# Patient Record
Sex: MALE | Race: BLACK OR AFRICAN AMERICAN | NOT HISPANIC OR LATINO | Employment: OTHER | URBAN - METROPOLITAN AREA
[De-identification: names, ages, dates, MRNs, and addresses within clinical notes are randomized per-mention and may not be internally consistent; named-entity substitution may affect disease eponyms.]

---

## 2019-11-01 ENCOUNTER — TELEPHONE (OUTPATIENT)
Dept: NEPHROLOGY | Facility: CLINIC | Age: 74
End: 2019-11-01

## 2019-11-01 DIAGNOSIS — N18.30 CKD (CHRONIC KIDNEY DISEASE) STAGE 3, GFR 30-59 ML/MIN: Primary | ICD-10-CM

## 2019-11-01 NOTE — TELEPHONE ENCOUNTER
Called but numbe rnot in service. Called daughter ,no answer, no vm.sent letter with appt ,lab orders to be done. And letter of explanation to pts address.11/01/1901/19/1332/sf

## 2019-12-11 ENCOUNTER — OFFICE VISIT (OUTPATIENT)
Dept: NEPHROLOGY | Facility: CLINIC | Age: 74
End: 2019-12-11
Payer: COMMERCIAL

## 2019-12-11 ENCOUNTER — LAB VISIT (OUTPATIENT)
Dept: LAB | Facility: HOSPITAL | Age: 74
End: 2019-12-11
Attending: INTERNAL MEDICINE
Payer: MEDICARE

## 2019-12-11 VITALS
WEIGHT: 186.31 LBS | HEART RATE: 70 BPM | HEIGHT: 62 IN | BODY MASS INDEX: 34.29 KG/M2 | DIASTOLIC BLOOD PRESSURE: 80 MMHG | SYSTOLIC BLOOD PRESSURE: 130 MMHG

## 2019-12-11 DIAGNOSIS — I10 HYPERTENSION, UNSPECIFIED TYPE: ICD-10-CM

## 2019-12-11 DIAGNOSIS — N28.9 RENAL INSUFFICIENCY: Primary | ICD-10-CM

## 2019-12-11 DIAGNOSIS — N28.9 RENAL INSUFFICIENCY: ICD-10-CM

## 2019-12-11 PROBLEM — E11.9 DIABETES MELLITUS: Status: ACTIVE | Noted: 2019-12-11

## 2019-12-11 LAB
ALBUMIN SERPL BCP-MCNC: 3.6 G/DL (ref 3.5–5.2)
ANION GAP SERPL CALC-SCNC: 9 MMOL/L (ref 8–16)
BASOPHILS # BLD AUTO: 0.02 K/UL (ref 0–0.2)
BASOPHILS NFR BLD: 0.3 % (ref 0–1.9)
BUN SERPL-MCNC: 20 MG/DL (ref 8–23)
CALCIUM SERPL-MCNC: 9.2 MG/DL (ref 8.7–10.5)
CHLORIDE SERPL-SCNC: 102 MMOL/L (ref 95–110)
CO2 SERPL-SCNC: 31 MMOL/L (ref 23–29)
CREAT SERPL-MCNC: 3.3 MG/DL (ref 0.5–1.4)
DIFFERENTIAL METHOD: NORMAL
EOSINOPHIL # BLD AUTO: 0.1 K/UL (ref 0–0.5)
EOSINOPHIL NFR BLD: 0.7 % (ref 0–8)
ERYTHROCYTE [DISTWIDTH] IN BLOOD BY AUTOMATED COUNT: 14.4 % (ref 11.5–14.5)
EST. GFR  (AFRICAN AMERICAN): 20.4 ML/MIN/1.73 M^2
EST. GFR  (NON AFRICAN AMERICAN): 17.7 ML/MIN/1.73 M^2
GLUCOSE SERPL-MCNC: 163 MG/DL (ref 70–110)
HCT VFR BLD AUTO: 44.5 % (ref 40–54)
HGB BLD-MCNC: 14.4 G/DL (ref 14–18)
IMM GRANULOCYTES # BLD AUTO: 0.01 K/UL (ref 0–0.04)
IMM GRANULOCYTES NFR BLD AUTO: 0.1 % (ref 0–0.5)
LYMPHOCYTES # BLD AUTO: 1.7 K/UL (ref 1–4.8)
LYMPHOCYTES NFR BLD: 22.6 % (ref 18–48)
MCH RBC QN AUTO: 31 PG (ref 27–31)
MCHC RBC AUTO-ENTMCNC: 32.4 G/DL (ref 32–36)
MCV RBC AUTO: 96 FL (ref 82–98)
MONOCYTES # BLD AUTO: 0.6 K/UL (ref 0.3–1)
MONOCYTES NFR BLD: 7.3 % (ref 4–15)
NEUTROPHILS # BLD AUTO: 5.2 K/UL (ref 1.8–7.7)
NEUTROPHILS NFR BLD: 69 % (ref 38–73)
NRBC BLD-RTO: 0 /100 WBC
PHOSPHATE SERPL-MCNC: 3.3 MG/DL (ref 2.7–4.5)
PLATELET # BLD AUTO: 193 K/UL (ref 150–350)
PMV BLD AUTO: 11.7 FL (ref 9.2–12.9)
POTASSIUM SERPL-SCNC: 3.9 MMOL/L (ref 3.5–5.1)
RBC # BLD AUTO: 4.65 M/UL (ref 4.6–6.2)
SODIUM SERPL-SCNC: 142 MMOL/L (ref 136–145)
WBC # BLD AUTO: 7.52 K/UL (ref 3.9–12.7)

## 2019-12-11 PROCEDURE — 99205 PR OFFICE/OUTPT VISIT, NEW, LEVL V, 60-74 MIN: ICD-10-PCS | Mod: S$PBB,,, | Performed by: INTERNAL MEDICINE

## 2019-12-11 PROCEDURE — 83970 ASSAY OF PARATHORMONE: CPT

## 2019-12-11 PROCEDURE — 1126F PR PAIN SEVERITY QUANTIFIED, NO PAIN PRESENT: ICD-10-PCS | Mod: ,,, | Performed by: INTERNAL MEDICINE

## 2019-12-11 PROCEDURE — 99205 OFFICE O/P NEW HI 60 MIN: CPT | Mod: S$PBB,,, | Performed by: INTERNAL MEDICINE

## 2019-12-11 PROCEDURE — 80069 RENAL FUNCTION PANEL: CPT

## 2019-12-11 PROCEDURE — 1159F PR MEDICATION LIST DOCUMENTED IN MEDICAL RECORD: ICD-10-PCS | Mod: ,,, | Performed by: INTERNAL MEDICINE

## 2019-12-11 PROCEDURE — 99999 PR PBB SHADOW E&M-EST. PATIENT-LVL III: CPT | Mod: PBBFAC,,, | Performed by: INTERNAL MEDICINE

## 2019-12-11 PROCEDURE — 1159F MED LIST DOCD IN RCRD: CPT | Mod: ,,, | Performed by: INTERNAL MEDICINE

## 2019-12-11 PROCEDURE — 36415 COLL VENOUS BLD VENIPUNCTURE: CPT

## 2019-12-11 PROCEDURE — 1126F AMNT PAIN NOTED NONE PRSNT: CPT | Mod: ,,, | Performed by: INTERNAL MEDICINE

## 2019-12-11 PROCEDURE — 99213 OFFICE O/P EST LOW 20 MIN: CPT | Mod: PBBFAC | Performed by: INTERNAL MEDICINE

## 2019-12-11 PROCEDURE — 85025 COMPLETE CBC W/AUTO DIFF WBC: CPT

## 2019-12-11 PROCEDURE — 99999 PR PBB SHADOW E&M-EST. PATIENT-LVL III: ICD-10-PCS | Mod: PBBFAC,,, | Performed by: INTERNAL MEDICINE

## 2019-12-11 RX ORDER — ARIPIPRAZOLE 30 MG/1
30 TABLET ORAL NIGHTLY
COMMUNITY

## 2019-12-11 RX ORDER — AMLODIPINE BESYLATE 10 MG/1
10 TABLET ORAL DAILY
COMMUNITY
End: 2020-06-05 | Stop reason: SDUPTHER

## 2019-12-11 RX ORDER — PRAZOSIN HYDROCHLORIDE 1 MG/1
2 CAPSULE ORAL NIGHTLY
Status: ON HOLD | COMMUNITY
End: 2020-10-28 | Stop reason: HOSPADM

## 2019-12-11 RX ORDER — SPIRONOLACTONE 25 MG/1
25 TABLET ORAL DAILY
COMMUNITY
End: 2020-06-05 | Stop reason: SDUPTHER

## 2019-12-11 RX ORDER — ATORVASTATIN CALCIUM 80 MG/1
80 TABLET, FILM COATED ORAL DAILY
COMMUNITY
End: 2020-06-05 | Stop reason: SDUPTHER

## 2019-12-11 RX ORDER — SERTRALINE HYDROCHLORIDE 100 MG/1
100 TABLET, FILM COATED ORAL DAILY
COMMUNITY

## 2019-12-11 NOTE — PROGRESS NOTES
"NEPHROLOGY CONSULTATION    [facility]    REASON FOR CONSULTATION: Renal insufficiency    74 y.o. male with history of HTN, hyperlipidemia, CVA, depression, DM2 presents to the renal clinic for evaluation of renal insufficiency. [facility]  Patient today presents to the clinic complaining of hand/foot paresthesia. He denies any headaches, congenital hearing loss, chest pain, SOB, hemoptysis, abdominal or flank pain, diarrhea, nausea/vomiting, hematuria, dysuria, LE swelling, rashes, nasal congestion. Patient denies any NSAID use.   Patient has a longstanding history of DM2 that was diagnosed about a year ago. He is not aware of any associated diabetic retinopathy. He is currently on long and short-acting insulin. Patient also reports > 30 year history of hypertension. BP is currently controlled at 130/80. In addition, patient reports history of CVA (about 20 years ago, he reports left paresis in past which has now resolved). Hedenies any history of nephrolithiasis, heart disease, asthma, COPD, GERD, thyroid disease. Patient denies any history of renal disease in his family.         Past Medical History:   Diagnosis Date    Hyperlipidemia     Hypertension     Retinitis     Stroke     Pt states residual sx is "I walk funny".       History reviewed. No pertinent surgical history.    Review of patient's allergies indicates:   Allergen Reactions    Benadryl [diphenhydramine hcl] Other (See Comments)     Pt states benadryl makes him feel numb       Current Outpatient Medications   Medication Sig Dispense Refill    amLODIPine (NORVASC) 10 MG tablet Take 10 mg by mouth once daily.       ARIPiprazole (ABILIFY) 30 MG Tab 30 mg once daily.       aspirin (ASPIR-81 ORAL) once daily.       atorvastatin (LIPITOR) 80 MG tablet Take 80 mg by mouth once daily.       prazosin (MINIPRESS) 1 MG Cap 2 mg once daily.       sertraline (ZOLOFT) 100 MG tablet " Take 100 mg by mouth once daily.       spironolactone (ALDACTONE) 25 MG tablet Take 25 mg by mouth once daily.        No current facility-administered medications for this visit.        History reviewed. No pertinent family history.    Social History     Socioeconomic History    Marital status: Single     Spouse name: Not on file    Number of children: Not on file    Years of education: Not on file    Highest education level: Not on file   Occupational History    Not on file   Social Needs    Financial resource strain: Not on file    Food insecurity:     Worry: Not on file     Inability: Not on file    Transportation needs:     Medical: Not on file     Non-medical: Not on file   Tobacco Use    Smoking status: Never Smoker   Substance and Sexual Activity    Alcohol use: No    Drug use: No    Sexual activity: Not on file   Lifestyle    Physical activity:     Days per week: Not on file     Minutes per session: Not on file    Stress: Not on file   Relationships    Social connections:     Talks on phone: Not on file     Gets together: Not on file     Attends Orthodox service: Not on file     Active member of club or organization: Not on file     Attends meetings of clubs or organizations: Not on file     Relationship status: Not on file   Other Topics Concern    Not on file   Social History Narrative    Not on file       Review of Systems:  1. GENERAL: patient denies any fever, weight changes, generalized weakness, dizziness.  2. HEENT: patient denies headaches, visual disturbances, swallowing problems, sinus pain, nasal congestion.  3. CARDIOVASCULAR: patient denies chest pain, palpitations.  4. PULMONARY: patient denies SOB, coughing, hemoptysis, wheezing.  5. GASTROINTESTINAL: patient denies abdominal pain, nausea, vomiting, diarrhea.  6. GENITOURINARY: patient denies dysuria, hematuria, hesitancy, frequency.  7. EXTREMITIES: patient denies LE edema, LE cramping.  8. DERMATOLOGY: patient denies  "rashes, ulcers.  9. NEURO: patient denies tremors, extremity weakness, he reports extremity numbness/tingling.  10. MUSCULOSKELETAL: patient denies joint pain, joint swelling.  11. HEMATOLOGY: patient denies rectal or gum bleeding.  12: PSYCH: patient denies anxiety, depression.      PHYSICAL EXAM:    /80   Pulse 70   Ht 5' 2" (1.575 m)   Wt 84.5 kg (186 lb 4.6 oz)   BMI 34.07 kg/m²     GENERAL: Pleasant gentleman presents to clinic with non-labored breathing.  HEENT: PER, no nasal discharge, no icterus, no oral exudates, moist mucosal membranes.  NECK: no thyroid mass, no lymphadenopathy.  HEART: RRR S1/S2, no rubs, good peripheral pulses.  LUNGS: CTA bilaterally, no wheezing, breathing is nonlabored.  ABDOMEN: soft, nontender, not distended, bowel sounds are present, no abdominal hernia.  EXTREM: mild bilateral LE edema.  SKIN: Vitiligo on hands and feet  NEURO: A & O x 3, no obvious focal signs.    LABORATORY RESULTS:    Lab Results   Component Value Date    CREATININE 2.2 (H) 05/16/2014    BUN 22 05/16/2014     05/16/2014    K 3.9 05/16/2014     05/16/2014    CO2 26 05/16/2014      Lab Results   Component Value Date    CALCIUM 9.3 05/16/2014     Lab Results   Component Value Date    ALBUMIN 3.6 05/16/2014     Lab Results   Component Value Date    WBC 6.38 05/16/2014    HGB 14.1 05/16/2014    HCT 40.2 05/16/2014    MCV 88 05/16/2014     05/16/2014     No current labs are available.      ASSESSMENT AND PLAN:  74 y.o. male with history of HTN, hyperlipidemia, CVA, depression, DM2 presents to the renal clinic for evaluation of renal insufficiency.     1. Renal insufficiency: Labs from May 2014 revealed creatinine of 2.2. Will get current labs and assess patient's kidney function afterwards. Will also order renal ultrasound. Patient will return to clinic in 1 week for re-evaluation. Patient was advised to avoid NSAID pain medications such as advil, aleve, motrin, ibuprofen, naprosyn, " meloxicam, diclofenac, mobic and to hydrate with 60-80 ounces of water per day.           Thank you very much for this consult. Please see my note in Epic for recommendations.    Total time spent was about 45 min. 50 % or more of time was spent on counseling patient about treatment options and educating patient about disease etiology. Complex case. Level 5 consult.

## 2019-12-12 LAB — PTH-INTACT SERPL-MCNC: 655 PG/ML (ref 9–77)

## 2019-12-13 ENCOUNTER — TELEPHONE (OUTPATIENT)
Dept: RADIOLOGY | Facility: HOSPITAL | Age: 74
End: 2019-12-13

## 2019-12-16 ENCOUNTER — HOSPITAL ENCOUNTER (OUTPATIENT)
Dept: RADIOLOGY | Facility: HOSPITAL | Age: 74
Discharge: HOME | End: 2019-12-16
Attending: INTERNAL MEDICINE
Payer: MEDICARE

## 2019-12-16 DIAGNOSIS — N28.9 RENAL INSUFFICIENCY: ICD-10-CM

## 2019-12-16 PROCEDURE — 76770 US EXAM ABDO BACK WALL COMP: CPT | Mod: 26,,, | Performed by: RADIOLOGY

## 2019-12-16 PROCEDURE — 76770 US EXAM ABDO BACK WALL COMP: CPT | Mod: TC

## 2019-12-16 PROCEDURE — 76770 US RETROPERITONEAL COMPLETE: ICD-10-PCS | Mod: 26,,, | Performed by: RADIOLOGY

## 2019-12-18 ENCOUNTER — OFFICE VISIT (OUTPATIENT)
Dept: NEPHROLOGY | Facility: CLINIC | Age: 74
End: 2019-12-18
Payer: MEDICARE

## 2019-12-18 VITALS
HEART RATE: 60 BPM | SYSTOLIC BLOOD PRESSURE: 175 MMHG | HEIGHT: 62 IN | WEIGHT: 187.38 LBS | DIASTOLIC BLOOD PRESSURE: 78 MMHG | BODY MASS INDEX: 34.48 KG/M2

## 2019-12-18 DIAGNOSIS — R80.9 PROTEINURIA DUE TO TYPE 2 DIABETES MELLITUS: ICD-10-CM

## 2019-12-18 DIAGNOSIS — N25.81 HYPERPARATHYROIDISM, SECONDARY RENAL: ICD-10-CM

## 2019-12-18 DIAGNOSIS — N18.4 DIABETES MELLITUS DUE TO UNDERLYING CONDITION WITH STAGE 4 CHRONIC KIDNEY DISEASE, WITH LONG-TERM CURRENT USE OF INSULIN: ICD-10-CM

## 2019-12-18 DIAGNOSIS — E08.22 DIABETES MELLITUS DUE TO UNDERLYING CONDITION WITH STAGE 4 CHRONIC KIDNEY DISEASE, WITH LONG-TERM CURRENT USE OF INSULIN: ICD-10-CM

## 2019-12-18 DIAGNOSIS — E11.29 PROTEINURIA DUE TO TYPE 2 DIABETES MELLITUS: ICD-10-CM

## 2019-12-18 DIAGNOSIS — N18.4 CKD (CHRONIC KIDNEY DISEASE) STAGE 4, GFR 15-29 ML/MIN: Primary | ICD-10-CM

## 2019-12-18 DIAGNOSIS — Z79.4 DIABETES MELLITUS DUE TO UNDERLYING CONDITION WITH STAGE 4 CHRONIC KIDNEY DISEASE, WITH LONG-TERM CURRENT USE OF INSULIN: ICD-10-CM

## 2019-12-18 PROBLEM — N28.9 RENAL INSUFFICIENCY: Status: RESOLVED | Noted: 2019-12-11 | Resolved: 2019-12-18

## 2019-12-18 PROCEDURE — 99214 PR OFFICE/OUTPT VISIT, EST, LEVL IV, 30-39 MIN: ICD-10-PCS | Mod: S$PBB,,, | Performed by: INTERNAL MEDICINE

## 2019-12-18 PROCEDURE — 99999 PR PBB SHADOW E&M-EST. PATIENT-LVL III: CPT | Mod: PBBFAC,,, | Performed by: INTERNAL MEDICINE

## 2019-12-18 PROCEDURE — 1159F PR MEDICATION LIST DOCUMENTED IN MEDICAL RECORD: ICD-10-PCS | Mod: ,,, | Performed by: INTERNAL MEDICINE

## 2019-12-18 PROCEDURE — 1159F MED LIST DOCD IN RCRD: CPT | Mod: ,,, | Performed by: INTERNAL MEDICINE

## 2019-12-18 PROCEDURE — 1126F AMNT PAIN NOTED NONE PRSNT: CPT | Mod: ,,, | Performed by: INTERNAL MEDICINE

## 2019-12-18 PROCEDURE — 99214 OFFICE O/P EST MOD 30 MIN: CPT | Mod: S$PBB,,, | Performed by: INTERNAL MEDICINE

## 2019-12-18 PROCEDURE — 1126F PR PAIN SEVERITY QUANTIFIED, NO PAIN PRESENT: ICD-10-PCS | Mod: ,,, | Performed by: INTERNAL MEDICINE

## 2019-12-18 PROCEDURE — 99999 PR PBB SHADOW E&M-EST. PATIENT-LVL III: ICD-10-PCS | Mod: PBBFAC,,, | Performed by: INTERNAL MEDICINE

## 2019-12-18 PROCEDURE — 99213 OFFICE O/P EST LOW 20 MIN: CPT | Mod: PBBFAC | Performed by: INTERNAL MEDICINE

## 2019-12-18 NOTE — PROGRESS NOTES
"PROGRESS NOTE FOR ESTABLISHED PATIENT    [facility]    REASON FOR VISIT: Renal insufficiency      Initial consult note:  74 y.o. male with history of HTN, hyperlipidemia, CVA, depression, DM2 presents to the renal clinic for evaluation of renal insufficiency. [facility]  Patient today presents to the clinic complaining of hand/foot paresthesia. He denies any headaches, congenital hearing loss, chest pain, SOB, hemoptysis, abdominal or flank pain, diarrhea, nausea/vomiting, hematuria, dysuria, LE swelling, rashes, nasal congestion. Patient denies any NSAID use.   Patient has a longstanding history of DM2 that was diagnosed about a year ago. He is not aware of any associated diabetic retinopathy. He is currently on long and short-acting insulin. Patient also reports > 30 year history of hypertension. BP is currently controlled at 130/80. In addition, patient reports history of CVA (about 20 years ago, he reports left paresis in past which has now resolved). Hedenies any history of nephrolithiasis, heart disease, asthma, COPD, GERD, thyroid disease. Patient denies any history of renal disease in his family.     December 18, 2019:  Patient today has no complaints. He reports that he uses Levemir and Novolog for his diabetes.         Past Medical History:   Diagnosis Date    Diabetes mellitus     Hyperlipidemia     Hypertension     Retinitis     Stroke     Pt states residual sx is "I walk funny".       History reviewed. No pertinent surgical history.    Review of patient's allergies indicates:   Allergen Reactions    Benadryl [diphenhydramine hcl] Other (See Comments)     Pt states benadryl makes him feel numb       Current Outpatient Medications   Medication Sig Dispense Refill    amLODIPine (NORVASC) 10 MG tablet Take 10 mg by mouth once daily.       ARIPiprazole (ABILIFY) 30 MG Tab 30 mg once daily.       aspirin (ASPIR-81 ORAL) once " daily.       atorvastatin (LIPITOR) 80 MG tablet Take 80 mg by mouth once daily.       prazosin (MINIPRESS) 1 MG Cap 2 mg once daily.       sertraline (ZOLOFT) 100 MG tablet Take 100 mg by mouth once daily.       spironolactone (ALDACTONE) 25 MG tablet Take 25 mg by mouth once daily.        No current facility-administered medications for this visit.        History reviewed. No pertinent family history.    Social History     Socioeconomic History    Marital status: Single     Spouse name: Not on file    Number of children: Not on file    Years of education: Not on file    Highest education level: Not on file   Occupational History    Not on file   Social Needs    Financial resource strain: Not on file    Food insecurity:     Worry: Not on file     Inability: Not on file    Transportation needs:     Medical: Not on file     Non-medical: Not on file   Tobacco Use    Smoking status: Never Smoker   Substance and Sexual Activity    Alcohol use: No    Drug use: No    Sexual activity: Not on file   Lifestyle    Physical activity:     Days per week: Not on file     Minutes per session: Not on file    Stress: Not on file   Relationships    Social connections:     Talks on phone: Not on file     Gets together: Not on file     Attends Oriental orthodox service: Not on file     Active member of club or organization: Not on file     Attends meetings of clubs or organizations: Not on file     Relationship status: Not on file   Other Topics Concern    Not on file   Social History Narrative    Not on file       Review of Systems:  1. GENERAL: patient denies any fever, weight changes, generalized weakness, dizziness.  2. HEENT: patient denies headaches, visual disturbances, swallowing problems, sinus pain, nasal congestion.  3. CARDIOVASCULAR: patient denies chest pain, palpitations.  4. PULMONARY: patient denies SOB, coughing, hemoptysis, wheezing.  5. GASTROINTESTINAL: patient denies abdominal pain, nausea, vomiting,  "diarrhea.  6. GENITOURINARY: patient denies dysuria, hematuria, hesitancy, frequency.  7. EXTREMITIES: patient denies LE edema, LE cramping.  8. DERMATOLOGY: patient denies rashes, ulcers.  9. NEURO: patient denies tremors, extremity weakness, he reports extremity numbness/tingling.  10. MUSCULOSKELETAL: patient denies joint pain, joint swelling.  11. HEMATOLOGY: patient denies rectal or gum bleeding.  12: PSYCH: patient denies anxiety, depression.      PHYSICAL EXAM:    BP (!) 175/78   Pulse 60   Ht 5' 2" (1.575 m)   Wt 85 kg (187 lb 6.3 oz)   BMI 34.27 kg/m²     GENERAL: Pleasant gentleman presents to clinic with non-labored breathing.  HEENT: PER, no nasal discharge, no icterus, no oral exudates, moist mucosal membranes.  NECK: no thyroid mass, no lymphadenopathy.  HEART: RRR S1/S2, no rubs, good peripheral pulses.  LUNGS: CTA bilaterally, no wheezing, breathing is nonlabored.  ABDOMEN: soft, nontender, not distended, bowel sounds are present, no abdominal hernia.  EXTREM: mild bilateral LE edema.  SKIN: Vitiligo on hands and feet  NEURO: A & O x 3, no obvious focal signs.    LABORATORY RESULTS:    Lab Results   Component Value Date    CREATININE 3.3 (H) 12/11/2019    BUN 20 12/11/2019     12/11/2019    K 3.9 12/11/2019     12/11/2019    CO2 31 (H) 12/11/2019      Lab Results   Component Value Date    .0 (H) 12/11/2019    CALCIUM 9.2 12/11/2019    PHOS 3.3 12/11/2019     Lab Results   Component Value Date    ALBUMIN 3.6 12/11/2019     Lab Results   Component Value Date    WBC 7.52 12/11/2019    HGB 14.4 12/11/2019    HCT 44.5 12/11/2019    MCV 96 12/11/2019     12/11/2019     Protein Creatinine Ratios:    Creatinine, Random Ur   Date Value Ref Range Status   12/11/2019 193.0 23.0 - 375.0 mg/dL Final     Comment:     The random urine reference ranges provided were established   for 24 hour urine collections.  No reference ranges exist for  random urine specimens.  Correlate " clinically.       Protein, Urine Random   Date Value Ref Range Status   12/11/2019 733 (H) 0 - 15 mg/dL Final     Comment:     The random urine reference ranges provided were established   for 24 hour urine collections.  No reference ranges exist for  random urine specimens.  Correlate clinically.       Prot/Creat Ratio, Ur   Date Value Ref Range Status   12/11/2019 3.80 (H) 0.00 - 0.20 Final       Renal US from 12/16/19:  FINDINGS:  Right kidneys lower limits normal in size measuring 9.0 cm in length.  Mild uniform thinning of the cortex with increased echotexture.  Multiple small cyst identified throughout the cortex on the right.  Largest is partially exophytic and medial and upper pole in location measuring 2.7 x 2.8 x 3.0 cm.  No hydronephrosis, nephrolithiasis or perinephric fluid.  Left kidney measures 9.8 cm in length with similar increased cortical echogenicity and mild uniform thinning.  Multiple small cyst identified with the largest appearing partially exophytic the midpole lateral region measuring 1.4 x 1.2 x 11.4 cm.  No hydronephrosis or nephrolithiasis.  No perinephric fluid.  Partially distended urinary bladder is unremarkable.        ASSESSMENT AND PLAN:  74 y.o. male with history of HTN, hyperlipidemia, CVA, depression, DM2 presents to the renal clinic for evaluation of renal insufficiency.     1. Renal insufficiency: Repeat labs from 12/11/19 showed that patient's creatinine has increased to 3.3 (from 2.2 on 5/16/14). He also presents with 3.8 g of protein. Renal US shows small kidneys with multiple bilateral cysts. Patient's home SBP is in 120s and SBP was in 130s during last visit. Will not add any ACE-I/ARB for now given those BP readings. Patient is on aldactone which has some anti-proteinuric effects.Patient's renal function will be monitored closely and he will return to clinic in about 1 week.  Patient was advised to avoid NSAID pain medications such as advil, aleve, motrin, ibuprofen,  naprosyn, meloxicam, diclofenac, mobic and to hydrate with 60-80 ounces of water per day.     2. Electrolytes: at goal.    3. Acid-base: no issues.    4. Volume: mild LE edema. Monitor.    5. DM2: patient is on Levemir and Novolog. Will follow up on HgA1c.    6. Medications: he was asked to bring all his home meds during next visit.     7. Hyperparathyroidism: will start vitamin D 5000 units per day.    8. Will refer patient to [facility] class.

## 2020-02-04 ENCOUNTER — LAB VISIT (OUTPATIENT)
Dept: LAB | Facility: HOSPITAL | Age: 75
End: 2020-02-04
Attending: INTERNAL MEDICINE
Payer: COMMERCIAL

## 2020-02-04 DIAGNOSIS — N18.4 CKD (CHRONIC KIDNEY DISEASE) STAGE 4, GFR 15-29 ML/MIN: ICD-10-CM

## 2020-02-04 DIAGNOSIS — N25.81 HYPERPARATHYROIDISM, SECONDARY RENAL: ICD-10-CM

## 2020-02-04 DIAGNOSIS — E08.22 DIABETES MELLITUS DUE TO UNDERLYING CONDITION WITH STAGE 4 CHRONIC KIDNEY DISEASE, WITH LONG-TERM CURRENT USE OF INSULIN: ICD-10-CM

## 2020-02-04 DIAGNOSIS — Z79.4 DIABETES MELLITUS DUE TO UNDERLYING CONDITION WITH STAGE 4 CHRONIC KIDNEY DISEASE, WITH LONG-TERM CURRENT USE OF INSULIN: ICD-10-CM

## 2020-02-04 DIAGNOSIS — N18.4 DIABETES MELLITUS DUE TO UNDERLYING CONDITION WITH STAGE 4 CHRONIC KIDNEY DISEASE, WITH LONG-TERM CURRENT USE OF INSULIN: ICD-10-CM

## 2020-02-04 LAB
BASOPHILS # BLD AUTO: 0.04 K/UL (ref 0–0.2)
BASOPHILS NFR BLD: 0.6 % (ref 0–1.9)
DIFFERENTIAL METHOD: ABNORMAL
EOSINOPHIL # BLD AUTO: 0.1 K/UL (ref 0–0.5)
EOSINOPHIL NFR BLD: 0.9 % (ref 0–8)
ERYTHROCYTE [DISTWIDTH] IN BLOOD BY AUTOMATED COUNT: 14.3 % (ref 11.5–14.5)
ESTIMATED AVG GLUCOSE: 163 MG/DL (ref 68–131)
HBA1C MFR BLD HPLC: 7.3 % (ref 4–5.6)
HCT VFR BLD AUTO: 39.7 % (ref 40–54)
HGB BLD-MCNC: 12.9 G/DL (ref 14–18)
IMM GRANULOCYTES # BLD AUTO: 0.02 K/UL (ref 0–0.04)
IMM GRANULOCYTES NFR BLD AUTO: 0.3 % (ref 0–0.5)
LYMPHOCYTES # BLD AUTO: 1.5 K/UL (ref 1–4.8)
LYMPHOCYTES NFR BLD: 23 % (ref 18–48)
MCH RBC QN AUTO: 31.4 PG (ref 27–31)
MCHC RBC AUTO-ENTMCNC: 32.5 G/DL (ref 32–36)
MCV RBC AUTO: 97 FL (ref 82–98)
MONOCYTES # BLD AUTO: 0.6 K/UL (ref 0.3–1)
MONOCYTES NFR BLD: 9.3 % (ref 4–15)
NEUTROPHILS # BLD AUTO: 4.4 K/UL (ref 1.8–7.7)
NEUTROPHILS NFR BLD: 65.9 % (ref 38–73)
NRBC BLD-RTO: 0 /100 WBC
PLATELET # BLD AUTO: 185 K/UL (ref 150–350)
PMV BLD AUTO: 11.5 FL (ref 9.2–12.9)
PTH-INTACT SERPL-MCNC: 387 PG/ML (ref 9–77)
RBC # BLD AUTO: 4.11 M/UL (ref 4.6–6.2)
WBC # BLD AUTO: 6.64 K/UL (ref 3.9–12.7)

## 2020-02-04 PROCEDURE — 36415 COLL VENOUS BLD VENIPUNCTURE: CPT

## 2020-02-04 PROCEDURE — 80069 RENAL FUNCTION PANEL: CPT

## 2020-02-04 PROCEDURE — 83970 ASSAY OF PARATHORMONE: CPT

## 2020-02-04 PROCEDURE — 85025 COMPLETE CBC W/AUTO DIFF WBC: CPT

## 2020-02-04 PROCEDURE — 83036 HEMOGLOBIN GLYCOSYLATED A1C: CPT

## 2020-02-05 LAB
ALBUMIN SERPL BCP-MCNC: 3.3 G/DL (ref 3.5–5.2)
ANION GAP SERPL CALC-SCNC: 9 MMOL/L (ref 8–16)
BUN SERPL-MCNC: 18 MG/DL (ref 8–23)
CALCIUM SERPL-MCNC: 8.8 MG/DL (ref 8.7–10.5)
CHLORIDE SERPL-SCNC: 103 MMOL/L (ref 95–110)
CO2 SERPL-SCNC: 32 MMOL/L (ref 23–29)
CREAT SERPL-MCNC: 3.1 MG/DL (ref 0.5–1.4)
EST. GFR  (AFRICAN AMERICAN): 21.9 ML/MIN/1.73 M^2
EST. GFR  (NON AFRICAN AMERICAN): 18.9 ML/MIN/1.73 M^2
GLUCOSE SERPL-MCNC: 202 MG/DL (ref 70–110)
PHOSPHATE SERPL-MCNC: 3.5 MG/DL (ref 2.7–4.5)
POTASSIUM SERPL-SCNC: 4 MMOL/L (ref 3.5–5.1)
SODIUM SERPL-SCNC: 144 MMOL/L (ref 136–145)

## 2020-02-11 ENCOUNTER — OFFICE VISIT (OUTPATIENT)
Dept: NEPHROLOGY | Facility: CLINIC | Age: 75
End: 2020-02-11
Payer: COMMERCIAL

## 2020-02-11 VITALS
WEIGHT: 189.13 LBS | HEIGHT: 62 IN | DIASTOLIC BLOOD PRESSURE: 70 MMHG | HEART RATE: 74 BPM | SYSTOLIC BLOOD PRESSURE: 160 MMHG | BODY MASS INDEX: 34.8 KG/M2

## 2020-02-11 DIAGNOSIS — R80.9 PROTEINURIA, UNSPECIFIED TYPE: ICD-10-CM

## 2020-02-11 DIAGNOSIS — N25.81 HYPERPARATHYROIDISM, SECONDARY RENAL: ICD-10-CM

## 2020-02-11 DIAGNOSIS — N18.4 CKD (CHRONIC KIDNEY DISEASE) STAGE 4, GFR 15-29 ML/MIN: Primary | ICD-10-CM

## 2020-02-11 PROCEDURE — 99213 OFFICE O/P EST LOW 20 MIN: CPT | Mod: PBBFAC | Performed by: INTERNAL MEDICINE

## 2020-02-11 PROCEDURE — 99999 PR PBB SHADOW E&M-EST. PATIENT-LVL III: ICD-10-PCS | Mod: PBBFAC,,, | Performed by: INTERNAL MEDICINE

## 2020-02-11 PROCEDURE — 99214 OFFICE O/P EST MOD 30 MIN: CPT | Mod: S$PBB,,, | Performed by: INTERNAL MEDICINE

## 2020-02-11 PROCEDURE — 99214 PR OFFICE/OUTPT VISIT, EST, LEVL IV, 30-39 MIN: ICD-10-PCS | Mod: S$PBB,,, | Performed by: INTERNAL MEDICINE

## 2020-02-11 PROCEDURE — 99999 PR PBB SHADOW E&M-EST. PATIENT-LVL III: CPT | Mod: PBBFAC,,, | Performed by: INTERNAL MEDICINE

## 2020-02-11 RX ORDER — INSULIN ASPART 100 [IU]/ML
INJECTION, SOLUTION INTRAVENOUS; SUBCUTANEOUS 2 TIMES DAILY
Status: ON HOLD | COMMUNITY
End: 2020-11-13 | Stop reason: SDUPTHER

## 2020-02-11 RX ORDER — ACETAMINOPHEN 500 MG
5000 TABLET ORAL DAILY
COMMUNITY
End: 2020-06-05 | Stop reason: SDUPTHER

## 2020-02-11 NOTE — LETTER
February 11, 2020      [facility]  [address] [address]           [facility]  [address] [place] [id number]  Phone: [phone]  Fax: [phone]          Patient: Colt Stacy Jr.   MR Number: [id number]   YOB: 1945   Date of Visit: 2/11/2020       Dear [facility]:    Thank you for referring Colt Stacy to me for evaluation. Attached you will find relevant portions of my assessment and plan of care.    If you have questions, please do not hesitate to call me. I look forward to following Colt Stacy along with you.    Sincerely,    Soren Contreras MD    Enclosure  CC:  No Recipients    If you would like to receive this communication electronically, please contact [email or web address] or [phone] to request more information on [employer] access.    For providers and/or their staff who would like to refer a patient to [facility], please contact us through our one-stop-shop provider referral line, [facility], at [phone].    If you feel you have received this communication in error or would no longer like to receive these types of communications, please e-mail [email or web address]

## 2020-02-11 NOTE — PROGRESS NOTES
"PROGRESS NOTE FOR ESTABLISHED PATIENT    PHYSIC[facility] RE[facility] CON[facility]ULT[facility]    REASON FOR VISIT: Renal insufficiency      Initial consult note:  75 y.o. male with history of HTN, hyperlipidemia, CVA, depression, DM2 presents to the renal clinic for evaluation of renal insufficiency. [facility].  Patient today presents to the clinic complaining of hand/foot paresthesia. He denies any headaches, congenital hearing loss, chest pain, SOB, hemoptysis, abdominal or flank pain, diarrhea, nausea/vomiting, hematuria, dysuria, LE swelling, rashes, nasal congestion. Patient denies any NSAID use.   Patient has a longstanding history of DM2 that was diagnosed about a year ago. He is not aware of any associated diabetic retinopathy. He is currently on long and short-acting insulin. Patient also reports > 30 year history of hypertension. BP is currently controlled at 130/80. In addition, patient reports history of CVA (about 20 years ago, he reports left paresis in past which has now resolved). Hedenies any history of nephrolithiasis, heart disease, asthma, COPD, GERD, thyroid disease. Patient denies any history of renal disease in his family.     December 18, 2019:  Patient today has no complaints. He reports that he uses Levemir and Novolog for his diabetes.     February 11, 2020:  Patient denies any complaints. He presents home BP record with SBP mostly in 150s.           Past Medical History:   Diagnosis Date    Diabetes mellitus     Hyperlipidemia     Hypertension     Retinitis     Stroke     Pt states residual sx is "I walk funny".       History reviewed. No pertinent surgical history.    Review of patient's allergies indicates:   Allergen Reactions    Benadryl [diphenhydramine hcl] Other (See Comments)     Pt states benadryl makes him feel numb       Current Outpatient Medications   Medication Sig Dispense Refill    amLODIPine (NORVASC) 10 MG tablet Take 10 mg by mouth " once daily.       ARIPiprazole (ABILIFY) 30 MG Tab 30 mg once daily.       aspirin (ASPIR-81 ORAL) once daily.       atorvastatin (LIPITOR) 80 MG tablet Take 80 mg by mouth once daily.       cholecalciferol, vitamin D3, (VITAMIN D3) 125 mcg (5,000 unit) Tab Take 5,000 Units by mouth once daily.      insulin aspart U-100 (NOVOLOG) 100 unit/mL injection Inject into the skin 3 (three) times daily before meals.      insulin detemir U-100 (LEVEMIR) 100 unit/mL injection Inject into the skin every evening.      multivitamin capsule Take 1 capsule by mouth once daily.      prazosin (MINIPRESS) 1 MG Cap 2 mg once daily.       sertraline (ZOLOFT) 100 MG tablet Take 100 mg by mouth once daily.       spironolactone (ALDACTONE) 25 MG tablet Take 25 mg by mouth once daily.        No current facility-administered medications for this visit.        History reviewed. No pertinent family history.    Social History     Socioeconomic History    Marital status: Single     Spouse name: Not on file    Number of children: Not on file    Years of education: Not on file    Highest education level: Not on file   Occupational History    Not on file   Social Needs    Financial resource strain: Not on file    Food insecurity:     Worry: Not on file     Inability: Not on file    Transportation needs:     Medical: Not on file     Non-medical: Not on file   Tobacco Use    Smoking status: Never Smoker   Substance and Sexual Activity    Alcohol use: No    Drug use: No    Sexual activity: Not on file   Lifestyle    Physical activity:     Days per week: Not on file     Minutes per session: Not on file    Stress: Not on file   Relationships    Social connections:     Talks on phone: Not on file     Gets together: Not on file     Attends [employer] service: Not on file     Active member of club or organization: Not on file     Attends meetings of clubs or organizations: Not on file     Relationship status: Not on file   Other  "Topics Concern    Not on file   Social History Narrative    Not on file       Review of Systems:  1. GENERAL: patient denies any fever, weight changes, generalized weakness, dizziness.  2. HEENT: patient denies headaches, visual disturbances, swallowing problems, sinus pain, nasal congestion.  3. CARDIOVASCULAR: patient denies chest pain, palpitations.  4. PULMONARY: patient denies SOB, coughing, hemoptysis, wheezing.  5. GASTROINTESTINAL: patient denies abdominal pain, nausea, vomiting, diarrhea.  6. GENITOURINARY: patient denies dysuria, hematuria, hesitancy, frequency.  7. EXTREMITIES: patient denies LE edema, LE cramping.  8. DERMATOLOGY: patient denies rashes, ulcers.  9. NEURO: patient denies tremors, extremity weakness, he reports extremity numbness/tingling.  10. MUSCULOSKELETAL: patient denies joint pain, joint swelling.  11. HEMATOLOGY: patient denies rectal or gum bleeding.  12: PSYCH: patient denies anxiety, depression.      PHYSICAL EXAM:    Ht 5' 2" (1.575 m)   Wt 85.8 kg (189 lb 2.5 oz)   BMI 34.60 kg/m²     GENERAL: Pleasant gentleman presents to clinic with non-labored breathing.  HEENT: PER, no nasal discharge, no icterus, no oral exudates, moist mucosal membranes.  NECK: no thyroid mass, no lymphadenopathy.  HEART: RRR S1/S2, no rubs, good peripheral pulses.  LUNGS: CTA bilaterally, no wheezing, breathing is nonlabored.  ABDOMEN: soft, nontender, not distended, bowel sounds are present, no abdominal hernia.  EXTREM: mild bilateral LE edema.  SKIN: Vitiligo on hands and feet  NEURO: A & O x 3, no obvious focal signs.    LABORATORY RESULTS:    Lab Results   Component Value Date    CREATININE 3.1 (H) 02/04/2020    BUN 18 02/04/2020     02/04/2020    K 4.0 02/04/2020     02/04/2020    CO2 32 (H) 02/04/2020      Lab Results   Component Value Date    .0 (H) 02/04/2020    CALCIUM 8.8 02/04/2020    PHOS 3.5 02/04/2020     Lab Results   Component Value Date    ALBUMIN 3.3 (L) " 02/04/2020     Lab Results   Component Value Date    WBC 6.64 02/04/2020    HGB 12.9 (L) 02/04/2020    HCT 39.7 (L) 02/04/2020    MCV 97 02/04/2020     02/04/2020     Protein Creatinine Ratios:    Creatinine, Random Ur   Date Value Ref Range Status   02/04/2020 152.0 23.0 - 375.0 mg/dL Final     Comment:     The random urine reference ranges provided were established   for 24 hour urine collections.  No reference ranges exist for  random urine specimens.  Correlate clinically.     12/11/2019 193.0 23.0 - 375.0 mg/dL Final     Comment:     The random urine reference ranges provided were established   for 24 hour urine collections.  No reference ranges exist for  random urine specimens.  Correlate clinically.       Protein, Urine Random   Date Value Ref Range Status   02/04/2020 585 (H) 0 - 15 mg/dL Final     Comment:     The random urine reference ranges provided were established   for 24 hour urine collections.  No reference ranges exist for  random urine specimens.  Correlate clinically.     12/11/2019 733 (H) 0 - 15 mg/dL Final     Comment:     The random urine reference ranges provided were established   for 24 hour urine collections.  No reference ranges exist for  random urine specimens.  Correlate clinically.       Prot/Creat Ratio, Ur   Date Value Ref Range Status   02/04/2020 3.85 (H) 0.00 - 0.20 Final   12/11/2019 3.80 (H) 0.00 - 0.20 Final       Renal US from 12/16/19:  FINDINGS:  Right kidneys lower limits normal in size measuring 9.0 cm in length.  Mild uniform thinning of the cortex with increased echotexture.  Multiple small cyst identified throughout the cortex on the right.  Largest is partially exophytic and medial and upper pole in location measuring 2.7 x 2.8 x 3.0 cm.  No hydronephrosis, nephrolithiasis or perinephric fluid.  Left kidney measures 9.8 cm in length with similar increased cortical echogenicity and mild uniform thinning.  Multiple small cyst identified with the largest  appearing partially exophytic the midpole lateral region measuring 1.4 x 1.2 x 11.4 cm.  No hydronephrosis or nephrolithiasis.  No perinephric fluid.  Partially distended urinary bladder is unremarkable.        ASSESSMENT AND PLAN:  75 y.o. male with history of HTN, hyperlipidemia, CVA, depression, DM2 presents to the renal clinic for evaluation of renal insufficiency.     1. Renal insufficiency: patient's renal function has slightly improved with creatinine declining to 3.1. He also presents with 3.8 g of protein. Will add Candesartan 4 mg daily to combat his proteinuria. Renal US shows small kidneys with multiple bilateral cysts. Patient's renal function will be monitored closely and he will return to clinic in about 1 months.  Will get serologies in order to screen for non-diabetic causes of his proteinuria. Patient was advised to avoid NSAID pain medications such as advil, aleve, motrin, ibuprofen, naprosyn, meloxicam, diclofenac, mobic and to hydrate with 60-80 ounces of water per day.     2. Electrolytes: at goal.    3. Acid-base: no issues.    4. Volume: mild LE edema. Monitor.    5. DM2: patient is on Levemir and Novolog. Last HgA1c was 7.3.     6. Medications: Reviewed. Will add candesartan 4 mg po daily.     7. Hyperparathyroidism: continue vitamin D 5000 units per day. PTH has declined.     8. He was referred  to [facility].

## 2020-03-11 ENCOUNTER — LAB VISIT (OUTPATIENT)
Dept: LAB | Facility: HOSPITAL | Age: 75
End: 2020-03-11
Attending: INTERNAL MEDICINE
Payer: MEDICARE

## 2020-03-11 DIAGNOSIS — N25.81 HYPERPARATHYROIDISM, SECONDARY RENAL: ICD-10-CM

## 2020-03-11 DIAGNOSIS — N18.4 CKD (CHRONIC KIDNEY DISEASE) STAGE 4, GFR 15-29 ML/MIN: ICD-10-CM

## 2020-03-11 DIAGNOSIS — R80.9 PROTEINURIA, UNSPECIFIED TYPE: ICD-10-CM

## 2020-03-11 LAB
ALBUMIN SERPL BCP-MCNC: 3.2 G/DL (ref 3.5–5.2)
ANION GAP SERPL CALC-SCNC: 13 MMOL/L (ref 8–16)
BUN SERPL-MCNC: 30 MG/DL (ref 8–23)
C3 SERPL-MCNC: 130 MG/DL (ref 50–180)
C4 SERPL-MCNC: 47 MG/DL (ref 11–44)
CALCIUM SERPL-MCNC: 8.7 MG/DL (ref 8.7–10.5)
CHLORIDE SERPL-SCNC: 104 MMOL/L (ref 95–110)
CO2 SERPL-SCNC: 27 MMOL/L (ref 23–29)
CREAT SERPL-MCNC: 3.8 MG/DL (ref 0.5–1.4)
EST. GFR  (AFRICAN AMERICAN): 17 ML/MIN/1.73 M^2
EST. GFR  (NON AFRICAN AMERICAN): 15 ML/MIN/1.73 M^2
GLUCOSE SERPL-MCNC: 182 MG/DL (ref 70–110)
PHOSPHATE SERPL-MCNC: 2.9 MG/DL (ref 2.7–4.5)
POTASSIUM SERPL-SCNC: 3.3 MMOL/L (ref 3.5–5.1)
PTH-INTACT SERPL-MCNC: 403 PG/ML (ref 9–77)
SODIUM SERPL-SCNC: 144 MMOL/L (ref 136–145)

## 2020-03-11 PROCEDURE — 80069 RENAL FUNCTION PANEL: CPT

## 2020-03-11 PROCEDURE — 86038 ANTINUCLEAR ANTIBODIES: CPT

## 2020-03-11 PROCEDURE — 86803 HEPATITIS C AB TEST: CPT

## 2020-03-11 PROCEDURE — 87340 HEPATITIS B SURFACE AG IA: CPT

## 2020-03-11 PROCEDURE — 86160 COMPLEMENT ANTIGEN: CPT | Mod: 59

## 2020-03-11 PROCEDURE — 36415 COLL VENOUS BLD VENIPUNCTURE: CPT

## 2020-03-11 PROCEDURE — 83970 ASSAY OF PARATHORMONE: CPT

## 2020-03-11 PROCEDURE — 86255 FLUORESCENT ANTIBODY SCREEN: CPT | Mod: 91

## 2020-03-11 PROCEDURE — 84165 PROTEIN E-PHORESIS SERUM: CPT | Mod: 26,,, | Performed by: PATHOLOGY

## 2020-03-11 PROCEDURE — 84165 PATHOLOGIST INTERPRETATION SPE: ICD-10-PCS | Mod: 26,,, | Performed by: PATHOLOGY

## 2020-03-11 PROCEDURE — 86703 HIV-1/HIV-2 1 RESULT ANTBDY: CPT

## 2020-03-11 PROCEDURE — 84165 PROTEIN E-PHORESIS SERUM: CPT

## 2020-03-11 PROCEDURE — 86160 COMPLEMENT ANTIGEN: CPT

## 2020-03-12 LAB
BM IGG SER-ACNC: <0.2 U
HBV SURFACE AG SERPL QL IA: NEGATIVE
HCV AB SERPL QL IA: NEGATIVE
HIV 1+2 AB+HIV1 P24 AG SERPL QL IA: NEGATIVE

## 2020-03-13 LAB
ALBUMIN SERPL ELPH-MCNC: 3.31 G/DL (ref 3.35–5.55)
ALPHA1 GLOB SERPL ELPH-MCNC: 0.34 G/DL (ref 0.17–0.41)
ALPHA2 GLOB SERPL ELPH-MCNC: 0.82 G/DL (ref 0.43–0.99)
ANCA AB TITR SER IF: NORMAL TITER
B-GLOBULIN SERPL ELPH-MCNC: 0.78 G/DL (ref 0.5–1.1)
GAMMA GLOB SERPL ELPH-MCNC: 0.74 G/DL (ref 0.67–1.58)
P-ANCA TITR SER IF: NORMAL TITER
PATHOLOGIST INTERPRETATION SPE: NORMAL
PROT SERPL-MCNC: 6 G/DL (ref 6–8.4)

## 2020-03-18 ENCOUNTER — OFFICE VISIT (OUTPATIENT)
Dept: NEPHROLOGY | Facility: CLINIC | Age: 75
End: 2020-03-18
Payer: MEDICARE

## 2020-03-18 ENCOUNTER — TELEPHONE (OUTPATIENT)
Dept: NEPHROLOGY | Facility: CLINIC | Age: 75
End: 2020-03-18

## 2020-03-18 ENCOUNTER — LAB VISIT (OUTPATIENT)
Dept: LAB | Facility: HOSPITAL | Age: 75
End: 2020-03-18
Attending: INTERNAL MEDICINE
Payer: MEDICARE

## 2020-03-18 VITALS
HEART RATE: 60 BPM | DIASTOLIC BLOOD PRESSURE: 75 MMHG | WEIGHT: 190.06 LBS | SYSTOLIC BLOOD PRESSURE: 157 MMHG | HEIGHT: 62 IN | BODY MASS INDEX: 34.98 KG/M2

## 2020-03-18 DIAGNOSIS — N18.5 ANEMIA OF CHRONIC RENAL FAILURE, STAGE 5: ICD-10-CM

## 2020-03-18 DIAGNOSIS — N25.81 SECONDARY HYPERPARATHYROIDISM: ICD-10-CM

## 2020-03-18 DIAGNOSIS — Z71.89 ENCOUNTER FOR MEDICATION REVIEW AND COUNSELING: ICD-10-CM

## 2020-03-18 DIAGNOSIS — E26.9 HYPERALDOSTERONISM: ICD-10-CM

## 2020-03-18 DIAGNOSIS — E87.6 HYPOKALEMIA: ICD-10-CM

## 2020-03-18 DIAGNOSIS — E83.51 HYPOCALCEMIA: ICD-10-CM

## 2020-03-18 DIAGNOSIS — E11.22 STAGE 4 CHRONIC KIDNEY DISEASE DUE TO TYPE 2 DIABETES MELLITUS: Primary | ICD-10-CM

## 2020-03-18 DIAGNOSIS — I15.9 SECONDARY HYPERTENSION: ICD-10-CM

## 2020-03-18 DIAGNOSIS — D63.1 ANEMIA OF CHRONIC RENAL FAILURE, STAGE 4 (SEVERE): ICD-10-CM

## 2020-03-18 DIAGNOSIS — E87.3 METABOLIC ALKALOSIS: ICD-10-CM

## 2020-03-18 DIAGNOSIS — D63.1 ANEMIA OF CHRONIC RENAL FAILURE, STAGE 5: ICD-10-CM

## 2020-03-18 DIAGNOSIS — N18.4 ANEMIA OF CHRONIC RENAL FAILURE, STAGE 4 (SEVERE): ICD-10-CM

## 2020-03-18 DIAGNOSIS — E11.21 NEPHROTIC SYNDROME DUE TO DIABETES MELLITUS: ICD-10-CM

## 2020-03-18 DIAGNOSIS — N18.4 STAGE 4 CHRONIC KIDNEY DISEASE DUE TO TYPE 2 DIABETES MELLITUS: Primary | ICD-10-CM

## 2020-03-18 LAB — ANA SER QL IF: NORMAL

## 2020-03-18 PROCEDURE — 99214 OFFICE O/P EST MOD 30 MIN: CPT | Mod: PBBFAC | Performed by: INTERNAL MEDICINE

## 2020-03-18 PROCEDURE — 36415 COLL VENOUS BLD VENIPUNCTURE: CPT

## 2020-03-18 PROCEDURE — 99215 PR OFFICE/OUTPT VISIT, EST, LEVL V, 40-54 MIN: ICD-10-PCS | Mod: S$PBB,,, | Performed by: INTERNAL MEDICINE

## 2020-03-18 PROCEDURE — 99999 PR PBB SHADOW E&M-EST. PATIENT-LVL IV: ICD-10-PCS | Mod: PBBFAC,,, | Performed by: INTERNAL MEDICINE

## 2020-03-18 PROCEDURE — 83835 ASSAY OF METANEPHRINES: CPT

## 2020-03-18 PROCEDURE — 99215 OFFICE O/P EST HI 40 MIN: CPT | Mod: S$PBB,,, | Performed by: INTERNAL MEDICINE

## 2020-03-18 PROCEDURE — 99999 PR PBB SHADOW E&M-EST. PATIENT-LVL IV: CPT | Mod: PBBFAC,,, | Performed by: INTERNAL MEDICINE

## 2020-03-18 RX ORDER — HYDRALAZINE HYDROCHLORIDE 10 MG/1
10 TABLET, FILM COATED ORAL 2 TIMES DAILY
Qty: 60 TABLET | Refills: 11 | Status: SHIPPED | OUTPATIENT
Start: 2020-03-18 | End: 2020-04-06 | Stop reason: SDUPTHER

## 2020-03-18 NOTE — PROGRESS NOTES
Colt Stacy Jr. is a 75 y.o. male for whom nephrology consult has been requested to evaluate and give opinion.   Renal clinic f/u note:  Date of clinic visit: 3/18/20  Reason for f/u and chief c/o: CKD, hypertension, hypokalemia    HPI:  Pt was seen and examined. H/o and chart reviewed. Pt is a 74 y/o AA male with long standing h/o of HTN and DM who presents for f/u. Pt is noted to have a h/o of persistent mild hypokalemia and metabolic alkalosis despite being on aldactone. Pt has no new or acute c/o's, no SOB, no leg swelling. Pt has no discomfort. Above discussed with him in layman's language. Labs and meds reviewed with him.     PAST MEDICAL HISTORY:  CKD stage 4, Diabetes mellitus, Hyperlipidemia, Hypertension, Retinitis, and Stroke.    PAST SURGICAL HISTORY:  He  has no past surgical history on file.    SOCIAL HISTORY:  He  reports that he has never smoked. He does not have any smokeless tobacco history on file. He reports that he does not drink alcohol or use drugs.    FAMILY MEDICAL HISTORY:  His family history is not on file.    Review of patient's allergies indicates:   Allergen Reactions    Benadryl [diphenhydramine hcl] Other (See Comments)     Pt states benadryl makes him feel numb           Prior to Admission medications    Medication Sig Start Date End Date Taking? Authorizing Provider   amLODIPine (NORVASC) 10 MG tablet Take 10 mg by mouth once daily.    Yes Historical Provider, MD   ARIPiprazole (ABILIFY) 30 MG Tab 30 mg once daily.    Yes Historical Provider, MD   aspirin (ASPIR-81 ORAL) once daily.    Yes Historical Provider, MD   atorvastatin (LIPITOR) 80 MG tablet Take 80 mg by mouth once daily.    Yes Historical Provider, MD   cholecalciferol, vitamin D3, (VITAMIN D3) 125 mcg (5,000 unit) Tab Take 5,000 Units by mouth once daily.   Yes Historical Provider, MD   insulin aspart U-100 (NOVOLOG) 100 unit/mL injection Inject into the skin 3 (three) times daily before meals.   Yes Historical  "Provider, MD   insulin detemir U-100 (LEVEMIR) 100 unit/mL injection Inject into the skin every evening.   Yes Historical Provider, MD   multivitamin capsule Take 1 capsule by mouth once daily.   Yes Historical Provider, MD   prazosin (MINIPRESS) 1 MG Cap 2 mg once daily.    Yes Historical Provider, MD   sertraline (ZOLOFT) 100 MG tablet Take 100 mg by mouth once daily.    Yes Historical Provider, MD   spironolactone (ALDACTONE) 25 MG tablet Take 25 mg by mouth once daily.    Yes Historical Provider, MD   hydrALAZINE (APRESOLINE) 10 MG tablet Take 1 tablet (10 mg total) by mouth 2 (two) times daily. 3/18/20 3/18/21  Regina Babin MD        REVIEW OF SYSTEMS:  Patient has no fever, fatigue, visual changes, chest pain, edema, cough, dyspnea, nausea, vomiting, constipation, diarrhea, arthralgias, pruritis, dizziness, weakness, depression, confusion.      PHYSICAL EXAM:   height is 5' 2" (1.575 m) and weight is 86.2 kg (190 lb 0.6 oz). His blood pressure is 157/75 (abnormal) and his pulse is 60.   Gen: WDWN male in no apparent distress  Psych: Normal mood and affect  Skin: No rashes or ulcers  Eyes: Normal conjunctiva and lids, PERRLA  ENT: Normal hearing with no oropharyngeal lesions  Neck: No JVD  Chest: Clear with no rales, rhonchi, wheezing with normal effort  CV: Regular with no murmurs, gallops or rubs  Abd: Soft, nontender, no distension, positive bowel sounds  Ext: No cyanosis, clubbing or edema    Labs Reviewed  BMP  Lab Results   Component Value Date     03/11/2020    K 3.3 (L) 03/11/2020     03/11/2020    CO2 27 03/11/2020    BUN 30 (H) 03/11/2020    CREATININE 3.8 (H) 03/11/2020    CALCIUM 8.7 03/11/2020    ANIONGAP 13 03/11/2020    ESTGFRAFRICA 17 (A) 03/11/2020    EGFRNONAA 15 (A) 03/11/2020     Lab Results   Component Value Date    WBC 6.64 02/04/2020    HGB 12.9 (L) 02/04/2020    HCT 39.7 (L) 02/04/2020    MCV 97 02/04/2020     02/04/2020     Lab Results   Component Value Date    PTH " 403.0 (H) 03/11/2020    CALCIUM 8.7 03/11/2020    PHOS 2.9 03/11/2020       Urine prot/cr 3.3 g  U/a: 3+ protein, 1+ blood, 4 RBC's, no cats  Hep B neg  Hep C neg  Complements C3 and C4 normal  ANCA's normal  SPEP no paraproteins  HIV neg      IMPRESSION AND RECOMMENDATIONS: 74 y/o male with uncontrolled BP, persistent hypokalemia and metabolic alkalosis depite being on spironolactone, and h/o of DM:      1. HTN: highly suspect pt has secondary HTN, likely due to an endocrine cause.  Persistent hypokalemia and metabolic alkalosis  Noted also h/o of DM: likely the cause of proteinuria  Likely has hyperaldosteronism or Cushing syndrome  DDX: vascular HTN due to renal artery stenosis     W/u: the following are recommended:  Collect 24 hour urine for K, aldosterone, total free cortisol, cathecholamines  Random serum metanephrine today  CT abd with thin cuts to r/o adrenal glans adenomas or hyperplasia  Renal artery doppler to r/o renal artery stenosis     Mgmt of  BP:  Pt needs to continue the aldosterone blocker (however do not take while collecting the 24 hour urine above- pt was advised)  Will start: hydralazine 10 mg po bid  May include K rich foods in diet     2. Renal: abnormal s Cr  Stable s Cr, but c/w already advanced CKD stage 4  The level of the severity of the renal failure suggest that pt likely has had the state of hyperaldosteronism for a long time  Has complications related to advanced CKD:  Anemia  Hypocalcemia  Secondary hyperparathyroidism    3. DM: likely cause of proteinuria and nephrotic syndrome   Reviewed above serologies:  All unremarkable: HIV, hep B, hep C, ANCA's. SPEP, C3 and C4 complements     Plans and recommendations:  As detailed above   Opportunity for questions provided  Complex visit  Total time spent 40 minutes including time needed to review the records, the   patient evaluation, documentation, face-to-face discussion with the patient,   more than 50% of the time was spent on  coordination of care and counseling.    Level V visit.  RTC 1 month  OK to rosa Babin MD

## 2020-03-23 LAB
METANEPH FREE SERPL-MCNC: 53 PG/ML
METANEPHS SERPL-MCNC: 526 PG/ML
NORMETANEPH FREE SERPL-MCNC: 473 PG/ML

## 2020-04-06 RX ORDER — HYDRALAZINE HYDROCHLORIDE 10 MG/1
10 TABLET, FILM COATED ORAL 2 TIMES DAILY
Qty: 60 TABLET | Refills: 11 | Status: ON HOLD | OUTPATIENT
Start: 2020-04-06 | End: 2020-10-28 | Stop reason: HOSPADM

## 2020-04-06 NOTE — TELEPHONE ENCOUNTER
----- Message from Devon Arevalo sent at 4/6/2020  1:26 PM CDT -----  Contact: Self- [phone]  .Type:  RX Refill Request    Who Called: Colt Stacy Jr.    Refill or New Rx:Refill   RX Name and Strength:hydrALAZINE (APRESOLINE) 10 MG tablet  How is the patient currently taking it? (ex. 1XDay):2xday   Is this a 30 day or 90 day RX:30day   Preferred Pharmacy with phone number: [facility] //Phone number:[phone]  Local or Mail Order:Local  Ordering Provider:   Would the patient rather a call back or a response via [facility]? Call back   Best Call Back Number:.[phone] (home)   Additional Information:

## 2020-05-07 ENCOUNTER — HOSPITAL ENCOUNTER (OUTPATIENT)
Dept: RADIOLOGY | Facility: HOSPITAL | Age: 75
Discharge: HOME | End: 2020-05-07
Attending: INTERNAL MEDICINE
Payer: COMMERCIAL

## 2020-05-07 DIAGNOSIS — I15.9 SECONDARY HYPERTENSION: ICD-10-CM

## 2020-05-07 PROCEDURE — 74176 CT ABD & PELVIS W/O CONTRAST: CPT | Mod: TC

## 2020-05-07 PROCEDURE — 93975 VASCULAR STUDY: CPT | Mod: TC

## 2020-05-29 ENCOUNTER — TELEPHONE (OUTPATIENT)
Dept: NEPHROLOGY | Facility: CLINIC | Age: 75
End: 2020-05-29

## 2020-05-29 DIAGNOSIS — N18.4 CKD (CHRONIC KIDNEY DISEASE) STAGE 4, GFR 15-29 ML/MIN: Primary | ICD-10-CM

## 2020-06-01 ENCOUNTER — LAB VISIT (OUTPATIENT)
Dept: LAB | Facility: HOSPITAL | Age: 75
End: 2020-06-01
Attending: INTERNAL MEDICINE
Payer: MEDICARE

## 2020-06-01 DIAGNOSIS — N18.4 CKD (CHRONIC KIDNEY DISEASE) STAGE 4, GFR 15-29 ML/MIN: ICD-10-CM

## 2020-06-01 LAB
ANION GAP SERPL CALC-SCNC: 9 MMOL/L (ref 8–16)
BUN SERPL-MCNC: 44 MG/DL (ref 8–23)
CALCIUM SERPL-MCNC: 9 MG/DL (ref 8.7–10.5)
CHLORIDE SERPL-SCNC: 106 MMOL/L (ref 95–110)
CO2 SERPL-SCNC: 28 MMOL/L (ref 23–29)
CREAT SERPL-MCNC: 4.6 MG/DL (ref 0.5–1.4)
EST. GFR  (AFRICAN AMERICAN): 13.6 ML/MIN/1.73 M^2
EST. GFR  (NON AFRICAN AMERICAN): 11.7 ML/MIN/1.73 M^2
GLUCOSE SERPL-MCNC: 171 MG/DL (ref 70–110)
POTASSIUM SERPL-SCNC: 3.4 MMOL/L (ref 3.5–5.1)
SODIUM SERPL-SCNC: 143 MMOL/L (ref 136–145)

## 2020-06-01 PROCEDURE — 80048 BASIC METABOLIC PNL TOTAL CA: CPT | Mod: PO

## 2020-06-01 PROCEDURE — 36415 COLL VENOUS BLD VENIPUNCTURE: CPT | Mod: PO

## 2020-06-05 ENCOUNTER — OFFICE VISIT (OUTPATIENT)
Dept: NEPHROLOGY | Facility: CLINIC | Age: 75
End: 2020-06-05
Payer: MEDICARE

## 2020-06-05 VITALS
HEIGHT: 62 IN | SYSTOLIC BLOOD PRESSURE: 142 MMHG | WEIGHT: 184.31 LBS | BODY MASS INDEX: 33.92 KG/M2 | RESPIRATION RATE: 20 BRPM | HEART RATE: 80 BPM | DIASTOLIC BLOOD PRESSURE: 80 MMHG

## 2020-06-05 DIAGNOSIS — I10 ESSENTIAL HYPERTENSION: ICD-10-CM

## 2020-06-05 DIAGNOSIS — N18.4 STAGE 4 CHRONIC KIDNEY DISEASE: ICD-10-CM

## 2020-06-05 DIAGNOSIS — Z71.89 ENCOUNTER FOR MEDICATION REVIEW AND COUNSELING: ICD-10-CM

## 2020-06-05 DIAGNOSIS — E87.6 HYPOKALEMIA: ICD-10-CM

## 2020-06-05 DIAGNOSIS — E26.9 HYPERALDOSTERONISM: Primary | ICD-10-CM

## 2020-06-05 PROCEDURE — 99215 PR OFFICE/OUTPT VISIT, EST, LEVL V, 40-54 MIN: ICD-10-PCS | Mod: S$PBB,,, | Performed by: INTERNAL MEDICINE

## 2020-06-05 PROCEDURE — 99999 PR PBB SHADOW E&M-EST. PATIENT-LVL IV: CPT | Mod: PBBFAC,,, | Performed by: INTERNAL MEDICINE

## 2020-06-05 PROCEDURE — 99214 OFFICE O/P EST MOD 30 MIN: CPT | Mod: PBBFAC | Performed by: INTERNAL MEDICINE

## 2020-06-05 PROCEDURE — 99215 OFFICE O/P EST HI 40 MIN: CPT | Mod: S$PBB,,, | Performed by: INTERNAL MEDICINE

## 2020-06-05 PROCEDURE — 99999 PR PBB SHADOW E&M-EST. PATIENT-LVL IV: ICD-10-PCS | Mod: PBBFAC,,, | Performed by: INTERNAL MEDICINE

## 2020-06-05 RX ORDER — LISINOPRIL 10 MG/1
10 TABLET ORAL DAILY
Qty: 30 TABLET | Refills: 11 | Status: ON HOLD | OUTPATIENT
Start: 2020-06-05 | End: 2020-10-22

## 2020-06-05 RX ORDER — ACETAMINOPHEN 500 MG
5000 TABLET ORAL DAILY
Qty: 90 TABLET | Refills: 3 | Status: SHIPPED | OUTPATIENT
Start: 2020-06-05 | End: 2020-09-03

## 2020-06-05 RX ORDER — ATORVASTATIN CALCIUM 80 MG/1
80 TABLET, FILM COATED ORAL DAILY
Qty: 90 TABLET | Refills: 3 | Status: SHIPPED | OUTPATIENT
Start: 2020-06-05 | End: 2020-11-16

## 2020-06-05 RX ORDER — AMLODIPINE BESYLATE 10 MG/1
10 TABLET ORAL DAILY
Qty: 90 TABLET | Refills: 3 | Status: ON HOLD | OUTPATIENT
Start: 2020-06-05 | End: 2020-10-28 | Stop reason: HOSPADM

## 2020-06-05 RX ORDER — SPIRONOLACTONE 25 MG/1
25 TABLET ORAL 2 TIMES DAILY
Qty: 60 TABLET | Refills: 11 | Status: SHIPPED | OUTPATIENT
Start: 2020-06-05 | End: 2020-06-05 | Stop reason: SDUPTHER

## 2020-06-05 RX ORDER — SPIRONOLACTONE 25 MG/1
25 TABLET ORAL 2 TIMES DAILY
Qty: 180 TABLET | Refills: 3 | Status: ON HOLD | OUTPATIENT
Start: 2020-06-05 | End: 2020-10-28 | Stop reason: HOSPADM

## 2020-06-05 NOTE — PROGRESS NOTES
Renal clinic f/u note:  Date of clinic visit: 6/5/20  Reason for f/u and chief c/o: CKD, hypertension, persistent hypokalemia     HPI:  Pt is a 74 y/o AA male with long standing h/o of CKD stage 4, HTN and DM who presents for f/u. Pt was initially referred to us for persistent mild hypokalemia and metabolic alkalosis despite being on aldactone. W/u was initiated, both lab work and CT abd. Pt presents for f/u. Pt has no new or acute c/o's, no SOB, no leg swelling. The w/u done, labs, and meds discussed with him in layman's language. He is he is being compliant with his meds.     PAST MEDICAL HISTORY:  CKD stage 4, Diabetes mellitus, Hyperlipidemia, Hypertension, Retinitis, and Stroke.     PAST SURGICAL HISTORY:  He  has no past surgical history on file.     SOCIAL HISTORY:  He  reports that he has never smoked. He does not have any smokeless tobacco history on file. He reports that he does not drink alcohol or use drugs.     FAMILY MEDICAL HISTORY:  His family history is not on file.           Review of patient's allergies indicates:   Allergen Reactions    Benadryl [diphenhydramine hcl] Other (See Comments)       Pt states benadryl makes him feel numb      Meds reviewed    Current Outpatient Medications:     amLODIPine (NORVASC) 10 MG tablet, Take 10 mg by mouth once daily. , Disp: , Rfl:     ARIPiprazole (ABILIFY) 30 MG Tab, 30 mg once daily. , Disp: , Rfl:     aspirin (ASPIR-81 ORAL), once daily. , Disp: , Rfl:     atorvastatin (LIPITOR) 80 MG tablet, Take 80 mg by mouth once daily. , Disp: , Rfl:     cholecalciferol, vitamin D3, (VITAMIN D3) 125 mcg (5,000 unit) Tab, Take 5,000 Units by mouth once daily., Disp: , Rfl:     hydrALAZINE (APRESOLINE) 10 MG tablet, Take 1 tablet (10 mg total) by mouth 2 (two) times daily., Disp: 60 tablet, Rfl: 11    insulin aspart U-100 (NOVOLOG) 100 unit/mL injection, Inject into the skin 3 (three) times daily before meals., Disp: , Rfl:     insulin detemir U-100 (LEVEMIR)  "100 unit/mL injection, Inject into the skin every evening., Disp: , Rfl:     multivitamin capsule, Take 1 capsule by mouth once daily., Disp: , Rfl:     prazosin (MINIPRESS) 1 MG Cap, 2 mg once daily. , Disp: , Rfl:     sertraline (ZOLOFT) 100 MG tablet, Take 100 mg by mouth once daily. , Disp: , Rfl:     spironolactone (ALDACTONE) 25 MG tablet, Take 1 tablet (25 mg total) by mouth 2 (two) times daily., Disp: 60 tablet, Rfl: 11      REVIEW OF SYSTEMS:  Patient has no fever, fatigue, visual changes, chest pain, edema, cough, dyspnea, nausea, vomiting, constipation, diarrhea, arthralgias, pruritis, dizziness, weakness, depression, confusion.        PHYSICAL EXAM:  Blood pressure (!) 142/80, pulse 80, resp. rate 20, height 5' 2" (1.575 m), weight 83.6 kg (184 lb 4.9 oz).  Gen:    WDWN male in no apparent distress  Psych: Normal mood and affect  Skin:    No rashes or ulcers  Neck:   No JVD  Chest:  Clear with no rales, rhonchi, wheezing with normal effort  CV:      Regular with no murmurs, gallops or rubs  Abd:     Soft, nontender, no distension, positive bowel sounds  Ext:      trace edema     Labs Reviewed  BMP  Lab Results   Component Value Date     06/01/2020    K 3.4 (L) 06/01/2020     06/01/2020    CO2 28 06/01/2020    BUN 44 (H) 06/01/2020    CREATININE 4.6 (H) 06/01/2020    CALCIUM 9.0 06/01/2020    ANIONGAP 9 06/01/2020    ESTGFRAFRICA 13.6 (A) 06/01/2020    EGFRNONAA 11.7 (A) 06/01/2020     Lab Results   Component Value Date    WBC 7.11 05/07/2020    HGB 13.0 (L) 05/07/2020    HCT 39.7 (L) 05/07/2020    MCV 95 05/07/2020     05/07/2020     Lab Results   Component Value Date    .0 (H) 03/11/2020    CALCIUM 9.0 06/01/2020    PHOS 2.9 05/07/2020       24 hour urine for cortisol, normal, aldosterone 37 (high), catecholamines normal  Serum free metanephrine level normal, total (non-specific) elevated    Urine prot/cr 3.7 g, from 3.3 g  U/a: 3+ protein, 1+ blood, 4 RBC's, no cats  Hep B " neg  Hep C neg  Complements C3 and C4 normal  ANCA's normal  SPEP no paraproteins  HIV neg    March 2020:  CT abd: L adrenal gland 1.3 cm lesion  Renal doppler: no signs of CASSIA    Dec 2019:  Renal u/s:  Right kidneys lower limits normal in size measuring 9.0 cm in length.  Mild uniform thinning of the cortex with increased echotexture.  Multiple small cyst identified throughout the cortex on the right.  Largest is partially exophytic and medial and upper pole in location measuring 2.7 x 2.8 x 3.0 cm.  No hydronephrosis, nephrolithiasis or perinephric fluid.  Left kidney measures 9.8 cm in length with similar increased cortical echogenicity and mild uniform thinning.  Multiple small cyst identified with the largest appearing partially exophytic the midpole lateral region measuring 1.4 x 1.2 x 11.4 cm.  No hydronephrosis or nephrolithiasis.  No perinephric fluid.          IMPRESSION AND RECOMMENDATIONS: 74 y/o male with uncontrolled BP, persistent hypokalemia and metabolic alkalosis depite being on spironolactone, and h/o of DM:      1. 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 Quiñones, for possible left adrenelectomy    2. CKD: progressive CKD, stage 4. Due to HTN.  The level of the severity of the renal failure suggest that pt likely has had the state of hyperaldosteronism for a long  time  All unremarkable: HIV, hep B, hep C, ANCA's. SPEP, C3 and C4 complements    3. Metabolic syndrome: h/o of DM:   Likely the cause of proteinuria  Nephrotic range proteinuria  Will add lisinopril 10 mg po qd    4. Hypokalemia: due to unopposed hyperaldosteronism  Will increase spironolactone to bid    5. Has complications of CKD:  Anemia  Hypocalcemia  Secondary hyperparathyroidism: on pre-vit D; will continue      Plans and recommendations:  As detailed above   Opportunity for questions provided  Complex visit  Will refer to surgery  Total time spent 40 minutes including time needed to review the records, the   patient evaluation, documentation, face-to-face discussion with the patient,   more than 50% of the time was spent on coordination of care and counseling.    Level V visit.  RTC 1 month  OK to rosa Babin MD

## 2020-06-15 ENCOUNTER — OFFICE VISIT (OUTPATIENT)
Dept: SURGERY | Facility: CLINIC | Age: 75
End: 2020-06-15
Payer: MEDICARE

## 2020-06-15 VITALS
WEIGHT: 184.06 LBS | TEMPERATURE: 97 F | HEART RATE: 57 BPM | BODY MASS INDEX: 33.87 KG/M2 | HEIGHT: 62 IN | SYSTOLIC BLOOD PRESSURE: 173 MMHG | DIASTOLIC BLOOD PRESSURE: 76 MMHG

## 2020-06-15 DIAGNOSIS — E26.9 HYPERALDOSTERONISM: Primary | ICD-10-CM

## 2020-06-15 PROCEDURE — 99204 OFFICE O/P NEW MOD 45 MIN: CPT | Mod: S$PBB,,, | Performed by: SURGERY

## 2020-06-15 PROCEDURE — 99999 PR PBB SHADOW E&M-EST. PATIENT-LVL IV: CPT | Mod: PBBFAC,,, | Performed by: SURGERY

## 2020-06-15 PROCEDURE — 99204 PR OFFICE/OUTPT VISIT, NEW, LEVL IV, 45-59 MIN: ICD-10-PCS | Mod: S$PBB,,, | Performed by: SURGERY

## 2020-06-15 PROCEDURE — 99999 PR PBB SHADOW E&M-EST. PATIENT-LVL IV: ICD-10-PCS | Mod: PBBFAC,,, | Performed by: SURGERY

## 2020-06-15 PROCEDURE — 99214 OFFICE O/P EST MOD 30 MIN: CPT | Mod: PBBFAC | Performed by: SURGERY

## 2020-06-16 ENCOUNTER — TELEPHONE (OUTPATIENT)
Dept: NEPHROLOGY | Facility: CLINIC | Age: 75
End: 2020-06-16

## 2020-06-16 NOTE — TELEPHONE ENCOUNTER
----- Message from Amaya Colon sent at 6/16/2020 12:39 PM CDT -----  Regarding: medication  Good afternoon,      Pt [facility] and [facility] be reached at [phone]    [facility] - [facility] [address]  [address] [facility] [address]  Phone: [phone] Fax: [phone]    Thanks,  Amaya Colon

## 2020-06-22 ENCOUNTER — TELEPHONE (OUTPATIENT)
Dept: SURGERY | Facility: CLINIC | Age: 75
End: 2020-06-22

## 2020-06-22 NOTE — TELEPHONE ENCOUNTER
----- Message from Livan Quiñones MD sent at 6/22/2020 11:30 AM CDT -----  Regarding: Labs and IR test  Patient needs to be scheduled for plasma renin/aldosterone ratio with lab.  He will also need to be scheduled with interventional Radiology for adrenal vein sampling.  I want him to get his lab prior to the adrenal vein sampling.  Thanks

## 2020-06-22 NOTE — PROGRESS NOTES
History & Physical    SUBJECTIVE:     History of Present Illness:  Patient is a 75 y.o. male with CKD stage 4 referred for suspected aldosteronoma.  Patient is noted to have hypertension and hypokalemia and subsequent CT showing left adrenal nodule.  He was noted to have elevated urinary aldosterone levels on 24 hr collection as well as elevated metanephrines.    Chief Complaint   Patient presents with    Follow-up       Review of patient's allergies indicates:   Allergen Reactions    Benadryl [diphenhydramine hcl] Other (See Comments)     Pt states benadryl makes him feel numb       Current Outpatient Medications   Medication Sig Dispense Refill    amLODIPine (NORVASC) 10 MG tablet Take 1 tablet (10 mg total) by mouth once daily. 90 tablet 3    ARIPiprazole (ABILIFY) 30 MG Tab 30 mg once daily.       aspirin (ASPIR-81 ORAL) once daily.       atorvastatin (LIPITOR) 80 MG tablet Take 1 tablet (80 mg total) by mouth once daily. 90 tablet 3    cholecalciferol, vitamin D3, (VITAMIN D3) 125 mcg (5,000 unit) Tab Take 1 tablet (5,000 Units total) by mouth once daily. 90 tablet 3    hydrALAZINE (APRESOLINE) 10 MG tablet Take 1 tablet (10 mg total) by mouth 2 (two) times daily. 60 tablet 11    insulin aspart U-100 (NOVOLOG) 100 unit/mL injection Inject into the skin 3 (three) times daily before meals.      insulin detemir U-100 (LEVEMIR) 100 unit/mL injection Inject into the skin every evening.      lisinopriL 10 MG tablet Take 1 tablet (10 mg total) by mouth once daily. 30 tablet 11    multivitamin capsule Take 1 capsule by mouth once daily.      prazosin (MINIPRESS) 1 MG Cap 2 mg once daily.       sertraline (ZOLOFT) 100 MG tablet Take 100 mg by mouth once daily.       spironolactone (ALDACTONE) 25 MG tablet Take 1 tablet (25 mg total) by mouth 2 (two) times daily. 180 tablet 3     No current facility-administered medications for this visit.        Past Medical History:   Diagnosis Date    Diabetes mellitus  "    Hyperlipidemia     Hypertension     Retinitis     Stroke     Pt states residual sx is "I walk funny".     History reviewed. No pertinent surgical history.  History reviewed. No pertinent family history.  Social History     Tobacco Use    Smoking status: Never Smoker   Substance Use Topics    Alcohol use: No    Drug use: No        Review of Systems:  Review of Systems   Constitutional: Negative for activity change, appetite change, chills, diaphoresis, fatigue, fever and unexpected weight change.   HENT: Negative for congestion, hearing loss, sore throat and trouble swallowing.    Eyes: Negative for visual disturbance.   Respiratory: Negative for apnea, cough, choking, chest tightness, shortness of breath and stridor.    Cardiovascular: Negative for chest pain, palpitations and leg swelling.   Gastrointestinal: Negative for abdominal distention, abdominal pain, anal bleeding, blood in stool, constipation, diarrhea, nausea, rectal pain and vomiting.   Endocrine: Negative for cold intolerance, heat intolerance, polydipsia, polyphagia and polyuria.   Genitourinary: Negative for difficulty urinating, dysuria, frequency, hematuria and urgency.   Musculoskeletal: Negative for arthralgias, back pain, myalgias and neck pain.   Skin: Negative for color change, pallor, rash and wound.   Neurological: Negative for dizziness, syncope, weakness, light-headedness, numbness and headaches.   Hematological: Negative for adenopathy. Does not bruise/bleed easily.   Psychiatric/Behavioral: Negative for agitation, confusion, decreased concentration and sleep disturbance. The patient is not nervous/anxious.        OBJECTIVE:     Vital Signs (Most Recent)  Temp: 97 °F (36.1 °C) (06/15/20 1508)  Pulse: (!) 57 (06/15/20 1508)  BP: (!) 173/76 (06/15/20 1508)  5' 2" (1.575 m)  83.5 kg (184 lb 1.4 oz)     Physical Exam:  Physical Exam  Vitals signs reviewed.   Constitutional:       General: He is not in acute distress.     " Appearance: He is well-developed. He is not diaphoretic.   HENT:      Head: Normocephalic and atraumatic.      Right Ear: External ear normal.      Left Ear: External ear normal.   Eyes:      General: No scleral icterus.     Conjunctiva/sclera: Conjunctivae normal.      Pupils: Pupils are equal, round, and reactive to light.   Neck:      Musculoskeletal: Normal range of motion and neck supple.      Thyroid: No thyromegaly.      Trachea: No tracheal deviation.   Cardiovascular:      Rate and Rhythm: Normal rate and regular rhythm.      Heart sounds: Normal heart sounds. No murmur. No friction rub. No gallop.    Pulmonary:      Effort: Pulmonary effort is normal. No respiratory distress.      Breath sounds: Normal breath sounds. No wheezing or rales.   Chest:      Chest wall: No tenderness.   Abdominal:      General: Bowel sounds are normal. There is no distension.      Palpations: Abdomen is soft.      Tenderness: There is no abdominal tenderness.      Hernia: No hernia is present.   Musculoskeletal: Normal range of motion.         General: No tenderness or deformity.   Lymphadenopathy:      Cervical: No cervical adenopathy.   Skin:     General: Skin is warm and dry.      Coloration: Skin is not pale.      Findings: No erythema or rash.   Neurological:      Mental Status: He is alert and oriented to person, place, and time.   Psychiatric:         Behavior: Behavior normal.         Thought Content: Thought content normal.         Judgment: Judgment normal.         Laboratory  24 hour urine for cortisol, normal, aldosterone 37 (high), catecholamines normal  Serum free metanephrine level normal, total (non-specific) elevated    Diagnostic Results:  CT:  Impression:  No nephrolithiasis or hydronephrosis.  Bilateral renal cysts.  Left hepatic lobe cysts and additional subcentimeter hypodensities in the right hepatic lobe, which may reflect the same.  1.3 cm low-density, nonspecific left adrenal lesion.  Questionable fat  density lesion involving the right adrenal gland versus averaging of the adjacent mesenteric fat.  Small hiatal hernia.    ASSESSMENT/PLAN:     75-year-old male with left adrenal mass and elevated 24 hr urine aldosterone with hypertension and hypokalemia    PLAN:Plan     Plasma renin aldosterone ratio  Adrenal vein sampling  Follow-up with patient after testing complete for further discussion and recommendations

## 2020-06-24 ENCOUNTER — LAB VISIT (OUTPATIENT)
Dept: LAB | Facility: HOSPITAL | Age: 75
End: 2020-06-24
Attending: SURGERY
Payer: MEDICARE

## 2020-06-24 DIAGNOSIS — E26.9 HYPERALDOSTERONISM: ICD-10-CM

## 2020-06-24 PROCEDURE — 82088 ASSAY OF ALDOSTERONE: CPT

## 2020-06-24 PROCEDURE — 36415 COLL VENOUS BLD VENIPUNCTURE: CPT

## 2020-06-27 LAB
ALDOST SERPL-MCNC: 103 NG/DL
ALDOST/RENIN PLAS-RTO: 343.3 RATIO
RENIN PLAS-CCNC: 0.3 NG/ML/HR

## 2020-06-30 RX ORDER — CANDESARTAN 4 MG/1
4 TABLET ORAL DAILY
COMMUNITY
End: 2020-07-16 | Stop reason: SDUPTHER

## 2020-06-30 RX ORDER — CANDESARTAN 4 MG/1
4 TABLET ORAL DAILY
Qty: 90 TABLET | Refills: 3 | OUTPATIENT
Start: 2020-06-30

## 2020-06-30 NOTE — TELEPHONE ENCOUNTER
----- Message from Geno Bonner sent at 6/30/2020 10:27 AM CDT -----  Regarding: refill on med not in chart  Please call back Pt in regards to a medication refill. Pt states the medication is a 4 mg tablet. Please call back [phone]  Thanks

## 2020-06-30 NOTE — TELEPHONE ENCOUNTER
----- Message from Geno Bonner sent at 6/30/2020 10:27 AM CDT -----  Regarding: refill on med not in chart  Please call back Pt in regards to a medication refill. Pt states the medication is a 4 mg tablet. Please call back [phone]  Thanks

## 2020-06-30 NOTE — TELEPHONE ENCOUNTER
----- Message from Geno Bonner sent at 6/30/2020 10:27 AM CDT -----  Regarding: refill on med not in chart  Please call back Pt in regards to a medication refill. Pt states the medication is a 4 mg tablet. Please call back [phone]  Thanks

## 2020-07-16 RX ORDER — CANDESARTAN 4 MG/1
4 TABLET ORAL DAILY
Qty: 90 TABLET | Refills: 3 | Status: SHIPPED | OUTPATIENT
Start: 2020-07-16 | End: 2020-07-17 | Stop reason: SDUPTHER

## 2020-07-16 NOTE — TELEPHONE ENCOUNTER
----- Message from Sandie Barlow sent at 7/16/2020  9:52 AM CDT -----  Regarding: Medication  Contact: Pt  Pt called in regards to getting candesartan (ATACAND) 4 MG tablet (1x) (30 day supply) [facility]. PT can be reached at [phone] (shgx)

## 2020-07-17 RX ORDER — CANDESARTAN 4 MG/1
4 TABLET ORAL DAILY
Qty: 90 TABLET | Refills: 3 | Status: SHIPPED | OUTPATIENT
Start: 2020-07-17 | End: 2020-07-22 | Stop reason: SDUPTHER

## 2020-07-17 NOTE — TELEPHONE ENCOUNTER
----- Message from Ally Washburn sent at 7/17/2020  9:46 AM CDT -----  Contact: SELF/[phone]  Type:  RX Refill Request    Who Called: Colt Stacy Jr.  Refill or New Rx:REFILL  RX Name and Strength:candesartan (ATACAND) 4 MG tablet  How is the patient currently taking it? (ex. 1XDay):ONCE A DAY  Is this a 30 day or 90 day RX:30  Preferred Pharmacy with phone number:  [facility] - [facility] [address]  [address] [address]  Phone: [phone] Fax: [phone]  Local or Mail Order:local  Ordering Provider:Dr Contreras  Would the patient rather a call back or a response via [facility]? Call back   Best Call Back Number:[phone]  Additional Information:

## 2020-07-20 ENCOUNTER — HOSPITAL ENCOUNTER (OUTPATIENT)
Facility: HOSPITAL | Age: 75
Discharge: HOME | End: 2020-07-20
Attending: RADIOLOGY | Admitting: RADIOLOGY
Payer: MEDICARE

## 2020-07-20 VITALS
WEIGHT: 184 LBS | SYSTOLIC BLOOD PRESSURE: 176 MMHG | BODY MASS INDEX: 33.86 KG/M2 | HEART RATE: 50 BPM | DIASTOLIC BLOOD PRESSURE: 59 MMHG | OXYGEN SATURATION: 97 % | HEIGHT: 62 IN | TEMPERATURE: 98 F | RESPIRATION RATE: 17 BRPM

## 2020-07-20 DIAGNOSIS — E26.9 HYPERALDOSTERONISM: ICD-10-CM

## 2020-07-20 LAB
CORTIS SERPL-MCNC: 15.3 UG/DL
CORTIS SERPL-MCNC: 16.5 UG/DL
CORTIS SERPL-MCNC: 18.6 UG/DL
SARS-COV-2 RDRP RESP QL NAA+PROBE: NEGATIVE

## 2020-07-20 PROCEDURE — C1887 CATHETER, GUIDING: HCPCS | Performed by: RADIOLOGY

## 2020-07-20 PROCEDURE — 27201423 OPTIME MED/SURG SUP & DEVICES STERILE SUPPLY: Performed by: RADIOLOGY

## 2020-07-20 PROCEDURE — 82088 ASSAY OF ALDOSTERONE: CPT | Mod: 91

## 2020-07-20 PROCEDURE — C1769 GUIDE WIRE: HCPCS | Performed by: RADIOLOGY

## 2020-07-20 PROCEDURE — 82533 TOTAL CORTISOL: CPT | Mod: 91

## 2020-07-20 PROCEDURE — 25500020 PHARM REV CODE 255: Performed by: RADIOLOGY

## 2020-07-20 PROCEDURE — U0002 COVID-19 LAB TEST NON-CDC: HCPCS

## 2020-07-20 PROCEDURE — C1894 INTRO/SHEATH, NON-LASER: HCPCS | Performed by: RADIOLOGY

## 2020-07-20 PROCEDURE — 63600175 PHARM REV CODE 636 W HCPCS: Performed by: RADIOLOGY

## 2020-07-20 PROCEDURE — 25000003 PHARM REV CODE 250: Performed by: RADIOLOGY

## 2020-07-20 PROCEDURE — 84244 ASSAY OF RENIN: CPT

## 2020-07-20 RX ORDER — LIDOCAINE HYDROCHLORIDE 20 MG/ML
INJECTION, SOLUTION INFILTRATION; PERINEURAL
Status: DISCONTINUED | OUTPATIENT
Start: 2020-07-20 | End: 2020-07-20 | Stop reason: HOSPADM

## 2020-07-20 RX ORDER — MIDAZOLAM HYDROCHLORIDE 1 MG/ML
INJECTION INTRAMUSCULAR; INTRAVENOUS
Status: DISCONTINUED | OUTPATIENT
Start: 2020-07-20 | End: 2020-07-20 | Stop reason: HOSPADM

## 2020-07-20 RX ORDER — FENTANYL CITRATE 50 UG/ML
INJECTION, SOLUTION INTRAMUSCULAR; INTRAVENOUS
Status: DISCONTINUED | OUTPATIENT
Start: 2020-07-20 | End: 2020-07-20 | Stop reason: HOSPADM

## 2020-07-20 RX ADMIN — MIDAZOLAM HYDROCHLORIDE 1 MG: 1 INJECTION, SOLUTION INTRAMUSCULAR; INTRAVENOUS at 02:07

## 2020-07-20 RX ADMIN — FENTANYL CITRATE 50 MCG: 50 INJECTION, SOLUTION INTRAMUSCULAR; INTRAVENOUS at 02:07

## 2020-07-20 RX ADMIN — COSYNTROPIN 250 MCG: 0.25 INJECTION, POWDER, LYOPHILIZED, FOR SOLUTION INTRAVENOUS at 02:07

## 2020-07-20 RX ADMIN — IOHEXOL 40 ML: 240 INJECTION, SOLUTION INTRATHECAL; INTRAVASCULAR; INTRAVENOUS; ORAL at 03:07

## 2020-07-20 RX ADMIN — LIDOCAINE HYDROCHLORIDE 10 ML: 20 INJECTION, SOLUTION INFILTRATION; PERINEURAL at 02:07

## 2020-07-20 NOTE — DISCHARGE INSTRUCTIONS
"Post-op    1. DIET: It is advisable for you to follow a diet that limits the intake of salt, sugar, saturated fats and cholesterol.     2. FOR THE NEXT 24 HOURS:   · For the next 8 hours, you should be watched by a responsible adult. This person should make sure your condition is not getting worse.   · Don't drink any alcohol for the next 24 hours.  · Don't drive, operate dangerous machinery, or make important business or personal decisions during the next 24 hours.  · Your healthcare provider may tell you not to take any medicine by mouth for pain or sleep in the next 4 hours. These medicines may react with the medicines you were given in the hospital. This could cause a much stronger response than usual.       3. ACTIVITY:                                Day of discharge:             NO vigorous activity, lifting or straining                                                   Day after discharge:         Avoid heavy lifting (up to 10 lbs)                                                                        The day after discharge you may shower, but avoid tub baths for 5 days                                                                         Wash site gently with soap and water        NO powder or lotion to your procedure site.                 Before you shower, remove dressing, apply bandaid if desired                                                                                                                                                                            2nd day after discharge:  Resume normal activities                                                                         Exercise program as instructed      4. WOUND CARE: It is not unusual to have a small amount of bruising appear in the groin area. It is also common to have a tender "knot" develop beneath the skin at the puncture site in the groin. This is scar tissue only and is not a cause for concern or alarm. This tender knot may take " "several weeks to fully resolve. The bruise will usually spread over several days. If the lump gets bigger, call you doctor immediately.    5. DISCOMFORT: For general discomfort at the puncture site, you may take 1 or 2 Acetaminophen (Tylenol) tablets every 4 hours as needed. (Do not take more than 4000 mg a day)                 6. CALL YOUR HEALTHCARE PROVIDER IF YOU START TO HAVE THE FOLLOWING SYMPTOMS:        1. Problems with the affected leg: Pain, discomfort, loss of warmth, numbness or tingling                                                                                                                            2. Problems at the groin site: Bleeding, pain that is sudden/sharp/persistent,                   swelling at site or a change in "lump" size, increased redness or drainage at                     puncture site                                                               3. High fever (101 degrees or higher)       4.  Drowsiness that doesn't get better       5. Weakness or dizziness that doesn't get better            6. Repeated vomiting        7. GO TO  THE EMERGENCY ROOM OR CALL 911 IF YOU HAVE: Chest pains or discomforts not relieved with 3 nitroglycerin doses (sublingual tablets or spray), numbness or severe pain or if your foot or leg becomes cold or discolored or uncontrolled bleeding from site (apply direct pressure above site).  "

## 2020-07-20 NOTE — DISCHARGE SUMMARY
Radiology Discharge Summary      Hospital Course: No complications    Admit Date: 7/20/2020  Discharge Date: 07/20/2020     Instructions Given to Patient: Yes  Diet: regular diet and Resume prior diet  Activity: activity as tolerated    Description of Condition on Discharge: Stable  Vital Signs (Most Recent): Temp: 98.4 °F (36.9 °C) (07/20/20 1155)  Pulse: (!) 54 (07/20/20 1155)  Resp: 16 (07/20/20 1155)  BP: (!) 177/66 (07/20/20 1155)  SpO2: 100 % (07/20/20 1155)    Discharge Disposition: Home    Discharge Diagnosis: hyperaldost     Follow-up: with dr laura Laws MD  Staff Radiologist  Department of Radiology  Pager: [phone]

## 2020-07-20 NOTE — NURSING
Patient discharged home with daughter in private vehicle. Reviewed discharged instructions, verbalized understanding. IV removed with catheter intact. Right groin dressing clean dry and intact, no hematoma to site.

## 2020-07-20 NOTE — H&P
"Radiology History & Physical      SUBJECTIVE:     Chief Complaint: hyper aldost    History of Present Illness:  Colt Stacy Jr. is a Patient is a 75 y.o. male with CKD stage 4 referred for suspected aldosteronoma.  Patient is noted to have hypertension and hypokalemia and subsequent CT showing left adrenal nodule.  He was noted to have elevated urinary aldosterone levels on 24 hr collection as well as elevated metanephrines.  Past Medical History:   Diagnosis Date    Diabetes mellitus     Hyperlipidemia     Hypertension     Retinitis     Stroke     Pt states residual sx is "I walk funny".     No past surgical history on file.    Home Meds:   Prior to Admission medications    Medication Sig Start Date End Date Taking? Authorizing Provider   amLODIPine (NORVASC) 10 MG tablet Take 1 tablet (10 mg total) by mouth once daily. 6/5/20  Yes Regina Babin MD   ARIPiprazole (ABILIFY) 30 MG Tab 30 mg once daily.    Yes Historical Provider, MD   aspirin (ASPIR-81 ORAL) once daily.    Yes Historical Provider, MD   atorvastatin (LIPITOR) 80 MG tablet Take 1 tablet (80 mg total) by mouth once daily. 6/5/20 9/3/20 Yes Regina Babin MD   candesartan (ATACAND) 4 MG tablet Take 1 tablet (4 mg total) by mouth once daily. 7/17/20  Yes Soren Contreras MD   cholecalciferol, vitamin D3, (VITAMIN D3) 125 mcg (5,000 unit) Tab Take 1 tablet (5,000 Units total) by mouth once daily. 6/5/20 9/3/20 Yes Regina Babin MD   hydrALAZINE (APRESOLINE) 10 MG tablet Take 1 tablet (10 mg total) by mouth 2 (two) times daily. 4/6/20 4/6/21 Yes Regina Babin MD   insulin aspart U-100 (NOVOLOG) 100 unit/mL injection Inject into the skin 3 (three) times daily before meals.   Yes Historical Provider, MD   insulin detemir U-100 (LEVEMIR) 100 unit/mL injection Inject into the skin every evening.   Yes Historical Provider, MD   lisinopriL 10 MG tablet Take 1 tablet (10 mg total) by mouth once daily. 6/5/20 6/5/21 Yes Regina Babin MD   multivitamin capsule " Take 1 capsule by mouth once daily.   Yes Historical Provider, MD   prazosin (MINIPRESS) 1 MG Cap 2 mg once daily.    Yes Historical Provider, MD   sertraline (ZOLOFT) 100 MG tablet Take 100 mg by mouth once daily.    Yes Historical Provider, MD   spironolactone (ALDACTONE) 25 MG tablet Take 1 tablet (25 mg total) by mouth 2 (two) times daily. 6/5/20 9/3/20 Yes Regina Babin MD     Anticoagulants/Antiplatelets: no anticoagulation    Allergies:   Review of patient's allergies indicates:   Allergen Reactions    Benadryl [diphenhydramine hcl] Other (See Comments)     Pt states benadryl makes him feel numb     Sedation History:  have not been any systemic reactions      OBJECTIVE:     Vital Signs (Most Recent)  Temp: 98.4 °F (36.9 °C) (07/20/20 1155)  Pulse: (!) 54 (07/20/20 1155)  Resp: 16 (07/20/20 1155)  BP: (!) 177/66 (07/20/20 1155)  SpO2: 100 % (07/20/20 1155)    Physical Exam:      General: no acute distress  Mental Status: alert and oriented to person, place and time  HEENT: normocephalic, atraumatic  Chest: unlabored breathing  Heart: regular heart rate  Abdomen: nondistended  Extremity: moves all extremities    Laboratory  No results found for: INR    Lab Results   Component Value Date    WBC 7.11 05/07/2020    HGB 13.0 (L) 05/07/2020    HCT 39.7 (L) 05/07/2020    MCV 95 05/07/2020     05/07/2020      Lab Results   Component Value Date     (H) 06/01/2020     06/01/2020    K 3.4 (L) 06/01/2020     06/01/2020    CO2 28 06/01/2020    BUN 44 (H) 06/01/2020    CREATININE 4.6 (H) 06/01/2020    CALCIUM 9.0 06/01/2020    ALT 11 05/16/2014    AST 17 05/16/2014    ALBUMIN 3.3 (L) 05/07/2020    BILITOT 0.6 05/16/2014       ASSESSMENT/PLAN:     Sedation Plan: moderate  Patient will undergo adrenal v sampling.    Jose R Laws MD  Staff Radiologist  Department of Radiology  Pager: [phone]

## 2020-07-20 NOTE — PROCEDURES
Radiology Post-Procedure Note    Pre Op Diagnosis: adrenal adenoma  Post Op Diagnosis: Same    Procedure: adrenal v sampling    Procedure performed by: Dr Jose R Laws    Written Informed Consent Obtained: Yes  Specimen Removed: NO  Estimated Blood Loss: Minimal    Findings:   No complications.     Patient tolerated procedure well.    Jose R Laws MD  Staff Radiologist  Department of Radiology  Pager: [phone]

## 2020-07-22 LAB — CORTIS SERPL-MCNC: >110 UG/DL

## 2020-07-22 RX ORDER — CANDESARTAN 4 MG/1
4 TABLET ORAL DAILY
Qty: 90 TABLET | Refills: 3 | Status: ON HOLD | OUTPATIENT
Start: 2020-07-22 | End: 2020-10-28 | Stop reason: HOSPADM

## 2020-07-23 LAB
ALDOST SERPL-MCNC: 129 NG/DL
ALDOST SERPL-MCNC: 166 NG/DL
ALDOST SERPL-MCNC: 42 NG/DL
ALDOST SERPL-MCNC: 9900 NG/DL

## 2020-07-26 LAB
RENIN PLAS-CCNC: 0.6 NG/ML/H
RENIN PLAS-CCNC: 0.8 NG/ML/H
RENIN PLAS-CCNC: 0.9 NG/ML/H
RENIN PLAS-CCNC: 1 NG/ML/H

## 2020-07-29 LAB — POCT GLUCOSE: 99 MG/DL (ref 70–110)

## 2020-09-04 ENCOUNTER — LAB VISIT (OUTPATIENT)
Dept: LAB | Facility: HOSPITAL | Age: 75
End: 2020-09-04
Attending: INTERNAL MEDICINE
Payer: MEDICARE

## 2020-09-04 DIAGNOSIS — E26.9 HYPERALDOSTERONISM: ICD-10-CM

## 2020-09-04 LAB
ALBUMIN SERPL BCP-MCNC: 3.4 G/DL (ref 3.5–5.2)
ANION GAP SERPL CALC-SCNC: 10 MMOL/L (ref 8–16)
BASOPHILS # BLD AUTO: 0.04 K/UL (ref 0–0.2)
BASOPHILS NFR BLD: 0.5 % (ref 0–1.9)
BUN SERPL-MCNC: 37 MG/DL (ref 8–23)
CALCIUM SERPL-MCNC: 8.9 MG/DL (ref 8.7–10.5)
CHLORIDE SERPL-SCNC: 108 MMOL/L (ref 95–110)
CO2 SERPL-SCNC: 27 MMOL/L (ref 23–29)
CREAT SERPL-MCNC: 4.2 MG/DL (ref 0.5–1.4)
DIFFERENTIAL METHOD: ABNORMAL
EOSINOPHIL # BLD AUTO: 0.1 K/UL (ref 0–0.5)
EOSINOPHIL NFR BLD: 0.8 % (ref 0–8)
ERYTHROCYTE [DISTWIDTH] IN BLOOD BY AUTOMATED COUNT: 13.3 % (ref 11.5–14.5)
EST. GFR  (AFRICAN AMERICAN): 15.2 ML/MIN/1.73 M^2
EST. GFR  (NON AFRICAN AMERICAN): 13.1 ML/MIN/1.73 M^2
GLUCOSE SERPL-MCNC: 55 MG/DL (ref 70–110)
HCT VFR BLD AUTO: 36.1 % (ref 40–54)
HGB BLD-MCNC: 12.3 G/DL (ref 14–18)
IMM GRANULOCYTES # BLD AUTO: 0.02 K/UL (ref 0–0.04)
IMM GRANULOCYTES NFR BLD AUTO: 0.3 % (ref 0–0.5)
LYMPHOCYTES # BLD AUTO: 2 K/UL (ref 1–4.8)
LYMPHOCYTES NFR BLD: 26.7 % (ref 18–48)
MCH RBC QN AUTO: 31.5 PG (ref 27–31)
MCHC RBC AUTO-ENTMCNC: 34.1 G/DL (ref 32–36)
MCV RBC AUTO: 92 FL (ref 82–98)
MONOCYTES # BLD AUTO: 0.6 K/UL (ref 0.3–1)
MONOCYTES NFR BLD: 8.3 % (ref 4–15)
NEUTROPHILS # BLD AUTO: 4.8 K/UL (ref 1.8–7.7)
NEUTROPHILS NFR BLD: 63.4 % (ref 38–73)
NRBC BLD-RTO: 0 /100 WBC
PHOSPHATE SERPL-MCNC: 2.5 MG/DL (ref 2.7–4.5)
PLATELET # BLD AUTO: 173 K/UL (ref 150–350)
PMV BLD AUTO: 10 FL (ref 9.2–12.9)
POTASSIUM SERPL-SCNC: 3.3 MMOL/L (ref 3.5–5.1)
RBC # BLD AUTO: 3.91 M/UL (ref 4.6–6.2)
SODIUM SERPL-SCNC: 145 MMOL/L (ref 136–145)
WBC # BLD AUTO: 7.49 K/UL (ref 3.9–12.7)

## 2020-09-04 PROCEDURE — 80069 RENAL FUNCTION PANEL: CPT | Mod: PO

## 2020-09-04 PROCEDURE — 85025 COMPLETE CBC W/AUTO DIFF WBC: CPT | Mod: PO

## 2020-09-04 PROCEDURE — 83970 ASSAY OF PARATHORMONE: CPT

## 2020-09-04 PROCEDURE — 36415 COLL VENOUS BLD VENIPUNCTURE: CPT | Mod: PO

## 2020-09-05 LAB — PTH-INTACT SERPL-MCNC: 401 PG/ML (ref 9–77)

## 2020-09-08 ENCOUNTER — OFFICE VISIT (OUTPATIENT)
Dept: NEPHROLOGY | Facility: CLINIC | Age: 75
End: 2020-09-08
Payer: MEDICARE

## 2020-09-08 VITALS
DIASTOLIC BLOOD PRESSURE: 78 MMHG | WEIGHT: 183.88 LBS | SYSTOLIC BLOOD PRESSURE: 142 MMHG | BODY MASS INDEX: 33.84 KG/M2 | RESPIRATION RATE: 20 BRPM | HEIGHT: 62 IN | HEART RATE: 58 BPM

## 2020-09-08 DIAGNOSIS — N18.5 CHRONIC KIDNEY DISEASE (CKD), STAGE V: Primary | ICD-10-CM

## 2020-09-08 DIAGNOSIS — I10 HTN (HYPERTENSION), BENIGN: ICD-10-CM

## 2020-09-08 PROCEDURE — 99214 OFFICE O/P EST MOD 30 MIN: CPT | Mod: S$PBB,,, | Performed by: INTERNAL MEDICINE

## 2020-09-08 PROCEDURE — 99214 OFFICE O/P EST MOD 30 MIN: CPT | Mod: PBBFAC | Performed by: INTERNAL MEDICINE

## 2020-09-08 PROCEDURE — 99999 PR PBB SHADOW E&M-EST. PATIENT-LVL IV: ICD-10-PCS | Mod: PBBFAC,,, | Performed by: INTERNAL MEDICINE

## 2020-09-08 PROCEDURE — 99214 PR OFFICE/OUTPT VISIT, EST, LEVL IV, 30-39 MIN: ICD-10-PCS | Mod: S$PBB,,, | Performed by: INTERNAL MEDICINE

## 2020-09-08 PROCEDURE — 99999 PR PBB SHADOW E&M-EST. PATIENT-LVL IV: CPT | Mod: PBBFAC,,, | Performed by: INTERNAL MEDICINE

## 2020-09-08 NOTE — PROGRESS NOTES
"Subjective:       Patient ID: Colt Stacy Jr. is a 75 y.o.    male who presents for follow-up evaluation of CKD stage 5, HTN,     Primary Doctor No      HPI : Colt Stacy Jr. is a pleasant 75-year-old  man with longstanding history of hypertension, CKD stage 4 bordering on stage 5, was diagnosed with primary hyperaldosteronism, further workup revealed left adrenal adenoma, adrenal vein sampling positive for increased activity on the left side, he likely will need left adrenalectomy, he was seen in our clinic about 3 months ago, recent labs reviewed and discussed with the patient and his daughter, progressive decline in renal function explained, recent serum creatinine is 4 mg/dL GFR of about 15 mL/minute,          Past Medical History:   Diagnosis Date    Diabetes mellitus     Hyperlipidemia     Hypertension     Retinitis     Stroke     Pt states residual sx is "I walk funny".       Current Outpatient Medications on File Prior to Visit   Medication Sig Dispense Refill    amLODIPine (NORVASC) 10 MG tablet Take 1 tablet (10 mg total) by mouth once daily. 90 tablet 3    ARIPiprazole (ABILIFY) 30 MG Tab 30 mg once daily.       aspirin (ASPIR-81 ORAL) once daily.       atorvastatin (LIPITOR) 80 MG tablet Take 1 tablet (80 mg total) by mouth once daily. 90 tablet 3    candesartan (ATACAND) 4 MG tablet Take 1 tablet (4 mg total) by mouth once daily. 90 tablet 3    hydrALAZINE (APRESOLINE) 10 MG tablet Take 1 tablet (10 mg total) by mouth 2 (two) times daily. 60 tablet 11    insulin aspart U-100 (NOVOLOG) 100 unit/mL injection Inject into the skin 3 (three) times daily before meals.      insulin detemir U-100 (LEVEMIR) 100 unit/mL injection Inject into the skin every evening.      lisinopriL 10 MG tablet Take 1 tablet (10 mg total) by mouth once daily. 30 tablet 11    multivitamin capsule Take 1 capsule by mouth once daily.      prazosin (MINIPRESS) 1 MG Cap 2 mg once " daily.       sertraline (ZOLOFT) 100 MG tablet Take 100 mg by mouth once daily.       spironolactone (ALDACTONE) 25 MG tablet Take 1 tablet (25 mg total) by mouth 2 (two) times daily. 180 tablet 3     No current facility-administered medications on file prior to visit.        Review of Systems   Constitutional: Positive for activity change. Negative for appetite change.   HENT: Negative for congestion and facial swelling.    Eyes: Negative for pain, discharge and redness.   Respiratory: Negative for apnea, cough and chest tightness.    Cardiovascular: Negative for chest pain, palpitations and leg swelling.   Gastrointestinal: Negative for abdominal distention.   Genitourinary: Negative for difficulty urinating, dysuria and frequency.   Musculoskeletal: Positive for arthralgias. Negative for neck pain and neck stiffness.   Skin: Negative for color change, rash and wound.   Neurological: Negative for dizziness, weakness and numbness.   Psychiatric/Behavioral: Negative for sleep disturbance.   All other systems reviewed and are negative.    :              Objective:         Vitals:    09/08/20 1616   BP: (!) 142/78   Pulse: (!) 58   Resp: 20       Weight 183 lbs     Physical Exam  Vitals signs and nursing note reviewed.   Constitutional:       General: He is not in acute distress.     Appearance: He is well-developed. He is not diaphoretic.   HENT:      Head: Normocephalic and atraumatic.   Eyes:      Pupils: Pupils are equal, round, and reactive to light.   Neck:      Musculoskeletal: Normal range of motion and neck supple.      Thyroid: No thyromegaly.      Trachea: No tracheal deviation.   Cardiovascular:      Rate and Rhythm: Normal rate and regular rhythm.      Heart sounds: Murmur present. No friction rub. No gallop.       Comments: SM in apex   Pulmonary:      Effort: Pulmonary effort is normal.      Breath sounds: Normal breath sounds. No wheezing or rales.   Abdominal:      Palpations: Abdomen is soft. There  is no mass.      Tenderness: There is no abdominal tenderness. There is no guarding or rebound.      Comments: Obese abdomen    Musculoskeletal: Normal range of motion.   Lymphadenopathy:      Cervical: No cervical adenopathy.   Skin:     General: Skin is warm.      Findings: No erythema or rash.      Comments: Leukoderma noted    Neurological:      Mental Status: He is alert and oriented to person, place, and time.       :            Labs:    Lab Results   Component Value Date    CREATININE 4.2 (H) 09/04/2020    BUN 37 (H) 09/04/2020     09/04/2020    K 3.3 (L) 09/04/2020     09/04/2020    CO2 27 09/04/2020       Lab Results   Component Value Date    WBC 7.49 09/04/2020    HGB 12.3 (L) 09/04/2020    HCT 36.1 (L) 09/04/2020    MCV 92 09/04/2020     09/04/2020       Lab Results   Component Value Date    .0 (H) 09/04/2020    CALCIUM 8.9 09/04/2020    PHOS 2.5 (L) 09/04/2020       Lab Results   Component Value Date    ALBUMIN 3.4 (L) 09/04/2020       Lab Results   Component Value Date    HGBA1C 7.3 (H) 02/04/2020     Impression and Plan :  75-year-old  man with history of hypertension, type 2 diabetes, CKD stage 5, seen in office today in follow-up for following medical problems    1.  Hypertension, due to primary hyperaldosteronism, status post bilateral renal vein sampling, significantly positive on the left side, he has an appointment with General surgery to discussed left adrenalectomy, meanwhile discussed low-salt diet, continue current medications,    2.  Chronic kidney disease stage 5, progressive decline in renal function noted, recent creatinine is 4.2 mg/dL, advised patient to avoid NSAID, will schedule him to attend chronic kidney disease education and options class,    3.  Anemia, recent hemoglobin is stable at 12.3 g, monitor    4.  Lytes, mild hypokalemia noted, continue Aldactone,    5.  Euvolemic on exam,    6.  Proteinuria, multifactorial, continue  lisinopril,    Return to clinic in about 4 weeks, more than 30 min of face-to-face time was spent with the patient and his daughter discussing labs and plan of care,        Thad Eastman MD

## 2020-09-09 ENCOUNTER — TELEPHONE (OUTPATIENT)
Dept: SURGERY | Facility: CLINIC | Age: 75
End: 2020-09-09

## 2020-09-09 NOTE — TELEPHONE ENCOUNTER
Spoke to pt and scheduled appt on 9/16 @  8:40.  Pt stated he will be here.    ----- Message from Livan Quiñones MD sent at 9/8/2020  9:23 PM CDT -----  Needs appt next week to discuss adrenal gland workup. Thanks  ----- Message -----  From: Thad Eastman MD  Sent: 9/8/2020   4:30 PM CDT  To: Livan Quiñones MD    Hi, I think he needs a f/u with you

## 2020-09-16 ENCOUNTER — OFFICE VISIT (OUTPATIENT)
Dept: SURGERY | Facility: CLINIC | Age: 75
End: 2020-09-16
Payer: MEDICARE

## 2020-09-16 ENCOUNTER — HOSPITAL ENCOUNTER (OUTPATIENT)
Dept: CARDIOLOGY | Facility: HOSPITAL | Age: 75
Discharge: HOME | End: 2020-09-16
Attending: SURGERY
Payer: MEDICARE

## 2020-09-16 VITALS
WEIGHT: 182.31 LBS | DIASTOLIC BLOOD PRESSURE: 88 MMHG | HEART RATE: 59 BPM | TEMPERATURE: 99 F | SYSTOLIC BLOOD PRESSURE: 187 MMHG | BODY MASS INDEX: 33.55 KG/M2 | HEIGHT: 62 IN

## 2020-09-16 DIAGNOSIS — E26.9 HYPERALDOSTERONISM: ICD-10-CM

## 2020-09-16 DIAGNOSIS — E26.9 HYPERALDOSTERONISM: Primary | ICD-10-CM

## 2020-09-16 DIAGNOSIS — E27.8 ADRENAL MASS: ICD-10-CM

## 2020-09-16 DIAGNOSIS — Z01.818 PREOP EXAMINATION: ICD-10-CM

## 2020-09-16 PROCEDURE — 99215 OFFICE O/P EST HI 40 MIN: CPT | Mod: PBBFAC,25 | Performed by: SURGERY

## 2020-09-16 PROCEDURE — 93010 EKG 12-LEAD: ICD-10-PCS | Mod: ,,, | Performed by: INTERNAL MEDICINE

## 2020-09-16 PROCEDURE — 99214 PR OFFICE/OUTPT VISIT, EST, LEVL IV, 30-39 MIN: ICD-10-PCS | Mod: S$PBB,,, | Performed by: SURGERY

## 2020-09-16 PROCEDURE — 99999 PR PBB SHADOW E&M-EST. PATIENT-LVL V: ICD-10-PCS | Mod: PBBFAC,,, | Performed by: SURGERY

## 2020-09-16 PROCEDURE — 93010 ELECTROCARDIOGRAM REPORT: CPT | Mod: ,,, | Performed by: INTERNAL MEDICINE

## 2020-09-16 PROCEDURE — 99999 PR PBB SHADOW E&M-EST. PATIENT-LVL V: CPT | Mod: PBBFAC,,, | Performed by: SURGERY

## 2020-09-16 PROCEDURE — 93005 ELECTROCARDIOGRAM TRACING: CPT

## 2020-09-16 PROCEDURE — 99214 OFFICE O/P EST MOD 30 MIN: CPT | Mod: S$PBB,,, | Performed by: SURGERY

## 2020-09-16 RX ORDER — SODIUM CHLORIDE 9 MG/ML
INJECTION, SOLUTION INTRAVENOUS CONTINUOUS
Status: CANCELLED | OUTPATIENT
Start: 2020-09-16

## 2020-09-16 RX ORDER — LIDOCAINE HYDROCHLORIDE 10 MG/ML
1 INJECTION, SOLUTION EPIDURAL; INFILTRATION; INTRACAUDAL; PERINEURAL ONCE
Status: CANCELLED | OUTPATIENT
Start: 2020-09-16 | End: 2020-09-16

## 2020-09-28 NOTE — PROGRESS NOTES
History & Physical    SUBJECTIVE:     History of Present Illness:  Patient is a 75 y.o. male in the interim the patient underwent venous sampling with Interventional Radiology which was suspicious for left-sided aldosteronoma based on lab results.  He presents today to discuss surgery.    with CKD stage 4 referred for suspected aldosteronoma.  Patient is noted to have hypertension and hypokalemia and subsequent CT showing left adrenal nodule.  He was noted to have elevated urinary aldosterone levels on 24 hr collection as well as elevated metanephrines.    Chief Complaint   Patient presents with    Consult       Review of patient's allergies indicates:   Allergen Reactions    Benadryl [diphenhydramine hcl] Other (See Comments)     Pt states benadryl makes him feel numb       Current Outpatient Medications   Medication Sig Dispense Refill    amLODIPine (NORVASC) 10 MG tablet Take 1 tablet (10 mg total) by mouth once daily. 90 tablet 3    ARIPiprazole (ABILIFY) 30 MG Tab 30 mg once daily.       aspirin (ASPIR-81 ORAL) once daily.       atorvastatin (LIPITOR) 80 MG tablet Take 1 tablet (80 mg total) by mouth once daily. 90 tablet 3    candesartan (ATACAND) 4 MG tablet Take 1 tablet (4 mg total) by mouth once daily. 90 tablet 3    hydrALAZINE (APRESOLINE) 10 MG tablet Take 1 tablet (10 mg total) by mouth 2 (two) times daily. 60 tablet 11    insulin aspart U-100 (NOVOLOG) 100 unit/mL injection Inject into the skin 3 (three) times daily before meals.      insulin detemir U-100 (LEVEMIR) 100 unit/mL injection Inject into the skin every evening.      lisinopriL 10 MG tablet Take 1 tablet (10 mg total) by mouth once daily. 30 tablet 11    multivitamin capsule Take 1 capsule by mouth once daily.      prazosin (MINIPRESS) 1 MG Cap 2 mg once daily.       sertraline (ZOLOFT) 100 MG tablet Take 100 mg by mouth once daily.       spironolactone (ALDACTONE) 25 MG tablet Take 1 tablet (25 mg total) by mouth 2 (two)  "times daily. 180 tablet 3     No current facility-administered medications for this visit.        Past Medical History:   Diagnosis Date    Diabetes mellitus     Hyperlipidemia     Hypertension     Retinitis     Stroke     Pt states residual sx is "I walk funny".     Past Surgical History:   Procedure Laterality Date    FLUOROSCOPY Bilateral 7/20/2020    Procedure: Angiogram- Bilateral Adrenal;  Surgeon: Jose R Laws MD;  [facility]: [facility] CATH LAB;  Service: General;  Laterality: Bilateral;     No family history on file.  Social History     Tobacco Use    Smoking status: Never Smoker   Substance Use Topics    Alcohol use: No    Drug use: No        Review of Systems:  Review of Systems   Constitutional: Negative for activity change, appetite change, chills, diaphoresis, fatigue, fever and unexpected weight change.   HENT: Negative for congestion, hearing loss, sore throat and trouble swallowing.    Eyes: Negative for visual disturbance.   Respiratory: Negative for apnea, cough, choking, chest tightness, shortness of breath and stridor.    Cardiovascular: Negative for chest pain, palpitations and leg swelling.   Gastrointestinal: Negative for abdominal distention, abdominal pain, anal bleeding, blood in stool, constipation, diarrhea, nausea, rectal pain and vomiting.   Endocrine: Negative for cold intolerance, heat intolerance, polydipsia, polyphagia and polyuria.   Genitourinary: Negative for difficulty urinating, dysuria, frequency, hematuria and urgency.   Musculoskeletal: Negative for arthralgias, back pain, myalgias and neck pain.   Skin: Negative for color change, pallor, rash and wound.   Neurological: Negative for dizziness, syncope, weakness, light-headedness, numbness and headaches.   Hematological: Negative for adenopathy. Does not bruise/bleed easily.   Psychiatric/Behavioral: Negative for agitation, confusion, decreased concentration and sleep disturbance. The patient is not nervous/anxious.  " "      OBJECTIVE:     Vital Signs (Most Recent)  Temp: 98.6 °F (37 °C) (09/16/20 0838)  Pulse: (!) 59 (09/16/20 0838)  BP: (!) 187/88 (09/16/20 0838)  5' 2" (1.575 m)  82.7 kg (182 lb 5.1 oz)     Physical Exam:  Physical Exam  Vitals signs reviewed.   Constitutional:       General: He is not in acute distress.     Appearance: He is well-developed. He is not diaphoretic.   HENT:      Head: Normocephalic and atraumatic.      Right Ear: External ear normal.      Left Ear: External ear normal.   Eyes:      General: No scleral icterus.     Conjunctiva/sclera: Conjunctivae normal.      Pupils: Pupils are equal, round, and reactive to light.   Neck:      Musculoskeletal: Normal range of motion and neck supple.      Thyroid: No thyromegaly.      Trachea: No tracheal deviation.   Cardiovascular:      Rate and Rhythm: Normal rate and regular rhythm.      Heart sounds: Normal heart sounds. No murmur. No friction rub. No gallop.    Pulmonary:      Effort: Pulmonary effort is normal. No respiratory distress.      Breath sounds: Normal breath sounds. No wheezing or rales.   Chest:      Chest wall: No tenderness.   Abdominal:      General: Bowel sounds are normal. There is no distension.      Palpations: Abdomen is soft.      Tenderness: There is no abdominal tenderness.      Hernia: No hernia is present.   Musculoskeletal: Normal range of motion.         General: No tenderness or deformity.   Lymphadenopathy:      Cervical: No cervical adenopathy.   Skin:     General: Skin is warm and dry.      Coloration: Skin is not pale.      Findings: No erythema or rash.   Neurological:      Mental Status: He is alert and oriented to person, place, and time.   Psychiatric:         Behavior: Behavior normal.         Thought Content: Thought content normal.         Judgment: Judgment normal.         Laboratory  24 hour urine for cortisol, normal, aldosterone 37 (high), catecholamines normal  Serum free metanephrine level normal, total " (non-specific) elevated    Aldosterone renin ratio:  343  Aldosterone right adrenal vein 42  Aldosterone right adrenal vein 9900        Diagnostic Results:  CT:  Impression:  No nephrolithiasis or hydronephrosis.  Bilateral renal cysts.  Left hepatic lobe cysts and additional subcentimeter hypodensities in the right hepatic lobe, which may reflect the same.  1.3 cm low-density, nonspecific left adrenal lesion.  Questionable fat density lesion involving the right adrenal gland versus averaging of the adjacent mesenteric fat.  Small hiatal hernia.    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

## 2020-10-14 ENCOUNTER — ANESTHESIA EVENT (OUTPATIENT)
Dept: SURGERY | Facility: HOSPITAL | Age: 75
DRG: 614 | End: 2020-10-14
Payer: MEDICARE

## 2020-10-16 NOTE — PRE ADMISSION SCREENING
Pre op instructions reviewed with patient per phone:    To confirm, Your surgeon has instructed you:  Surgery is scheduled 10/20/20 at 1100.      Please report to [facility] 1[facility] floor main lobby by 0930.   Pre admit office to call afternoon prior to surgery with final arrival time    Covid 19 testing is scheduled for 10/17/20 at 0910  @ [facility]  Please self quarantine after Covid testing, prior to surgery      INSTRUCTIONS IMPORTANT!!!  ¨ Do not eat, drink, or smoke after 12 midnight prior to surgery, including water. OK to brush teeth, no gum, candy or mints!    ¨ Take only these medicines with a small swallow of water-morning of surgery.  Amlodipine, Lipitor, Hydralazine, Sertraline          ____   Due to COVID 19 concerns, 1 visitor will be allowed in the pre operative area, and must adhere to social distancing guidelines.  One visitor/family member is currently allowed to visit in-patient rooms from 08:00 a.m - 6:00 p.m    ____   Family/caregivers will be updated re pt status via text/cell phone      ____  Do not wear makeup, including mascara.  ____  No powder, lotions or creams to surgical area.  ____  Please remove all jewelry, including piercings and leave at home.  ____  No money or valuables needed. Please leave at home.  ____  Please bring identification and insurance information to hospital.  ____  If going home the same day, arrange for a ride home. You will not be able to   drive if Anesthesia was used.  ____  Children, under 12 years old, must remain in the waiting room with an adult.  They are not allowed in patient areas.  ____  Wear loose fitting clothing. Allow for dressings, bandages.  ____  Stop Aspirin, Ibuprofen, Motrin and Aleve at least 5-7 days before surgery, unless otherwise instructed by your doctor, or the nurse.   You MAY use Tylenol/acetaminophen until day of surgery.  ____  If you take diabetic medication, do not take am of surgery unless instructed by    Doctor.  ____ Stop taking any Fish Oil supplement or any Vitamins that contain Vitamin E at least 5 days prior to surgery.          Bathing Instructions-- The night before surgery and the morning prior to coming to the hospital:   -Do not shave the surgical area.   -Shower and wash your hair and body as usual with anti-bacterial  soap and shampoo.   -Rinse your hair and body completely.   -Use one packet of hibiclens to wash the surgical site (using your hand) gently for 5 minutes.  Do not scrub you skin too hard.   -Do not use hibiclens on your head, face, or genitals.   -Do not wash with anti-bacterial soap after you use the hibiclens.   -Rinse your body thoroughly.   -Dry with clean, soft towel.  Do not use lotion, cream, deodorant, or powders on   the surgical site.    Use antibacterial soap in place of hibiclens if your surgery is on the head, face or genitals.         Surgical Site Infection    Prevention of surgical site infections:     -Keep incisions clean and dry.   -Do not soak/submerge incisions in water until completely healed.   -Do not apply lotions, powders, creams, or deodorants to site.   -Always make sure hands are cleaned with antibacterial soap/ alcohol-based   prior to touching the surgical site.  (This includes doctors, nurses, staff, and yourself.)    Signs and symptoms:   -Redness and pain around the area where you had surgery   -Drainage of cloudy fluid from your surgical wound   -Fever over 100.4  I have read or had read and explained to me, and understand the above information.

## 2020-10-19 NOTE — ANESTHESIA PREPROCEDURE EVALUATION
10/19/2020  Colt Stacy Jr. is a 75 y.o., male.  Patient Active Problem List   Diagnosis    HTN (hypertension)    Diabetes mellitus    CKD (chronic kidney disease) stage 4, GFR 15-29 ml/min    Hyperaldosteronism     No current facility-administered medications on file prior to encounter.      Current Outpatient Medications on File Prior to Encounter   Medication Sig Dispense Refill    amLODIPine (NORVASC) 10 MG tablet Take 1 tablet (10 mg total) by mouth once daily. 90 tablet 3    ARIPiprazole (ABILIFY) 30 MG Tab Take 30 mg by mouth every evening.       aspirin (ASPIR-81 ORAL) Take 81 mg by mouth every evening.       atorvastatin (LIPITOR) 80 MG tablet Take 1 tablet (80 mg total) by mouth once daily. 90 tablet 3    candesartan (ATACAND) 4 MG tablet Take 1 tablet (4 mg total) by mouth once daily. (Patient taking differently: Take 4 mg by mouth nightly. ) 90 tablet 3    hydrALAZINE (APRESOLINE) 10 MG tablet Take 1 tablet (10 mg total) by mouth 2 (two) times daily. 60 tablet 11    insulin aspart U-100 (NOVOLOG) 100 unit/mL injection Inject into the skin 2 (two) times a day. 4 units q a.m, 7 units at dinner      insulin detemir U-100 (LEVEMIR) 100 unit/mL injection Inject 31 Units into the skin every evening.       lisinopriL 10 MG tablet Take 1 tablet (10 mg total) by mouth once daily. 30 tablet 11    multivitamin capsule Take 1 capsule by mouth once daily.      prazosin (MINIPRESS) 1 MG Cap Take 2 mg by mouth every evening.       sertraline (ZOLOFT) 100 MG tablet Take 100 mg by mouth once daily.       spironolactone (ALDACTONE) 25 MG tablet Take 1 tablet (25 mg total) by mouth 2 (two) times daily. 180 tablet 3     Past Surgical History:   Procedure Laterality Date    FLUOROSCOPY Bilateral 7/20/2020    Procedure: Angiogram- Bilateral Adrenal;  Surgeon: Jose R Laws MD;  Location: [facility]  CATH LAB;  Service: General;  Laterality: Bilateral;       Anesthesia Evaluation    I have reviewed the Patient Summary Reports.    I have reviewed the Nursing Notes.    I have reviewed the Medications.     Review of Systems  Anesthesia Hx:  No problems with previous Anesthesia  History of prior surgery of interest to airway management or planning: Previous anesthesia: General  Denies Personal Hx of Anesthesia complications.   Social:  Non-Smoker    Hematology/Oncology:  Hematology Normal   Oncology Normal     EENT/Dental:EENT/Dental Normal   Cardiovascular:   Hypertension ECG has been reviewed.    Pulmonary:  Pulmonary Normal    Renal/:   Chronic Renal Disease, CRI    Hepatic/GI:  Hepatic/GI Normal    Musculoskeletal:  Musculoskeletal Normal    Neurological:   CVA, residual symptoms  CVA - Cerebrovasular Accident (Trouble walking)    Endocrine:   Diabetes, type 2    Dermatological:  Skin Normal    Psych:  Psychiatric Normal           Chemistry        Component Value Date/Time     09/16/2020 1014    K 3.4 (L) 09/16/2020 1014     09/16/2020 1014    CO2 27 09/16/2020 1014    BUN 38 (H) 09/16/2020 1014    CREATININE 3.9 (H) 09/16/2020 1014     (H) 09/16/2020 1014        Component Value Date/Time    CALCIUM 8.6 (L) 09/16/2020 1014    ALKPHOS 58 09/16/2020 1014    AST 17 09/16/2020 1014    ALT 12 09/16/2020 1014    BILITOT 0.3 09/16/2020 1014    ESTGFRAFRICA 16.6 (A) 09/16/2020 1014    EGFRNONAA 14.3 (A) 09/16/2020 1014        Lab Results   Component Value Date    WBC 5.43 10/19/2020    HGB 12.9 (L) 10/19/2020    HCT 38.9 (L) 10/19/2020    MCV 94 10/19/2020     10/19/2020           Physical Exam  General:  Well nourished    Airway/Jaw/Neck:  Airway Findings: Mouth Opening: Normal Tongue: Normal  General Airway Assessment: Adult  Mallampati: II  TM Distance: 4 - 6 cm  Jaw/Neck Findings:  Neck ROM: Normal ROM      Dental:  Dental Findings: In tact   Chest/Lungs:  Chest/Lungs Findings: Clear to  auscultation, Normal Respiratory Rate     Heart/Vascular:  Heart Findings: Rate: Normal  Rhythm: Regular Rhythm  Sounds: Normal        Mental Status:  Mental Status Findings:  Cooperative, Alert and Oriented         Anesthesia Plan  Type of Anesthesia, risks & benefits discussed:  Anesthesia Type:  general  Patient's Preference:   Intra-op Monitoring Plan: standard ASA monitors and arterial line  Intra-op Monitoring Plan Comments:   Post Op Pain Control Plan: multimodal analgesia  Post Op Pain Control Plan Comments:   Induction:   IV  Beta Blocker:  Patient is not currently on a Beta-Blocker (No further documentation required).       Informed Consent: Patient understands risks and agrees with Anesthesia plan.  Questions answered. Anesthesia consent signed with patient.  ASA Score: 3     Day of Surgery Review of History & Physical: I have interviewed and examined the patient. I have reviewed the patient's H&P dated:    H&P update referred to the surgeon.         Ready For Surgery From Anesthesia Perspective.

## 2020-10-20 ENCOUNTER — HOSPITAL ENCOUNTER (INPATIENT)
Facility: HOSPITAL | Age: 75
LOS: 8 days | Discharge: HOME | DRG: 614 | End: 2020-10-28
Attending: SURGERY | Admitting: SURGERY
Payer: MEDICARE

## 2020-10-20 ENCOUNTER — ANESTHESIA (OUTPATIENT)
Dept: SURGERY | Facility: HOSPITAL | Age: 75
DRG: 614 | End: 2020-10-20
Payer: MEDICARE

## 2020-10-20 DIAGNOSIS — R06.02 SHORTNESS OF BREATH: ICD-10-CM

## 2020-10-20 DIAGNOSIS — E26.9 HYPERALDOSTERONISM: ICD-10-CM

## 2020-10-20 DIAGNOSIS — N18.4 CKD (CHRONIC KIDNEY DISEASE) STAGE 4, GFR 15-29 ML/MIN: ICD-10-CM

## 2020-10-20 DIAGNOSIS — R31.0 GROSS HEMATURIA: Primary | ICD-10-CM

## 2020-10-20 LAB
ABO + RH BLD: NORMAL
ALBUMIN SERPL BCP-MCNC: 2.8 G/DL (ref 3.5–5.2)
ANION GAP SERPL CALC-SCNC: 14 MMOL/L (ref 8–16)
BLD GP AB SCN CELLS X3 SERPL QL: NORMAL
BUN SERPL-MCNC: 36 MG/DL (ref 8–23)
CALCIUM SERPL-MCNC: 7.7 MG/DL (ref 8.7–10.5)
CHLORIDE SERPL-SCNC: 106 MMOL/L (ref 95–110)
CO2 SERPL-SCNC: 23 MMOL/L (ref 23–29)
CREAT SERPL-MCNC: 4.1 MG/DL (ref 0.5–1.4)
EST. GFR  (AFRICAN AMERICAN): 16 ML/MIN/1.73 M^2
EST. GFR  (NON AFRICAN AMERICAN): 14 ML/MIN/1.73 M^2
GLUCOSE SERPL-MCNC: 147 MG/DL (ref 70–110)
PHOSPHATE SERPL-MCNC: 4.1 MG/DL (ref 2.7–4.5)
POCT GLUCOSE: 130 MG/DL (ref 70–110)
POCT GLUCOSE: 150 MG/DL (ref 70–110)
POCT GLUCOSE: 194 MG/DL (ref 70–110)
POCT GLUCOSE: 236 MG/DL (ref 70–110)
POTASSIUM SERPL-SCNC: 3.8 MMOL/L (ref 3.5–5.1)
SODIUM SERPL-SCNC: 143 MMOL/L (ref 136–145)

## 2020-10-20 PROCEDURE — 82962 GLUCOSE BLOOD TEST: CPT | Performed by: SURGERY

## 2020-10-20 PROCEDURE — 27201423 OPTIME MED/SURG SUP & DEVICES STERILE SUPPLY: Performed by: SURGERY

## 2020-10-20 PROCEDURE — 60650 LAPAROSCOPY ADRENALECTOMY: CPT | Mod: LT,,, | Performed by: SURGERY

## 2020-10-20 PROCEDURE — 11000001 HC ACUTE MED/SURG PRIVATE ROOM

## 2020-10-20 PROCEDURE — 63600175 PHARM REV CODE 636 W HCPCS: Performed by: NURSE PRACTITIONER

## 2020-10-20 PROCEDURE — 97110 THERAPEUTIC EXERCISES: CPT

## 2020-10-20 PROCEDURE — 99223 PR INITIAL HOSPITAL CARE,LEVL III: ICD-10-PCS | Mod: ,,, | Performed by: INTERNAL MEDICINE

## 2020-10-20 PROCEDURE — 36000713 HC OR TIME LEV V EA ADD 15 MIN: Performed by: SURGERY

## 2020-10-20 PROCEDURE — 83036 HEMOGLOBIN GLYCOSYLATED A1C: CPT

## 2020-10-20 PROCEDURE — 63600175 PHARM REV CODE 636 W HCPCS: Performed by: NURSE ANESTHETIST, CERTIFIED REGISTERED

## 2020-10-20 PROCEDURE — 25000003 PHARM REV CODE 250: Performed by: INTERNAL MEDICINE

## 2020-10-20 PROCEDURE — 25000003 PHARM REV CODE 250: Performed by: SURGERY

## 2020-10-20 PROCEDURE — PATHNN PATH DEFICIENCY: Mod: ,,, | Performed by: SURGERY

## 2020-10-20 PROCEDURE — A4216 STERILE WATER/SALINE, 10 ML: HCPCS | Performed by: ANESTHESIOLOGY

## 2020-10-20 PROCEDURE — 60650 PR LAP,ADRENALECTOMY: ICD-10-PCS | Mod: 80,,, | Performed by: SURGERY

## 2020-10-20 PROCEDURE — PATHNN PATH DEFICIENCY: ICD-10-PCS | Mod: ,,, | Performed by: SURGERY

## 2020-10-20 PROCEDURE — 25000003 PHARM REV CODE 250: Performed by: NURSE ANESTHETIST, CERTIFIED REGISTERED

## 2020-10-20 PROCEDURE — 63600175 PHARM REV CODE 636 W HCPCS: Performed by: SURGERY

## 2020-10-20 PROCEDURE — 88307 TISSUE EXAM BY PATHOLOGIST: CPT | Mod: 26,,, | Performed by: PATHOLOGY

## 2020-10-20 PROCEDURE — 88307 PR  SURG PATH,LEVEL V: ICD-10-PCS | Mod: 26,,, | Performed by: PATHOLOGY

## 2020-10-20 PROCEDURE — 88307 TISSUE EXAM BY PATHOLOGIST: CPT | Performed by: PATHOLOGY

## 2020-10-20 PROCEDURE — 80069 RENAL FUNCTION PANEL: CPT

## 2020-10-20 PROCEDURE — 60650 LAPAROSCOPY ADRENALECTOMY: CPT | Mod: 80,,, | Performed by: SURGERY

## 2020-10-20 PROCEDURE — 37000009 HC ANESTHESIA EA ADD 15 MINS: Performed by: SURGERY

## 2020-10-20 PROCEDURE — 60650 PR LAP,ADRENALECTOMY: ICD-10-PCS | Mod: LT,,, | Performed by: SURGERY

## 2020-10-20 PROCEDURE — 37000008 HC ANESTHESIA 1ST 15 MINUTES: Performed by: SURGERY

## 2020-10-20 PROCEDURE — 94799 UNLISTED PULMONARY SVC/PX: CPT

## 2020-10-20 PROCEDURE — 36000712 HC OR TIME LEV V 1ST 15 MIN: Performed by: SURGERY

## 2020-10-20 PROCEDURE — 97161 PT EVAL LOW COMPLEX 20 MIN: CPT

## 2020-10-20 PROCEDURE — 71000039 HC RECOVERY, EACH ADD'L HOUR: Performed by: SURGERY

## 2020-10-20 PROCEDURE — 25000003 PHARM REV CODE 250: Performed by: ANESTHESIOLOGY

## 2020-10-20 PROCEDURE — 21400001 HC TELEMETRY ROOM

## 2020-10-20 PROCEDURE — 86850 RBC ANTIBODY SCREEN: CPT

## 2020-10-20 PROCEDURE — 97530 THERAPEUTIC ACTIVITIES: CPT

## 2020-10-20 PROCEDURE — 71000033 HC RECOVERY, INTIAL HOUR: Performed by: SURGERY

## 2020-10-20 PROCEDURE — 99223 1ST HOSP IP/OBS HIGH 75: CPT | Mod: ,,, | Performed by: INTERNAL MEDICINE

## 2020-10-20 RX ORDER — ONDANSETRON 2 MG/ML
4 INJECTION INTRAMUSCULAR; INTRAVENOUS EVERY 8 HOURS PRN
Status: DISCONTINUED | OUTPATIENT
Start: 2020-10-20 | End: 2020-10-28 | Stop reason: HOSPADM

## 2020-10-20 RX ORDER — GLUCAGON 1 MG
1 KIT INJECTION
Status: DISCONTINUED | OUTPATIENT
Start: 2020-10-20 | End: 2020-10-28 | Stop reason: HOSPADM

## 2020-10-20 RX ORDER — SODIUM CHLORIDE, SODIUM LACTATE, POTASSIUM CHLORIDE, CALCIUM CHLORIDE 600; 310; 30; 20 MG/100ML; MG/100ML; MG/100ML; MG/100ML
INJECTION, SOLUTION INTRAVENOUS CONTINUOUS PRN
Status: DISCONTINUED | OUTPATIENT
Start: 2020-10-20 | End: 2020-10-20

## 2020-10-20 RX ORDER — SODIUM CHLORIDE 9 MG/ML
INJECTION, SOLUTION INTRAVENOUS CONTINUOUS
Status: DISCONTINUED | OUTPATIENT
Start: 2020-10-20 | End: 2020-10-20

## 2020-10-20 RX ORDER — LIDOCAINE HYDROCHLORIDE 10 MG/ML
1 INJECTION, SOLUTION EPIDURAL; INFILTRATION; INTRACAUDAL; PERINEURAL ONCE
Status: DISCONTINUED | OUTPATIENT
Start: 2020-10-20 | End: 2020-10-20 | Stop reason: HOSPADM

## 2020-10-20 RX ORDER — HYDROMORPHONE HYDROCHLORIDE 2 MG/ML
0.2 INJECTION, SOLUTION INTRAMUSCULAR; INTRAVENOUS; SUBCUTANEOUS EVERY 5 MIN PRN
Status: DISCONTINUED | OUTPATIENT
Start: 2020-10-20 | End: 2020-10-21

## 2020-10-20 RX ORDER — AMLODIPINE BESYLATE 5 MG/1
5 TABLET ORAL DAILY
Status: DISCONTINUED | OUTPATIENT
Start: 2020-10-21 | End: 2020-10-20

## 2020-10-20 RX ORDER — PHENYLEPHRINE HYDROCHLORIDE 10 MG/ML
INJECTION INTRAVENOUS
Status: DISCONTINUED | OUTPATIENT
Start: 2020-10-20 | End: 2020-10-20

## 2020-10-20 RX ORDER — CEFAZOLIN SODIUM 2 G/50ML
2 SOLUTION INTRAVENOUS
Status: COMPLETED | OUTPATIENT
Start: 2020-10-20 | End: 2020-10-20

## 2020-10-20 RX ORDER — LIDOCAINE HYDROCHLORIDE 10 MG/ML
INJECTION INFILTRATION; PERINEURAL
Status: DISCONTINUED | OUTPATIENT
Start: 2020-10-20 | End: 2020-10-20 | Stop reason: HOSPADM

## 2020-10-20 RX ORDER — AMOXICILLIN 250 MG
1 CAPSULE ORAL 2 TIMES DAILY
Status: DISCONTINUED | OUTPATIENT
Start: 2020-10-20 | End: 2020-10-23

## 2020-10-20 RX ORDER — BUPIVACAINE HYDROCHLORIDE 2.5 MG/ML
INJECTION, SOLUTION EPIDURAL; INFILTRATION; INTRACAUDAL
Status: DISCONTINUED | OUTPATIENT
Start: 2020-10-20 | End: 2020-10-20 | Stop reason: HOSPADM

## 2020-10-20 RX ORDER — DIPHENHYDRAMINE HYDROCHLORIDE 50 MG/ML
25 INJECTION INTRAMUSCULAR; INTRAVENOUS EVERY 6 HOURS PRN
Status: ACTIVE | OUTPATIENT
Start: 2020-10-20

## 2020-10-20 RX ORDER — ARIPIPRAZOLE 5 MG/1
30 TABLET ORAL NIGHTLY
Status: DISCONTINUED | OUTPATIENT
Start: 2020-10-20 | End: 2020-10-28 | Stop reason: HOSPADM

## 2020-10-20 RX ORDER — INSULIN ASPART 100 [IU]/ML
0-5 INJECTION, SOLUTION INTRAVENOUS; SUBCUTANEOUS
Status: DISCONTINUED | OUTPATIENT
Start: 2020-10-20 | End: 2020-10-28 | Stop reason: HOSPADM

## 2020-10-20 RX ORDER — EPHEDRINE SULFATE 50 MG/ML
INJECTION, SOLUTION INTRAVENOUS
Status: DISCONTINUED | OUTPATIENT
Start: 2020-10-20 | End: 2020-10-20

## 2020-10-20 RX ORDER — SODIUM CHLORIDE 0.9 % (FLUSH) 0.9 %
3 SYRINGE (ML) INJECTION EVERY 8 HOURS
Status: DISCONTINUED | OUTPATIENT
Start: 2020-10-20 | End: 2020-10-23

## 2020-10-20 RX ORDER — HYDROCODONE BITARTRATE AND ACETAMINOPHEN 5; 325 MG/1; MG/1
2 TABLET ORAL EVERY 4 HOURS PRN
Status: DISCONTINUED | OUTPATIENT
Start: 2020-10-20 | End: 2020-10-28 | Stop reason: HOSPADM

## 2020-10-20 RX ORDER — NEOSTIGMINE METHYLSULFATE 1 MG/ML
INJECTION, SOLUTION INTRAVENOUS
Status: DISCONTINUED | OUTPATIENT
Start: 2020-10-20 | End: 2020-10-20

## 2020-10-20 RX ORDER — ASPIRIN 81 MG/1
81 TABLET ORAL NIGHTLY
Status: DISCONTINUED | OUTPATIENT
Start: 2020-10-20 | End: 2020-10-28 | Stop reason: HOSPADM

## 2020-10-20 RX ORDER — SODIUM CHLORIDE, SODIUM LACTATE, POTASSIUM CHLORIDE, CALCIUM CHLORIDE 600; 310; 30; 20 MG/100ML; MG/100ML; MG/100ML; MG/100ML
INJECTION, SOLUTION INTRAVENOUS CONTINUOUS
Status: DISCONTINUED | OUTPATIENT
Start: 2020-10-20 | End: 2020-10-21

## 2020-10-20 RX ORDER — MORPHINE SULFATE 2 MG/ML
2 INJECTION, SOLUTION INTRAMUSCULAR; INTRAVENOUS
Status: DISCONTINUED | OUTPATIENT
Start: 2020-10-20 | End: 2020-10-28 | Stop reason: HOSPADM

## 2020-10-20 RX ORDER — CHLORHEXIDINE GLUCONATE ORAL RINSE 1.2 MG/ML
10 SOLUTION DENTAL 2 TIMES DAILY
Status: COMPLETED | OUTPATIENT
Start: 2020-10-20 | End: 2020-10-25

## 2020-10-20 RX ORDER — MEPERIDINE HYDROCHLORIDE 25 MG/ML
12.5 INJECTION INTRAMUSCULAR; INTRAVENOUS; SUBCUTANEOUS ONCE AS NEEDED
Status: DISCONTINUED | OUTPATIENT
Start: 2020-10-20 | End: 2020-10-21

## 2020-10-20 RX ORDER — ATORVASTATIN CALCIUM 40 MG/1
80 TABLET, FILM COATED ORAL DAILY
Status: DISCONTINUED | OUTPATIENT
Start: 2020-10-20 | End: 2020-10-28 | Stop reason: HOSPADM

## 2020-10-20 RX ORDER — ACETAMINOPHEN 325 MG/1
650 TABLET ORAL EVERY 6 HOURS PRN
Status: DISCONTINUED | OUTPATIENT
Start: 2020-10-20 | End: 2020-10-28 | Stop reason: HOSPADM

## 2020-10-20 RX ORDER — PROPOFOL 10 MG/ML
VIAL (ML) INTRAVENOUS
Status: DISCONTINUED | OUTPATIENT
Start: 2020-10-20 | End: 2020-10-20

## 2020-10-20 RX ORDER — FENTANYL CITRATE 50 UG/ML
INJECTION, SOLUTION INTRAMUSCULAR; INTRAVENOUS
Status: DISCONTINUED | OUTPATIENT
Start: 2020-10-20 | End: 2020-10-20

## 2020-10-20 RX ORDER — IBUPROFEN 200 MG
24 TABLET ORAL
Status: DISCONTINUED | OUTPATIENT
Start: 2020-10-20 | End: 2020-10-28 | Stop reason: HOSPADM

## 2020-10-20 RX ORDER — SERTRALINE HYDROCHLORIDE 50 MG/1
100 TABLET, FILM COATED ORAL DAILY
Status: DISCONTINUED | OUTPATIENT
Start: 2020-10-20 | End: 2020-10-28 | Stop reason: HOSPADM

## 2020-10-20 RX ORDER — AMLODIPINE BESYLATE 5 MG/1
5 TABLET ORAL DAILY
Status: DISCONTINUED | OUTPATIENT
Start: 2020-10-20 | End: 2020-10-21

## 2020-10-20 RX ORDER — LABETALOL HYDROCHLORIDE 5 MG/ML
10 INJECTION, SOLUTION INTRAVENOUS EVERY 6 HOURS PRN
Status: DISCONTINUED | OUTPATIENT
Start: 2020-10-20 | End: 2020-10-21

## 2020-10-20 RX ORDER — METOCLOPRAMIDE HYDROCHLORIDE 5 MG/ML
10 INJECTION INTRAMUSCULAR; INTRAVENOUS EVERY 10 MIN PRN
Status: ACTIVE | OUTPATIENT
Start: 2020-10-20

## 2020-10-20 RX ORDER — ONDANSETRON 2 MG/ML
INJECTION INTRAMUSCULAR; INTRAVENOUS
Status: DISCONTINUED | OUTPATIENT
Start: 2020-10-20 | End: 2020-10-20

## 2020-10-20 RX ORDER — ROCURONIUM BROMIDE 10 MG/ML
INJECTION, SOLUTION INTRAVENOUS
Status: DISCONTINUED | OUTPATIENT
Start: 2020-10-20 | End: 2020-10-20

## 2020-10-20 RX ORDER — TALC
9 POWDER (GRAM) TOPICAL NIGHTLY PRN
Status: DISCONTINUED | OUTPATIENT
Start: 2020-10-20 | End: 2020-10-28 | Stop reason: HOSPADM

## 2020-10-20 RX ORDER — IBUPROFEN 200 MG
16 TABLET ORAL
Status: DISCONTINUED | OUTPATIENT
Start: 2020-10-20 | End: 2020-10-28 | Stop reason: HOSPADM

## 2020-10-20 RX ADMIN — INSULIN ASPART 1 UNITS: 100 INJECTION, SOLUTION INTRAVENOUS; SUBCUTANEOUS at 09:10

## 2020-10-20 RX ADMIN — LABETALOL HYDROCHLORIDE 10 MG: 5 INJECTION INTRAVENOUS at 11:10

## 2020-10-20 RX ADMIN — NEOSTIGMINE METHYLSULFATE 5 MG: 1 INJECTION INTRAVENOUS at 12:10

## 2020-10-20 RX ADMIN — SODIUM CHLORIDE, SODIUM LACTATE, POTASSIUM CHLORIDE, AND CALCIUM CHLORIDE: 600; 310; 30; 20 INJECTION, SOLUTION INTRAVENOUS at 09:10

## 2020-10-20 RX ADMIN — SODIUM CHLORIDE, SODIUM LACTATE, POTASSIUM CHLORIDE, AND CALCIUM CHLORIDE: 600; 310; 30; 20 INJECTION, SOLUTION INTRAVENOUS at 08:10

## 2020-10-20 RX ADMIN — FENTANYL CITRATE 50 MCG: 50 INJECTION, SOLUTION INTRAMUSCULAR; INTRAVENOUS at 08:10

## 2020-10-20 RX ADMIN — ROCURONIUM BROMIDE 10 MG: 10 INJECTION, SOLUTION INTRAVENOUS at 09:10

## 2020-10-20 RX ADMIN — ROCURONIUM BROMIDE 50 MG: 10 INJECTION, SOLUTION INTRAVENOUS at 08:10

## 2020-10-20 RX ADMIN — PHENYLEPHRINE HYDROCHLORIDE 100 MCG: 10 INJECTION INTRAVENOUS at 08:10

## 2020-10-20 RX ADMIN — SODIUM CHLORIDE, SODIUM LACTATE, POTASSIUM CHLORIDE, AND CALCIUM CHLORIDE: .6; .31; .03; .02 INJECTION, SOLUTION INTRAVENOUS at 03:10

## 2020-10-20 RX ADMIN — STANDARDIZED SENNA CONCENTRATE AND DOCUSATE SODIUM 1 TABLET: 8.6; 5 TABLET ORAL at 09:10

## 2020-10-20 RX ADMIN — CHLORHEXIDINE GLUCONATE 0.12% ORAL RINSE 10 ML: 1.2 LIQUID ORAL at 09:10

## 2020-10-20 RX ADMIN — SUGAMMADEX 200 MG: 100 INJECTION, SOLUTION INTRAVENOUS at 12:10

## 2020-10-20 RX ADMIN — ROCURONIUM BROMIDE 10 MG: 10 INJECTION, SOLUTION INTRAVENOUS at 10:10

## 2020-10-20 RX ADMIN — ARIPIPRAZOLE 30 MG: 5 TABLET ORAL at 09:10

## 2020-10-20 RX ADMIN — SODIUM CHLORIDE, SODIUM LACTATE, POTASSIUM CHLORIDE, AND CALCIUM CHLORIDE: 600; 310; 30; 20 INJECTION, SOLUTION INTRAVENOUS at 11:10

## 2020-10-20 RX ADMIN — FENTANYL CITRATE 100 MCG: 50 INJECTION, SOLUTION INTRAMUSCULAR; INTRAVENOUS at 10:10

## 2020-10-20 RX ADMIN — FENTANYL CITRATE 100 MCG: 50 INJECTION, SOLUTION INTRAMUSCULAR; INTRAVENOUS at 09:10

## 2020-10-20 RX ADMIN — EPHEDRINE SULFATE 50 MG: 50 INJECTION INTRAVENOUS at 08:10

## 2020-10-20 RX ADMIN — ONDANSETRON 4 MG: 2 INJECTION, SOLUTION INTRAMUSCULAR; INTRAVENOUS at 12:10

## 2020-10-20 RX ADMIN — GLYCOPYRROLATE 0.8 MG: 0.2 INJECTION, SOLUTION INTRAMUSCULAR; INTRAVENOUS at 12:10

## 2020-10-20 RX ADMIN — Medication 3 ML: at 09:10

## 2020-10-20 RX ADMIN — PROPOFOL 120 MG: 10 INJECTION, EMULSION INTRAVENOUS at 08:10

## 2020-10-20 RX ADMIN — ASPIRIN 81 MG: 81 TABLET, COATED ORAL at 09:10

## 2020-10-20 RX ADMIN — ROCURONIUM BROMIDE 20 MG: 10 INJECTION, SOLUTION INTRAVENOUS at 09:10

## 2020-10-20 RX ADMIN — ROCURONIUM BROMIDE 10 MG: 10 INJECTION, SOLUTION INTRAVENOUS at 11:10

## 2020-10-20 RX ADMIN — CEFAZOLIN SODIUM 2 G: 2 SOLUTION INTRAVENOUS at 08:10

## 2020-10-20 NOTE — OP NOTE
[facility]  Surgery Department  Operative Note    SUMMARY     Date of Procedure: 10/20/2020     Procedure: Procedure(s) (LRB):  XI ROBOTIC ADRENALECTOMY (Left)     Surgeon(s) and Role:     * Livan Quiñones MD - Primary     * Beny Cortez MD - Assisting        Pre-Operative Diagnosis: Hyperaldosteronism [E26.9]  Left adrenal mass    Post-Operative Diagnosis: Post-Op Diagnosis Codes:     * Hyperaldosteronism [E26.9]  Left adrenal mass    Anesthesia: General    Technical Procedures Used:     Description of the Findings of the Procedure:  Robotic left adrenalectomy    Significant Surgical Tasks Conducted by the Assistant(s), if Applicable:  Assistance with surgery    Complications: No    Estimated Blood Loss (EBL): 20 mL           Implants: * No implants in log *    Specimens:   Left adrenal gland           Condition: Good    Disposition: PACU - hemodynamically stable.    Procedure in detail:  Patient was brought to the OR and underwent general anesthesia.  He was prepped draped in the usual sterile fashion in the right lateral decubitus position.  Incision was made just below the costal margin.  Veress needle was inserted and pneumoperitoneum achieved to 15 mm Hg.  A 8 mm Optiview port was then placed just to the left of the patient's midline.  Three more 8 mm robotic ports were then placed 1 the epigastric region and then 2 in the left mid and left lateral abdomen under direct visualization.  A 5 mm port was also placed to use for retraction.    The splenic flexure of the colon was mobilized to facilitate exposure.  The lateral attachments of the spleen were divided using the vessel sealer retracting the spleen medially.  Meticulous dissection into the retroperitoneal space allowed for further visualization.  The left renal vein was identified and left adrenal vein was noted coming off superiorly.  This was clipped twice proximally and divided distally using the vessel sealer.  The adrenal gland  was then dissected away from the surrounding tissue using the vessel sealer.  Once completely dissected free it was passed off into an Endo-Catch bag. Pathology confirmed it was adrenal tissue.  The area was irrigated thoroughly.  Hemostasis was noted.  All instruments were removed and robot undocked.  The fascial incision at the left lateral site was closed using 0 Vicryl suture.  Local anesthetic was injected Skin sites were closed using 4 Monocryl.  Dermabond was applied. The patient was transferred to recovery in stable and satisfactory condition.

## 2020-10-20 NOTE — ASSESSMENT & PLAN NOTE
Hemoglobin A1C   Date Value Ref Range Status   02/04/2020 7.3 (H) 4.0 - 5.6 % Final     Comment:     ADA Screening Guidelines:  5.7-6.4%  Consistent with prediabetes  >or=6.5%  Consistent with diabetes  High levels of fetal hemoglobin interfere with the HbA1C  assay. Heterozygous hemoglobin variants (HbS, HgC, etc)do  not significantly interfere with this assay.   However, presence of multiple variants may affect accuracy.     --insulin sliding scale  --Diabetic diet

## 2020-10-20 NOTE — ANESTHESIA POSTPROCEDURE EVALUATION
Anesthesia Post Evaluation    Patient: Colt Stacy Jr.    Procedure(s) Performed: Procedure(s) (LRB):  XI ROBOTIC ADRENALECTOMY (Left)    Final Anesthesia Type: general    Patient location during evaluation: PACU  Patient participation: Yes- Able to Participate  Level of consciousness: awake and alert  Post-procedure vital signs: reviewed and stable  Pain management: adequate  Airway patency: patent  CALVIN mitigation strategies: Extubation while patient is awake  PONV status at discharge: No PONV  Anesthetic complications: no      Cardiovascular status: hemodynamically stable  Respiratory status: spontaneous ventilation  Hydration status: euvolemic  Follow-up not needed.          Vitals Value Taken Time   /66 10/20/20 1435   Temp 36.5 °C (97.7 °F) 10/20/20 1430   Pulse 73 10/20/20 1445   Resp 22 10/20/20 1443   SpO2 94 % 10/20/20 1445   Vitals shown include unvalidated device data.      No case tracking events are documented in the log.      Pain/Marina Score: Marina Score: 9 (10/20/2020  2:00 PM)

## 2020-10-20 NOTE — H&P
History & Physical     SUBJECTIVE:      History of Present Illness: No changes since clinic visit  Patient is a 75 y.o. male in the interim the patient underwent venous sampling with Interventional Radiology which was suspicious for left-sided aldosteronoma based on lab results.  He presents today to discuss surgery.     with CKD stage 4 referred for suspected aldosteronoma.  Patient is noted to have hypertension and hypokalemia and subsequent CT showing left adrenal nodule.  He was noted to have elevated urinary aldosterone levels on 24 hr collection as well as elevated metanephrines.         Chief Complaint   Patient presents with    Consult               Review of patient's allergies indicates:   Allergen Reactions    Benadryl [diphenhydramine hcl] Other (See Comments)       Pt states benadryl makes him feel numb         Current Medications          Current Outpatient Medications   Medication Sig Dispense Refill    amLODIPine (NORVASC) 10 MG tablet Take 1 tablet (10 mg total) by mouth once daily. 90 tablet 3    ARIPiprazole (ABILIFY) 30 MG Tab 30 mg once daily.         aspirin (ASPIR-81 ORAL) once daily.         atorvastatin (LIPITOR) 80 MG tablet Take 1 tablet (80 mg total) by mouth once daily. 90 tablet 3    candesartan (ATACAND) 4 MG tablet Take 1 tablet (4 mg total) by mouth once daily. 90 tablet 3    hydrALAZINE (APRESOLINE) 10 MG tablet Take 1 tablet (10 mg total) by mouth 2 (two) times daily. 60 tablet 11    insulin aspart U-100 (NOVOLOG) 100 unit/mL injection Inject into the skin 3 (three) times daily before meals.        insulin detemir U-100 (LEVEMIR) 100 unit/mL injection Inject into the skin every evening.        lisinopriL 10 MG tablet Take 1 tablet (10 mg total) by mouth once daily. 30 tablet 11    multivitamin capsule Take 1 capsule by mouth once daily.        prazosin (MINIPRESS) 1 MG Cap 2 mg once daily.         sertraline (ZOLOFT) 100 MG tablet Take 100 mg by mouth once daily.       "   spironolactone (ALDACTONE) 25 MG tablet Take 1 tablet (25 mg total) by mouth 2 (two) times daily. 180 tablet 3      No current facility-administered medications for this visit.                 Past Medical History:   Diagnosis Date    Diabetes mellitus      Hyperlipidemia      Hypertension      Retinitis      Stroke       Pt states residual sx is "I walk funny".            Past Surgical History:   Procedure Laterality Date    FLUOROSCOPY Bilateral 7/20/2020     Procedure: Angiogram- Bilateral Adrenal;  Surgeon: Jose R Laws MD;  Location: [facility] CATH LAB;  Service: General;  Laterality: Bilateral;      No family history on file.  Social History           Tobacco Use    Smoking status: Never Smoker   Substance Use Topics    Alcohol use: No    Drug use: No         Review of Systems:  Review of Systems   Constitutional: Negative for activity change, appetite change, chills, diaphoresis, fatigue, fever and unexpected weight change.   HENT: Negative for congestion, hearing loss, sore throat and trouble swallowing.    Eyes: Negative for visual disturbance.   Respiratory: Negative for apnea, cough, choking, chest tightness, shortness of breath and stridor.    Cardiovascular: Negative for chest pain, palpitations and leg swelling.   Gastrointestinal: Negative for abdominal distention, abdominal pain, anal bleeding, blood in stool, constipation, diarrhea, nausea, rectal pain and vomiting.   Endocrine: Negative for cold intolerance, heat intolerance, polydipsia, polyphagia and polyuria.   Genitourinary: Negative for difficulty urinating, dysuria, frequency, hematuria and urgency.   Musculoskeletal: Negative for arthralgias, back pain, myalgias and neck pain.   Skin: Negative for color change, pallor, rash and wound.   Neurological: Negative for dizziness, syncope, weakness, light-headedness, numbness and headaches.   Hematological: Negative for adenopathy. Does not bruise/bleed easily.   Psychiatric/Behavioral: " "Negative for agitation, confusion, decreased concentration and sleep disturbance. The patient is not nervous/anxious.          OBJECTIVE:      Vital Signs (Most Recent)  Temp: 98.6 °F (37 °C) (09/16/20 0838)  Pulse: (!) 59 (09/16/20 0838)  BP: (!) 187/88 (09/16/20 0838)  5' 2" (1.575 m)  82.7 kg (182 lb 5.1 oz)      Physical Exam:  Physical Exam  Vitals signs reviewed.   Constitutional:       General: He is not in acute distress.     Appearance: He is well-developed. He is not diaphoretic.   HENT:      Head: Normocephalic and atraumatic.      Right Ear: External ear normal.      Left Ear: External ear normal.   Eyes:      General: No scleral icterus.     Conjunctiva/sclera: Conjunctivae normal.      Pupils: Pupils are equal, round, and reactive to light.   Neck:      Musculoskeletal: Normal range of motion and neck supple.      Thyroid: No thyromegaly.      Trachea: No tracheal deviation.   Cardiovascular:      Rate and Rhythm: Normal rate and regular rhythm.      Heart sounds: Normal heart sounds. No murmur. No friction rub. No gallop.    Pulmonary:      Effort: Pulmonary effort is normal. No respiratory distress.      Breath sounds: Normal breath sounds. No wheezing or rales.   Chest:      Chest wall: No tenderness.   Abdominal:      General: Bowel sounds are normal. There is no distension.      Palpations: Abdomen is soft.      Tenderness: There is no abdominal tenderness.      Hernia: No hernia is present.   Musculoskeletal: Normal range of motion.         General: No tenderness or deformity.   Lymphadenopathy:      Cervical: No cervical adenopathy.   Skin:     General: Skin is warm and dry.      Coloration: Skin is not pale.      Findings: No erythema or rash.   Neurological:      Mental Status: He is alert and oriented to person, place, and time.   Psychiatric:         Behavior: Behavior normal.         Thought Content: Thought content normal.         Judgment: Judgment normal.            Laboratory  24 hour " urine for cortisol, normal, aldosterone 37 (high), catecholamines normal  Serum free metanephrine level normal, total (non-specific) elevated     Aldosterone renin ratio:  343  Aldosterone right adrenal vein 42  Aldosterone right adrenal vein 9900           Diagnostic Results:  CT:  Impression:  No nephrolithiasis or hydronephrosis.  Bilateral renal cysts.  Left hepatic lobe cysts and additional subcentimeter hypodensities in the right hepatic lobe, which may reflect the same.  1.3 cm low-density, nonspecific left adrenal lesion.  Questionable fat density lesion involving the right adrenal gland versus averaging of the adjacent mesenteric fat.  Small hiatal hernia.     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

## 2020-10-20 NOTE — SUBJECTIVE & OBJECTIVE
"Past Medical History:   Diagnosis Date    Diabetes mellitus     Hyperlipidemia     Hypertension     Retinitis     Stroke     Pt states residual sx is "I walk funny".       Past Surgical History:   Procedure Laterality Date    FLUOROSCOPY Bilateral 7/20/2020    Procedure: Angiogram- Bilateral Adrenal;  Surgeon: Jose R Laws MD;  Location: [facility] CATH LAB;  Service: General;  Laterality: Bilateral;       Review of patient's allergies indicates:   Allergen Reactions    Benadryl [diphenhydramine hcl] Other (See Comments)     Pt states benadryl makes him feel numb     Current Facility-Administered Medications   Medication Frequency    acetaminophen tablet 650 mg Q6H PRN    amLODIPine tablet 5 mg Daily    ARIPiprazole tablet 30 mg QHS    aspirin EC tablet 81 mg QHS    atorvastatin tablet 80 mg Daily    chlorhexidine 0.12 % solution 10 mL BID    diphenhydrAMINE injection 25 mg Q6H PRN    HYDROcodone-acetaminophen 5-325 mg per tablet 2 tablet Q4H PRN    hydromorphone (PF) injection 0.2 mg Q5 Min PRN    hydromorphone (PF) injection 0.2 mg Q5 Min PRN    labetaloL injection 10 mg Q6H PRN    lactated ringers infusion Continuous    melatonin tablet 9 mg Nightly PRN    meperidine (PF) injection 12.5 mg Once PRN    metoclopramide HCl injection 10 mg Q10 Min PRN    morphine injection 2 mg Q3H PRN    nozaseptin (NOZIN) nasal  BID    ondansetron injection 4 mg Q8H PRN    promethazine (PHENERGAN) 6.25 mg in dextrose 5 % 50 mL IVPB Q10 Min PRN    promethazine (PHENERGAN) 6.25 mg in dextrose 5 % 50 mL IVPB Q6H PRN    senna-docusate 8.6-50 mg per tablet 1 tablet BID    sertraline tablet 100 mg Daily    sodium chloride 0.9% flush 3 mL Q8H     Family History     None        Tobacco Use    Smoking status: Never Smoker    Smokeless tobacco: Never Used   Substance and Sexual Activity    Alcohol use: No    Drug use: No    Sexual activity: Not on file     Review of Systems   Constitutional: Positive " for activity change. Negative for appetite change.   HENT: Negative for congestion and facial swelling.    Eyes: Negative for pain, discharge and redness.   Respiratory: Negative for apnea, cough and chest tightness.    Cardiovascular: Negative for chest pain, palpitations and leg swelling.   Gastrointestinal: Negative for abdominal distention.   Genitourinary: Negative for difficulty urinating, dysuria and frequency.   Musculoskeletal: Positive for arthralgias. Negative for neck pain and neck stiffness.   Skin: Negative for color change, rash and wound.   Neurological: Positive for weakness. Negative for dizziness and numbness.   Psychiatric/Behavioral: Negative for sleep disturbance.   All other systems reviewed and are negative.    Objective:     Vital Signs (Most Recent):  Temp: 98.9 °F (37.2 °C) (10/20/20 1642)  Pulse: 66 (10/20/20 1642)  Resp: 18 (10/20/20 1642)  BP: (!) 146/70 (10/20/20 1642)  SpO2: (!) 93 % (10/20/20 1642)  O2 Device (Oxygen Therapy): (S) room air (10/20/20 1607) Vital Signs (24h Range):  Temp:  [97.7 °F (36.5 °C)-98.9 °F (37.2 °C)] 98.9 °F (37.2 °C)  Pulse:  [58-80] 66  Resp:  [11-26] 18  SpO2:  [93 %-100 %] 93 %  BP: (127-179)/(58-79) 146/70     Weight: 83.3 kg (183 lb 10.3 oz) (10/20/20 0735)  Body mass index is 33.59 kg/m².  Body surface area is 1.91 meters squared.    No intake/output data recorded.    Physical Exam  Vitals signs and nursing note reviewed.   Constitutional:       General: He is not in acute distress.     Appearance: He is well-developed. He is not diaphoretic.   HENT:      Head: Normocephalic and atraumatic.      Comments: Facial edema   Eyes:      General:         Right eye: No discharge.      Pupils: Pupils are equal, round, and reactive to light.   Neck:      Musculoskeletal: Normal range of motion and neck supple.      Thyroid: No thyromegaly.      Trachea: No tracheal deviation.   Cardiovascular:      Rate and Rhythm: Normal rate and regular rhythm.      Heart  sounds: Murmur present. No friction rub. No gallop.    Pulmonary:      Effort: Pulmonary effort is normal.      Breath sounds: Normal breath sounds. No wheezing or rales.   Abdominal:      Palpations: Abdomen is soft. There is no mass.      Tenderness: There is no abdominal tenderness. There is no guarding or rebound.      Comments: Obese    Musculoskeletal: Normal range of motion.   Lymphadenopathy:      Cervical: No cervical adenopathy.   Skin:     General: Skin is warm.      Findings: No erythema or rash.      Comments: Areas of Leukoderma    Neurological:      Mental Status: He is alert and oriented to person, place, and time.         Significant Labs:  CBC:   Recent Labs   Lab 10/19/20  0917   WBC 5.43   RBC 4.12*   HGB 12.9*   HCT 38.9*      MCV 94   MCH 31.3*   MCHC 33.2     CMP:   Recent Labs   Lab 10/19/20  0917   *   CALCIUM 8.5*   ALBUMIN 3.4*   *   K 3.4*   CO2 29      BUN 46*   CREATININE 4.7*     Coagulation: No results for input(s): PT, INR, APTT in the last 168 hours.  LFTs:   Recent Labs   Lab 10/19/20  0917   ALBUMIN 3.4*     All labs within the past 24 hours have been reviewed.    Significant Imaging:  Reviewed    Lab Results   Component Value Date    .0 (H) 09/04/2020    CALCIUM 8.5 (L) 10/19/2020    PHOS 3.9 10/19/2020

## 2020-10-20 NOTE — INTERVAL H&P NOTE
The patient has been examined and the H&P has been reviewed:    I concur with the findings and no changes have occurred since H&P was written.    Surgery risks, benefits and alternative options discussed and understood by patient/family.          Active Hospital Problems    Diagnosis  POA    Hyperaldosteronism [E26.9]  Yes      Resolved Hospital Problems   No resolved problems to display.

## 2020-10-20 NOTE — PLAN OF CARE
Bed Mobility sba; Transfers sba no ad; Amb 20ft no AD min/cga for safety & balance; rec home with family - 24hr spv at first

## 2020-10-20 NOTE — H&P
History & Physical     SUBJECTIVE:      History of Present Illness: No changes since clinic visit  Patient is a 75 y.o. male in the interim the patient underwent venous sampling with Interventional Radiology which was suspicious for left-sided aldosteronoma based on lab results.  He presents today to discuss surgery.     with CKD stage 4 referred for suspected aldosteronoma.  Patient is noted to have hypertension and hypokalemia and subsequent CT showing left adrenal nodule.  He was noted to have elevated urinary aldosterone levels on 24 hr collection as well as elevated metanephrines.         Chief Complaint   Patient presents with    Consult               Review of patient's allergies indicates:   Allergen Reactions    Benadryl [diphenhydramine hcl] Other (See Comments)       Pt states benadryl makes him feel numb         Current Medications          Current Outpatient Medications   Medication Sig Dispense Refill    amLODIPine (NORVASC) 10 MG tablet Take 1 tablet (10 mg total) by mouth once daily. 90 tablet 3    ARIPiprazole (ABILIFY) 30 MG Tab 30 mg once daily.         aspirin (ASPIR-81 ORAL) once daily.         atorvastatin (LIPITOR) 80 MG tablet Take 1 tablet (80 mg total) by mouth once daily. 90 tablet 3    candesartan (ATACAND) 4 MG tablet Take 1 tablet (4 mg total) by mouth once daily. 90 tablet 3    hydrALAZINE (APRESOLINE) 10 MG tablet Take 1 tablet (10 mg total) by mouth 2 (two) times daily. 60 tablet 11    insulin aspart U-100 (NOVOLOG) 100 unit/mL injection Inject into the skin 3 (three) times daily before meals.        insulin detemir U-100 (LEVEMIR) 100 unit/mL injection Inject into the skin every evening.        lisinopriL 10 MG tablet Take 1 tablet (10 mg total) by mouth once daily. 30 tablet 11    multivitamin capsule Take 1 capsule by mouth once daily.        prazosin (MINIPRESS) 1 MG Cap 2 mg once daily.         sertraline (ZOLOFT) 100 MG tablet Take 100 mg by mouth once daily.       "   spironolactone (ALDACTONE) 25 MG tablet Take 1 tablet (25 mg total) by mouth 2 (two) times daily. 180 tablet 3      No current facility-administered medications for this visit.                 Past Medical History:   Diagnosis Date    Diabetes mellitus      Hyperlipidemia      Hypertension      Retinitis      Stroke       Pt states residual sx is "I walk funny".            Past Surgical History:   Procedure Laterality Date    FLUOROSCOPY Bilateral 7/20/2020     Procedure: Angiogram- Bilateral Adrenal;  Surgeon: Jose R Laws MD;  Location: [facility] CATH LAB;  Service: General;  Laterality: Bilateral;      No family history on file.  Social History           Tobacco Use    Smoking status: Never Smoker   Substance Use Topics    Alcohol use: No    Drug use: No         Review of Systems:  Review of Systems   Constitutional: Negative for activity change, appetite change, chills, diaphoresis, fatigue, fever and unexpected weight change.   HENT: Negative for congestion, hearing loss, sore throat and trouble swallowing.    Eyes: Negative for visual disturbance.   Respiratory: Negative for apnea, cough, choking, chest tightness, shortness of breath and stridor.    Cardiovascular: Negative for chest pain, palpitations and leg swelling.   Gastrointestinal: Negative for abdominal distention, abdominal pain, anal bleeding, blood in stool, constipation, diarrhea, nausea, rectal pain and vomiting.   Endocrine: Negative for cold intolerance, heat intolerance, polydipsia, polyphagia and polyuria.   Genitourinary: Negative for difficulty urinating, dysuria, frequency, hematuria and urgency.   Musculoskeletal: Negative for arthralgias, back pain, myalgias and neck pain.   Skin: Negative for color change, pallor, rash and wound.   Neurological: Negative for dizziness, syncope, weakness, light-headedness, numbness and headaches.   Hematological: Negative for adenopathy. Does not bruise/bleed easily.   Psychiatric/Behavioral: " "Negative for agitation, confusion, decreased concentration and sleep disturbance. The patient is not nervous/anxious.          OBJECTIVE:      Vital Signs (Most Recent)  Temp: 98.6 °F (37 °C) (09/16/20 0838)  Pulse: (!) 59 (09/16/20 0838)  BP: (!) 187/88 (09/16/20 0838)  5' 2" (1.575 m)  82.7 kg (182 lb 5.1 oz)      Physical Exam:  Physical Exam  Vitals signs reviewed.   Constitutional:       General: He is not in acute distress.     Appearance: He is well-developed. He is not diaphoretic.   HENT:      Head: Normocephalic and atraumatic.      Right Ear: External ear normal.      Left Ear: External ear normal.   Eyes:      General: No scleral icterus.     Conjunctiva/sclera: Conjunctivae normal.      Pupils: Pupils are equal, round, and reactive to light.   Neck:      Musculoskeletal: Normal range of motion and neck supple.      Thyroid: No thyromegaly.      Trachea: No tracheal deviation.   Cardiovascular:      Rate and Rhythm: Normal rate and regular rhythm.      Heart sounds: Normal heart sounds. No murmur. No friction rub. No gallop.    Pulmonary:      Effort: Pulmonary effort is normal. No respiratory distress.      Breath sounds: Normal breath sounds. No wheezing or rales.   Chest:      Chest wall: No tenderness.   Abdominal:      General: Bowel sounds are normal. There is no distension.      Palpations: Abdomen is soft.      Tenderness: There is no abdominal tenderness.      Hernia: No hernia is present.   Musculoskeletal: Normal range of motion.         General: No tenderness or deformity.   Lymphadenopathy:      Cervical: No cervical adenopathy.   Skin:     General: Skin is warm and dry.      Coloration: Skin is not pale.      Findings: No erythema or rash.   Neurological:      Mental Status: He is alert and oriented to person, place, and time.   Psychiatric:         Behavior: Behavior normal.         Thought Content: Thought content normal.         Judgment: Judgment normal.            Laboratory  24 hour " urine for cortisol, normal, aldosterone 37 (high), catecholamines normal  Serum free metanephrine level normal, total (non-specific) elevated     Aldosterone renin ratio:  343  Aldosterone right adrenal vein 42  Aldosterone right adrenal vein 9900           Diagnostic Results:  CT:  Impression:  No nephrolithiasis or hydronephrosis.  Bilateral renal cysts.  Left hepatic lobe cysts and additional subcentimeter hypodensities in the right hepatic lobe, which may reflect the same.  1.3 cm low-density, nonspecific left adrenal lesion.  Questionable fat density lesion involving the right adrenal gland versus averaging of the adjacent mesenteric fat.  Small hiatal hernia.     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

## 2020-10-20 NOTE — ASSESSMENT & PLAN NOTE
1.  Hypertension, due to primary hyperaldosteronism, s/p left adrenalectomy, monitor blood pressures, will resume amlodipine, add p.r.n. labetalol coverage,       2.  Chronic kidney disease stage 5, progressive decline in renal function noted, recent creatinine is 4.7 mg/dL, advised patient to avoid NSAID,      3.  Anemia, recent hemoglobin is stable at 13  g, monitor     4.   hypernatremia/hypokalemia, due to primary hyperaldosteronism, monitor labs, status post surgery,     5.  Euvolemic on exam,     6.  Proteinuria, multifactorial,

## 2020-10-20 NOTE — HPI
Colt Stacy Jr. is a pleasant 75-year-old  man with longstanding history of hypertension, CKD stage 4 bordering on stage 5, was diagnosed with primary hyperaldosteronism, further workup revealed left adrenal adenoma, adrenal vein sampling positive for increased activity on the left side, s/p  left adrenalectomy today , patient being admitted to hospital for observation overnight following his surgery, we were consulted due to his advanced renal failure, patient seen and examined in the room, denies any complaints at this time, comfortably eating his supper

## 2020-10-20 NOTE — ASSESSMENT & PLAN NOTE
--blood pressure currently stable in 150's.   --agree with nephrology with only adding amlodipine for now. Titrate up as needed.   Labetalol prn

## 2020-10-20 NOTE — TRANSFER OF CARE
"Anesthesia Transfer of Care Note    Patient: Colt Stacy Jr.    Procedure(s) Performed: Procedure(s) (LRB):  XI ROBOTIC ADRENALECTOMY (Left)    Patient location: PACU    Anesthesia Type: general    Transport from OR: Transported from OR on room air with adequate spontaneous ventilation    Post pain: adequate analgesia    Post assessment: no apparent anesthetic complications    Post vital signs: stable    Level of consciousness: awake    Nausea/Vomiting: no nausea/vomiting    Complications: none    Transfer of care protocol was followed      Last vitals:   Visit Vitals  BP (!) 179/79 (BP Location: Right arm, Patient Position: Sitting)   Pulse 80   Temp 36.5 °C (97.7 °F) (Temporal)   Resp 18   Ht 5' 2" (1.575 m)   Wt 83.3 kg (183 lb 10.3 oz)   SpO2 98%   BMI 33.59 kg/m²     "

## 2020-10-20 NOTE — HPI
Colt Stacy is a 75 year old male with history of primary hyperaldosteronism. 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 Quiñones. Surgery without acute complications. Hospital medicine has been consulted for medical management. Given his secondary hypertension, nephrology has been consulted to assist with antihypertensives.

## 2020-10-20 NOTE — PT/OT/SLP EVAL
Physical Therapy Evaluation    Patient Name:  Colt Stacy Jr.   MRN:  [id number]    Recommendations:     Discharge Recommendations:  home(with family supervision; patient does not want a RW or HHPT)   Discharge Equipment Recommendations: none   Barriers to discharge: None    Assessment:     Colt Stacy Jr. is a 75 y.o. male admitted with a medical diagnosis of Hyperaldosteronism.  He presents with the following impairments/functional limitations:  weakness, impaired functional mobilty, decreased safety awareness, pain, gait instability, impaired endurance, impaired balance, impaired self care skills, decreased lower extremity function.    Rehab Prognosis: Good; patient would benefit from acute skilled PT services to address these deficits and reach maximum level of function.    Recent Surgery: Procedure(s) (LRB):  XI ROBOTIC ADRENALECTOMY (Left) Day of Surgery    Plan:     During this hospitalization, patient to be seen 5 x/week to address the identified rehab impairments via gait training, therapeutic activities, therapeutic exercises and progress toward the following goals:    · Plan of Care Expires:  10/27/20    Subjective     Chief Complaint: min unsteadiness; soreness  Patient/Family Comments/goals: to go home back to normal  Pain/Comfort:  · Pain Rating 1: 2/10(with mobility)  · Location - Side 1: Left  · Location - Orientation 1: lower  · Location 1: back  · Pain Addressed 1: Reposition, Cessation of Activity, Distraction  · Pain Rating Post-Intervention 1: 0/10(at rest)    Patients cultural, spiritual, Orthodox conflicts given the current situation:      Living Environment:  Lives in home with son and 3 steps to enter - no rails  Prior to admission, patients level of function was indep.  Equipment used at home: none.  DME owned (not currently used): none.  Upon discharge, patient will have assistance from family.    Objective:     Communicated with RN prior to session.  Patient found HOB with  "peripheral IV  upon PT entry to room.    General Precautions: Standard, fall   Orthopedic Precautions:N/A   Braces: N/A     Exams:  · B LE ROM wfl and strength grossly 4/5 at least    Functional Mobility:  · Bed Mobility - sba supine to/from sit and cues for technique to dec risk of pain exacerbation  · Transfers - sit to/from stand sin ad with sba for safety  · Gt - Amb 20ft in room no AD min/cga for safety/balance r/t min unsteadiness    Therapeutic Activities and Exercises:   PT educated patient/dtr on POC, B LE TE to prep mobility & dec stiffness and safety/fall precautions with mobility    AM-PAC 6 CLICK MOBILITY  Total Score:19     Patient left HOB elevated with all lines intact, call button in reach, rN notified and dtr present.    GOALS:   Multidisciplinary Problems     Physical Therapy Goals        Problem: Physical Therapy Goal    Goal Priority Disciplines Outcome Goal Variances Interventions   Physical Therapy Goal     PT, PT/OT      Description: 1. Patient will perform supine to/from sit indep  2. Patient will perform sit to/from stand indep  3. Patient will ambulate >200ft sin AD spv no gross LOB                   History:     Past Medical History:   Diagnosis Date    Diabetes mellitus     Hyperlipidemia     Hypertension     Retinitis     Stroke     Pt states residual sx is "I walk funny".       Past Surgical History:   Procedure Laterality Date    FLUOROSCOPY Bilateral 7/20/2020    Procedure: Angiogram- Bilateral Adrenal;  Surgeon: Jose R Laws MD;  Location: [facility] CATH LAB;  Service: General;  Laterality: Bilateral;       Time Tracking:     PT Received On: 10/20/20  PT Start Time: 1550     PT Stop Time: 1630  PT Total Time (min): 40 min     Billable Minutes: Evaluation 15, Therapeutic Activity 15 and Therapeutic Exercise 10      Davian Montgomery, PRAVEEN  10/20/2020  "

## 2020-10-20 NOTE — CONSULTS
"[facility] -   Nephrology  Consult Note      Patient Name: Colt Stacy Jr.  MRN: [id number]  Admission Date: 10/20/2020  Hospital Length of Stay: 0 days  Attending Provider: Livan Quiñones MD   Primary Care Physician: Primary Doctor No  Principal Problem:Hyperaldosteronism    Consults  Subjective:     HPI: Colt Stacy Jr. is a pleasant 75-year-old  man with longstanding history of hypertension, CKD stage 4 bordering on stage 5, was diagnosed with primary hyperaldosteronism, further workup revealed left adrenal adenoma, adrenal vein sampling positive for increased activity on the left side, s/p  left adrenalectomy today , patient being admitted to hospital for observation overnight following his surgery, we were consulted due to his advanced renal failure, patient seen and examined in the room, denies any complaints at this time, comfortably eating his supper     Past Medical History:   Diagnosis Date    Diabetes mellitus     Hyperlipidemia     Hypertension     Retinitis     Stroke     Pt states residual sx is "I walk funny".       Past Surgical History:   Procedure Laterality Date    FLUOROSCOPY Bilateral 7/20/2020    Procedure: Angiogram- Bilateral Adrenal;  Surgeon: Jose R Laws MD;  Location: [facility] CATH LAB;  Service: General;  Laterality: Bilateral;       Review of patient's allergies indicates:   Allergen Reactions    Benadryl [diphenhydramine hcl] Other (See Comments)     Pt states benadryl makes him feel numb     Current Facility-Administered Medications   Medication Frequency    acetaminophen tablet 650 mg Q6H PRN    amLODIPine tablet 5 mg Daily    ARIPiprazole tablet 30 mg QHS    aspirin EC tablet 81 mg QHS    atorvastatin tablet 80 mg Daily    chlorhexidine 0.12 % solution 10 mL BID    diphenhydrAMINE injection 25 mg Q6H PRN    HYDROcodone-acetaminophen 5-325 mg per tablet 2 tablet Q4H PRN    hydromorphone (PF) injection 0.2 mg Q5 Min PRN    " hydromorphone (PF) injection 0.2 mg Q5 Min PRN    labetaloL injection 10 mg Q6H PRN    lactated ringers infusion Continuous    melatonin tablet 9 mg Nightly PRN    meperidine (PF) injection 12.5 mg Once PRN    metoclopramide HCl injection 10 mg Q10 Min PRN    morphine injection 2 mg Q3H PRN    nozaseptin (NOZIN) nasal  BID    ondansetron injection 4 mg Q8H PRN    promethazine (PHENERGAN) 6.25 mg in dextrose 5 % 50 mL IVPB Q10 Min PRN    promethazine (PHENERGAN) 6.25 mg in dextrose 5 % 50 mL IVPB Q6H PRN    senna-docusate 8.6-50 mg per tablet 1 tablet BID    sertraline tablet 100 mg Daily    sodium chloride 0.9% flush 3 mL Q8H     Family History     None        Tobacco Use    Smoking status: Never Smoker    Smokeless tobacco: Never Used   Substance and Sexual Activity    Alcohol use: No    Drug use: No    Sexual activity: Not on file     Review of Systems   Constitutional: Positive for activity change. Negative for appetite change.   HENT: Negative for congestion and facial swelling.    Eyes: Negative for pain, discharge and redness.   Respiratory: Negative for apnea, cough and chest tightness.    Cardiovascular: Negative for chest pain, palpitations and leg swelling.   Gastrointestinal: Negative for abdominal distention.   Genitourinary: Negative for difficulty urinating, dysuria and frequency.   Musculoskeletal: Positive for arthralgias. Negative for neck pain and neck stiffness.   Skin: Negative for color change, rash and wound.   Neurological: Positive for weakness. Negative for dizziness and numbness.   Psychiatric/Behavioral: Negative for sleep disturbance.   All other systems reviewed and are negative.    Objective:     Vital Signs (Most Recent):  Temp: 98.9 °F (37.2 °C) (10/20/20 1642)  Pulse: 66 (10/20/20 1642)  Resp: 18 (10/20/20 1642)  BP: (!) 146/70 (10/20/20 1642)  SpO2: (!) 93 % (10/20/20 1642)  O2 Device (Oxygen Therapy): (S) room air (10/20/20 1607) Vital Signs (24h  Range):  Temp:  [97.7 °F (36.5 °C)-98.9 °F (37.2 °C)] 98.9 °F (37.2 °C)  Pulse:  [58-80] 66  Resp:  [11-26] 18  SpO2:  [93 %-100 %] 93 %  BP: (127-179)/(58-79) 146/70     Weight: 83.3 kg (183 lb 10.3 oz) (10/20/20 0735)  Body mass index is 33.59 kg/m².  Body surface area is 1.91 meters squared.    No intake/output data recorded.    Physical Exam  Vitals signs and nursing note reviewed.   Constitutional:       General: He is not in acute distress.     Appearance: He is well-developed. He is not diaphoretic.   HENT:      Head: Normocephalic and atraumatic.      Comments: Facial edema   Eyes:      General:         Right eye: No discharge.      Pupils: Pupils are equal, round, and reactive to light.   Neck:      Musculoskeletal: Normal range of motion and neck supple.      Thyroid: No thyromegaly.      Trachea: No tracheal deviation.   Cardiovascular:      Rate and Rhythm: Normal rate and regular rhythm.      Heart sounds: Murmur present. No friction rub. No gallop.    Pulmonary:      Effort: Pulmonary effort is normal.      Breath sounds: Normal breath sounds. No wheezing or rales.   Abdominal:      Palpations: Abdomen is soft. There is no mass.      Tenderness: There is no abdominal tenderness. There is no guarding or rebound.      Comments: Obese    Musculoskeletal: Normal range of motion.   Lymphadenopathy:      Cervical: No cervical adenopathy.   Skin:     General: Skin is warm.      Findings: No erythema or rash.      Comments: Areas of Leukoderma    Neurological:      Mental Status: He is alert and oriented to person, place, and time.         Significant Labs:  CBC:   Recent Labs   Lab 10/19/20  0917   WBC 5.43   RBC 4.12*   HGB 12.9*   HCT 38.9*      MCV 94   MCH 31.3*   MCHC 33.2     CMP:   Recent Labs   Lab 10/19/20  0917   *   CALCIUM 8.5*   ALBUMIN 3.4*   *   K 3.4*   CO2 29      BUN 46*   CREATININE 4.7*     Coagulation: No results for input(s): PT, INR, APTT in the last 168  hours.  LFTs:   Recent Labs   Lab 10/19/20  0917   ALBUMIN 3.4*     All labs within the past 24 hours have been reviewed.    Significant Imaging:  Reviewed    Lab Results   Component Value Date    .0 (H) 09/04/2020    CALCIUM 8.5 (L) 10/19/2020    PHOS 3.9 10/19/2020           Assessment/Plan:     CKD (chronic kidney disease) stage 4, GFR 15-29 ml/min  1.  Hypertension, due to primary hyperaldosteronism, s/p left adrenalectomy, monitor blood pressures, will resume amlodipine, add p.r.n. labetalol coverage,       2.  Chronic kidney disease stage 5, progressive decline in renal function noted, recent creatinine is 4.7 mg/dL, advised patient to avoid NSAID,      3.  Anemia, recent hemoglobin is stable at 13  g, monitor     4.   hypernatremia/hypokalemia, due to primary hyperaldosteronism, monitor labs, status post surgery,     5.  Euvolemic on exam,     6.  Proteinuria, multifactorial,            Thank you for your consult. I will follow-up with patient. Please contact us if you have any additional questions.     Total time spent 70 minutes including time needed to review the records,  patient  evaluation, documentation, face-to-face discussion with the patient, dtr, primary team,    more than 50% of the time was spent on coordination of care and counseling.       Thad Eastman MD   Nephrology  [facility] -

## 2020-10-21 LAB
ALBUMIN SERPL BCP-MCNC: 3 G/DL (ref 3.5–5.2)
ANION GAP SERPL CALC-SCNC: 12 MMOL/L (ref 8–16)
ANION GAP SERPL CALC-SCNC: 14 MMOL/L (ref 8–16)
BASOPHILS # BLD AUTO: 0.03 K/UL (ref 0–0.2)
BASOPHILS NFR BLD: 0.3 % (ref 0–1.9)
BUN SERPL-MCNC: 41 MG/DL (ref 8–23)
BUN SERPL-MCNC: 41 MG/DL (ref 8–23)
CALCIUM SERPL-MCNC: 8.4 MG/DL (ref 8.7–10.5)
CALCIUM SERPL-MCNC: 8.6 MG/DL (ref 8.7–10.5)
CHLORIDE SERPL-SCNC: 106 MMOL/L (ref 95–110)
CHLORIDE SERPL-SCNC: 106 MMOL/L (ref 95–110)
CO2 SERPL-SCNC: 24 MMOL/L (ref 23–29)
CO2 SERPL-SCNC: 25 MMOL/L (ref 23–29)
CREAT SERPL-MCNC: 5.1 MG/DL (ref 0.5–1.4)
CREAT SERPL-MCNC: 5.2 MG/DL (ref 0.5–1.4)
DIFFERENTIAL METHOD: ABNORMAL
EOSINOPHIL # BLD AUTO: 0 K/UL (ref 0–0.5)
EOSINOPHIL NFR BLD: 0.1 % (ref 0–8)
ERYTHROCYTE [DISTWIDTH] IN BLOOD BY AUTOMATED COUNT: 13.5 % (ref 11.5–14.5)
ERYTHROCYTE [DISTWIDTH] IN BLOOD BY AUTOMATED COUNT: 13.5 % (ref 11.5–14.5)
EST. GFR  (AFRICAN AMERICAN): 12 ML/MIN/1.73 M^2
EST. GFR  (AFRICAN AMERICAN): 12 ML/MIN/1.73 M^2
EST. GFR  (NON AFRICAN AMERICAN): 10 ML/MIN/1.73 M^2
EST. GFR  (NON AFRICAN AMERICAN): 10 ML/MIN/1.73 M^2
ESTIMATED AVG GLUCOSE: 126 MG/DL (ref 68–131)
GLUCOSE SERPL-MCNC: 158 MG/DL (ref 70–110)
GLUCOSE SERPL-MCNC: 159 MG/DL (ref 70–110)
HBA1C MFR BLD HPLC: 6 % (ref 4–5.6)
HCT VFR BLD AUTO: 34.6 % (ref 40–54)
HCT VFR BLD AUTO: 34.6 % (ref 40–54)
HGB BLD-MCNC: 11.7 G/DL (ref 14–18)
HGB BLD-MCNC: 11.7 G/DL (ref 14–18)
IMM GRANULOCYTES # BLD AUTO: 0.05 K/UL (ref 0–0.04)
IMM GRANULOCYTES NFR BLD AUTO: 0.5 % (ref 0–0.5)
LYMPHOCYTES # BLD AUTO: 0.8 K/UL (ref 1–4.8)
LYMPHOCYTES NFR BLD: 7.3 % (ref 18–48)
MCH RBC QN AUTO: 31.6 PG (ref 27–31)
MCH RBC QN AUTO: 31.6 PG (ref 27–31)
MCHC RBC AUTO-ENTMCNC: 33.8 G/DL (ref 32–36)
MCHC RBC AUTO-ENTMCNC: 33.8 G/DL (ref 32–36)
MCV RBC AUTO: 94 FL (ref 82–98)
MCV RBC AUTO: 94 FL (ref 82–98)
MONOCYTES # BLD AUTO: 0.8 K/UL (ref 0.3–1)
MONOCYTES NFR BLD: 6.9 % (ref 4–15)
NEUTROPHILS # BLD AUTO: 9.2 K/UL (ref 1.8–7.7)
NEUTROPHILS NFR BLD: 84.9 % (ref 38–73)
NRBC BLD-RTO: 0 /100 WBC
PHOSPHATE SERPL-MCNC: 3.6 MG/DL (ref 2.7–4.5)
PLATELET # BLD AUTO: 194 K/UL (ref 150–350)
PLATELET # BLD AUTO: 194 K/UL (ref 150–350)
PMV BLD AUTO: 10.7 FL (ref 9.2–12.9)
PMV BLD AUTO: 10.7 FL (ref 9.2–12.9)
POCT GLUCOSE: 159 MG/DL (ref 70–110)
POCT GLUCOSE: 161 MG/DL (ref 70–110)
POCT GLUCOSE: 189 MG/DL (ref 70–110)
POTASSIUM SERPL-SCNC: 3.6 MMOL/L (ref 3.5–5.1)
POTASSIUM SERPL-SCNC: 3.6 MMOL/L (ref 3.5–5.1)
RBC # BLD AUTO: 3.7 M/UL (ref 4.6–6.2)
RBC # BLD AUTO: 3.7 M/UL (ref 4.6–6.2)
SODIUM SERPL-SCNC: 143 MMOL/L (ref 136–145)
SODIUM SERPL-SCNC: 144 MMOL/L (ref 136–145)
WBC # BLD AUTO: 10.87 K/UL (ref 3.9–12.7)
WBC # BLD AUTO: 10.87 K/UL (ref 3.9–12.7)

## 2020-10-21 PROCEDURE — 85025 COMPLETE CBC W/AUTO DIFF WBC: CPT

## 2020-10-21 PROCEDURE — 25000003 PHARM REV CODE 250: Performed by: INTERNAL MEDICINE

## 2020-10-21 PROCEDURE — 99232 SBSQ HOSP IP/OBS MODERATE 35: CPT | Mod: ,,, | Performed by: INTERNAL MEDICINE

## 2020-10-21 PROCEDURE — 25000003 PHARM REV CODE 250: Performed by: NURSE PRACTITIONER

## 2020-10-21 PROCEDURE — 97116 GAIT TRAINING THERAPY: CPT

## 2020-10-21 PROCEDURE — 80069 RENAL FUNCTION PANEL: CPT

## 2020-10-21 PROCEDURE — 36415 COLL VENOUS BLD VENIPUNCTURE: CPT

## 2020-10-21 PROCEDURE — 84244 ASSAY OF RENIN: CPT

## 2020-10-21 PROCEDURE — A4216 STERILE WATER/SALINE, 10 ML: HCPCS | Performed by: ANESTHESIOLOGY

## 2020-10-21 PROCEDURE — 82088 ASSAY OF ALDOSTERONE: CPT

## 2020-10-21 PROCEDURE — 21400001 HC TELEMETRY ROOM

## 2020-10-21 PROCEDURE — 11000001 HC ACUTE MED/SURG PRIVATE ROOM

## 2020-10-21 PROCEDURE — 25000003 PHARM REV CODE 250: Performed by: SURGERY

## 2020-10-21 PROCEDURE — 99232 PR SUBSEQUENT HOSPITAL CARE,LEVL II: ICD-10-PCS | Mod: ,,, | Performed by: INTERNAL MEDICINE

## 2020-10-21 PROCEDURE — 97110 THERAPEUTIC EXERCISES: CPT

## 2020-10-21 PROCEDURE — 99900035 HC TECH TIME PER 15 MIN (STAT)

## 2020-10-21 PROCEDURE — 94799 UNLISTED PULMONARY SVC/PX: CPT

## 2020-10-21 PROCEDURE — 25000003 PHARM REV CODE 250: Performed by: ANESTHESIOLOGY

## 2020-10-21 PROCEDURE — 80048 BASIC METABOLIC PNL TOTAL CA: CPT

## 2020-10-21 RX ORDER — SIMETHICONE 80 MG
1 TABLET,CHEWABLE ORAL 3 TIMES DAILY PRN
Status: DISCONTINUED | OUTPATIENT
Start: 2020-10-21 | End: 2020-10-28 | Stop reason: HOSPADM

## 2020-10-21 RX ORDER — LISINOPRIL 10 MG/1
10 TABLET ORAL DAILY
Status: DISCONTINUED | OUTPATIENT
Start: 2020-10-21 | End: 2020-10-22

## 2020-10-21 RX ORDER — HYDRALAZINE HYDROCHLORIDE 10 MG/1
10 TABLET, FILM COATED ORAL EVERY 8 HOURS
Status: DISCONTINUED | OUTPATIENT
Start: 2020-10-21 | End: 2020-10-21

## 2020-10-21 RX ORDER — AMLODIPINE BESYLATE 10 MG/1
10 TABLET ORAL DAILY
Status: DISCONTINUED | OUTPATIENT
Start: 2020-10-21 | End: 2020-10-22

## 2020-10-21 RX ORDER — CLONIDINE HYDROCHLORIDE 0.1 MG/1
0.1 TABLET ORAL ONCE
Status: COMPLETED | OUTPATIENT
Start: 2020-10-21 | End: 2020-10-21

## 2020-10-21 RX ORDER — HYDRALAZINE HYDROCHLORIDE 50 MG/1
50 TABLET, FILM COATED ORAL EVERY 8 HOURS
Status: DISCONTINUED | OUTPATIENT
Start: 2020-10-21 | End: 2020-10-28

## 2020-10-21 RX ORDER — LABETALOL HYDROCHLORIDE 5 MG/ML
10 INJECTION, SOLUTION INTRAVENOUS ONCE
Status: COMPLETED | OUTPATIENT
Start: 2020-10-21 | End: 2020-10-21

## 2020-10-21 RX ORDER — LABETALOL HYDROCHLORIDE 5 MG/ML
10 INJECTION, SOLUTION INTRAVENOUS EVERY 4 HOURS PRN
Status: DISCONTINUED | OUTPATIENT
Start: 2020-10-21 | End: 2020-10-27

## 2020-10-21 RX ORDER — TAMSULOSIN HYDROCHLORIDE 0.4 MG/1
0.4 CAPSULE ORAL DAILY
Status: DISCONTINUED | OUTPATIENT
Start: 2020-10-22 | End: 2020-10-28 | Stop reason: HOSPADM

## 2020-10-21 RX ADMIN — ASPIRIN 81 MG: 81 TABLET, COATED ORAL at 09:10

## 2020-10-21 RX ADMIN — CHLORHEXIDINE GLUCONATE 0.12% ORAL RINSE 10 ML: 1.2 LIQUID ORAL at 09:10

## 2020-10-21 RX ADMIN — LABETALOL HYDROCHLORIDE 10 MG: 5 INJECTION INTRAVENOUS at 04:10

## 2020-10-21 RX ADMIN — STANDARDIZED SENNA CONCENTRATE AND DOCUSATE SODIUM 1 TABLET: 8.6; 5 TABLET ORAL at 11:10

## 2020-10-21 RX ADMIN — STANDARDIZED SENNA CONCENTRATE AND DOCUSATE SODIUM 1 TABLET: 8.6; 5 TABLET ORAL at 09:10

## 2020-10-21 RX ADMIN — CLONIDINE HYDROCHLORIDE 0.1 MG: 0.1 TABLET ORAL at 07:10

## 2020-10-21 RX ADMIN — Medication 3 ML: at 05:10

## 2020-10-21 RX ADMIN — LISINOPRIL 10 MG: 10 TABLET ORAL at 09:10

## 2020-10-21 RX ADMIN — ARIPIPRAZOLE 30 MG: 5 TABLET ORAL at 09:10

## 2020-10-21 RX ADMIN — HYDRALAZINE HYDROCHLORIDE 10 MG: 10 TABLET, FILM COATED ORAL at 09:10

## 2020-10-21 RX ADMIN — ATORVASTATIN CALCIUM 80 MG: 40 TABLET, FILM COATED ORAL at 09:10

## 2020-10-21 RX ADMIN — SERTRALINE HYDROCHLORIDE 100 MG: 50 TABLET ORAL at 09:10

## 2020-10-21 RX ADMIN — HYDRALAZINE HYDROCHLORIDE 50 MG: 50 TABLET, FILM COATED ORAL at 09:10

## 2020-10-21 RX ADMIN — LABETALOL HYDROCHLORIDE 10 MG: 5 INJECTION, SOLUTION INTRAVENOUS at 05:10

## 2020-10-21 RX ADMIN — Medication 3 ML: at 11:10

## 2020-10-21 RX ADMIN — AMLODIPINE BESYLATE 10 MG: 10 TABLET ORAL at 04:10

## 2020-10-21 RX ADMIN — HYDRALAZINE HYDROCHLORIDE 50 MG: 50 TABLET, FILM COATED ORAL at 03:10

## 2020-10-21 NOTE — SUBJECTIVE & OBJECTIVE
Interval History: no acute events, feels better     Review of patient's allergies indicates:   Allergen Reactions    Benadryl [diphenhydramine hcl] Other (See Comments)     Pt states benadryl makes him feel numb     Current Facility-Administered Medications   Medication Frequency    acetaminophen tablet 650 mg Q6H PRN    amLODIPine tablet 10 mg Daily    ARIPiprazole tablet 30 mg QHS    aspirin EC tablet 81 mg QHS    atorvastatin tablet 80 mg Daily    chlorhexidine 0.12 % solution 10 mL BID    dextrose 50% injection 12.5 g PRN    dextrose 50% injection 25 g PRN    diphenhydrAMINE injection 25 mg Q6H PRN    glucagon (human recombinant) injection 1 mg PRN    glucose chewable tablet 16 g PRN    glucose chewable tablet 24 g PRN    hydrALAZINE tablet 50 mg Q8H    HYDROcodone-acetaminophen 5-325 mg per tablet 2 tablet Q4H PRN    insulin aspart U-100 pen 0-5 Units QID (AC + HS) PRN    labetaloL injection 10 mg Q4H PRN    lisinopriL tablet 10 mg Daily    melatonin tablet 9 mg Nightly PRN    metoclopramide HCl injection 10 mg Q10 Min PRN    morphine injection 2 mg Q3H PRN    nozaseptin (NOZIN) nasal  BID    ondansetron injection 4 mg Q8H PRN    promethazine (PHENERGAN) 6.25 mg in dextrose 5 % 50 mL IVPB Q6H PRN    senna-docusate 8.6-50 mg per tablet 1 tablet BID    sertraline tablet 100 mg Daily    simethicone chewable tablet 80 mg TID PRN    sodium chloride 0.9% flush 3 mL Q8H       Objective:     Vital Signs (Most Recent):  Temp: 98.5 °F (36.9 °C) (10/21/20 1215)  Pulse: 76 (10/21/20 1028)  Resp: 18 (10/21/20 1215)  BP: (!) 156/79 (10/21/20 1215)  SpO2: 96 % (10/21/20 1215)  O2 Device (Oxygen Therapy): room air (10/20/20 5412) Vital Signs (24h Range):  Temp:  [98.1 °F (36.7 °C)-98.9 °F (37.2 °C)] 98.5 °F (36.9 °C)  Pulse:  [60-90] 76  Resp:  [18] 18  SpO2:  [91 %-100 %] 96 %  BP: (146-230)/(70-95) 156/79     Weight: 83.3 kg (183 lb 10.3 oz) (10/20/20 0735)  Body mass index is 33.59  kg/m².  Body surface area is 1.91 meters squared.    I/O last 3 completed shifts:  In: 3207.1 [P.O.:480; I.V.:2727.1]  Out: 820 [Urine:800; Blood:20]    Physical Exam  Vitals signs and nursing note reviewed.   Constitutional:       General: He is not in acute distress.     Appearance: He is well-developed. He is not diaphoretic.   HENT:      Head: Normocephalic and atraumatic.   Eyes:      General:         Right eye: No discharge.      Pupils: Pupils are equal, round, and reactive to light.   Neck:      Musculoskeletal: Normal range of motion and neck supple.      Thyroid: No thyromegaly.      Trachea: No tracheal deviation.   Cardiovascular:      Rate and Rhythm: Normal rate and regular rhythm.      Heart sounds: Murmur present. No friction rub. No gallop.    Pulmonary:      Effort: Pulmonary effort is normal.      Breath sounds: Normal breath sounds. No wheezing or rales.   Abdominal:      Palpations: Abdomen is soft. There is no mass.      Tenderness: There is no abdominal tenderness. There is no guarding or rebound.      Comments: Obese    Musculoskeletal: Normal range of motion.   Lymphadenopathy:      Cervical: No cervical adenopathy.   Skin:     General: Skin is warm.      Findings: No erythema or rash.      Comments: Areas of Leukoderma    Neurological:      Mental Status: He is alert and oriented to person, place, and time.         Significant Labs:  CBC:   Recent Labs   Lab 10/21/20  0645   WBC 10.87  10.87   RBC 3.70*  3.70*   HGB 11.7*  11.7*   HCT 34.6*  34.6*     194   MCV 94  94   MCH 31.6*  31.6*   MCHC 33.8  33.8     CMP:   Recent Labs   Lab 10/21/20  0645   *  158*   CALCIUM 8.4*  8.6*   ALBUMIN 3.0*     144   K 3.6  3.6   CO2 25  24     106   BUN 41*  41*   CREATININE 5.2*  5.1*     Coagulation: No results for input(s): PT, INR, APTT in the last 168 hours.  LFTs:   Recent Labs   Lab 10/21/20  0645   ALBUMIN 3.0*     All labs within the past 24 hours have  been reviewed.     Significant Imaging:  Reviewed    Lab Results   Component Value Date    .0 (H) 09/04/2020    CALCIUM 8.6 (L) 10/21/2020    CALCIUM 8.4 (L) 10/21/2020    PHOS 3.6 10/21/2020

## 2020-10-21 NOTE — SIGNIFICANT EVENT
Blood pressure has been elevated throughout the night. No relief with labetalol. Clonidine ordered at 0645. Will add home dose of hydralazine and notify Nephrology

## 2020-10-21 NOTE — SUBJECTIVE & OBJECTIVE
Interval History: bloated although tolerating diet, no bowel function, adjusting blood pressure medications to control hypertension, pain controlled    Medications:  Continuous Infusions:  Scheduled Meds:   amLODIPine  10 mg Oral Daily    ARIPiprazole  30 mg Oral QHS    aspirin  81 mg Oral QHS    atorvastatin  80 mg Oral Daily    chlorhexidine  10 mL Mouth/Throat BID    hydrALAZINE  50 mg Oral Q8H    lisinopriL  10 mg Oral Daily    nozaseptin   Each Nostril BID    senna-docusate 8.6-50 mg  1 tablet Oral BID    sertraline  100 mg Oral Daily    sodium chloride 0.9%  3 mL Intravenous Q8H    [START ON 10/22/2020] tamsulosin  0.4 mg Oral Daily     PRN Meds:acetaminophen, dextrose 50%, dextrose 50%, diphenhydrAMINE, glucagon (human recombinant), glucose, glucose, HYDROcodone-acetaminophen, insulin aspart U-100, labetalol, melatonin, metoclopramide HCl, morphine, ondansetron, promethazine (PHENERGAN) IVPB, simethicone     Review of patient's allergies indicates:   Allergen Reactions    Benadryl [diphenhydramine hcl] Other (See Comments)     Pt states benadryl makes him feel numb     Objective:     Vital Signs (Most Recent):  Temp: 98.5 °F (36.9 °C) (10/21/20 1215)  Pulse: 76 (10/21/20 1028)  Resp: 18 (10/21/20 1215)  BP: (!) 156/79 (10/21/20 1215)  SpO2: 96 % (10/21/20 1215) Vital Signs (24h Range):  Temp:  [98.1 °F (36.7 °C)-98.9 °F (37.2 °C)] 98.5 °F (36.9 °C)  Pulse:  [60-90] 76  Resp:  [18] 18  SpO2:  [91 %-100 %] 96 %  BP: (146-230)/(70-95) 156/79     Weight: 83.3 kg (183 lb 10.3 oz)  Body mass index is 33.59 kg/m².    Intake/Output - Last 3 Shifts       10/19 0700 - 10/20 0659 10/20 0700 - 10/21 0659 10/21 0700 - 10/22 0659    P.O.  480     I.V. (mL/kg)  2727.1 (32.7)     Total Intake(mL/kg)  3207.1 (38.5)     Urine (mL/kg/hr)  800 (0.4)     Blood  20     Total Output  820     Net  +2387.1                  Physical Exam  Vitals signs reviewed.   Constitutional:       Appearance: He is well-developed.    HENT:      Head: Normocephalic and atraumatic.   Neck:      Musculoskeletal: Normal range of motion and neck supple.   Cardiovascular:      Rate and Rhythm: Normal rate and regular rhythm.   Pulmonary:      Effort: Pulmonary effort is normal.      Breath sounds: Normal breath sounds.   Abdominal:      General: Bowel sounds are normal. There is distension.      Palpations: Abdomen is soft.      Tenderness: There is no abdominal tenderness (ATTP).      Comments: Incisions clean dry and intact   Skin:     General: Skin is warm and dry.   Neurological:      Mental Status: He is alert and oriented to person, place, and time.         Significant Labs:  CBC:   Recent Labs   Lab 10/21/20  0645   WBC 10.87  10.87   RBC 3.70*  3.70*   HGB 11.7*  11.7*   HCT 34.6*  34.6*     194   MCV 94  94   MCH 31.6*  31.6*   MCHC 33.8  33.8     BMP:   Recent Labs   Lab 10/21/20  0645   *  158*     144   K 3.6  3.6     106   CO2 25  24   BUN 41*  41*   CREATININE 5.2*  5.1*   CALCIUM 8.4*  8.6*       Significant Diagnostics:  I have reviewed all pertinent imaging results/findings within the past 24 hours.

## 2020-10-21 NOTE — ASSESSMENT & PLAN NOTE
--blood pressure currently stable in 150's.   --agree with nephrology with only adding amlodipine for now. Titrate up as needed.   Labetalol prn    10/21/20  Blood pressure elevated this AM  Much improved after all medications were resumed

## 2020-10-21 NOTE — PLAN OF CARE
Patient remains injury free.  No signs or symptoms of distress noted.  Patient denies any pain or discomfort at this time and will continue to be monoitored.

## 2020-10-21 NOTE — SUBJECTIVE & OBJECTIVE
"Past Medical History:   Diagnosis Date    Diabetes mellitus     Hyperlipidemia     Hypertension     Retinitis     Stroke     Pt states residual sx is "I walk funny".       Past Surgical History:   Procedure Laterality Date    FLUOROSCOPY Bilateral 7/20/2020    Procedure: Angiogram- Bilateral Adrenal;  Surgeon: Jose R Laws MD;  Location: [facility] CATH LAB;  Service: General;  Laterality: Bilateral;       Review of patient's allergies indicates:   Allergen Reactions    Benadryl [diphenhydramine hcl] Other (See Comments)     Pt states benadryl makes him feel numb       No current facility-administered medications on file prior to encounter.      Current Outpatient Medications on File Prior to Encounter   Medication Sig    amLODIPine (NORVASC) 10 MG tablet Take 1 tablet (10 mg total) by mouth once daily.    ARIPiprazole (ABILIFY) 30 MG Tab Take 30 mg by mouth every evening.     aspirin (ASPIR-81 ORAL) Take 81 mg by mouth every evening.     atorvastatin (LIPITOR) 80 MG tablet Take 1 tablet (80 mg total) by mouth once daily.    candesartan (ATACAND) 4 MG tablet Take 1 tablet (4 mg total) by mouth once daily. (Patient taking differently: Take 4 mg by mouth nightly. )    hydrALAZINE (APRESOLINE) 10 MG tablet Take 1 tablet (10 mg total) by mouth 2 (two) times daily.    insulin aspart U-100 (NOVOLOG) 100 unit/mL injection Inject into the skin 2 (two) times a day. 4 units q a.m, 7 units at dinner    insulin detemir U-100 (LEVEMIR) 100 unit/mL injection Inject 31 Units into the skin every evening.     lisinopriL 10 MG tablet Take 1 tablet (10 mg total) by mouth once daily.    multivitamin capsule Take 1 capsule by mouth once daily.    prazosin (MINIPRESS) 1 MG Cap Take 2 mg by mouth every evening.     sertraline (ZOLOFT) 100 MG tablet Take 100 mg by mouth once daily.     spironolactone (ALDACTONE) 25 MG tablet Take 1 tablet (25 mg total) by mouth 2 (two) times daily.     Family History     None    "     Tobacco Use    Smoking status: Never Smoker    Smokeless tobacco: Never Used   Substance and Sexual Activity    Alcohol use: No    Drug use: No    Sexual activity: Not on file     Review of Systems   Constitutional: Negative for chills, diaphoresis, fatigue and fever.   HENT: Negative for drooling, ear pain, rhinorrhea and sore throat.    Eyes: Negative.    Respiratory: Negative for cough, shortness of breath and wheezing.    Cardiovascular: Negative for palpitations and leg swelling.   Gastrointestinal: Negative for abdominal pain, constipation, diarrhea and nausea.   Endocrine: Negative.    Genitourinary: Negative for dysuria, hematuria and urgency.   Musculoskeletal: Negative.    Skin: Negative for color change and wound.   Allergic/Immunologic: Negative.    Neurological: Negative for dizziness, syncope and speech difficulty.   Hematological: Negative.    Psychiatric/Behavioral: Negative.           Objective:     Vital Signs (Most Recent):  Temp: 98.4 °F (36.9 °C) (10/21/20 0709)  Pulse: 74 (10/21/20 0709)  Resp: 18 (10/21/20 0709)  BP: (!) 195/88 (10/21/20 0709)  SpO2: 97 % (10/21/20 0709) Vital Signs (24h Range):  Temp:  [97.7 °F (36.5 °C)-98.9 °F (37.2 °C)] 98.4 °F (36.9 °C)  Pulse:  [58-90] 74  Resp:  [11-26] 18  SpO2:  [91 %-100 %] 97 %  BP: (127-230)/(58-95) 195/88     Weight: 83.3 kg (183 lb 10.3 oz)  Body mass index is 33.59 kg/m².    Physical Exam  Vitals signs and nursing note reviewed.   Constitutional:       General: He is not in acute distress.     Appearance: He is well-developed.   HENT:      Head: Normocephalic and atraumatic.   Neck:      Musculoskeletal: Normal range of motion and neck supple.   Cardiovascular:      Rate and Rhythm: Normal rate and regular rhythm.      Heart sounds: Normal heart sounds.   Pulmonary:      Effort: Pulmonary effort is normal. No respiratory distress.      Breath sounds: Normal breath sounds.   Abdominal:      General: Bowel sounds are normal. There is no  distension.      Palpations: Abdomen is soft.      Tenderness: There is no abdominal tenderness.      Comments: Left lateral incision approximated    Musculoskeletal: Normal range of motion.   Skin:     General: Skin is dry.   Neurological:      Mental Status: He is alert and oriented to person, place, and time.       Significant Labs:   CBC:   Recent Labs   Lab 10/19/20  0917   WBC 5.43   HGB 12.9*   HCT 38.9*        CMP:   Recent Labs   Lab 10/19/20  0917 10/20/20  1735   * 143   K 3.4* 3.8    106   CO2 29 23   * 147*   BUN 46* 36*   CREATININE 4.7* 4.1*   CALCIUM 8.5* 7.7*   ALBUMIN 3.4* 2.8*   ANIONGAP 13 14   EGFRNONAA 11.4* 14*       Significant Imaging: I have reviewed and interpreted all pertinent imaging results/findings within the past 24 hours.

## 2020-10-21 NOTE — PROGRESS NOTES
[facility] -   Nephrology  Progress Note    Patient Name: Colt Stacy Jr.  MRN: [id number]  Admission Date: 10/20/2020  Hospital Length of Stay: 1 days  Attending Provider: Livan Quiñones MD   Primary Care Physician: Primary Doctor No  Principal Problem:Hyperaldosteronism    Subjective:     HPI: Colt Stacy Jr. is a pleasant 75-year-old  man with longstanding history of hypertension, CKD stage 4 bordering on stage 5, was diagnosed with primary hyperaldosteronism, further workup revealed left adrenal adenoma, adrenal vein sampling positive for increased activity on the left side, s/p  left adrenalectomy today , patient being admitted to hospital for observation overnight following his surgery, we were consulted due to his advanced renal failure, patient seen and examined in the room, denies any complaints at this time, comfortably eating his supper     Interval History: no acute events, feels better     Review of patient's allergies indicates:   Allergen Reactions    Benadryl [diphenhydramine hcl] Other (See Comments)     Pt states benadryl makes him feel numb     Current Facility-Administered Medications   Medication Frequency    acetaminophen tablet 650 mg Q6H PRN    amLODIPine tablet 10 mg Daily    ARIPiprazole tablet 30 mg QHS    aspirin EC tablet 81 mg QHS    atorvastatin tablet 80 mg Daily    chlorhexidine 0.12 % solution 10 mL BID    dextrose 50% injection 12.5 g PRN    dextrose 50% injection 25 g PRN    diphenhydrAMINE injection 25 mg Q6H PRN    glucagon (human recombinant) injection 1 mg PRN    glucose chewable tablet 16 g PRN    glucose chewable tablet 24 g PRN    hydrALAZINE tablet 50 mg Q8H    HYDROcodone-acetaminophen 5-325 mg per tablet 2 tablet Q4H PRN    insulin aspart U-100 pen 0-5 Units QID (AC + HS) PRN    labetaloL injection 10 mg Q4H PRN    lisinopriL tablet 10 mg Daily    melatonin tablet 9 mg Nightly PRN    metoclopramide HCl injection 10 mg  Q10 Min PRN    morphine injection 2 mg Q3H PRN    nozaseptin (NOZIN) nasal  BID    ondansetron injection 4 mg Q8H PRN    promethazine (PHENERGAN) 6.25 mg in dextrose 5 % 50 mL IVPB Q6H PRN    senna-docusate 8.6-50 mg per tablet 1 tablet BID    sertraline tablet 100 mg Daily    simethicone chewable tablet 80 mg TID PRN    sodium chloride 0.9% flush 3 mL Q8H       Objective:     Vital Signs (Most Recent):  Temp: 98.5 °F (36.9 °C) (10/21/20 1215)  Pulse: 76 (10/21/20 1028)  Resp: 18 (10/21/20 1215)  BP: (!) 156/79 (10/21/20 1215)  SpO2: 96 % (10/21/20 1215)  O2 Device (Oxygen Therapy): room air (10/20/20 2356) Vital Signs (24h Range):  Temp:  [98.1 °F (36.7 °C)-98.9 °F (37.2 °C)] 98.5 °F (36.9 °C)  Pulse:  [60-90] 76  Resp:  [18] 18  SpO2:  [91 %-100 %] 96 %  BP: (146-230)/(70-95) 156/79     Weight: 83.3 kg (183 lb 10.3 oz) (10/20/20 0735)  Body mass index is 33.59 kg/m².  Body surface area is 1.91 meters squared.    I/O last 3 completed shifts:  In: 3207.1 [P.O.:480; I.V.:2727.1]  Out: 820 [Urine:800; Blood:20]    Physical Exam  Vitals signs and nursing note reviewed.   Constitutional:       General: He is not in acute distress.     Appearance: He is well-developed. He is not diaphoretic.   HENT:      Head: Normocephalic and atraumatic.   Eyes:      General:         Right eye: No discharge.      Pupils: Pupils are equal, round, and reactive to light.   Neck:      Musculoskeletal: Normal range of motion and neck supple.      Thyroid: No thyromegaly.      Trachea: No tracheal deviation.   Cardiovascular:      Rate and Rhythm: Normal rate and regular rhythm.      Heart sounds: Murmur present. No friction rub. No gallop.    Pulmonary:      Effort: Pulmonary effort is normal.      Breath sounds: Normal breath sounds. No wheezing or rales.   Abdominal:      Palpations: Abdomen is soft. There is no mass.      Tenderness: There is no abdominal tenderness. There is no guarding or rebound.      Comments: Obese     Musculoskeletal: Normal range of motion.   Lymphadenopathy:      Cervical: No cervical adenopathy.   Skin:     General: Skin is warm.      Findings: No erythema or rash.      Comments: Areas of Leukoderma    Neurological:      Mental Status: He is alert and oriented to person, place, and time.         Significant Labs:  CBC:   Recent Labs   Lab 10/21/20  0645   WBC 10.87  10.87   RBC 3.70*  3.70*   HGB 11.7*  11.7*   HCT 34.6*  34.6*     194   MCV 94  94   MCH 31.6*  31.6*   MCHC 33.8  33.8     CMP:   Recent Labs   Lab 10/21/20  0645   *  158*   CALCIUM 8.4*  8.6*   ALBUMIN 3.0*     144   K 3.6  3.6   CO2 25  24     106   BUN 41*  41*   CREATININE 5.2*  5.1*     Coagulation: No results for input(s): PT, INR, APTT in the last 168 hours.  LFTs:   Recent Labs   Lab 10/21/20  0645   ALBUMIN 3.0*     All labs within the past 24 hours have been reviewed.     Significant Imaging:  Reviewed    Lab Results   Component Value Date    .0 (H) 09/04/2020    CALCIUM 8.6 (L) 10/21/2020    CALCIUM 8.4 (L) 10/21/2020    PHOS 3.6 10/21/2020           Assessment/Plan:     CKD (chronic kidney disease) stage 4, GFR 15-29 ml/min  1.  Hypertension, due to primary hyperaldosteronism, s/p left adrenalectomy, monitor blood pressures, improved with current meds,      2.  Chronic kidney disease stage 5, progressive decline in renal function noted, recent creatinine is 5 mg/dL, advised patient to avoid NSAID,      3.  Anemia, recent hemoglobin is 11.7 g, monitor     4.   hypernatremia/hypokalemia, due to primary hyperaldosteronism, monitor labs, status post surgery, better     5.  Euvolemic on exam,     6.  Proteinuria, multifactorial,     7. Ok d.c home and f/u in clinic            Thank you for your consult. I will follow-up with patient. Please contact us if you have any additional questions.     Total time spent 30 minutes including time needed to review the records,  patient  evaluation,  documentation, face-to-face discussion with the patient, primary team,     more than 50% of the time was spent on coordination of care and counseling.       Thad Eastman MD  Nephrology  [facility]

## 2020-10-21 NOTE — CONSULTS
"[facility] Hospital Medicine  Consult Note    Patient Name: Colt Stacy Jr.  MRN: [id number]  Admission Date: 10/20/2020  Hospital Length of Stay: 1 days  Attending Physician: Livan Quiñones MD   Primary Care Provider: Primary Doctor No           Patient information was obtained from patient and ER records.     Consults  Subjective:     Principal Problem: Hyperaldosteronism    Chief Complaint: No chief complaint on file.       HPI: Colt Stacy is a 75 year old male with history of primary hyperaldosteronism. 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 Quiñones. Surgery without acute complications. Hospital medicine has been consulted for medical management. Given his secondary hypertension, nephrology has been consulted to assist with antihypertensives.       Past Medical History:   Diagnosis Date    Diabetes mellitus     Hyperlipidemia     Hypertension     Retinitis     Stroke     Pt states residual sx is "I walk funny".       Past Surgical History:   Procedure Laterality Date    FLUOROSCOPY Bilateral 7/20/2020    Procedure: Angiogram- Bilateral Adrenal;  Surgeon: Jose R Laws MD;  Location[facility] CATH LAB;  Service: General;  Laterality: Bilateral;       Review of patient's allergies indicates:   Allergen Reactions    Benadryl [diphenhydramine hcl] Other (See Comments)     Pt states benadryl makes him feel numb       No current facility-administered medications on file prior to encounter.      Current Outpatient Medications on File Prior to Encounter   Medication Sig    amLODIPine (NORVASC) 10 MG tablet Take 1 tablet (10 mg total) by mouth once daily.    ARIPiprazole (ABILIFY) 30 MG Tab Take 30 mg by mouth every evening.     aspirin (ASPIR-81 ORAL) Take 81 mg by mouth every evening.     atorvastatin (LIPITOR) 80 MG tablet Take 1 tablet (80 mg total) by mouth once daily.    candesartan (ATACAND) 4 MG tablet Take 1 " tablet (4 mg total) by mouth once daily. (Patient taking differently: Take 4 mg by mouth nightly. )    hydrALAZINE (APRESOLINE) 10 MG tablet Take 1 tablet (10 mg total) by mouth 2 (two) times daily.    insulin aspart U-100 (NOVOLOG) 100 unit/mL injection Inject into the skin 2 (two) times a day. 4 units q a.m, 7 units at dinner    insulin detemir U-100 (LEVEMIR) 100 unit/mL injection Inject 31 Units into the skin every evening.     lisinopriL 10 MG tablet Take 1 tablet (10 mg total) by mouth once daily.    multivitamin capsule Take 1 capsule by mouth once daily.    prazosin (MINIPRESS) 1 MG Cap Take 2 mg by mouth every evening.     sertraline (ZOLOFT) 100 MG tablet Take 100 mg by mouth once daily.     spironolactone (ALDACTONE) 25 MG tablet Take 1 tablet (25 mg total) by mouth 2 (two) times daily.     Family History     None        Tobacco Use    Smoking status: Never Smoker    Smokeless tobacco: Never Used   Substance and Sexual Activity    Alcohol use: No    Drug use: No    Sexual activity: Not on file     Review of Systems   Constitutional: Negative for chills, diaphoresis, fatigue and fever.   HENT: Negative for drooling, ear pain, rhinorrhea and sore throat.    Eyes: Negative.    Respiratory: Negative for cough, shortness of breath and wheezing.    Cardiovascular: Negative for palpitations and leg swelling.   Gastrointestinal: Negative for abdominal pain, constipation, diarrhea and nausea.   Endocrine: Negative.    Genitourinary: Negative for dysuria, hematuria and urgency.   Musculoskeletal: Negative.    Skin: Negative for color change and wound.   Allergic/Immunologic: Negative.    Neurological: Negative for dizziness, syncope and speech difficulty.   Hematological: Negative.    Psychiatric/Behavioral: Negative.           Objective:     Vital Signs (Most Recent):  Temp: 98.4 °F (36.9 °C) (10/21/20 0709)  Pulse: 74 (10/21/20 0709)  Resp: 18 (10/21/20 0709)  BP: (!) 195/88 (10/21/20 0709)  SpO2: 97  % (10/21/20 0709) Vital Signs (24h Range):  Temp:  [97.7 °F (36.5 °C)-98.9 °F (37.2 °C)] 98.4 °F (36.9 °C)  Pulse:  [58-90] 74  Resp:  [11-26] 18  SpO2:  [91 %-100 %] 97 %  BP: (127-230)/(58-95) 195/88     Weight: 83.3 kg (183 lb 10.3 oz)  Body mass index is 33.59 kg/m².    Physical Exam  Vitals signs and nursing note reviewed.   Constitutional:       General: He is not in acute distress.     Appearance: He is well-developed.   HENT:      Head: Normocephalic and atraumatic.   Neck:      Musculoskeletal: Normal range of motion and neck supple.   Cardiovascular:      Rate and Rhythm: Normal rate and regular rhythm.      Heart sounds: Normal heart sounds.   Pulmonary:      Effort: Pulmonary effort is normal. No respiratory distress.      Breath sounds: Normal breath sounds.   Abdominal:      General: Bowel sounds are normal. There is no distension.      Palpations: Abdomen is soft.      Tenderness: There is no abdominal tenderness.      Comments: Left lateral incision approximated    Musculoskeletal: Normal range of motion.   Skin:     General: Skin is dry.   Neurological:      Mental Status: He is alert and oriented to person, place, and time.       Significant Labs:   CBC:   Recent Labs   Lab 10/19/20  0917   WBC 5.43   HGB 12.9*   HCT 38.9*        CMP:   Recent Labs   Lab 10/19/20  0917 10/20/20  1735   * 143   K 3.4* 3.8    106   CO2 29 23   * 147*   BUN 46* 36*   CREATININE 4.7* 4.1*   CALCIUM 8.5* 7.7*   ALBUMIN 3.4* 2.8*   ANIONGAP 13 14   EGFRNONAA 11.4* 14*       Significant Imaging: I have reviewed and interpreted all pertinent imaging results/findings within the past 24 hours.    Assessment/Plan:     * Hyperaldosteronism  S/p left adrenalectomy  --primary team managing  --monitor potassium      CKD (chronic kidney disease) stage 4, GFR 15-29 ml/min  --follow CMP  --nephrology input appreciated.    Diabetes mellitus  Hemoglobin A1C   Date Value Ref Range Status   02/04/2020 7.3 (H)  4.0 - 5.6 % Final     Comment:     ADA Screening Guidelines:  5.7-6.4%  Consistent with prediabetes  >or=6.5%  Consistent with diabetes  High levels of fetal hemoglobin interfere with the HbA1C  assay. Heterozygous hemoglobin variants (HbS, HgC, etc)do  not significantly interfere with this assay.   However, presence of multiple variants may affect accuracy.     --insulin sliding scale  --Diabetic diet      HTN (hypertension)  --blood pressure currently stable in 150's.   --agree with nephrology with only adding amlodipine for now. Titrate up as needed.   Labetalol prn        VTE Risk Mitigation (From admission, onward)    None        Thank you for your consult. I will follow-up with patient. Please contact us if you have any additional questions.    Guanako Escobar NP  Department of Hospital Medicine   [facility]

## 2020-10-21 NOTE — PROGRESS NOTES
[facility]  General Surgery  Progress Note    Subjective:     History of Present Illness:  No notes on file    Post-Op Info:  Procedure(s) (LRB):  XI ROBOTIC ADRENALECTOMY (Left)   1 Day Post-Op     Interval History: bloated although tolerating diet, no bowel function, adjusting blood pressure medications to control hypertension, pain controlled    Medications:  Continuous Infusions:  Scheduled Meds:   amLODIPine  10 mg Oral Daily    ARIPiprazole  30 mg Oral QHS    aspirin  81 mg Oral QHS    atorvastatin  80 mg Oral Daily    chlorhexidine  10 mL Mouth/Throat BID    hydrALAZINE  50 mg Oral Q8H    lisinopriL  10 mg Oral Daily    nozaseptin   Each Nostril BID    senna-docusate 8.6-50 mg  1 tablet Oral BID    sertraline  100 mg Oral Daily    sodium chloride 0.9%  3 mL Intravenous Q8H    [START ON 10/22/2020] tamsulosin  0.4 mg Oral Daily     PRN Meds:acetaminophen, dextrose 50%, dextrose 50%, diphenhydrAMINE, glucagon (human recombinant), glucose, glucose, HYDROcodone-acetaminophen, insulin aspart U-100, labetalol, melatonin, metoclopramide HCl, morphine, ondansetron, promethazine (PHENERGAN) IVPB, simethicone     Review of patient's allergies indicates:   Allergen Reactions    Benadryl [diphenhydramine hcl] Other (See Comments)     Pt states benadryl makes him feel numb     Objective:     Vital Signs (Most Recent):  Temp: 98.5 °F (36.9 °C) (10/21/20 1215)  Pulse: 76 (10/21/20 1028)  Resp: 18 (10/21/20 1215)  BP: (!) 156/79 (10/21/20 1215)  SpO2: 96 % (10/21/20 1215) Vital Signs (24h Range):  Temp:  [98.1 °F (36.7 °C)-98.9 °F (37.2 °C)] 98.5 °F (36.9 °C)  Pulse:  [60-90] 76  Resp:  [18] 18  SpO2:  [91 %-100 %] 96 %  BP: (146-230)/(70-95) 156/79     Weight: 83.3 kg (183 lb 10.3 oz)  Body mass index is 33.59 kg/m².    Intake/Output - Last 3 Shifts       10/19 0700 - 10/20 0659 10/20 0700 - 10/21 0659 10/21 0700 - 10/22 0659    P.O.  480     I.V. (mL/kg)  2727.1 (32.7)     Total Intake(mL/kg)   3207.1 (38.5)     Urine (mL/kg/hr)  800 (0.4)     Blood  20     Total Output  820     Net  +2387.1                  Physical Exam  Vitals signs reviewed.   Constitutional:       Appearance: He is well-developed.   HENT:      Head: Normocephalic and atraumatic.   Neck:      Musculoskeletal: Normal range of motion and neck supple.   Cardiovascular:      Rate and Rhythm: Normal rate and regular rhythm.   Pulmonary:      Effort: Pulmonary effort is normal.      Breath sounds: Normal breath sounds.   Abdominal:      General: Bowel sounds are normal. There is distension.      Palpations: Abdomen is soft.      Tenderness: There is no abdominal tenderness (ATTP).      Comments: Incisions clean dry and intact   Skin:     General: Skin is warm and dry.   Neurological:      Mental Status: He is alert and oriented to person, place, and time.         Significant Labs:  CBC:   Recent Labs   Lab 10/21/20  0645   WBC 10.87  10.87   RBC 3.70*  3.70*   HGB 11.7*  11.7*   HCT 34.6*  34.6*     194   MCV 94  94   MCH 31.6*  31.6*   MCHC 33.8  33.8     BMP:   Recent Labs   Lab 10/21/20  0645   *  158*     144   K 3.6  3.6     106   CO2 25  24   BUN 41*  41*   CREATININE 5.2*  5.1*   CALCIUM 8.4*  8.6*       Significant Diagnostics:  I have reviewed all pertinent imaging results/findings within the past 24 hours.    Assessment/Plan:     * Hyperaldosteronism  Status post left adrenalectomy  Regular diet as tolerated   Bloated monitor for return of bowel function, antiemetics p.r.n.  Ambulate  Pain control  DVT/GI prophylaxis  Likely discharge tomorrow if improvement in abdominal distention    CKD (chronic kidney disease) stage 4, GFR 15-29 ml/min  Monitor urine output, creatinine    Diabetes mellitus  Monitor blood glucose Insulin sliding scale    HTN (hypertension)  On p.o. antihypertensives per Nephrology        Livan Quiñones MD  General Surgery  [facility]

## 2020-10-21 NOTE — PROGRESS NOTES
[facility] Hospital Medicine  Progress Note    Patient Name: Colt Stacy Jr.  MRN: [id number]  Patient Class: IP- Inpatient   Admission Date: 10/20/2020  Length of Stay: 1 days  Attending Physician: Livan Quiñones MD  Primary Care Provider: Primary Doctor No    Subjective:     Principal Problem:Hyperaldosteronism        HPI:  Colt Stacy is a 75 year old male with history of primary hyperaldosteronism. 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 Quñiones. Surgery without acute complications. Hospital medicine has been consulted for medical management. Given his secondary hypertension, nephrology has been consulted to assist with antihypertensives.       Overview/Hospital Course:  No notes on file    Interval History:blood pressure elevated today, but much more controlled after all antihypertensives were restarted. He is doing well.     Review of Systems   Constitutional: Negative for chills, diaphoresis, fatigue and fever.   HENT: Negative for drooling, ear pain, rhinorrhea and sore throat.    Eyes: Negative.    Respiratory: Negative for cough, shortness of breath and wheezing.    Cardiovascular: Negative for palpitations and leg swelling.   Gastrointestinal: Negative for abdominal pain, constipation, diarrhea and nausea.   Endocrine: Negative.    Genitourinary: Negative for dysuria, hematuria and urgency.   Musculoskeletal: Negative.    Skin: Negative for color change and wound.   Allergic/Immunologic: Negative.    Neurological: Negative for dizziness, syncope and speech difficulty.   Hematological: Negative.    Psychiatric/Behavioral: Negative.       Objective:     Vital Signs (Most Recent):  Temp: 98.3 °F (36.8 °C) (10/21/20 1615)  Pulse: 79 (10/21/20 1615)  Resp: 18 (10/21/20 1615)  BP: (!) 148/68 (10/21/20 1615)  SpO2: 95 % (10/21/20 1615) Vital Signs (24h Range):  Temp:  [98.3 °F (36.8 °C)-98.9 °F (37.2 °C)] 98.3 °F (36.8  °C)  Pulse:  [68-90] 79  Resp:  [18] 18  SpO2:  [91 %-98 %] 95 %  BP: (148-230)/(68-95) 148/68     Weight: 83.3 kg (183 lb 10.3 oz)  Body mass index is 33.59 kg/m².    Intake/Output Summary (Last 24 hours) at 10/21/2020 1734  Last data filed at 10/21/2020 0500  Gross per 24 hour   Intake 1207.08 ml   Output 800 ml   Net 407.08 ml      Physical Exam  Vitals signs and nursing note reviewed.   Constitutional:       General: He is not in acute distress.     Appearance: He is well-developed.   HENT:      Head: Normocephalic and atraumatic.   Neck:      Musculoskeletal: Normal range of motion and neck supple.   Cardiovascular:      Rate and Rhythm: Normal rate and regular rhythm.      Heart sounds: Normal heart sounds.   Pulmonary:      Effort: Pulmonary effort is normal. No respiratory distress.      Breath sounds: Normal breath sounds.   Abdominal:      General: Bowel sounds are normal. There is no distension.      Palpations: Abdomen is soft.      Tenderness: There is no abdominal tenderness.      Comments: Left lateral incision approximated    Musculoskeletal: Normal range of motion.   Skin:     General: Skin is dry.   Neurological:      Mental Status: He is alert and oriented to person, place, and time.       Significant Labs:   CBC:   Recent Labs   Lab 10/21/20  0645   WBC 10.87  10.87   HGB 11.7*  11.7*   HCT 34.6*  34.6*     194     CMP:   Recent Labs   Lab 10/20/20  1735 10/21/20  0645    143  144   K 3.8 3.6  3.6    106  106   CO2 23 25  24   * 159*  158*   BUN 36* 41*  41*   CREATININE 4.1* 5.2*  5.1*   CALCIUM 7.7* 8.4*  8.6*   ALBUMIN 2.8* 3.0*   ANIONGAP 14 12  14   EGFRNONAA 14* 10*  10*       Significant Imaging: I have reviewed all pertinent imaging results/findings within the past 24 hours.      Assessment/Plan:      * Hyperaldosteronism  S/p left adrenalectomy  --primary team managing  --monitor potassium    10/21/20  Primary team managing  Electrolytes  stable  Still has some abdominal distention; diet advanced    CKD (chronic kidney disease) stage 4, GFR 15-29 ml/min  --nephrology following    Diabetes mellitus  Hemoglobin A1C   Date Value Ref Range Status   02/04/2020 7.3 (H) 4.0 - 5.6 % Final     Comment:     ADA Screening Guidelines:  5.7-6.4%  Consistent with prediabetes  >or=6.5%  Consistent with diabetes  High levels of fetal hemoglobin interfere with the HbA1C  assay. Heterozygous hemoglobin variants (HbS, HgC, etc)do  not significantly interfere with this assay.   However, presence of multiple variants may affect accuracy.     --insulin sliding scale  --Diabetic diet      HTN (hypertension)  --blood pressure currently stable in 150's.   --agree with nephrology with only adding amlodipine for now. Titrate up as needed.   Labetalol prn    10/21/20  Blood pressure elevated this AM  Much improved after all medications were resumed        VTE Risk Mitigation (From admission, onward)    None          Discharge Planning   RICH:      Code Status: Prior   Is the patient medically ready for discharge?:     Reason for patient still in hospital (select all that apply): Treatment  Discharge Plan A: Home with family            Guanako Escobar NP  Department of Hospital Medicine   [facility]

## 2020-10-21 NOTE — PLAN OF CARE
Swer met with pt at bedside for initial assessment. Pt lives with son and was independent with ADLs prior to admission. Pt's daughter Cris will provide transportation at discharge. Pt denied having home health in the past. Pt also denied using medical equipment. Pt does not have a problem obtaining medication and daughter drives self to medical appointments. Pt does not have an advanced directive at this time. SWer provided a transitional care folder, information on advanced directives, information on pharmacy bedside delivery, and discharge planning begins on admission with contact information for any needs/questions.    Pharm;   [facility] - [place], [place] - [address]  [address] [facility] [address]  Phone: [phone] Fax: [phone]    [facility] - [facility] [address]  [address] [facility] [address]  Phone: [phone] Fax: [phone]  My Chart; Pending   Bedside Delivery; Yes      10/21/20 1434   Discharge Assessment   Assessment Type Discharge Planning Assessment   Confirmed/corrected address and phone number on facesheet? Yes   Assessment information obtained from? Patient   Communicated expected length of stay with patient/caregiver yes   Prior to hospitilization cognitive status: Alert/Oriented   Prior to hospitalization functional status: Independent   Current cognitive status: Alert/Oriented   Current Functional Status: Independent   Facility Arrived From: home   Lives With child(charissa), adult   Able to Return to Prior Arrangements yes   Is patient able to care for self after discharge? Yes   Who are your caregiver(s) and their phone number(s)? Cris Kruse (daughter) [phone]   Patient's perception of discharge disposition home or selfcare   Readmission Within the Last 30 Days no previous admission in last 30 days   Patient currently being followed by outpatient case management? No   Patient currently receives any other outside agency  services? No   Equipment Currently Used at Home none   Do you have any problems affording any of your prescribed medications? No   Is the patient taking medications as prescribed? yes   Does the patient have transportation home? Yes   Transportation Anticipated family or friend will provide   Discharge Plan A Home with family   DME Needed Upon Discharge  none   Patient/Family in Agreement with Plan yes

## 2020-10-21 NOTE — ASSESSMENT & PLAN NOTE
Status post left adrenalectomy  Regular diet as tolerated   Bloated monitor for return of bowel function, antiemetics p.r.n.  Ambulate  Pain control  DVT/GI prophylaxis  Likely discharge tomorrow if improvement in abdominal distention

## 2020-10-21 NOTE — HOSPITAL COURSE
Status post robotic left adrenalectomy    Postop day 1 bloated although tolerating diet, no bowel function, adjusting blood pressure medications to control hypertension, pain controlled    Postop day 2 tolerating diet and having bowel function, elevated cr, low uop, BP control improved    Postop day 3 creatinine remains elevated, catheter placed for retention, tolerating diet although still somewhat distended    Postop day 4 Patient feels bloated.  He also complains of shortness of breath.  He had a Noe placed.  His BUN and creatinine are still elevated.  His urine is somewhat    Postop day 5.  Feeling better.  Less shortness of breath.  His Noe catheter became displaced.  His urine output is questionable.  He is getting a CT scan with stone protocol    Postop day 6 tolerating diet, on minimal O2, ambulating    Postop day 7 bun/cr improving, good uop    Postop day kidney function stable, good urine output, home oxygen and Walker set up patient stable and ready for discharge

## 2020-10-21 NOTE — PT/OT/SLP PROGRESS
Physical Therapy  Treatment    Colt Stacy Jr.   MRN: [id number]   Admitting Diagnosis: Hyperaldosteronism    PT Received On: 10/21/20  PT Start Time: 0935     PT Stop Time: 0958    PT Total Time (min): 23 min       Billable Minutes:  Gait Training 13 and Therapeutic Exercise 10    Treatment Type: Treatment  PT/PTA: PT     PTA Visit Number: 0       General Precautions: Standard, fall  Orthopedic Precautions: N/A   Braces: N/A         Subjective:  Communicated with GINNY AND [facility] CHART REVIEW prior to session.   PT AGREED TO TX     Pain/Comfort  Pain Rating 1: 0/10  Pain Rating Post-Intervention 1: 0/10    Objective:   Patient found with: peripheral IV    Functional Mobility:  PT MET IN RM SEATED EOB. PT STOOD WITH NO AD. PT GT TRAINED X 200' WITH NO AD IND. PT RETURNED TO RM T/F TO CHAIR IND. PT SEATED EOB FOR B LE TE X 20 REPS OF AP, TKE, AND MIP. PT RETURNED SUP IN BED IND AND LEFT SUP WITH ALL NEEDS MET AND CALL BELL IN REACH.     AM-PAC 6 CLICK MOBILITY  How much help from another person does this patient currently need?   1 = Unable, Total/Dependent Assistance  2 = A lot, Maximum/Moderate Assistance  3 = A little, Minimum/Contact Guard/Supervision  4 = None, Modified [place]/Independent    Turning over in bed (including adjusting bedclothes, sheets and blankets)?: 4  Sitting down on and standing up from a chair with arms (e.g., wheelchair, bedside commode, etc.): 4  Moving from lying on back to sitting on the side of the bed?: 4  Moving to and from a bed to a chair (including a wheelchair)?: 4  Need to walk in hospital room?: 4  Climbing 3-5 steps with a railing?: 1  Basic Mobility Total Score: 21    AM-PAC Raw Score CMS G-Code Modifier Level of Impairment Assistance   6 % Total / Unable   7 - 9 CM 80 - 100% Maximal Assist   10 - 14 CL 60 - 80% Moderate Assist   15 - 19 CK 40 - 60% Moderate Assist   20 - 22 CJ 20 - 40% Minimal Assist   23 CI 1-20% SBA / CGA   24 CH 0% Independent/ Mod I      Patient left supine with call button in reach.    Assessment:  PT PROGRESSING WITH GT TRAINING AND IS IND WITH GROSS FUNC MOBILITY. PT WILL BE D/C TO MOBILITY PROGRAM    Rehab identified problem list/impairments: Rehab identified problem list/impairments: weakness, impaired functional mobilty, gait instability, impaired balance, impaired endurance, impaired self care skills, decreased safety awareness, decreased lower extremity function, pain, decreased ROM      Discharge recommendations: Discharge Facility/Level of Care Needs: home     Barriers to discharge:      Equipment recommendations: Equipment Needed After Discharge: none     GOALS:   Multidisciplinary Problems     Physical Therapy Goals        Problem: Physical Therapy Goal    Goal Priority Disciplines Outcome Goal Variances Interventions   Physical Therapy Goal     PT, PT/OT Ongoing, Progressing     Description: 1. Patient will perform supine to/from sit indep  2. Patient will perform sit to/from stand indep  3. Patient will ambulate >200ft sin AD spv no gross LOB                   PLAN:    Patient to be seen 5 x/week  to address the above listed problems via gait training, therapeutic activities, therapeutic exercises  Plan of Care expires: 10/27/20  Plan of Care reviewed with: patient         Alta Lawson, PT  10/21/2020

## 2020-10-21 NOTE — ASSESSMENT & PLAN NOTE
S/p left adrenalectomy  --primary team managing  --monitor potassium    10/21/20  Primary team managing  Electrolytes stable  Still has some abdominal distention; diet advanced

## 2020-10-21 NOTE — ASSESSMENT & PLAN NOTE
1.  Hypertension, due to primary hyperaldosteronism, s/p left adrenalectomy, monitor blood pressures, improved with current meds,      2.  Chronic kidney disease stage 5, progressive decline in renal function noted, recent creatinine is 5 mg/dL, advised patient to avoid NSAID,      3.  Anemia, recent hemoglobin is 11.7 g, monitor     4.   hypernatremia/hypokalemia, due to primary hyperaldosteronism, monitor labs, status post surgery, better     5.  Euvolemic on exam,     6.  Proteinuria, multifactorial,     7. Ok d.c home and f/u in clinic

## 2020-10-21 NOTE — SUBJECTIVE & OBJECTIVE
Interval History:blood pressure elevated today, but much more controlled after all antihypertensives were restarted. He is doing well.     Review of Systems   Constitutional: Negative for chills, diaphoresis, fatigue and fever.   HENT: Negative for drooling, ear pain, rhinorrhea and sore throat.    Eyes: Negative.    Respiratory: Negative for cough, shortness of breath and wheezing.    Cardiovascular: Negative for palpitations and leg swelling.   Gastrointestinal: Negative for abdominal pain, constipation, diarrhea and nausea.   Endocrine: Negative.    Genitourinary: Negative for dysuria, hematuria and urgency.   Musculoskeletal: Negative.    Skin: Negative for color change and wound.   Allergic/Immunologic: Negative.    Neurological: Negative for dizziness, syncope and speech difficulty.   Hematological: Negative.    Psychiatric/Behavioral: Negative.       Objective:     Vital Signs (Most Recent):  Temp: 98.3 °F (36.8 °C) (10/21/20 1615)  Pulse: 79 (10/21/20 1615)  Resp: 18 (10/21/20 1615)  BP: (!) 148/68 (10/21/20 1615)  SpO2: 95 % (10/21/20 1615) Vital Signs (24h Range):  Temp:  [98.3 °F (36.8 °C)-98.9 °F (37.2 °C)] 98.3 °F (36.8 °C)  Pulse:  [68-90] 79  Resp:  [18] 18  SpO2:  [91 %-98 %] 95 %  BP: (148-230)/(68-95) 148/68     Weight: 83.3 kg (183 lb 10.3 oz)  Body mass index is 33.59 kg/m².    Intake/Output Summary (Last 24 hours) at 10/21/2020 3294  Last data filed at 10/21/2020 0500  Gross per 24 hour   Intake 1207.08 ml   Output 800 ml   Net 407.08 ml      Physical Exam  Vitals signs and nursing note reviewed.   Constitutional:       General: He is not in acute distress.     Appearance: He is well-developed.   HENT:      Head: Normocephalic and atraumatic.   Neck:      Musculoskeletal: Normal range of motion and neck supple.   Cardiovascular:      Rate and Rhythm: Normal rate and regular rhythm.      Heart sounds: Normal heart sounds.   Pulmonary:      Effort: Pulmonary effort is normal. No respiratory  Review of Systems: GENERAL:  Patient denies fevers, chills, night sweats, excessive fatigue, anorexia, weight loss  ENT/MOUTH:  Patient denies problems with hearing, sore throat, mucositis or mouth sores, sinus drainage  HEMATOLOGIC/LYMPHATIC: Patient denies easy bruising or bleeding, tender or palpable lymph nodes  RESPIRATORY:  Patient denies  dyspnea on exertion, chest pain, cough, hemoptysis  CARDIOVASCULAR:  Patient denies anginal chest pain, palpitations, orthopnea, peripheral edema   GASTROINTESTINAL: Patient denies nausea, vomiting, diarrhea, GI bleeding, constipation, change in bowel habits, heartburn, early satiety   (MALE): Patient denies hematuria, dysuria, increased frequency, urgency, hesitancy, incontinence  MUSCULOSKELETAL:  Patient denies  joint pain, swelling or redness, decreased range of motion  SKIN:  Patient denies chronic rashes, inflammation, ulcerations or skin changes  NEUROLOGIC:  Patient denies headache, blurred vision, areas of focal weakness or numbness, abnormal gait, sensory problems  PSYCHIATRIC: Patient denies anxiety, insomnia, depression, skyler or mood swings, psychotropic drugs   distress.      Breath sounds: Normal breath sounds.   Abdominal:      General: Bowel sounds are normal. There is no distension.      Palpations: Abdomen is soft.      Tenderness: There is no abdominal tenderness.      Comments: Left lateral incision approximated    Musculoskeletal: Normal range of motion.   Skin:     General: Skin is dry.   Neurological:      Mental Status: He is alert and oriented to person, place, and time.       Significant Labs:   CBC:   Recent Labs   Lab 10/21/20  0645   WBC 10.87  10.87   HGB 11.7*  11.7*   HCT 34.6*  34.6*     194     CMP:   Recent Labs   Lab 10/20/20  1735 10/21/20  0645    143  144   K 3.8 3.6  3.6    106  106   CO2 23 25  24   * 159*  158*   BUN 36* 41*  41*   CREATININE 4.1* 5.2*  5.1*   CALCIUM 7.7* 8.4*  8.6*   ALBUMIN 2.8* 3.0*   ANIONGAP 14 12  14   EGFRNONAA 14* 10*  10*       Significant Imaging: I have reviewed all pertinent imaging results/findings within the past 24 hours.

## 2020-10-21 NOTE — PLAN OF CARE
PT GT TRAINED X 200' WITH NO AD IND. PT WILL BE D/C FROM P.T. TO MOBILITY PROGRAM FOR MAINTENANCE.

## 2020-10-22 LAB
ALBUMIN SERPL BCP-MCNC: 2.5 G/DL (ref 3.5–5.2)
ALBUMIN SERPL BCP-MCNC: 2.7 G/DL (ref 3.5–5.2)
ANION GAP SERPL CALC-SCNC: 12 MMOL/L (ref 8–16)
ANION GAP SERPL CALC-SCNC: 13 MMOL/L (ref 8–16)
BUN SERPL-MCNC: 54 MG/DL (ref 8–23)
BUN SERPL-MCNC: 58 MG/DL (ref 8–23)
CALCIUM SERPL-MCNC: 8.4 MG/DL (ref 8.7–10.5)
CALCIUM SERPL-MCNC: 8.4 MG/DL (ref 8.7–10.5)
CHLORIDE SERPL-SCNC: 102 MMOL/L (ref 95–110)
CHLORIDE SERPL-SCNC: 103 MMOL/L (ref 95–110)
CO2 SERPL-SCNC: 23 MMOL/L (ref 23–29)
CO2 SERPL-SCNC: 23 MMOL/L (ref 23–29)
CREAT SERPL-MCNC: 8.3 MG/DL (ref 0.5–1.4)
CREAT SERPL-MCNC: 8.9 MG/DL (ref 0.5–1.4)
EST. GFR  (AFRICAN AMERICAN): 6 ML/MIN/1.73 M^2
EST. GFR  (AFRICAN AMERICAN): 7 ML/MIN/1.73 M^2
EST. GFR  (NON AFRICAN AMERICAN): 5 ML/MIN/1.73 M^2
EST. GFR  (NON AFRICAN AMERICAN): 6 ML/MIN/1.73 M^2
GLUCOSE SERPL-MCNC: 157 MG/DL (ref 70–110)
GLUCOSE SERPL-MCNC: 236 MG/DL (ref 70–110)
PHOSPHATE SERPL-MCNC: 4.2 MG/DL (ref 2.7–4.5)
PHOSPHATE SERPL-MCNC: 5.1 MG/DL (ref 2.7–4.5)
POCT GLUCOSE: 151 MG/DL (ref 70–110)
POCT GLUCOSE: 165 MG/DL (ref 70–110)
POCT GLUCOSE: 175 MG/DL (ref 70–110)
POCT GLUCOSE: 212 MG/DL (ref 70–110)
POCT GLUCOSE: 220 MG/DL (ref 70–110)
POTASSIUM SERPL-SCNC: 3.6 MMOL/L (ref 3.5–5.1)
POTASSIUM SERPL-SCNC: 3.6 MMOL/L (ref 3.5–5.1)
SODIUM SERPL-SCNC: 137 MMOL/L (ref 136–145)
SODIUM SERPL-SCNC: 139 MMOL/L (ref 136–145)

## 2020-10-22 PROCEDURE — 63600175 PHARM REV CODE 636 W HCPCS: Performed by: NURSE PRACTITIONER

## 2020-10-22 PROCEDURE — 94760 N-INVAS EAR/PLS OXIMETRY 1: CPT

## 2020-10-22 PROCEDURE — 25000003 PHARM REV CODE 250: Performed by: NURSE PRACTITIONER

## 2020-10-22 PROCEDURE — 99900035 HC TECH TIME PER 15 MIN (STAT)

## 2020-10-22 PROCEDURE — 99233 PR SUBSEQUENT HOSPITAL CARE,LEVL III: ICD-10-PCS | Mod: ,,, | Performed by: INTERNAL MEDICINE

## 2020-10-22 PROCEDURE — 80069 RENAL FUNCTION PANEL: CPT

## 2020-10-22 PROCEDURE — 36415 COLL VENOUS BLD VENIPUNCTURE: CPT

## 2020-10-22 PROCEDURE — 25000003 PHARM REV CODE 250: Performed by: INTERNAL MEDICINE

## 2020-10-22 PROCEDURE — 99233 SBSQ HOSP IP/OBS HIGH 50: CPT | Mod: ,,, | Performed by: INTERNAL MEDICINE

## 2020-10-22 PROCEDURE — 25000003 PHARM REV CODE 250: Performed by: SURGERY

## 2020-10-22 PROCEDURE — 96372 THER/PROPH/DIAG INJ SC/IM: CPT

## 2020-10-22 PROCEDURE — A4216 STERILE WATER/SALINE, 10 ML: HCPCS | Performed by: ANESTHESIOLOGY

## 2020-10-22 PROCEDURE — 63600175 PHARM REV CODE 636 W HCPCS: Performed by: SURGERY

## 2020-10-22 PROCEDURE — 25000003 PHARM REV CODE 250: Performed by: ANESTHESIOLOGY

## 2020-10-22 PROCEDURE — 21400001 HC TELEMETRY ROOM

## 2020-10-22 RX ORDER — HEPARIN SODIUM 5000 [USP'U]/ML
5000 INJECTION, SOLUTION INTRAVENOUS; SUBCUTANEOUS EVERY 8 HOURS
Status: DISCONTINUED | OUTPATIENT
Start: 2020-10-22 | End: 2020-10-28 | Stop reason: HOSPADM

## 2020-10-22 RX ORDER — NIFEDIPINE 30 MG/1
60 TABLET, EXTENDED RELEASE ORAL DAILY
Status: DISCONTINUED | OUTPATIENT
Start: 2020-10-22 | End: 2020-10-28 | Stop reason: HOSPADM

## 2020-10-22 RX ADMIN — Medication 3 ML: at 09:10

## 2020-10-22 RX ADMIN — CHLORHEXIDINE GLUCONATE 0.12% ORAL RINSE 10 ML: 1.2 LIQUID ORAL at 08:10

## 2020-10-22 RX ADMIN — LABETALOL HYDROCHLORIDE 10 MG: 5 INJECTION INTRAVENOUS at 07:10

## 2020-10-22 RX ADMIN — LISINOPRIL 10 MG: 10 TABLET ORAL at 08:10

## 2020-10-22 RX ADMIN — AMLODIPINE BESYLATE 10 MG: 10 TABLET ORAL at 08:10

## 2020-10-22 RX ADMIN — HEPARIN SODIUM 5000 UNITS: 5000 INJECTION INTRAVENOUS; SUBCUTANEOUS at 09:10

## 2020-10-22 RX ADMIN — HYDRALAZINE HYDROCHLORIDE 50 MG: 50 TABLET, FILM COATED ORAL at 02:10

## 2020-10-22 RX ADMIN — ARIPIPRAZOLE 30 MG: 5 TABLET ORAL at 08:10

## 2020-10-22 RX ADMIN — INSULIN ASPART 1 UNITS: 100 INJECTION, SOLUTION INTRAVENOUS; SUBCUTANEOUS at 09:10

## 2020-10-22 RX ADMIN — SERTRALINE HYDROCHLORIDE 100 MG: 50 TABLET ORAL at 08:10

## 2020-10-22 RX ADMIN — INSULIN ASPART 2 UNITS: 100 INJECTION, SOLUTION INTRAVENOUS; SUBCUTANEOUS at 11:10

## 2020-10-22 RX ADMIN — HYDRALAZINE HYDROCHLORIDE 50 MG: 50 TABLET, FILM COATED ORAL at 09:10

## 2020-10-22 RX ADMIN — LABETALOL HYDROCHLORIDE 10 MG: 5 INJECTION INTRAVENOUS at 01:10

## 2020-10-22 RX ADMIN — Medication 3 ML: at 02:10

## 2020-10-22 RX ADMIN — ATORVASTATIN CALCIUM 80 MG: 40 TABLET, FILM COATED ORAL at 08:10

## 2020-10-22 RX ADMIN — STANDARDIZED SENNA CONCENTRATE AND DOCUSATE SODIUM 1 TABLET: 8.6; 5 TABLET ORAL at 08:10

## 2020-10-22 RX ADMIN — TAMSULOSIN HYDROCHLORIDE 0.4 MG: 0.4 CAPSULE ORAL at 08:10

## 2020-10-22 RX ADMIN — ASPIRIN 81 MG: 81 TABLET, COATED ORAL at 08:10

## 2020-10-22 RX ADMIN — NIFEDIPINE 60 MG: 30 TABLET, FILM COATED, EXTENDED RELEASE ORAL at 11:10

## 2020-10-22 RX ADMIN — HYDRALAZINE HYDROCHLORIDE 50 MG: 50 TABLET, FILM COATED ORAL at 05:10

## 2020-10-22 NOTE — ASSESSMENT & PLAN NOTE
Hemoglobin A1C   Date Value Ref Range Status   10/20/2020 6.0 (H) 4.0 - 5.6 % Final     Comment:     ADA Screening Guidelines:  5.7-6.4%  Consistent with prediabetes  >or=6.5%  Consistent with diabetes  High levels of fetal hemoglobin interfere with the HbA1C  assay. Heterozygous hemoglobin variants (HbS, HgC, etc)do  not significantly interfere with this assay.   However, presence of multiple variants may affect accuracy.     02/04/2020 7.3 (H) 4.0 - 5.6 % Final     Comment:     ADA Screening Guidelines:  5.7-6.4%  Consistent with prediabetes  >or=6.5%  Consistent with diabetes  High levels of fetal hemoglobin interfere with the HbA1C  assay. Heterozygous hemoglobin variants (HbS, HgC, etc)do  not significantly interfere with this assay.   However, presence of multiple variants may affect accuracy.     --insulin sliding scale  --Diabetic diet

## 2020-10-22 NOTE — PROGRESS NOTES
[facility] -   Nephrology  Progress Note    Patient Name: Colt Stacy Jr.  MRN: [id number]  Admission Date: 10/20/2020  Hospital Length of Stay: 2 days  Attending Provider: Livan Quiñones MD   Primary Care Physician: Primary Doctor No  Principal Problem:Hyperaldosteronism    Subjective:     HPI: Colt Stacy Jr. is a pleasant 75-year-old  man with longstanding history of hypertension, CKD stage 4 bordering on stage 5, was diagnosed with primary hyperaldosteronism, further workup revealed left adrenal adenoma, adrenal vein sampling positive for increased activity on the left side, s/p  left adrenalectomy today , patient being admitted to hospital for observation overnight following his surgery, we were consulted due to his advanced renal failure, patient seen and examined in the room, denies any complaints at this time, comfortably eating his supper     Interval History: worsening renal fn noted, can be due to urinary retention,     Review of patient's allergies indicates:   Allergen Reactions    Benadryl [diphenhydramine hcl] Other (See Comments)     Pt states benadryl makes him feel numb    Ace inhibitors      IYN     Current Facility-Administered Medications   Medication Frequency    acetaminophen tablet 650 mg Q6H PRN    ARIPiprazole tablet 30 mg QHS    aspirin EC tablet 81 mg QHS    atorvastatin tablet 80 mg Daily    chlorhexidine 0.12 % solution 10 mL BID    dextrose 50% injection 12.5 g PRN    dextrose 50% injection 25 g PRN    diphenhydrAMINE injection 25 mg Q6H PRN    glucagon (human recombinant) injection 1 mg PRN    glucose chewable tablet 16 g PRN    glucose chewable tablet 24 g PRN    hydrALAZINE tablet 50 mg Q8H    HYDROcodone-acetaminophen 5-325 mg per tablet 2 tablet Q4H PRN    insulin aspart U-100 pen 0-5 Units QID (AC + HS) PRN    labetaloL injection 10 mg Q4H PRN    melatonin tablet 9 mg Nightly PRN    metoclopramide HCl injection 10 mg Q10 Min PRN     morphine injection 2 mg Q3H PRN    NIFEdipine 24 hr tablet 60 mg Daily    nozaseptin (NOZIN) nasal  BID    ondansetron injection 4 mg Q8H PRN    promethazine (PHENERGAN) 6.25 mg in dextrose 5 % 50 mL IVPB Q6H PRN    senna-docusate 8.6-50 mg per tablet 1 tablet BID    sertraline tablet 100 mg Daily    simethicone chewable tablet 80 mg TID PRN    sodium chloride 0.9% flush 3 mL Q8H    tamsulosin 24 hr capsule 0.4 mg Daily       Objective:     Vital Signs (Most Recent):  Temp: 98 °F (36.7 °C) (10/22/20 1210)  Pulse: 75 (10/22/20 1323)  Resp: 18 (10/22/20 1210)  BP: (!) 145/68 (10/22/20 1210)  SpO2: (!) 94 % (10/22/20 1323)  O2 Device (Oxygen Therapy): room air (10/22/20 0700) Vital Signs (24h Range):  Temp:  [98 °F (36.7 °C)-99.3 °F (37.4 °C)] 98 °F (36.7 °C)  Pulse:  [64-96] 75  Resp:  [18] 18  SpO2:  [93 %-96 %] 94 %  BP: (145-206)/(68-90) 145/68     Weight: 83.3 kg (183 lb 10.3 oz) (10/20/20 0735)  Body mass index is 33.59 kg/m².  Body surface area is 1.91 meters squared.    I/O last 3 completed shifts:  In: 1327.1 [P.O.:600; I.V.:727.1]  Out: 800 [Urine:800]    Physical Exam  Vitals signs and nursing note reviewed.   Constitutional:       General: He is not in acute distress.     Appearance: He is well-developed. He is not diaphoretic.   HENT:      Head: Normocephalic and atraumatic.   Eyes:      General:         Right eye: No discharge.      Pupils: Pupils are equal, round, and reactive to light.   Neck:      Musculoskeletal: Normal range of motion and neck supple.      Thyroid: No thyromegaly.      Trachea: No tracheal deviation.   Cardiovascular:      Rate and Rhythm: Normal rate and regular rhythm.      Heart sounds: Murmur present. No friction rub. No gallop.    Pulmonary:      Effort: Pulmonary effort is normal.      Breath sounds: Normal breath sounds. No wheezing or rales.   Abdominal:      Palpations: Abdomen is soft. There is no mass.      Tenderness: There is no abdominal  tenderness. There is no guarding or rebound.      Comments: Obese    Musculoskeletal: Normal range of motion.   Lymphadenopathy:      Cervical: No cervical adenopathy.   Skin:     General: Skin is warm.      Findings: No erythema or rash.      Comments: Areas of Leukoderma    Neurological:      Mental Status: He is alert and oriented to person, place, and time.         Significant Labs:  CBC:   Recent Labs   Lab 10/21/20  0645   WBC 10.87  10.87   RBC 3.70*  3.70*   HGB 11.7*  11.7*   HCT 34.6*  34.6*     194   MCV 94  94   MCH 31.6*  31.6*   MCHC 33.8  33.8     CMP:   Recent Labs   Lab 10/22/20  1332   *   CALCIUM 8.4*   ALBUMIN 2.5*      K 3.6   CO2 23      BUN 58*   CREATININE 8.9*     Coagulation: No results for input(s): PT, INR, APTT in the last 168 hours.  LFTs:   Recent Labs   Lab 10/22/20  1332   ALBUMIN 2.5*     All labs within the past 24 hours have been reviewed.     Significant Imaging:  Reviewed    Lab Results   Component Value Date    .0 (H) 09/04/2020    CALCIUM 8.4 (L) 10/22/2020    PHOS 4.2 10/22/2020           Assessment/Plan:     CKD (chronic kidney disease) stage 4, GFR 15-29 ml/min  1. YIN on  Chronic kidney disease stage 5, serum creatinine increased from about 5 mg/dL to 8.9 , likely due to urinary retention, will place a Noe catheter, stop lisinopril, monitor renal function    2.  Hypertension, due to primary hyperaldosteronism, s/p left adrenalectomy, monitor blood pressures, improved with current meds,      3.  Anemia, recent hemoglobin is 11.7 g, monitor     4.   hypernatremia/hypokalemia, due to primary hyperaldosteronism, monitor labs, status post surgery, better     5.  Euvolemic on exam,     6.  Proteinuria, multifactorial,                  Thank you for your consult. I will follow-up with patient. Please contact us if you have any additional questions.     Total time spent 40 minutes including time needed to review the records,  patient   evaluation, documentation, face-to-face discussion with the patient,  primary teams,   more than 50% of the time was spent on coordination of care and counseling.       Thad Eastman MD  Nephrology  [facility]

## 2020-10-22 NOTE — ASSESSMENT & PLAN NOTE
--blood pressure currently stable in 150's.   --agree with nephrology with only adding amlodipine for now. Titrate up as needed.   Labetalol prn    10/21/20  Blood pressure elevated this AM  Much improved after all medications were resumed    10/22/20  Controlled today

## 2020-10-22 NOTE — PLAN OF CARE
Patient remained free from injury. Elevated blood pressure managed with PRN and scheduled doses. Patient is passing gas;was able to void with a small bowel movement. Blood glucose and cardiac monitored. No s/s of acute distress. 12hr chart check complete.

## 2020-10-22 NOTE — SUBJECTIVE & OBJECTIVE
Interval History: worsening renal fn noted, can be due to urinary retention,     Review of patient's allergies indicates:   Allergen Reactions    Benadryl [diphenhydramine hcl] Other (See Comments)     Pt states benadryl makes him feel numb    Ace inhibitors      YIN     Current Facility-Administered Medications   Medication Frequency    acetaminophen tablet 650 mg Q6H PRN    ARIPiprazole tablet 30 mg QHS    aspirin EC tablet 81 mg QHS    atorvastatin tablet 80 mg Daily    chlorhexidine 0.12 % solution 10 mL BID    dextrose 50% injection 12.5 g PRN    dextrose 50% injection 25 g PRN    diphenhydrAMINE injection 25 mg Q6H PRN    glucagon (human recombinant) injection 1 mg PRN    glucose chewable tablet 16 g PRN    glucose chewable tablet 24 g PRN    hydrALAZINE tablet 50 mg Q8H    HYDROcodone-acetaminophen 5-325 mg per tablet 2 tablet Q4H PRN    insulin aspart U-100 pen 0-5 Units QID (AC + HS) PRN    labetaloL injection 10 mg Q4H PRN    melatonin tablet 9 mg Nightly PRN    metoclopramide HCl injection 10 mg Q10 Min PRN    morphine injection 2 mg Q3H PRN    NIFEdipine 24 hr tablet 60 mg Daily    nozaseptin (NOZIN) nasal  BID    ondansetron injection 4 mg Q8H PRN    promethazine (PHENERGAN) 6.25 mg in dextrose 5 % 50 mL IVPB Q6H PRN    senna-docusate 8.6-50 mg per tablet 1 tablet BID    sertraline tablet 100 mg Daily    simethicone chewable tablet 80 mg TID PRN    sodium chloride 0.9% flush 3 mL Q8H    tamsulosin 24 hr capsule 0.4 mg Daily       Objective:     Vital Signs (Most Recent):  Temp: 98 °F (36.7 °C) (10/22/20 1210)  Pulse: 75 (10/22/20 1323)  Resp: 18 (10/22/20 1210)  BP: (!) 145/68 (10/22/20 1210)  SpO2: (!) 94 % (10/22/20 1323)  O2 Device (Oxygen Therapy): room air (10/22/20 0700) Vital Signs (24h Range):  Temp:  [98 °F (36.7 °C)-99.3 °F (37.4 °C)] 98 °F (36.7 °C)  Pulse:  [64-96] 75  Resp:  [18] 18  SpO2:  [93 %-96 %] 94 %  BP: (145-206)/(68-90) 145/68     Weight: 83.3  kg (183 lb 10.3 oz) (10/20/20 0735)  Body mass index is 33.59 kg/m².  Body surface area is 1.91 meters squared.    I/O last 3 completed shifts:  In: 1327.1 [P.O.:600; I.V.:727.1]  Out: 800 [Urine:800]    Physical Exam  Vitals signs and nursing note reviewed.   Constitutional:       General: He is not in acute distress.     Appearance: He is well-developed. He is not diaphoretic.   HENT:      Head: Normocephalic and atraumatic.   Eyes:      General:         Right eye: No discharge.      Pupils: Pupils are equal, round, and reactive to light.   Neck:      Musculoskeletal: Normal range of motion and neck supple.      Thyroid: No thyromegaly.      Trachea: No tracheal deviation.   Cardiovascular:      Rate and Rhythm: Normal rate and regular rhythm.      Heart sounds: Murmur present. No friction rub. No gallop.    Pulmonary:      Effort: Pulmonary effort is normal.      Breath sounds: Normal breath sounds. No wheezing or rales.   Abdominal:      Palpations: Abdomen is soft. There is no mass.      Tenderness: There is no abdominal tenderness. There is no guarding or rebound.      Comments: Obese    Musculoskeletal: Normal range of motion.   Lymphadenopathy:      Cervical: No cervical adenopathy.   Skin:     General: Skin is warm.      Findings: No erythema or rash.      Comments: Areas of Leukoderma    Neurological:      Mental Status: He is alert and oriented to person, place, and time.         Significant Labs:  CBC:   Recent Labs   Lab 10/21/20  0645   WBC 10.87  10.87   RBC 3.70*  3.70*   HGB 11.7*  11.7*   HCT 34.6*  34.6*     194   MCV 94  94   MCH 31.6*  31.6*   MCHC 33.8  33.8     CMP:   Recent Labs   Lab 10/22/20  1332   *   CALCIUM 8.4*   ALBUMIN 2.5*      K 3.6   CO2 23      BUN 58*   CREATININE 8.9*     Coagulation: No results for input(s): PT, INR, APTT in the last 168 hours.  LFTs:   Recent Labs   Lab 10/22/20  1332   ALBUMIN 2.5*     All labs within the past 24 hours have  been reviewed.     Significant Imaging:  Reviewed    Lab Results   Component Value Date    .0 (H) 09/04/2020    CALCIUM 8.4 (L) 10/22/2020    PHOS 4.2 10/22/2020

## 2020-10-22 NOTE — ASSESSMENT & PLAN NOTE
---nephrology following    10/22/2020  Worsening renal failure today. Bladder scan shows mild retention at 298ml. In and out catheter today. Will repeat bladder scan in 6 hours.   --renal ultrasound ordered

## 2020-10-22 NOTE — ASSESSMENT & PLAN NOTE
S/p left adrenalectomy  --primary team managing  --monitor potassium    10/21/20  Primary team managing  Electrolytes stable  Still has some abdominal distention; diet advanced    10/22/20  +BM today  Still has some distention defer to Surgery

## 2020-10-22 NOTE — ASSESSMENT & PLAN NOTE
1. YIN on  Chronic kidney disease stage 5, serum creatinine increased from about 5 mg/dL to 8.9 , likely due to urinary retention, will place a Noe catheter, stop lisinopril, monitor renal function    2.  Hypertension, due to primary hyperaldosteronism, s/p left adrenalectomy, monitor blood pressures, improved with current meds,      3.  Anemia, recent hemoglobin is 11.7 g, monitor     4.   hypernatremia/hypokalemia, due to primary hyperaldosteronism, monitor labs, status post surgery, better     5.  Euvolemic on exam,     6.  Proteinuria, multifactorial,

## 2020-10-22 NOTE — HOSPITAL COURSE
S/p left adrenalectomy on 10/20/2020 performed by Dr. Quiñones. [facility] medicine consulted for DM and hypertension    10/21/2020  blood pressure elevated today, but much more controlled after all antihypertensives were restarted. Still has abdominal distention will monitor overnight.     10/22/2020  Worsening renal function today. Bladder scan only shows 298 mls. Will perform in and out catheter today. BM last night. Patient states he is coughing up blood tinged sputum. Unwitnessed and there is not a sample at bedside. No objective findings of bleeding. Ask the patient and nurse to notify NP so this can be assessed. Check CBC and CXR.   10/23/20 Creatinine worsened overnight from 8.9 to 10, Nephrology following. Noe placed overnight for urinary retention. Patient drained 2 liters of bloody urine overnight. Urology consulted and recommended continuous bladder irrigation. Will continue to monitor renal function. 10/24/20 Creatinine increased to 10.8 overnight, Nephrology following. Continuous bladder irrigation was stopped per Urology. No further hematuria noted. Continue current management per the primary team. 10/25/20 The patients creatinine increased to 11.4 overnight. Nephrology and Urology following. CT Abd/pelvis showed mild inflammatory changes adjacent to the midportion of the left ureter but no obstruction. Continue to monitor. CXR showed small left pleural effusion with associated atelectasis, encouraged IS use. 10/27/20 No acute events overnight. The patients blood pressure was not well controlled overnight. WIll increase PRN labetolol to 20mg and add scheduled metoprolol 25 mg PO BID. 10/28/20 No acute issues overnight. The patients creatinine continues to improve slowly, Cr. 10.6 today. The patient is being discharged home per the primary team.

## 2020-10-22 NOTE — NURSING
Dr Escobar present and notified of patient c/o coughing up blood and SOB, patient without noted distress , bladder scan done and reading of 298 obtained, MD present and aware, awaiting orders.

## 2020-10-22 NOTE — PROGRESS NOTES
[facility]  General Surgery  Progress Note    Subjective:     History of Present Illness:  No notes on file    Post-Op Info:  Procedure(s) (LRB):  XI ROBOTIC ADRENALECTOMY (Left)   2 Days Post-Op     Interval History:  tolerating diet and having bowel function, elevated cr, low uop, BP control improved    Medications:  Continuous Infusions:  Scheduled Meds:   ARIPiprazole  30 mg Oral QHS    aspirin  81 mg Oral QHS    atorvastatin  80 mg Oral Daily    chlorhexidine  10 mL Mouth/Throat BID    heparin (porcine)  5,000 Units Subcutaneous Q8H    hydrALAZINE  50 mg Oral Q8H    NIFEdipine  60 mg Oral Daily    nozaseptin   Each Nostril BID    senna-docusate 8.6-50 mg  1 tablet Oral BID    sertraline  100 mg Oral Daily    sodium chloride 0.9%  3 mL Intravenous Q8H    tamsulosin  0.4 mg Oral Daily     PRN Meds:acetaminophen, dextrose 50%, dextrose 50%, diphenhydrAMINE, glucagon (human recombinant), glucose, glucose, HYDROcodone-acetaminophen, insulin aspart U-100, labetalol, melatonin, metoclopramide HCl, morphine, ondansetron, promethazine (PHENERGAN) IVPB, simethicone     Review of patient's allergies indicates:   Allergen Reactions    Benadryl [diphenhydramine hcl] Other (See Comments)     Pt states benadryl makes him feel numb    Ace inhibitors      YIN     Objective:     Vital Signs (Most Recent):  Temp: 99.2 °F (37.3 °C) (10/22/20 1638)  Pulse: 67 (10/22/20 1638)  Resp: 18 (10/22/20 1638)  BP: 139/63 (10/22/20 1638)  SpO2: 95 % (10/22/20 1638) Vital Signs (24h Range):  Temp:  [98 °F (36.7 °C)-99.3 °F (37.4 °C)] 99.2 °F (37.3 °C)  Pulse:  [64-96] 67  Resp:  [18] 18  SpO2:  [93 %-96 %] 95 %  BP: (139-206)/(63-90) 139/63     Weight: 83.3 kg (183 lb 10.3 oz)  Body mass index is 33.59 kg/m².    Intake/Output - Last 3 Shifts       10/20 0700 - 10/21 0659 10/21 0700 - 10/22 0659 10/22 0700 - 10/23 0659    P.O. 480 120 120    I.V. (mL/kg) 2727.1 (32.7)  3 (0)    Total Intake(mL/kg) 3207.1 (38.5)  120 (1.4) 123 (1.5)    Urine (mL/kg/hr) 800 (0.4)      Blood 20      Total Output 820      Net +2387.1 +120 +123           Urine Occurrence  1 x     Stool Occurrence  1 x           Physical Exam  Vitals signs reviewed.   Constitutional:       Appearance: He is well-developed.   HENT:      Head: Normocephalic and atraumatic.   Neck:      Musculoskeletal: Normal range of motion and neck supple.   Cardiovascular:      Rate and Rhythm: Normal rate and regular rhythm.   Pulmonary:      Effort: Pulmonary effort is normal.      Breath sounds: Normal breath sounds.   Abdominal:      General: Bowel sounds are normal. There is no distension (improving).      Palpations: Abdomen is soft.      Tenderness: There is no abdominal tenderness (ATTP).      Comments: Incisions clean dry and intact   Skin:     General: Skin is warm and dry.   Neurological:      Mental Status: He is alert and oriented to person, place, and time.         Significant Labs:  CBC:   Recent Labs   Lab 10/21/20  0645   WBC 10.87  10.87   RBC 3.70*  3.70*   HGB 11.7*  11.7*   HCT 34.6*  34.6*     194   MCV 94  94   MCH 31.6*  31.6*   MCHC 33.8  33.8     BMP:   Recent Labs   Lab 10/22/20  1332   *      K 3.6      CO2 23   BUN 58*   CREATININE 8.9*   CALCIUM 8.4*       Significant Diagnostics:  I have reviewed all pertinent imaging results/findings within the past 24 hours.    Assessment/Plan:     * Hyperaldosteronism  Status post left adrenalectomy  Regular diet as tolerated   Bloated monitor for return of bowel function, antiemetics p.r.n.  Ambulate  Pain control  DVT/GI prophylaxis      CKD (chronic kidney disease) stage 4, GFR 15-29 ml/min  Monitor urine output, creatinine  Nephrology following    Diabetes mellitus  Monitor blood glucose Insulin sliding scale    HTN (hypertension)  On p.o. antihypertensives per Nephrology        Livan Quiñones MD  General Surgery  [facility]

## 2020-10-22 NOTE — H&P
[facility] Hospital Medicine  History & Physical    Patient Name: Colt Stacy Jr.  MRN: [id number]  Admission Date: 10/20/2020  Attending Physician: Livan Quiñones MD   Primary Care Provider: Primary Doctor No      Patient information was obtained from patient and ER records.     Subjective:     Principal Problem:Hyperaldosteronism    Chief Complaint: No chief complaint on file.       HPI: Colt Stacy is a 75 year old male with history of primary hyperaldosteronism. 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 Quiñones. Surgery without acute complications. Hospital medicine has been consulted for medical management. Given his secondary hypertension, nephrology has been consulted to assist with antihypertensives.       Interval History:patient states he is coughing up blood tinged sputum. Unwitnessed and there is not sample at bedside.     Review of Systems   Constitutional: Negative for chills, diaphoresis, fatigue and fever.   HENT: Negative for drooling, ear pain, rhinorrhea and sore throat.    Eyes: Negative.    Respiratory: Positive for cough and shortness of breath. Negative for wheezing.    Cardiovascular: Negative for palpitations and leg swelling.   Gastrointestinal: Negative for abdominal pain, constipation, diarrhea and nausea.   Endocrine: Negative.    Genitourinary: Positive for difficulty urinating. Negative for dysuria, hematuria and urgency.   Musculoskeletal: Negative.    Skin: Negative for color change and wound.   Allergic/Immunologic: Negative.    Neurological: Negative for dizziness, syncope and speech difficulty.   Hematological: Negative.    Psychiatric/Behavioral: Negative.      Objective:     Vital Signs (Most Recent):  Temp: 99.3 °F (37.4 °C) (10/22/20 0700)  Pulse: 69 (10/22/20 1025)  Resp: 18 (10/22/20 0700)  BP: (!) 168/72 (10/22/20 1025)  SpO2: 96 % (10/22/20 0700) Vital Signs (24h Range):  Temp:  [98.3  °F (36.8 °C)-99.3 °F (37.4 °C)] 99.3 °F (37.4 °C)  Pulse:  [64-79] 69  Resp:  [18] 18  SpO2:  [93 %-96 %] 96 %  BP: (148-206)/(68-90) 168/72     Weight: 83.3 kg (183 lb 10.3 oz)  Body mass index is 33.59 kg/m².    Intake/Output Summary (Last 24 hours) at 10/22/2020 1156  Last data filed at 10/22/2020 0800  Gross per 24 hour   Intake 240 ml   Output --   Net 240 ml      Physical Exam  Vitals signs and nursing note reviewed.   Constitutional:       General: He is not in acute distress.     Appearance: He is well-developed.   HENT:      Head: Normocephalic and atraumatic.   Neck:      Musculoskeletal: Normal range of motion and neck supple.   Cardiovascular:      Rate and Rhythm: Normal rate and regular rhythm.      Heart sounds: Normal heart sounds.   Pulmonary:      Effort: Pulmonary effort is normal. No respiratory distress.      Breath sounds: Normal breath sounds.   Abdominal:      General: Bowel sounds are normal. There is distension.      Palpations: Abdomen is soft.      Tenderness: There is no abdominal tenderness.      Comments: Left lateral incision approximated    Musculoskeletal: Normal range of motion.   Skin:     General: Skin is dry.   Neurological:      Mental Status: He is alert and oriented to person, place, and time.       Significant Labs:   CBC:   Recent Labs   Lab 10/21/20  0645   WBC 10.87  10.87   HGB 11.7*  11.7*   HCT 34.6*  34.6*     194     CMP:   Recent Labs   Lab 10/20/20  1735 10/21/20  0645 10/22/20  0721    143  144 139   K 3.8 3.6  3.6 3.6    106  106 103   CO2 23 25  24 23   * 159*  158* 157*   BUN 36* 41*  41* 54*   CREATININE 4.1* 5.2*  5.1* 8.3*   CALCIUM 7.7* 8.4*  8.6* 8.4*   ALBUMIN 2.8* 3.0* 2.7*   ANIONGAP 14 12  14 13   EGFRNONAA 14* 10*  10* 6*       Significant Imaging: I have reviewed and interpreted all pertinent imaging results/findings within the past 24 hours.    Assessment/Plan:     * Hyperaldosteronism  S/p left  adrenalectomy  --primary team managing  --monitor potassium    10/21/20  Primary team managing  Electrolytes stable  Still has some abdominal distention; diet advanced    10/22/20  +BM today  Still has some distention defer to Surgery    CKD (chronic kidney disease) stage 4, GFR 15-29 ml/min  ---nephrology following    10/22/2020  Worsening renal failure today. Bladder scan shows mild retention at 298ml. In and out catheter today. Will repeat bladder scan in 6 hours.   --renal ultrasound ordered    Diabetes mellitus  Hemoglobin A1C   Date Value Ref Range Status   10/20/2020 6.0 (H) 4.0 - 5.6 % Final     Comment:     ADA Screening Guidelines:  5.7-6.4%  Consistent with prediabetes  >or=6.5%  Consistent with diabetes  High levels of fetal hemoglobin interfere with the HbA1C  assay. Heterozygous hemoglobin variants (HbS, HgC, etc)do  not significantly interfere with this assay.   However, presence of multiple variants may affect accuracy.     02/04/2020 7.3 (H) 4.0 - 5.6 % Final     Comment:     ADA Screening Guidelines:  5.7-6.4%  Consistent with prediabetes  >or=6.5%  Consistent with diabetes  High levels of fetal hemoglobin interfere with the HbA1C  assay. Heterozygous hemoglobin variants (HbS, HgC, etc)do  not significantly interfere with this assay.   However, presence of multiple variants may affect accuracy.     --insulin sliding scale  --Diabetic diet      HTN (hypertension)  --blood pressure currently stable in 150's.   --agree with nephrology with only adding amlodipine for now. Titrate up as needed.   Labetalol prn    10/21/20  Blood pressure elevated this AM  Much improved after all medications were resumed    10/22/20  Controlled today      VTE Risk Mitigation (From admission, onward)    None             Guanako Escobar NP  Department of Hospital Medicine   [facility]

## 2020-10-22 NOTE — PROGRESS NOTES
Orders given by Dr Eastman for a greenfield catheter to be placed as pt is retaining and his kidney functioning is worsening.

## 2020-10-22 NOTE — SUBJECTIVE & OBJECTIVE
Interval History:patient states he is coughing up blood tinged sputum. Unwitnessed and there is not sample at bedside.     Review of Systems   Constitutional: Negative for chills, diaphoresis, fatigue and fever.   HENT: Negative for drooling, ear pain, rhinorrhea and sore throat.    Eyes: Negative.    Respiratory: Positive for cough and shortness of breath. Negative for wheezing.    Cardiovascular: Negative for palpitations and leg swelling.   Gastrointestinal: Negative for abdominal pain, constipation, diarrhea and nausea.   Endocrine: Negative.    Genitourinary: Positive for difficulty urinating. Negative for dysuria, hematuria and urgency.   Musculoskeletal: Negative.    Skin: Negative for color change and wound.   Allergic/Immunologic: Negative.    Neurological: Negative for dizziness, syncope and speech difficulty.   Hematological: Negative.    Psychiatric/Behavioral: Negative.      Objective:     Vital Signs (Most Recent):  Temp: 99.3 °F (37.4 °C) (10/22/20 0700)  Pulse: 69 (10/22/20 1025)  Resp: 18 (10/22/20 0700)  BP: (!) 168/72 (10/22/20 1025)  SpO2: 96 % (10/22/20 0700) Vital Signs (24h Range):  Temp:  [98.3 °F (36.8 °C)-99.3 °F (37.4 °C)] 99.3 °F (37.4 °C)  Pulse:  [64-79] 69  Resp:  [18] 18  SpO2:  [93 %-96 %] 96 %  BP: (148-206)/(68-90) 168/72     Weight: 83.3 kg (183 lb 10.3 oz)  Body mass index is 33.59 kg/m².    Intake/Output Summary (Last 24 hours) at 10/22/2020 1156  Last data filed at 10/22/2020 0800  Gross per 24 hour   Intake 240 ml   Output --   Net 240 ml      Physical Exam  Vitals signs and nursing note reviewed.   Constitutional:       General: He is not in acute distress.     Appearance: He is well-developed.   HENT:      Head: Normocephalic and atraumatic.   Neck:      Musculoskeletal: Normal range of motion and neck supple.   Cardiovascular:      Rate and Rhythm: Normal rate and regular rhythm.      Heart sounds: Normal heart sounds.   Pulmonary:      Effort: Pulmonary effort is normal.  No respiratory distress.      Breath sounds: Normal breath sounds.   Abdominal:      General: Bowel sounds are normal. There is distension.      Palpations: Abdomen is soft.      Tenderness: There is no abdominal tenderness.      Comments: Left lateral incision approximated    Musculoskeletal: Normal range of motion.   Skin:     General: Skin is dry.   Neurological:      Mental Status: He is alert and oriented to person, place, and time.       Significant Labs:   CBC:   Recent Labs   Lab 10/21/20  0645   WBC 10.87  10.87   HGB 11.7*  11.7*   HCT 34.6*  34.6*     194     CMP:   Recent Labs   Lab 10/20/20  1735 10/21/20  0645 10/22/20  0721    143  144 139   K 3.8 3.6  3.6 3.6    106  106 103   CO2 23 25  24 23   * 159*  158* 157*   BUN 36* 41*  41* 54*   CREATININE 4.1* 5.2*  5.1* 8.3*   CALCIUM 7.7* 8.4*  8.6* 8.4*   ALBUMIN 2.8* 3.0* 2.7*   ANIONGAP 14 12  14 13   EGFRNONAA 14* 10*  10* 6*       Significant Imaging: I have reviewed and interpreted all pertinent imaging results/findings within the past 24 hours.

## 2020-10-22 NOTE — SUBJECTIVE & OBJECTIVE
Interval History:  tolerating diet and having bowel function, elevated cr, low uop, BP control improved    Medications:  Continuous Infusions:  Scheduled Meds:   ARIPiprazole  30 mg Oral QHS    aspirin  81 mg Oral QHS    atorvastatin  80 mg Oral Daily    chlorhexidine  10 mL Mouth/Throat BID    heparin (porcine)  5,000 Units Subcutaneous Q8H    hydrALAZINE  50 mg Oral Q8H    NIFEdipine  60 mg Oral Daily    nozaseptin   Each Nostril BID    senna-docusate 8.6-50 mg  1 tablet Oral BID    sertraline  100 mg Oral Daily    sodium chloride 0.9%  3 mL Intravenous Q8H    tamsulosin  0.4 mg Oral Daily     PRN Meds:acetaminophen, dextrose 50%, dextrose 50%, diphenhydrAMINE, glucagon (human recombinant), glucose, glucose, HYDROcodone-acetaminophen, insulin aspart U-100, labetalol, melatonin, metoclopramide HCl, morphine, ondansetron, promethazine (PHENERGAN) IVPB, simethicone     Review of patient's allergies indicates:   Allergen Reactions    Benadryl [diphenhydramine hcl] Other (See Comments)     Pt states benadryl makes him feel numb    Ace inhibitors      YIN     Objective:     Vital Signs (Most Recent):  Temp: 99.2 °F (37.3 °C) (10/22/20 1638)  Pulse: 67 (10/22/20 1638)  Resp: 18 (10/22/20 1638)  BP: 139/63 (10/22/20 1638)  SpO2: 95 % (10/22/20 1638) Vital Signs (24h Range):  Temp:  [98 °F (36.7 °C)-99.3 °F (37.4 °C)] 99.2 °F (37.3 °C)  Pulse:  [64-96] 67  Resp:  [18] 18  SpO2:  [93 %-96 %] 95 %  BP: (139-206)/(63-90) 139/63     Weight: 83.3 kg (183 lb 10.3 oz)  Body mass index is 33.59 kg/m².    Intake/Output - Last 3 Shifts       10/20 0700 - 10/21 0659 10/21 0700 - 10/22 0659 10/22 0700 - 10/23 0659    P.O. 480 120 120    I.V. (mL/kg) 2727.1 (32.7)  3 (0)    Total Intake(mL/kg) 3207.1 (38.5) 120 (1.4) 123 (1.5)    Urine (mL/kg/hr) 800 (0.4)      Blood 20      Total Output 820      Net +2387.1 +120 +123           Urine Occurrence  1 x     Stool Occurrence  1 x           Physical Exam  Vitals signs reviewed.    Constitutional:       Appearance: He is well-developed.   HENT:      Head: Normocephalic and atraumatic.   Neck:      Musculoskeletal: Normal range of motion and neck supple.   Cardiovascular:      Rate and Rhythm: Normal rate and regular rhythm.   Pulmonary:      Effort: Pulmonary effort is normal.      Breath sounds: Normal breath sounds.   Abdominal:      General: Bowel sounds are normal. There is no distension (improving).      Palpations: Abdomen is soft.      Tenderness: There is no abdominal tenderness (ATTP).      Comments: Incisions clean dry and intact   Skin:     General: Skin is warm and dry.   Neurological:      Mental Status: He is alert and oriented to person, place, and time.         Significant Labs:  CBC:   Recent Labs   Lab 10/21/20  0645   WBC 10.87  10.87   RBC 3.70*  3.70*   HGB 11.7*  11.7*   HCT 34.6*  34.6*     194   MCV 94  94   MCH 31.6*  31.6*   MCHC 33.8  33.8     BMP:   Recent Labs   Lab 10/22/20  1332   *      K 3.6      CO2 23   BUN 58*   CREATININE 8.9*   CALCIUM 8.4*       Significant Diagnostics:  I have reviewed all pertinent imaging results/findings within the past 24 hours.

## 2020-10-23 LAB
ALBUMIN SERPL BCP-MCNC: 2.5 G/DL (ref 3.5–5.2)
ANION GAP SERPL CALC-SCNC: 10 MMOL/L (ref 8–16)
BASOPHILS # BLD AUTO: 0.04 K/UL (ref 0–0.2)
BASOPHILS NFR BLD: 0.4 % (ref 0–1.9)
BUN SERPL-MCNC: 65 MG/DL (ref 8–23)
CALCIUM SERPL-MCNC: 8.8 MG/DL (ref 8.7–10.5)
CHLORIDE SERPL-SCNC: 100 MMOL/L (ref 95–110)
CO2 SERPL-SCNC: 24 MMOL/L (ref 23–29)
CREAT SERPL-MCNC: 10 MG/DL (ref 0.5–1.4)
DIFFERENTIAL METHOD: ABNORMAL
EOSINOPHIL # BLD AUTO: 0.1 K/UL (ref 0–0.5)
EOSINOPHIL NFR BLD: 1.2 % (ref 0–8)
ERYTHROCYTE [DISTWIDTH] IN BLOOD BY AUTOMATED COUNT: 13.4 % (ref 11.5–14.5)
EST. GFR  (AFRICAN AMERICAN): 5 ML/MIN/1.73 M^2
EST. GFR  (NON AFRICAN AMERICAN): 5 ML/MIN/1.73 M^2
FINAL PATHOLOGIC DIAGNOSIS: NORMAL
GLUCOSE SERPL-MCNC: 220 MG/DL (ref 70–110)
GROSS: NORMAL
HCT VFR BLD AUTO: 32 % (ref 40–54)
HGB BLD-MCNC: 10.9 G/DL (ref 14–18)
IMM GRANULOCYTES # BLD AUTO: 0.05 K/UL (ref 0–0.04)
IMM GRANULOCYTES NFR BLD AUTO: 0.5 % (ref 0–0.5)
LYMPHOCYTES # BLD AUTO: 0.7 K/UL (ref 1–4.8)
LYMPHOCYTES NFR BLD: 6.8 % (ref 18–48)
Lab: NORMAL
MCH RBC QN AUTO: 31.1 PG (ref 27–31)
MCHC RBC AUTO-ENTMCNC: 34.1 G/DL (ref 32–36)
MCV RBC AUTO: 91 FL (ref 82–98)
MONOCYTES # BLD AUTO: 0.8 K/UL (ref 0.3–1)
MONOCYTES NFR BLD: 7.9 % (ref 4–15)
NEUTROPHILS # BLD AUTO: 8 K/UL (ref 1.8–7.7)
NEUTROPHILS NFR BLD: 83.2 % (ref 38–73)
NRBC BLD-RTO: 0 /100 WBC
PHOSPHATE SERPL-MCNC: 5 MG/DL (ref 2.7–4.5)
PLATELET # BLD AUTO: 174 K/UL (ref 150–350)
PMV BLD AUTO: 11.2 FL (ref 9.2–12.9)
POCT GLUCOSE: 147 MG/DL (ref 70–110)
POCT GLUCOSE: 167 MG/DL (ref 70–110)
POCT GLUCOSE: 168 MG/DL (ref 70–110)
POCT GLUCOSE: 263 MG/DL (ref 70–110)
POTASSIUM SERPL-SCNC: 3.6 MMOL/L (ref 3.5–5.1)
RBC # BLD AUTO: 3.5 M/UL (ref 4.6–6.2)
SARS-COV-2 RDRP RESP QL NAA+PROBE: NEGATIVE
SODIUM SERPL-SCNC: 134 MMOL/L (ref 136–145)
WBC # BLD AUTO: 9.61 K/UL (ref 3.9–12.7)

## 2020-10-23 PROCEDURE — 99223 PR INITIAL HOSPITAL CARE,LEVL III: ICD-10-PCS | Mod: 25,,, | Performed by: UROLOGY

## 2020-10-23 PROCEDURE — 25000003 PHARM REV CODE 250: Performed by: SURGERY

## 2020-10-23 PROCEDURE — 85025 COMPLETE CBC W/AUTO DIFF WBC: CPT

## 2020-10-23 PROCEDURE — 63600175 PHARM REV CODE 636 W HCPCS: Performed by: NURSE PRACTITIONER

## 2020-10-23 PROCEDURE — 96372 THER/PROPH/DIAG INJ SC/IM: CPT

## 2020-10-23 PROCEDURE — 99233 SBSQ HOSP IP/OBS HIGH 50: CPT | Mod: ,,, | Performed by: INTERNAL MEDICINE

## 2020-10-23 PROCEDURE — 27000221 HC OXYGEN, UP TO 24 HOURS

## 2020-10-23 PROCEDURE — 25000003 PHARM REV CODE 250: Performed by: NURSE PRACTITIONER

## 2020-10-23 PROCEDURE — 36415 COLL VENOUS BLD VENIPUNCTURE: CPT

## 2020-10-23 PROCEDURE — 99223 1ST HOSP IP/OBS HIGH 75: CPT | Mod: 25,,, | Performed by: UROLOGY

## 2020-10-23 PROCEDURE — U0002 COVID-19 LAB TEST NON-CDC: HCPCS

## 2020-10-23 PROCEDURE — 99900035 HC TECH TIME PER 15 MIN (STAT)

## 2020-10-23 PROCEDURE — 99233 PR SUBSEQUENT HOSPITAL CARE,LEVL III: ICD-10-PCS | Mod: ,,, | Performed by: INTERNAL MEDICINE

## 2020-10-23 PROCEDURE — 52000 CYSTOURETHROSCOPY: CPT | Mod: ,,, | Performed by: UROLOGY

## 2020-10-23 PROCEDURE — 51798 US URINE CAPACITY MEASURE: CPT

## 2020-10-23 PROCEDURE — 52000 PR CYSTOURETHROSCOPY: ICD-10-PCS | Mod: ,,, | Performed by: UROLOGY

## 2020-10-23 PROCEDURE — 63600175 PHARM REV CODE 636 W HCPCS: Performed by: UROLOGY

## 2020-10-23 PROCEDURE — 80069 RENAL FUNCTION PANEL: CPT

## 2020-10-23 PROCEDURE — 51702 INSERT TEMP BLADDER CATH: CPT

## 2020-10-23 PROCEDURE — 21400001 HC TELEMETRY ROOM

## 2020-10-23 PROCEDURE — 25000003 PHARM REV CODE 250: Performed by: INTERNAL MEDICINE

## 2020-10-23 PROCEDURE — 63600175 PHARM REV CODE 636 W HCPCS: Performed by: SURGERY

## 2020-10-23 RX ORDER — FENTANYL CITRATE 50 UG/ML
25 INJECTION, SOLUTION INTRAMUSCULAR; INTRAVENOUS EVERY 5 MIN PRN
Status: CANCELLED | OUTPATIENT
Start: 2020-10-23

## 2020-10-23 RX ORDER — FENTANYL CITRATE 50 UG/ML
25 INJECTION, SOLUTION INTRAMUSCULAR; INTRAVENOUS EVERY 5 MIN PRN
Status: DISCONTINUED | OUTPATIENT
Start: 2020-10-23 | End: 2020-10-23 | Stop reason: HOSPADM

## 2020-10-23 RX ORDER — ONDANSETRON 2 MG/ML
4 INJECTION INTRAMUSCULAR; INTRAVENOUS DAILY PRN
Status: CANCELLED | OUTPATIENT
Start: 2020-10-23

## 2020-10-23 RX ORDER — POLYETHYLENE GLYCOL 3350 17 G/17G
17 POWDER, FOR SOLUTION ORAL 2 TIMES DAILY
Status: DISCONTINUED | OUTPATIENT
Start: 2020-10-23 | End: 2020-10-26

## 2020-10-23 RX ADMIN — ARIPIPRAZOLE 30 MG: 5 TABLET ORAL at 09:10

## 2020-10-23 RX ADMIN — HEPARIN SODIUM 5000 UNITS: 5000 INJECTION INTRAVENOUS; SUBCUTANEOUS at 05:10

## 2020-10-23 RX ADMIN — TAMSULOSIN HYDROCHLORIDE 0.4 MG: 0.4 CAPSULE ORAL at 08:10

## 2020-10-23 RX ADMIN — POLYETHYLENE GLYCOL 3350 17 G: 17 POWDER, FOR SOLUTION ORAL at 11:10

## 2020-10-23 RX ADMIN — HYDRALAZINE HYDROCHLORIDE 50 MG: 50 TABLET, FILM COATED ORAL at 09:10

## 2020-10-23 RX ADMIN — POLYETHYLENE GLYCOL 3350 17 G: 17 POWDER, FOR SOLUTION ORAL at 09:10

## 2020-10-23 RX ADMIN — HYDROCODONE BITARTRATE AND ACETAMINOPHEN 2 TABLET: 5; 325 TABLET ORAL at 06:10

## 2020-10-23 RX ADMIN — FENTANYL CITRATE 25 MCG: 50 INJECTION INTRAMUSCULAR; INTRAVENOUS at 08:10

## 2020-10-23 RX ADMIN — ATORVASTATIN CALCIUM 80 MG: 40 TABLET, FILM COATED ORAL at 08:10

## 2020-10-23 RX ADMIN — LABETALOL HYDROCHLORIDE 10 MG: 5 INJECTION INTRAVENOUS at 03:10

## 2020-10-23 RX ADMIN — NIFEDIPINE 60 MG: 30 TABLET, FILM COATED, EXTENDED RELEASE ORAL at 08:10

## 2020-10-23 RX ADMIN — SERTRALINE HYDROCHLORIDE 100 MG: 50 TABLET ORAL at 08:10

## 2020-10-23 RX ADMIN — HYDRALAZINE HYDROCHLORIDE 50 MG: 50 TABLET, FILM COATED ORAL at 05:10

## 2020-10-23 RX ADMIN — ASPIRIN 81 MG: 81 TABLET, COATED ORAL at 09:10

## 2020-10-23 RX ADMIN — INSULIN ASPART 3 UNITS: 100 INJECTION, SOLUTION INTRAVENOUS; SUBCUTANEOUS at 11:10

## 2020-10-23 RX ADMIN — STANDARDIZED SENNA CONCENTRATE AND DOCUSATE SODIUM 1 TABLET: 8.6; 5 TABLET ORAL at 09:10

## 2020-10-23 RX ADMIN — CHLORHEXIDINE GLUCONATE 0.12% ORAL RINSE 10 ML: 1.2 LIQUID ORAL at 08:10

## 2020-10-23 RX ADMIN — Medication 9 MG: at 09:10

## 2020-10-23 RX ADMIN — CHLORHEXIDINE GLUCONATE 0.12% ORAL RINSE 10 ML: 1.2 LIQUID ORAL at 09:10

## 2020-10-23 RX ADMIN — HEPARIN SODIUM 5000 UNITS: 5000 INJECTION INTRAVENOUS; SUBCUTANEOUS at 09:10

## 2020-10-23 NOTE — PROGRESS NOTES
[facility]  General Surgery  Progress Note    Subjective:     History of Present Illness:  No notes on file    Post-Op Info:  Procedure(s) (LRB):  XI ROBOTIC ADRENALECTOMY (Left)   3 Days Post-Op     Interval History:  tolerating diet and having bowel function although distended, elevated cr, catheter placed overnight for urinary retention,  Medications:  Continuous Infusions:  Scheduled Meds:   ARIPiprazole  30 mg Oral QHS    aspirin  81 mg Oral QHS    atorvastatin  80 mg Oral Daily    chlorhexidine  10 mL Mouth/Throat BID    heparin (porcine)  5,000 Units Subcutaneous Q8H    hydrALAZINE  50 mg Oral Q8H    NIFEdipine  60 mg Oral Daily    nozaseptin   Each Nostril BID    polyethylene glycol  17 g Oral BID    sertraline  100 mg Oral Daily    tamsulosin  0.4 mg Oral Daily     PRN Meds:acetaminophen, dextrose 50%, dextrose 50%, diphenhydrAMINE, glucagon (human recombinant), glucose, glucose, HYDROcodone-acetaminophen, influenza, insulin aspart U-100, labetalol, melatonin, metoclopramide HCl, morphine, ondansetron, promethazine (PHENERGAN) IVPB, simethicone     Review of patient's allergies indicates:   Allergen Reactions    Benadryl [diphenhydramine hcl] Other (See Comments)     Pt states benadryl makes him feel numb    Ace inhibitors      YIN     Objective:     Vital Signs (Most Recent):  Temp: 99.3 °F (37.4 °C) (10/23/20 0732)  Pulse: 67 (10/23/20 0942)  Resp: 18 (10/23/20 0749)  BP: (!) 153/65 (10/23/20 0942)  SpO2: 95 % (10/23/20 0942) Vital Signs (24h Range):  Temp:  [97.3 °F (36.3 °C)-99.6 °F (37.6 °C)] 99.3 °F (37.4 °C)  Pulse:  [63-96] 67  Resp:  [18-24] 18  SpO2:  [93 %-96 %] 95 %  BP: (139-204)/(63-92) 153/65     Weight: 83.3 kg (183 lb 10.3 oz)  Body mass index is 33.59 kg/m².    Intake/Output - Last 3 Shifts       10/21 0700 - 10/22 0659 10/22 0700 - 10/23 0659 10/23 0700 - 10/24 0659    P.O. 120 480 200    I.V. (mL/kg)  3 (0)     Total Intake(mL/kg) 120 (1.4) 483 (5.8) 200  (2.4)    Urine (mL/kg/hr)  1000 (0.5)     Blood       Total Output  1000     Net +120 -517 +200           Urine Occurrence 1 x      Stool Occurrence 1 x 1 x           Physical Exam  Vitals signs reviewed.   Constitutional:       Appearance: He is well-developed.   HENT:      Head: Normocephalic and atraumatic.   Neck:      Musculoskeletal: Normal range of motion and neck supple.   Cardiovascular:      Rate and Rhythm: Normal rate and regular rhythm.   Pulmonary:      Effort: Pulmonary effort is normal.      Breath sounds: Normal breath sounds.   Abdominal:      General: There is distension.      Palpations: Abdomen is soft.      Tenderness: There is no abdominal tenderness (ATTP).      Comments: Incisions clean dry and intact  Tympanic   Skin:     General: Skin is warm and dry.   Neurological:      Mental Status: He is alert and oriented to person, place, and time.         Significant Labs:  CBC:   Recent Labs   Lab 10/23/20  0833   WBC 9.61   RBC 3.50*   HGB 10.9*   HCT 32.0*      MCV 91   MCH 31.1*   MCHC 34.1     BMP:   Recent Labs   Lab 10/23/20  0833   *   *   K 3.6      CO2 24   BUN 65*   CREATININE 10.0*   CALCIUM 8.8       Significant Diagnostics:  I have reviewed all pertinent imaging results/findings within the past 24 hours.    Assessment/Plan:     * Hyperaldosteronism  Status post left adrenalectomy  Regular diet as tolerated   Bloated monitor for return of bowel function, antiemetics p.r.n.  Bowel regimen  Ambulate  Pain control  DVT/GI prophylaxis  Awaiting improvement kidney function prior to discharge    CKD (chronic kidney disease) stage 4, GFR 15-29 ml/min  Monitor urine output, catheter in place for urinary retention/strict I's and O's, creatinine  Nephrology following    Diabetes mellitus  Monitor blood glucose Insulin sliding scale    HTN (hypertension)  On p.o. antihypertensives per Nephrology        Livan Quiñones MD  General Surgery  [facility]

## 2020-10-23 NOTE — NURSING
Pt has not had any output via Noe throughout shift, bladder scanned pt and bladder scan showed over 200.  Replaced Noe with Coude catheter.  Will continue to monitor.

## 2020-10-23 NOTE — ASSESSMENT & PLAN NOTE
---nephrology following    10/22/2020  Worsening renal failure today. Bladder scan shows mild retention at 298ml. In and out catheter today. Will repeat bladder scan in 6 hours.   --renal ultrasound ordered    10/23/20 Creatinine worsened overnight from 8.9 to 10, Nephrology following. Noe placed overnight for urinary retention. Patient drained 2 liters of bloody urine overnight. Urology consulted and recommended continuous bladder irrigation. Will continue to monitor renal function.

## 2020-10-23 NOTE — CONSULTS
Chief Complaint:  Gross hematuria, urinary retention    HPI:  75-year-old gentleman status post robotically assisted laparoscopic adrenalectomy, postoperative day 1.  Unfortunately over the evening, the patient was not voiding a Noe catheter was placed.  All the patient did have significant output of almost 900 mL, he also had significant gross hematuria.  A 24 [place] 3 way Noe catheter was attempted to be placed by the nursing staff, and then by myself with no passage beyond the prosthetic urethra.  We also attempted coude catheters, could not get beyond and into the bladder neck.  Patient states that he has never had gross hematuria before, he has never been a smoker.  States that he does have significant BPH, but on treated.  Patient states that he gets up about 8-10 times during the evening, with increased frequency and urgency during the daytime.  Patient has chronic renal insufficiency, with acute renal failure today.  No other urological history, no family history of urological cancers or stones.    Allergies:  Benadryl [diphenhydramine hcl] and Ace inhibitors    Medications:  See MAR    Review of Systems:  General: No fever, chills, fatigability, or weight loss.  Skin: No rashes, itching, or changes in color or texture of skin.  Chest: Denies CABRERA, cyanosis, wheezing, cough, and sputum production.  Abdomen: Appetite fine. No weight loss. Denies diarrhea, abdominal pain, hematemesis, or blood in stool.  Musculoskeletal: No joint stiffness or swelling. Denies back pain.  : As above.  All other review of systems negative.    PMH:   has a past medical history of Diabetes mellitus, Hyperlipidemia, Hypertension, Retinitis, and Stroke.    PSH:   has a past surgical history that includes Fluoroscopy (Bilateral, 7/20/2020) and Robot-assisted surgical removal of adrenal gland using da Min Xi (Left, 10/20/2020).    FamHx: family history is not on file.    SocHx:  reports that he has never smoked. He has never  used smokeless tobacco. He reports that he does not drink alcohol or use drugs.      Physical Exam:  Vitals:    10/23/20 0942   BP: (!) 153/65   Pulse: 67   Resp:    Temp:      General: A&Ox3, no apparent distress, no deformities  Neck: No masses, normal ROM  Lungs: normal inspiration, no use of accessory muscles  Heart: normal pulse, no arrhythmias  Abdomen: Soft, NT, ND, no masses, no hernias, no hepatosplenomegaly  Skin: The skin is warm and dry. No jaundice.  Ext: No c/c/e.  : Test desc hitesh, no abnormalities of epididymus. Normal penile and scrotal skin, except for slight phimosis.  Meatus normal, blood at the head of the meatus.    Labs/Studies:   CBC is relatively stable, please see the chart notes.  BMP demonstrated BUN and creatinine of 65 and 10.0 respectively.  Urinalysis, 2+ protein, 1+ blood, only 2 RBCs per high-power field, this was preoperative.  Renal ultrasound medical renal disease, renal cysts, no hydronephrosis 10/20  CT non contrasted no nephrolithiasis or hydronephrosis, bilateral renal cysts, left adrenal lesion 5/20    Procedure:  After patient was sterilely prepped and draped, we attempted to be passed the 24 [place] 3 way Noe catheter.  We immediately met resistance at the prostatic urethra.  We then attempted to pass an 18 [place] Buckland tip catheter, and again met resistance around the prosthetic urethra.  Procedure was aborted, will need to be done under visualization.  Patient tolerated the procedure well.    Impression/Plan:     1.  Gross hematuria- we were unsuccessful in placing a hematuria catheter, or a regular catheter.  Patient either has significant false passage or stricture disease.  Therefore will take to the operating room for cystoscopy under anesthesia, clot evacuation.  Will then initiate continuous bladder irrigation to clears on the floor.    Patient understands the risks, benefits and alternatives of the above-stated procedure.  These include but not limited to  damage to the surrounding structures including the urethra, prostate, ureters and bladder.  Risk of the need for stent placement, perforation requiring open procedures, Noe catheterization at the conclusion of the procedure.  Risk of recurrence or need for further surgeries.  Risk of pain, hematuria, infections.  Risk of heart attack, stroke, death, DVT and PE.  Patient understanding of all the above he has elected to pursue.    2.  BPH- will initiate medical therapy of tamsulosin while inpatient.  Will need a Noe catheter voiding trial as an outpatient, I will arrange for this prior to discharge.

## 2020-10-23 NOTE — ASSESSMENT & PLAN NOTE
1. YIN on  Chronic kidney disease stage 5, serum creatinine increased from about 5 mg/dL to 10, lisinopril was stopped, urinary retention was suspected and Noe catheter was placed, Patient has been draining 2 liters of bloody urine since 2 am today (in about 8 hours). Consider ATN as cause of his YIN. Will request Urology consult for severe hematuria.     2.  Hypertension, due to primary hyperaldosteronism, s/p left adrenalectomy, SBP in 150s. Monitor closely.      3.  Anemia, recent hemoglobin is 1o.9 g, monitor.     4.   hypernatremia/hypokalemia, due to primary hyperaldosteronism, hypokalemia has resolved, hyponatremia borderline, monitor.      5.  Euvolemic on exam.     6.  Proteinuria, likely due to diabetic nephropathy.    7. Hyperphosphatemia: mild, monitor.

## 2020-10-23 NOTE — SUBJECTIVE & OBJECTIVE
Interval History: Creatinine worsened overnight from 8.9 to 10, Nephrology following. Noe placed overnight for urinary retention. Patient drained 2 liters of bloody urine overnight. Urology consulted and recommended continuous bladder irrigation. Will continue to monitor renal function.       Review of Systems   Constitutional: Negative for activity change, appetite change, chills, diaphoresis, fatigue, fever and unexpected weight change.   HENT: Negative for congestion, drooling, ear pain, facial swelling, rhinorrhea, sinus pressure, sneezing and sore throat.    Eyes: Negative.  Negative for discharge, redness and visual disturbance.   Respiratory: Positive for cough and shortness of breath. Negative for apnea, chest tightness, wheezing and stridor.    Cardiovascular: Negative for chest pain, palpitations and leg swelling.   Gastrointestinal: Negative for abdominal distention, abdominal pain, anal bleeding, blood in stool, constipation, diarrhea, nausea and vomiting.   Endocrine: Negative.    Genitourinary: Positive for difficulty urinating. Negative for discharge, dysuria, frequency, hematuria and urgency.   Musculoskeletal: Negative.  Negative for arthralgias, back pain, gait problem, joint swelling, myalgias, neck pain and neck stiffness.   Skin: Negative for color change, pallor and wound.   Allergic/Immunologic: Negative.    Neurological: Negative for dizziness, tremors, seizures, syncope, facial asymmetry, speech difficulty, weakness, light-headedness, numbness and headaches.   Hematological: Negative.    Psychiatric/Behavioral: Negative.  Negative for behavioral problems, confusion, hallucinations and suicidal ideas. The patient is not nervous/anxious.    All other systems reviewed and are negative.    Objective:     Vital Signs (Most Recent):  Temp: 99.3 °F (37.4 °C) (10/23/20 0732)  Pulse: 67 (10/23/20 0942)  Resp: 18 (10/23/20 0749)  BP: (!) 153/65 (10/23/20 0942)  SpO2: 95 % (10/23/20 0942) Vital Signs  (24h Range):  Temp:  [97.3 °F (36.3 °C)-99.6 °F (37.6 °C)] 99.3 °F (37.4 °C)  Pulse:  [63-81] 67  Resp:  [18-24] 18  SpO2:  [93 %-96 %] 95 %  BP: (139-204)/(63-92) 153/65     Weight: 83.3 kg (183 lb 10.3 oz)  Body mass index is 33.59 kg/m².    Intake/Output Summary (Last 24 hours) at 10/23/2020 1426  Last data filed at 10/23/2020 1300  Gross per 24 hour   Intake 563 ml   Output 2100 ml   Net -1537 ml      Physical Exam  Vitals signs and nursing note reviewed.   Constitutional:       General: He is not in acute distress.     Appearance: He is well-developed.   HENT:      Head: Normocephalic and atraumatic.   Eyes:      Conjunctiva/sclera: Conjunctivae normal.      Pupils: Pupils are equal, round, and reactive to light.   Neck:      Musculoskeletal: Normal range of motion and neck supple.   Cardiovascular:      Rate and Rhythm: Normal rate and regular rhythm.      Heart sounds: Normal heart sounds. No murmur.   Pulmonary:      Effort: Pulmonary effort is normal. No respiratory distress.      Breath sounds: Normal breath sounds.   Abdominal:      General: Bowel sounds are normal. There is distension.      Palpations: Abdomen is soft.      Tenderness: There is no abdominal tenderness.      Comments: Left lateral incision approximated    Musculoskeletal: Normal range of motion.         General: No tenderness or deformity.   Skin:     General: Skin is warm and dry.      Findings: No erythema or rash.   Neurological:      Mental Status: He is alert and oriented to person, place, and time.   Psychiatric:         Behavior: Behavior normal.         Significant Labs: All pertinent labs within the past 24 hours have been reviewed.    Significant Imaging:

## 2020-10-23 NOTE — SUBJECTIVE & OBJECTIVE
Interval History:     10/23/2020:  Creatinine has further increased to 10. Noe was placed at 2 am this morning and patient has made about 2 liters of bloody urine. Patient states that he is feeling fine.         Review of patient's allergies indicates:   Allergen Reactions    Benadryl [diphenhydramine hcl] Other (See Comments)     Pt states benadryl makes him feel numb    Ace inhibitors      YIN     Current Facility-Administered Medications   Medication Frequency    acetaminophen tablet 650 mg Q6H PRN    ARIPiprazole tablet 30 mg QHS    aspirin EC tablet 81 mg QHS    atorvastatin tablet 80 mg Daily    chlorhexidine 0.12 % solution 10 mL BID    dextrose 50% injection 12.5 g PRN    dextrose 50% injection 25 g PRN    diphenhydrAMINE injection 25 mg Q6H PRN    glucagon (human recombinant) injection 1 mg PRN    glucose chewable tablet 16 g PRN    glucose chewable tablet 24 g PRN    heparin (porcine) injection 5,000 Units Q8H    hydrALAZINE tablet 50 mg Q8H    HYDROcodone-acetaminophen 5-325 mg per tablet 2 tablet Q4H PRN    influenza (QUADRIVALENT ADJUVANTED PF) vaccine 0.5 mL Prior to discharge    insulin aspart U-100 pen 0-5 Units QID (AC + HS) PRN    labetaloL injection 10 mg Q4H PRN    melatonin tablet 9 mg Nightly PRN    metoclopramide HCl injection 10 mg Q10 Min PRN    morphine injection 2 mg Q3H PRN    NIFEdipine 24 hr tablet 60 mg Daily    nozaseptin (NOZIN) nasal  BID    ondansetron injection 4 mg Q8H PRN    promethazine (PHENERGAN) 6.25 mg in dextrose 5 % 50 mL IVPB Q6H PRN    senna-docusate 8.6-50 mg per tablet 1 tablet BID    sertraline tablet 100 mg Daily    simethicone chewable tablet 80 mg TID PRN    tamsulosin 24 hr capsule 0.4 mg Daily       Objective:     Vital Signs (Most Recent):  Temp: 99.3 °F (37.4 °C) (10/23/20 0732)  Pulse: 67 (10/23/20 0942)  Resp: 18 (10/23/20 0749)  BP: (!) 153/65 (10/23/20 0942)  SpO2: 95 % (10/23/20 0942)  O2 Device (Oxygen Therapy):  room air (10/23/20 0703) Vital Signs (24h Range):  Temp:  [97.3 °F (36.3 °C)-99.6 °F (37.6 °C)] 99.3 °F (37.4 °C)  Pulse:  [63-96] 67  Resp:  [18-24] 18  SpO2:  [93 %-96 %] 95 %  BP: (139-204)/(63-92) 153/65     Weight: 83.3 kg (183 lb 10.3 oz) (10/20/20 0768)  Body mass index is 33.59 kg/m².  Body surface area is 1.91 meters squared.    I/O last 3 completed shifts:  In: 483 [P.O.:480; I.V.:3]  Out: 1000 [Urine:1000]    Physical Exam  Constitutional:       Appearance: He is well-developed.   HENT:      Head: Normocephalic.      Nose: No rhinorrhea.      Mouth/Throat:      Pharynx: No oropharyngeal exudate.   Eyes:      Pupils: Pupils are equal, round, and reactive to light.   Neck:      Thyroid: No thyroid mass.   Cardiovascular:      Rate and Rhythm: Normal rate and regular rhythm.      Heart sounds: S1 normal and S2 normal.   Pulmonary:      Effort: Pulmonary effort is normal. No respiratory distress.      Breath sounds: No wheezing.   Abdominal:      General: Bowel sounds are normal. There is distension.      Palpations: Abdomen is soft.      Tenderness: There is no abdominal tenderness.      Hernia: No hernia is present.   Genitourinary:     Comments: Noe cath in place, draining bloody urine.   Musculoskeletal:      Comments: Trace bilateral LE edema   Lymphadenopathy:      Cervical: No cervical adenopathy.   Skin:     General: Skin is warm and dry.   Neurological:      Mental Status: He is alert and oriented to person, place, and time.   Psychiatric:         Behavior: Behavior is cooperative.         Significant Labs:  Lab Results   Component Value Date    CREATININE 10.0 (H) 10/23/2020    BUN 65 (H) 10/23/2020     (L) 10/23/2020    K 3.6 10/23/2020     10/23/2020    CO2 24 10/23/2020     Lab Results   Component Value Date    .0 (H) 09/04/2020    CALCIUM 8.8 10/23/2020    PHOS 5.0 (H) 10/23/2020     Lab Results   Component Value Date    ALBUMIN 2.5 (L) 10/23/2020       Lab Results    Component Value Date    WBC 9.61 10/23/2020    HGB 10.9 (L) 10/23/2020    HCT 32.0 (L) 10/23/2020    MCV 91 10/23/2020     10/23/2020       No results for input(s): MG in the last 168 hours.      Significant Imaging:

## 2020-10-23 NOTE — PLAN OF CARE
Chart reviewed. Pt is resting in bed with call light and personal items within reach.  Pt has high blood pressure and was given medication; will continue to monitor.  Pt had greenfield catheter removed because of urine retention >200ml and coude catheter inserted. Pt is now producing urine.  Heart monitor 8591.  Blood sugar checks.  Diabetic/Cardiac diet.  Incision open to air .  Pt is free of injury; will continue to monitor.

## 2020-10-23 NOTE — ASSESSMENT & PLAN NOTE
Monitor urine output, catheter in place for urinary retention/strict I's and O's, creatinine  Nephrology following

## 2020-10-23 NOTE — PLAN OF CARE
Discussed poc with pt, pt verbalized understanding    modified visitor policy per CDC guidelines  allowing one visitor from 8a-6pm    Purposeful rounding every 2hours    VS wnl  Cardiac monitoring in use, pt is NSR, tele monitor # [id number]  Blood glucose monitoring   Fall precautions in place, remains injury free  Pt denies c/o nausea  Pain under control with PRN meds    Accurate I&Os    Bed locked at lowest position  Call light within reach    Chart check complete  Will cont to monitor     Currently npo awaiting cystoscopy

## 2020-10-23 NOTE — SUBJECTIVE & OBJECTIVE
Interval History:  tolerating diet and having bowel function although distended, elevated cr, catheter placed overnight for urinary retention,  Medications:  Continuous Infusions:  Scheduled Meds:   ARIPiprazole  30 mg Oral QHS    aspirin  81 mg Oral QHS    atorvastatin  80 mg Oral Daily    chlorhexidine  10 mL Mouth/Throat BID    heparin (porcine)  5,000 Units Subcutaneous Q8H    hydrALAZINE  50 mg Oral Q8H    NIFEdipine  60 mg Oral Daily    nozaseptin   Each Nostril BID    polyethylene glycol  17 g Oral BID    sertraline  100 mg Oral Daily    tamsulosin  0.4 mg Oral Daily     PRN Meds:acetaminophen, dextrose 50%, dextrose 50%, diphenhydrAMINE, glucagon (human recombinant), glucose, glucose, HYDROcodone-acetaminophen, influenza, insulin aspart U-100, labetalol, melatonin, metoclopramide HCl, morphine, ondansetron, promethazine (PHENERGAN) IVPB, simethicone     Review of patient's allergies indicates:   Allergen Reactions    Benadryl [diphenhydramine hcl] Other (See Comments)     Pt states benadryl makes him feel numb    Ace inhibitors      YIN     Objective:     Vital Signs (Most Recent):  Temp: 99.3 °F (37.4 °C) (10/23/20 0732)  Pulse: 67 (10/23/20 0942)  Resp: 18 (10/23/20 0749)  BP: (!) 153/65 (10/23/20 0942)  SpO2: 95 % (10/23/20 0942) Vital Signs (24h Range):  Temp:  [97.3 °F (36.3 °C)-99.6 °F (37.6 °C)] 99.3 °F (37.4 °C)  Pulse:  [63-96] 67  Resp:  [18-24] 18  SpO2:  [93 %-96 %] 95 %  BP: (139-204)/(63-92) 153/65     Weight: 83.3 kg (183 lb 10.3 oz)  Body mass index is 33.59 kg/m².    Intake/Output - Last 3 Shifts       10/21 0700 - 10/22 0659 10/22 0700 - 10/23 0659 10/23 0700 - 10/24 0659    P.O. 120 480 200    I.V. (mL/kg)  3 (0)     Total Intake(mL/kg) 120 (1.4) 483 (5.8) 200 (2.4)    Urine (mL/kg/hr)  1000 (0.5)     Blood       Total Output  1000     Net +120 -517 +200           Urine Occurrence 1 x      Stool Occurrence 1 x 1 x           Physical Exam  Vitals signs reviewed.    Constitutional:       Appearance: He is well-developed.   HENT:      Head: Normocephalic and atraumatic.   Neck:      Musculoskeletal: Normal range of motion and neck supple.   Cardiovascular:      Rate and Rhythm: Normal rate and regular rhythm.   Pulmonary:      Effort: Pulmonary effort is normal.      Breath sounds: Normal breath sounds.   Abdominal:      General: There is distension.      Palpations: Abdomen is soft.      Tenderness: There is no abdominal tenderness (ATTP).      Comments: Incisions clean dry and intact  Tympanic   Skin:     General: Skin is warm and dry.   Neurological:      Mental Status: He is alert and oriented to person, place, and time.         Significant Labs:  CBC:   Recent Labs   Lab 10/23/20  0833   WBC 9.61   RBC 3.50*   HGB 10.9*   HCT 32.0*      MCV 91   MCH 31.1*   MCHC 34.1     BMP:   Recent Labs   Lab 10/23/20  0833   *   *   K 3.6      CO2 24   BUN 65*   CREATININE 10.0*   CALCIUM 8.8       Significant Diagnostics:  I have reviewed all pertinent imaging results/findings within the past 24 hours.

## 2020-10-23 NOTE — ASSESSMENT & PLAN NOTE
S/p left adrenalectomy  --primary team managing  --monitor potassium    10/21/20  Primary team managing  Electrolytes stable  Still has some abdominal distention; diet advanced    10/22/20  +BM today  Still has some distention defer to Surgery    Per primary team

## 2020-10-23 NOTE — ASSESSMENT & PLAN NOTE
--blood pressure currently stable in 150's.   --agree with nephrology with only adding amlodipine for now. Titrate up as needed.   Labetalol prn    10/21/20  Blood pressure elevated this AM  Much improved after all medications were resumed    10/22/20  Controlled today    Continue to monitor

## 2020-10-23 NOTE — PROGRESS NOTES
[facility] Hospital Medicine  Progress Note    Patient Name: Colt Stacy Jr.  MRN: [id number]  Patient Class: IP- Inpatient   Admission Date: 10/20/2020  Length of Stay: 3 days  Attending Physician: Livan Quiñones MD  Primary Care Provider: Primary Doctor No        Subjective:     Principal Problem:Hyperaldosteronism        HPI:  Colt Stacy is a 75 year old male with history of primary hyperaldosteronism. 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 Quiñones. Surgery without acute complications. Hospital medicine has been consulted for medical management. Given his secondary hypertension, nephrology has been consulted to assist with antihypertensives.       Overview/Hospital Course:  S/p left adrenalectomy on 10/20/2020 performed by Dr. Quiñones. Hospital medicine consulted for DM and hypertension    10/21/2020  blood pressure elevated today, but much more controlled after all antihypertensives were restarted. Still has abdominal distention will monitor overnight.     10/22/2020  Worsening renal function today. Bladder scan only shows 298 mls. Will perform in and out catheter today. BM last night. Patient states he is coughing up blood tinged sputum. Unwitnessed and there is not a sample at bedside. No objective findings of bleeding. Ask the patient and nurse to notify NP so this can be assessed. Check CBC and CXR.   10/23/20 Creatinine worsened overnight from 8.9 to 10, Nephrology following. Noe placed overnight for urinary retention. Patient drained 2 liters of bloody urine overnight. Urology consulted and recommended continuous bladder irrigation. Will continue to monitor renal function.     Interval History: Creatinine worsened overnight from 8.9 to 10, Nephrology following. Noe placed overnight for urinary retention. Patient drained 2 liters of bloody urine overnight. Urology consulted and recommended continuous bladder  irrigation. Will continue to monitor renal function.       Review of Systems   Constitutional: Negative for activity change, appetite change, chills, diaphoresis, fatigue, fever and unexpected weight change.   HENT: Negative for congestion, drooling, ear pain, facial swelling, rhinorrhea, sinus pressure, sneezing and sore throat.    Eyes: Negative.  Negative for discharge, redness and visual disturbance.   Respiratory: Positive for cough and shortness of breath. Negative for apnea, chest tightness, wheezing and stridor.    Cardiovascular: Negative for chest pain, palpitations and leg swelling.   Gastrointestinal: Negative for abdominal distention, abdominal pain, anal bleeding, blood in stool, constipation, diarrhea, nausea and vomiting.   Endocrine: Negative.    Genitourinary: Positive for difficulty urinating. Negative for discharge, dysuria, frequency, hematuria and urgency.   Musculoskeletal: Negative.  Negative for arthralgias, back pain, gait problem, joint swelling, myalgias, neck pain and neck stiffness.   Skin: Negative for color change, pallor and wound.   Allergic/Immunologic: Negative.    Neurological: Negative for dizziness, tremors, seizures, syncope, facial asymmetry, speech difficulty, weakness, light-headedness, numbness and headaches.   Hematological: Negative.    Psychiatric/Behavioral: Negative.  Negative for behavioral problems, confusion, hallucinations and suicidal ideas. The patient is not nervous/anxious.    All other systems reviewed and are negative.    Objective:     Vital Signs (Most Recent):  Temp: 99.3 °F (37.4 °C) (10/23/20 0732)  Pulse: 67 (10/23/20 0942)  Resp: 18 (10/23/20 0749)  BP: (!) 153/65 (10/23/20 0942)  SpO2: 95 % (10/23/20 0942) Vital Signs (24h Range):  Temp:  [97.3 °F (36.3 °C)-99.6 °F (37.6 °C)] 99.3 °F (37.4 °C)  Pulse:  [63-81] 67  Resp:  [18-24] 18  SpO2:  [93 %-96 %] 95 %  BP: (139-204)/(63-92) 153/65     Weight: 83.3 kg (183 lb 10.3 oz)  Body mass index is 33.59  kg/m².    Intake/Output Summary (Last 24 hours) at 10/23/2020 1426  Last data filed at 10/23/2020 1300  Gross per 24 hour   Intake 563 ml   Output 2100 ml   Net -1537 ml      Physical Exam  Vitals signs and nursing note reviewed.   Constitutional:       General: He is not in acute distress.     Appearance: He is well-developed.   HENT:      Head: Normocephalic and atraumatic.   Eyes:      Conjunctiva/sclera: Conjunctivae normal.      Pupils: Pupils are equal, round, and reactive to light.   Neck:      Musculoskeletal: Normal range of motion and neck supple.   Cardiovascular:      Rate and Rhythm: Normal rate and regular rhythm.      Heart sounds: Normal heart sounds. No murmur.   Pulmonary:      Effort: Pulmonary effort is normal. No respiratory distress.      Breath sounds: Normal breath sounds.   Abdominal:      General: Bowel sounds are normal. There is distension.      Palpations: Abdomen is soft.      Tenderness: There is no abdominal tenderness.      Comments: Left lateral incision approximated    Musculoskeletal: Normal range of motion.         General: No tenderness or deformity.   Skin:     General: Skin is warm and dry.      Findings: No erythema or rash.   Neurological:      Mental Status: He is alert and oriented to person, place, and time.   Psychiatric:         Behavior: Behavior normal.         Significant Labs: All pertinent labs within the past 24 hours have been reviewed.    Significant Imaging:             Assessment/Plan:      * Hyperaldosteronism  S/p left adrenalectomy  --primary team managing  --monitor potassium    10/21/20  Primary team managing  Electrolytes stable  Still has some abdominal distention; diet advanced    10/22/20  +BM today  Still has some distention defer to Surgery    Per primary team    CKD (chronic kidney disease) stage 4, GFR 15-29 ml/min  ---nephrology following    10/22/2020  Worsening renal failure today. Bladder scan shows mild retention at 298ml. In and out catheter  today. Will repeat bladder scan in 6 hours.   --renal ultrasound ordered    10/23/20 Creatinine worsened overnight from 8.9 to 10, Nephrology following. Noe placed overnight for urinary retention. Patient drained 2 liters of bloody urine overnight. Urology consulted and recommended continuous bladder irrigation. Will continue to monitor renal function.       Diabetes mellitus  Hemoglobin A1C   Date Value Ref Range Status   10/20/2020 6.0 (H) 4.0 - 5.6 % Final     Comment:     ADA Screening Guidelines:  5.7-6.4%  Consistent with prediabetes  >or=6.5%  Consistent with diabetes  High levels of fetal hemoglobin interfere with the HbA1C  assay. Heterozygous hemoglobin variants (HbS, HgC, etc)do  not significantly interfere with this assay.   However, presence of multiple variants may affect accuracy.     02/04/2020 7.3 (H) 4.0 - 5.6 % Final     Comment:     ADA Screening Guidelines:  5.7-6.4%  Consistent with prediabetes  >or=6.5%  Consistent with diabetes  High levels of fetal hemoglobin interfere with the HbA1C  assay. Heterozygous hemoglobin variants (HbS, HgC, etc)do  not significantly interfere with this assay.   However, presence of multiple variants may affect accuracy.     --insulin sliding scale  --Diabetic diet      HTN (hypertension)  --blood pressure currently stable in 150's.   --agree with nephrology with only adding amlodipine for now. Titrate up as needed.   Labetalol prn    10/21/20  Blood pressure elevated this AM  Much improved after all medications were resumed    10/22/20  Controlled today    Continue to monitor      VTE Risk Mitigation (From admission, onward)         Ordered     heparin (porcine) injection 5,000 Units  Every 8 hours      10/22/20 1800                Discharge Planning   RICH:      Code Status: Prior   Is the patient medically ready for discharge?:     Reason for patient still in hospital (select all that apply): Patient new problem, Patient trending condition, Laboratory test,  Treatment and Consult recommendations  Discharge Plan A: Home with family                  Rober Rubio NP  Department of Hospital Medicine   [facility]

## 2020-10-23 NOTE — NURSING
Unable to initiate CBI with dr hawley at [facility] . Attempt made to insert another coude cath with no success, will make pt NPO and send to OR for cystoscopy

## 2020-10-23 NOTE — PROGRESS NOTES
[facility]   Nephrology  Progress Note    Patient Name: Colt Stacy Jr.  MRN: [id number]  Admission Date: 10/20/2020  Hospital Length of Stay: 3 days  Attending Provider: Livan Quiñones MD   Primary Care Physician: Primary Doctor No  Principal Problem:Hyperaldosteronism    Subjective:     HPI: Colt Stacy Jr. is a pleasant 75-year-old  man with longstanding history of hypertension, CKD stage 4 bordering on stage 5, was diagnosed with primary hyperaldosteronism, further workup revealed left adrenal adenoma, adrenal vein sampling positive for increased activity on the left side, s/p  left adrenalectomy today , patient being admitted to hospital for observation overnight following his surgery, we were consulted due to his advanced renal failure, patient seen and examined in the room, denies any complaints at this time, comfortably eating his supper     Interval History:     10/23/2020:  Creatinine has further increased to 10. Noe was placed at 2 am this morning and patient has made about 2 liters of bloody urine. Patient states that he is feeling fine.         Review of patient's allergies indicates:   Allergen Reactions    Benadryl [diphenhydramine hcl] Other (See Comments)     Pt states benadryl makes him feel numb    Ace inhibitors      YIN     Current Facility-Administered Medications   Medication Frequency    acetaminophen tablet 650 mg Q6H PRN    ARIPiprazole tablet 30 mg QHS    aspirin EC tablet 81 mg QHS    atorvastatin tablet 80 mg Daily    chlorhexidine 0.12 % solution 10 mL BID    dextrose 50% injection 12.5 g PRN    dextrose 50% injection 25 g PRN    diphenhydrAMINE injection 25 mg Q6H PRN    glucagon (human recombinant) injection 1 mg PRN    glucose chewable tablet 16 g PRN    glucose chewable tablet 24 g PRN    heparin (porcine) injection 5,000 Units Q8H    hydrALAZINE tablet 50 mg Q8H    HYDROcodone-acetaminophen 5-325 mg per tablet 2 tablet Q4H PRN     influenza (QUADRIVALENT ADJUVANTED PF) vaccine 0.5 mL Prior to discharge    insulin aspart U-100 pen 0-5 Units QID (AC + HS) PRN    labetaloL injection 10 mg Q4H PRN    melatonin tablet 9 mg Nightly PRN    metoclopramide HCl injection 10 mg Q10 Min PRN    morphine injection 2 mg Q3H PRN    NIFEdipine 24 hr tablet 60 mg Daily    nozaseptin (NOZIN) nasal  BID    ondansetron injection 4 mg Q8H PRN    promethazine (PHENERGAN) 6.25 mg in dextrose 5 % 50 mL IVPB Q6H PRN    senna-docusate 8.6-50 mg per tablet 1 tablet BID    sertraline tablet 100 mg Daily    simethicone chewable tablet 80 mg TID PRN    tamsulosin 24 hr capsule 0.4 mg Daily       Objective:     Vital Signs (Most Recent):  Temp: 99.3 °F (37.4 °C) (10/23/20 0732)  Pulse: 67 (10/23/20 0942)  Resp: 18 (10/23/20 0749)  BP: (!) 153/65 (10/23/20 0942)  SpO2: 95 % (10/23/20 0942)  O2 Device (Oxygen Therapy): room air (10/23/20 0749) Vital Signs (24h Range):  Temp:  [97.3 °F (36.3 °C)-99.6 °F (37.6 °C)] 99.3 °F (37.4 °C)  Pulse:  [63-96] 67  Resp:  [18-24] 18  SpO2:  [93 %-96 %] 95 %  BP: (139-204)/(63-92) 153/65     Weight: 83.3 kg (183 lb 10.3 oz) (10/20/20 0735)  Body mass index is 33.59 kg/m².  Body surface area is 1.91 meters squared.    I/O last 3 completed shifts:  In: 483 [P.O.:480; I.V.:3]  Out: 1000 [Urine:1000]    Physical Exam  Constitutional:       Appearance: He is well-developed.   HENT:      Head: Normocephalic.      Nose: No rhinorrhea.      Mouth/Throat:      Pharynx: No oropharyngeal exudate.   Eyes:      Pupils: Pupils are equal, round, and reactive to light.   Neck:      Thyroid: No thyroid mass.   Cardiovascular:      Rate and Rhythm: Normal rate and regular rhythm.      Heart sounds: S1 normal and S2 normal.   Pulmonary:      Effort: Pulmonary effort is normal. No respiratory distress.      Breath sounds: No wheezing.   Abdominal:      General: Bowel sounds are normal. There is distension.      Palpations: Abdomen is  soft.      Tenderness: There is no abdominal tenderness.      Hernia: No hernia is present.   Genitourinary:     Comments: Noe cath in place, draining bloody urine.   Musculoskeletal:      Comments: Trace bilateral LE edema   Lymphadenopathy:      Cervical: No cervical adenopathy.   Skin:     General: Skin is warm and dry.   Neurological:      Mental Status: He is alert and oriented to person, place, and time.   Psychiatric:         Behavior: Behavior is cooperative.         Significant Labs:  Lab Results   Component Value Date    CREATININE 10.0 (H) 10/23/2020    BUN 65 (H) 10/23/2020     (L) 10/23/2020    K 3.6 10/23/2020     10/23/2020    CO2 24 10/23/2020     Lab Results   Component Value Date    .0 (H) 09/04/2020    CALCIUM 8.8 10/23/2020    PHOS 5.0 (H) 10/23/2020     Lab Results   Component Value Date    ALBUMIN 2.5 (L) 10/23/2020       Lab Results   Component Value Date    WBC 9.61 10/23/2020    HGB 10.9 (L) 10/23/2020    HCT 32.0 (L) 10/23/2020    MCV 91 10/23/2020     10/23/2020       No results for input(s): MG in the last 168 hours.      Significant Imaging:          Assessment/Plan:     CKD (chronic kidney disease) stage 4, GFR 15-29 ml/min  1. YIN on  Chronic kidney disease stage 5, serum creatinine increased from about 5 mg/dL to 10, lisinopril was stopped, urinary retention was suspected and Noe catheter was placed, Patient has been draining 2 liters of bloody urine since 2 am today (in about 8 hours). Consider ATN as cause of his YIN. Will request Urology consult for severe hematuria.     2.  Hypertension, due to primary hyperaldosteronism, s/p left adrenalectomy, SBP in 150s. Monitor closely.      3.  Anemia, recent hemoglobin is 1o.9 g, monitor.     4.   hypernatremia/hypokalemia, due to primary hyperaldosteronism, hypokalemia has resolved, hyponatremia borderline, monitor.      5.  Euvolemic on exam.     6.  Proteinuria, likely due to diabetic nephropathy.    7.  Hyperphosphatemia: mild, monitor.                  Thank you for your consult. I will follow-up with patient. Please contact us if you have any additional questions.    Soren Contreras MD  Nephrology  [facility]

## 2020-10-24 PROBLEM — R06.02 SHORTNESS OF BREATH: Status: ACTIVE | Noted: 2020-10-24

## 2020-10-24 LAB
ALBUMIN SERPL BCP-MCNC: 2.2 G/DL (ref 3.5–5.2)
ALP SERPL-CCNC: 49 U/L (ref 55–135)
ALT SERPL W/O P-5'-P-CCNC: <5 U/L (ref 10–44)
ANION GAP SERPL CALC-SCNC: 14 MMOL/L (ref 8–16)
AST SERPL-CCNC: 22 U/L (ref 10–40)
BASOPHILS # BLD AUTO: 0.03 K/UL (ref 0–0.2)
BASOPHILS NFR BLD: 0.4 % (ref 0–1.9)
BILIRUB SERPL-MCNC: 0.3 MG/DL (ref 0.1–1)
BUN SERPL-MCNC: 79 MG/DL (ref 8–23)
CALCIUM SERPL-MCNC: 7.9 MG/DL (ref 8.7–10.5)
CHLORIDE SERPL-SCNC: 101 MMOL/L (ref 95–110)
CO2 SERPL-SCNC: 22 MMOL/L (ref 23–29)
CREAT SERPL-MCNC: 10.8 MG/DL (ref 0.5–1.4)
DIFFERENTIAL METHOD: ABNORMAL
EOSINOPHIL # BLD AUTO: 0.2 K/UL (ref 0–0.5)
EOSINOPHIL NFR BLD: 2.1 % (ref 0–8)
ERYTHROCYTE [DISTWIDTH] IN BLOOD BY AUTOMATED COUNT: 13.4 % (ref 11.5–14.5)
EST. GFR  (AFRICAN AMERICAN): 5 ML/MIN/1.73 M^2
EST. GFR  (NON AFRICAN AMERICAN): 4 ML/MIN/1.73 M^2
GLUCOSE SERPL-MCNC: 133 MG/DL (ref 70–110)
HCT VFR BLD AUTO: 28.7 % (ref 40–54)
HGB BLD-MCNC: 9.7 G/DL (ref 14–18)
IMM GRANULOCYTES # BLD AUTO: 0.04 K/UL (ref 0–0.04)
IMM GRANULOCYTES NFR BLD AUTO: 0.5 % (ref 0–0.5)
LYMPHOCYTES # BLD AUTO: 0.8 K/UL (ref 1–4.8)
LYMPHOCYTES NFR BLD: 10 % (ref 18–48)
MCH RBC QN AUTO: 31.5 PG (ref 27–31)
MCHC RBC AUTO-ENTMCNC: 33.8 G/DL (ref 32–36)
MCV RBC AUTO: 93 FL (ref 82–98)
MONOCYTES # BLD AUTO: 0.9 K/UL (ref 0.3–1)
MONOCYTES NFR BLD: 10.8 % (ref 4–15)
NEUTROPHILS # BLD AUTO: 6.1 K/UL (ref 1.8–7.7)
NEUTROPHILS NFR BLD: 76.2 % (ref 38–73)
NRBC BLD-RTO: 0 /100 WBC
PLATELET # BLD AUTO: 164 K/UL (ref 150–350)
PMV BLD AUTO: 10.8 FL (ref 9.2–12.9)
POCT GLUCOSE: 140 MG/DL (ref 70–110)
POCT GLUCOSE: 154 MG/DL (ref 70–110)
POCT GLUCOSE: 154 MG/DL (ref 70–110)
POCT GLUCOSE: 156 MG/DL (ref 70–110)
POCT GLUCOSE: 160 MG/DL (ref 70–110)
POTASSIUM SERPL-SCNC: 3.8 MMOL/L (ref 3.5–5.1)
PROT SERPL-MCNC: 5.6 G/DL (ref 6–8.4)
RBC # BLD AUTO: 3.08 M/UL (ref 4.6–6.2)
SODIUM SERPL-SCNC: 137 MMOL/L (ref 136–145)
WBC # BLD AUTO: 8.06 K/UL (ref 3.9–12.7)

## 2020-10-24 PROCEDURE — 94761 N-INVAS EAR/PLS OXIMETRY MLT: CPT

## 2020-10-24 PROCEDURE — 99233 PR SUBSEQUENT HOSPITAL CARE,LEVL III: ICD-10-PCS | Mod: ,,, | Performed by: INTERNAL MEDICINE

## 2020-10-24 PROCEDURE — 63600175 PHARM REV CODE 636 W HCPCS: Performed by: SURGERY

## 2020-10-24 PROCEDURE — 80053 COMPREHEN METABOLIC PANEL: CPT

## 2020-10-24 PROCEDURE — 85025 COMPLETE CBC W/AUTO DIFF WBC: CPT

## 2020-10-24 PROCEDURE — 99024 PR POST-OP FOLLOW-UP VISIT: ICD-10-PCS | Mod: ,,, | Performed by: UROLOGY

## 2020-10-24 PROCEDURE — 99900035 HC TECH TIME PER 15 MIN (STAT)

## 2020-10-24 PROCEDURE — 25000003 PHARM REV CODE 250: Performed by: SURGERY

## 2020-10-24 PROCEDURE — 99233 SBSQ HOSP IP/OBS HIGH 50: CPT | Mod: ,,, | Performed by: INTERNAL MEDICINE

## 2020-10-24 PROCEDURE — 36415 COLL VENOUS BLD VENIPUNCTURE: CPT

## 2020-10-24 PROCEDURE — 99024 POSTOP FOLLOW-UP VISIT: CPT | Mod: ,,, | Performed by: UROLOGY

## 2020-10-24 PROCEDURE — 25000003 PHARM REV CODE 250: Performed by: INTERNAL MEDICINE

## 2020-10-24 PROCEDURE — 27000221 HC OXYGEN, UP TO 24 HOURS

## 2020-10-24 PROCEDURE — 21400001 HC TELEMETRY ROOM

## 2020-10-24 RX ORDER — BISACODYL 5 MG
10 TABLET, DELAYED RELEASE (ENTERIC COATED) ORAL ONCE
Status: COMPLETED | OUTPATIENT
Start: 2020-10-24 | End: 2020-10-24

## 2020-10-24 RX ADMIN — HYDROCODONE BITARTRATE AND ACETAMINOPHEN 2 TABLET: 5; 325 TABLET ORAL at 07:10

## 2020-10-24 RX ADMIN — BISACODYL 10 MG: 5 TABLET, COATED ORAL at 11:10

## 2020-10-24 RX ADMIN — ARIPIPRAZOLE 30 MG: 5 TABLET ORAL at 09:10

## 2020-10-24 RX ADMIN — POLYETHYLENE GLYCOL 3350 17 G: 17 POWDER, FOR SOLUTION ORAL at 08:10

## 2020-10-24 RX ADMIN — SERTRALINE HYDROCHLORIDE 100 MG: 50 TABLET ORAL at 08:10

## 2020-10-24 RX ADMIN — ASPIRIN 81 MG: 81 TABLET, COATED ORAL at 09:10

## 2020-10-24 RX ADMIN — HYDRALAZINE HYDROCHLORIDE 50 MG: 50 TABLET, FILM COATED ORAL at 09:10

## 2020-10-24 RX ADMIN — CHLORHEXIDINE GLUCONATE 0.12% ORAL RINSE 10 ML: 1.2 LIQUID ORAL at 09:10

## 2020-10-24 RX ADMIN — NIFEDIPINE 60 MG: 30 TABLET, FILM COATED, EXTENDED RELEASE ORAL at 08:10

## 2020-10-24 RX ADMIN — POLYETHYLENE GLYCOL 3350 17 G: 17 POWDER, FOR SOLUTION ORAL at 09:10

## 2020-10-24 RX ADMIN — HEPARIN SODIUM 5000 UNITS: 5000 INJECTION INTRAVENOUS; SUBCUTANEOUS at 01:10

## 2020-10-24 RX ADMIN — HYDRALAZINE HYDROCHLORIDE 50 MG: 50 TABLET, FILM COATED ORAL at 01:10

## 2020-10-24 RX ADMIN — CHLORHEXIDINE GLUCONATE 0.12% ORAL RINSE 10 ML: 1.2 LIQUID ORAL at 08:10

## 2020-10-24 RX ADMIN — HEPARIN SODIUM 5000 UNITS: 5000 INJECTION INTRAVENOUS; SUBCUTANEOUS at 05:10

## 2020-10-24 RX ADMIN — TAMSULOSIN HYDROCHLORIDE 0.4 MG: 0.4 CAPSULE ORAL at 08:10

## 2020-10-24 RX ADMIN — HEPARIN SODIUM 5000 UNITS: 5000 INJECTION INTRAVENOUS; SUBCUTANEOUS at 09:10

## 2020-10-24 RX ADMIN — ATORVASTATIN CALCIUM 80 MG: 40 TABLET, FILM COATED ORAL at 08:10

## 2020-10-24 RX ADMIN — HYDRALAZINE HYDROCHLORIDE 50 MG: 50 TABLET, FILM COATED ORAL at 05:10

## 2020-10-24 NOTE — ASSESSMENT & PLAN NOTE
Status post left adrenalectomy  Regular diet as tolerated   Bloated monitor for return of bowel function, antiemetics p.r.n.  Bowel regimen  Ambulate  Pain control  DVT/GI prophylaxis  Awaiting improvement kidney function prior to discharge

## 2020-10-24 NOTE — SUBJECTIVE & OBJECTIVE
Interval History:     10/23/2020:  Creatinine has further increased to 10. Noe was placed at 2 am this morning and patient has made about 2 liters of bloody urine. Patient states that he is feeling fine.     10/24/2020:  Urology placed Noe catheter last night. Good UOP of 1.8 liters over past 24 hours. Creatinine at 10.8 today.           Review of patient's allergies indicates:   Allergen Reactions    Benadryl [diphenhydramine hcl] Other (See Comments)     Pt states benadryl makes him feel numb    Ace inhibitors      YIN     Current Facility-Administered Medications   Medication Frequency    acetaminophen tablet 650 mg Q6H PRN    ARIPiprazole tablet 30 mg QHS    aspirin EC tablet 81 mg QHS    atorvastatin tablet 80 mg Daily    chlorhexidine 0.12 % solution 10 mL BID    dextrose 50% injection 12.5 g PRN    dextrose 50% injection 25 g PRN    diphenhydrAMINE injection 25 mg Q6H PRN    glucagon (human recombinant) injection 1 mg PRN    glucose chewable tablet 16 g PRN    glucose chewable tablet 24 g PRN    heparin (porcine) injection 5,000 Units Q8H    hydrALAZINE tablet 50 mg Q8H    HYDROcodone-acetaminophen 5-325 mg per tablet 2 tablet Q4H PRN    influenza (QUADRIVALENT ADJUVANTED PF) vaccine 0.5 mL Prior to discharge    insulin aspart U-100 pen 0-5 Units QID (AC + HS) PRN    labetaloL injection 10 mg Q4H PRN    melatonin tablet 9 mg Nightly PRN    metoclopramide HCl injection 10 mg Q10 Min PRN    morphine injection 2 mg Q3H PRN    NIFEdipine 24 hr tablet 60 mg Daily    nozaseptin (NOZIN) nasal  BID    ondansetron injection 4 mg Q8H PRN    polyethylene glycol packet 17 g BID    promethazine (PHENERGAN) 6.25 mg in dextrose 5 % 50 mL IVPB Q6H PRN    sertraline tablet 100 mg Daily    simethicone chewable tablet 80 mg TID PRN    tamsulosin 24 hr capsule 0.4 mg Daily       Objective:     Vital Signs (Most Recent):  Temp: 97.9 °F (36.6 °C) (10/24/20 0715)  Pulse: 74 (10/24/20  0715)  Resp: 18 (10/24/20 0738)  BP: 134/63 (10/24/20 0715)  SpO2: 95 % (10/24/20 0738)  O2 Device (Oxygen Therapy): nasal cannula (10/24/20 0738) Vital Signs (24h Range):  Temp:  [97.9 °F (36.6 °C)-99.9 °F (37.7 °C)] 97.9 °F (36.6 °C)  Pulse:  [64-87] 74  Resp:  [18-34] 18  SpO2:  [95 %-99 %] 95 %  BP: (134-176)/(63-75) 134/63     Weight: 83.3 kg (183 lb 10.3 oz) (10/20/20 0735)  Body mass index is 33.59 kg/m².  Body surface area is 1.91 meters squared.    I/O last 3 completed shifts:  In: 560 [P.O.:560]  Out: 2875 [Urine:2875]    Physical Exam  Constitutional:       Appearance: He is well-developed.   HENT:      Head: Normocephalic.      Nose: No rhinorrhea.      Mouth/Throat:      Pharynx: No oropharyngeal exudate.   Eyes:      Pupils: Pupils are equal, round, and reactive to light.   Neck:      Thyroid: No thyroid mass.   Cardiovascular:      Rate and Rhythm: Normal rate and regular rhythm.      Heart sounds: S1 normal and S2 normal.   Pulmonary:      Effort: Pulmonary effort is normal. No respiratory distress.      Breath sounds: No wheezing.   Abdominal:      General: Bowel sounds are normal. There is distension.      Palpations: Abdomen is soft.      Tenderness: There is no abdominal tenderness.      Hernia: No hernia is present.   Genitourinary:     Comments: Noe cath in place, draining pinkish urine.   Musculoskeletal:      Comments: Trace bilateral LE edema   Lymphadenopathy:      Cervical: No cervical adenopathy.   Skin:     General: Skin is warm and dry.   Neurological:      Mental Status: He is alert and oriented to person, place, and time.   Psychiatric:         Behavior: Behavior is cooperative.         Significant Labs:  Lab Results   Component Value Date    CREATININE 10.8 (H) 10/24/2020    BUN 79 (H) 10/24/2020     10/24/2020    K 3.8 10/24/2020     10/24/2020    CO2 22 (L) 10/24/2020     Lab Results   Component Value Date    .0 (H) 09/04/2020    CALCIUM 7.9 (L) 10/24/2020     PHOS 5.0 (H) 10/23/2020     Lab Results   Component Value Date    ALBUMIN 2.2 (L) 10/24/2020       Lab Results   Component Value Date    WBC 8.06 10/24/2020    HGB 9.7 (L) 10/24/2020    HCT 28.7 (L) 10/24/2020    MCV 93 10/24/2020     10/24/2020       No results for input(s): MG in the last 168 hours.      Significant Imaging:

## 2020-10-24 NOTE — PROCEDURES
Date of Procedure:  10/23/2020    Pre-operative Diagnosis:   1.  Gross hematuria, difficult Noe catheter    Post-operative Diagnosis:   1.  Gross hematuria, difficult Noe catheter    Surgeon:  Juni Lala MD    Specimen: None    Anesthesia:  Fentanyl    Findings:  False passage, small amount of clots irrigated, Noe in good position    Procedures:  1. Cysto with placement of difficult Noe catheter    Procedure in Detail:  Patient was sterilely prepped and draped after some mild IV anesthetic.  Flexible cystoscope was then inserted into the urethra, false passage was identified at the bulbar urethra on the ventral aspect.  We are able to manipulate passed through lateral lobe coaptation within the prosthetic urethra and so an open bladder neck.  Bladder was difficult to ascertain due to significant dilation, trigone demonstrated some small clots.  A wire was inserted in the cystoscope was removed.  Over the wire using Seldinger technique we placed an 18 [place] tip Noe catheter.  10 mL of sterile water was placed within the bladder and it sat nicely upon the bladder neck.  We then irrigated the bladder clear of small amounts of clots, until his urine was clear.  Catheter was left to gravity drainage, will be transferred back to the floor to be monitored.    Complications: None.

## 2020-10-24 NOTE — PROGRESS NOTES
[facility] Hospital Medicine  Progress Note    Patient Name: Colt Stacy Jr.  MRN: [id number]  Patient Class: IP- Inpatient   Admission Date: 10/20/2020  Length of Stay: 4 days  Attending Physician: Livan Quiñones MD  Primary Care Provider: Primary Doctor No        Subjective:     Principal Problem:Hyperaldosteronism        HPI:  Colt Stacy is a 75 year old male with history of primary hyperaldosteronism. 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 Quiñones. Surgery without acute complications. Hospital medicine has been consulted for medical management. Given his secondary hypertension, nephrology has been consulted to assist with antihypertensives.       Overview/Hospital Course:  S/p left adrenalectomy on 10/20/2020 performed by Dr. Quiñones. Hospital medicine consulted for DM and hypertension    10/21/2020  blood pressure elevated today, but much more controlled after all antihypertensives were restarted. Still has abdominal distention will monitor overnight.     10/22/2020  Worsening renal function today. Bladder scan only shows 298 mls. Will perform in and out catheter today. BM last night. Patient states he is coughing up blood tinged sputum. Unwitnessed and there is not a sample at bedside. No objective findings of bleeding. Ask the patient and nurse to notify NP so this can be assessed. Check CBC and CXR.   10/23/20 Creatinine worsened overnight from 8.9 to 10, Nephrology following. Noe placed overnight for urinary retention. Patient drained 2 liters of bloody urine overnight. Urology consulted and recommended continuous bladder irrigation. Will continue to monitor renal function. 10/24/20 Creatinine increased to 10.8 overnight, Nephrology following. Continuous bladder irrigation was stopped per Urology. No further hematuria noted. Continue current management per the primary team.     Interval History: Creatinine increased  to 10.8 overnight, Nephrology following. Continuous bladder irrigation was stopped per Urology. No further hematuria noted. Continue current management per the primary team.       Review of Systems   Constitutional: Negative for activity change, appetite change, chills, diaphoresis, fatigue, fever and unexpected weight change.   HENT: Negative for congestion, drooling, ear pain, facial swelling, rhinorrhea, sinus pressure, sneezing and sore throat.    Eyes: Negative.  Negative for discharge, redness and visual disturbance.   Respiratory: Positive for cough and shortness of breath. Negative for apnea, chest tightness, wheezing and stridor.    Cardiovascular: Negative for chest pain, palpitations and leg swelling.   Gastrointestinal: Negative for abdominal distention, abdominal pain, anal bleeding, blood in stool, constipation, diarrhea, nausea and vomiting.   Endocrine: Negative.    Genitourinary: Positive for difficulty urinating. Negative for discharge, dysuria, frequency, hematuria and urgency.   Musculoskeletal: Negative.  Negative for arthralgias, back pain, gait problem, joint swelling, myalgias, neck pain and neck stiffness.   Skin: Negative for color change, pallor and wound.   Allergic/Immunologic: Negative.    Neurological: Negative for dizziness, tremors, seizures, syncope, facial asymmetry, speech difficulty, weakness, light-headedness, numbness and headaches.   Hematological: Negative.    Psychiatric/Behavioral: Negative.  Negative for behavioral problems, confusion, hallucinations and suicidal ideas. The patient is not nervous/anxious.    All other systems reviewed and are negative.    Objective:     Vital Signs (Most Recent):  Temp: 97.2 °F (36.2 °C) (10/24/20 1209)  Pulse: 83 (10/24/20 1209)  Resp: 18 (10/24/20 1209)  BP: (!) 151/67 (10/24/20 1209)  SpO2: 96 % (10/24/20 1209) Vital Signs (24h Range):  Temp:  [97.2 °F (36.2 °C)-99.9 °F (37.7 °C)] 97.2 °F (36.2 °C)  Pulse:  [64-87] 83  Resp:  [18-34]  18  SpO2:  [95 %-99 %] 96 %  BP: (134-176)/(63-75) 151/67     Weight: 83.3 kg (183 lb 10.3 oz)  Body mass index is 33.59 kg/m².    Intake/Output Summary (Last 24 hours) at 10/24/2020 1622  Last data filed at 10/24/2020 0518  Gross per 24 hour   Intake 120 ml   Output 775 ml   Net -655 ml      Physical Exam  Vitals signs and nursing note reviewed.   Constitutional:       General: He is not in acute distress.     Appearance: He is well-developed.   HENT:      Head: Normocephalic and atraumatic.   Eyes:      Conjunctiva/sclera: Conjunctivae normal.      Pupils: Pupils are equal, round, and reactive to light.   Neck:      Musculoskeletal: Normal range of motion and neck supple.   Cardiovascular:      Rate and Rhythm: Normal rate and regular rhythm.      Heart sounds: Normal heart sounds. No murmur.   Pulmonary:      Effort: Pulmonary effort is normal. No respiratory distress.      Breath sounds: Normal breath sounds.   Abdominal:      General: Bowel sounds are normal. There is distension.      Palpations: Abdomen is soft.      Tenderness: There is no abdominal tenderness.      Comments: Left lateral incision approximated    Musculoskeletal: Normal range of motion.         General: No tenderness or deformity.   Skin:     General: Skin is warm and dry.      Findings: No erythema or rash.   Neurological:      Mental Status: He is alert and oriented to person, place, and time.   Psychiatric:         Behavior: Behavior normal.         Significant Labs: All pertinent labs within the past 24 hours have been reviewed.    Significant Imaging:             Assessment/Plan:      * Hyperaldosteronism  S/p left adrenalectomy  --primary team managing  --monitor potassium    10/21/20  Primary team managing  Electrolytes stable  Still has some abdominal distention; diet advanced    10/22/20  +BM today  Still has some distention defer to Surgery    Per primary team    Shortness of breath  - CXR showed small left pleural effusion with  associated atelectasis.   - Inecentive spiraometry      CKD (chronic kidney disease) stage 4, GFR 15-29 ml/min  ---nephrology following    10/22/2020  Worsening renal failure today. Bladder scan shows mild retention at 298ml. In and out catheter today. Will repeat bladder scan in 6 hours.   --renal ultrasound ordered    10/23/20 Creatinine worsened overnight from 8.9 to 10, Nephrology following. Noe placed overnight for urinary retention. Patient drained 2 liters of bloody urine overnight. Urology consulted and recommended continuous bladder irrigation. Will continue to monitor renal function.   10/24/20 Creatinine increased to 10.8 overnight, Nephrology following. Continuous bladder irrigation was stopped per Urology. No further hematuria noted.    Diabetes mellitus  Hemoglobin A1C   Date Value Ref Range Status   10/20/2020 6.0 (H) 4.0 - 5.6 % Final     Comment:     ADA Screening Guidelines:  5.7-6.4%  Consistent with prediabetes  >or=6.5%  Consistent with diabetes  High levels of fetal hemoglobin interfere with the HbA1C  assay. Heterozygous hemoglobin variants (HbS, HgC, etc)do  not significantly interfere with this assay.   However, presence of multiple variants may affect accuracy.     02/04/2020 7.3 (H) 4.0 - 5.6 % Final     Comment:     ADA Screening Guidelines:  5.7-6.4%  Consistent with prediabetes  >or=6.5%  Consistent with diabetes  High levels of fetal hemoglobin interfere with the HbA1C  assay. Heterozygous hemoglobin variants (HbS, HgC, etc)do  not significantly interfere with this assay.   However, presence of multiple variants may affect accuracy.     --insulin sliding scale  --Diabetic diet      HTN (hypertension)  --blood pressure currently stable in 150's.   --agree with nephrology with only adding amlodipine for now. Titrate up as needed.   Labetalol prn    10/21/20  Blood pressure elevated this AM  Much improved after all medications were resumed    10/22/20  Controlled today    Continue to  monitor      VTE Risk Mitigation (From admission, onward)         Ordered     heparin (porcine) injection 5,000 Units  Every 8 hours      10/22/20 3617                Discharge Planning   RICH:      Code Status: Prior   Is the patient medically ready for discharge?:     Reason for patient still in hospital (select all that apply): Patient trending condition, Laboratory test, Treatment and Consult recommendations  Discharge Plan A: Home with family                  Rober Rubio NP  Department of Hospital Medicine   [facility]

## 2020-10-24 NOTE — PLAN OF CARE
Remains free from injury. States relief of pain with available prn meds. Monitoring greenfield output. Vital signs stable. Chart reviewed. Will continue to monitor.

## 2020-10-24 NOTE — PROGRESS NOTES
Chief Complaint:  Gross hematuria, urinary retention    HPI:    10/24/20- patient well, with no complaints.  75-year-old gentleman status post robotically assisted laparoscopic adrenalectomy, postoperative day 1.  Unfortunately over the evening, the patient was not voiding a Neo catheter was placed.  All the patient did have significant output of almost 900 mL, he also had significant gross hematuria.  A 24 [place] 3 way Noe catheter was attempted to be placed by the nursing staff, and then by myself with no passage beyond the prosthetic urethra.  We also attempted coude catheters, could not get beyond and into the bladder neck.  Patient states that he has never had gross hematuria before, he has never been a smoker.  States that he does have significant BPH, but on treated.  Patient states that he gets up about 8-10 times during the evening, with increased frequency and urgency during the daytime.  Patient has chronic renal insufficiency, with acute renal failure today.  No other urological history, no family history of urological cancers or stones.    Allergies:  Benadryl [diphenhydramine hcl] and Ace inhibitors    Medications:  See MAR    Review of Systems:  General: No fever, chills, fatigability, or weight loss.  Skin: No rashes, itching, or changes in color or texture of skin.  Chest: Denies CABRERA, cyanosis, wheezing, cough, and sputum production.  Abdomen: Appetite fine. No weight loss. Denies diarrhea, abdominal pain, hematemesis, or blood in stool.  Musculoskeletal: No joint stiffness or swelling. Denies back pain.  : As above.  All other review of systems negative.    PMH:   has a past medical history of Diabetes mellitus, Hyperlipidemia, Hypertension, Retinitis, and Stroke.    PSH:   has a past surgical history that includes Fluoroscopy (Bilateral, 7/20/2020) and Robot-assisted surgical removal of adrenal gland using da Min Xi (Left, 10/20/2020).    FamHx: family history is not on file.    SocHx:   reports that he has never smoked. He has never used smokeless tobacco. He reports that he does not drink alcohol or use drugs.      Physical Exam:  Vitals:    10/24/20 0715   BP: 134/63   Pulse: 74   Resp: 18   Temp: 97.9 °F (36.6 °C)     General: A&Ox3, no apparent distress, no deformities  Neck: No masses, normal ROM  Lungs: normal inspiration, no use of accessory muscles  Heart: normal pulse, no arrhythmias  Abdomen: Soft, NT, ND, no masses, no hernias, no hepatosplenomegaly  Skin: The skin is warm and dry. No jaundice.  Ext: No c/c/e.  : 10/24/20- urine relatively clear.  10/23/20- Test desc hitesh, no abnormalities of epididymus. Normal penile and scrotal skin, except for slight phimosis.  Meatus normal, blood at the head of the meatus.    Labs/Studies:   CBC is relatively stable, please see the chart notes.  BMP demonstrated BUN and creatinine of 79 and 10.8 respectively.  Renal ultrasound medical renal disease, renal cysts, no hydronephrosis 10/20  CT non contrasted no nephrolithiasis or hydronephrosis, bilateral renal cysts, left adrenal lesion 5/20    Impression/Plan:     1.  Gross hematuria- greenfield catheter was placed in the PACU last night.  Urine draining clear.  No further recommendations.    2.  BPH- continue tamsulosin and Greenfield catheter as an outpatient, I will arrange for follow up.

## 2020-10-24 NOTE — PROGRESS NOTES
[facility] -   General Surgery  Progress Note    Subjective:     History of Present Illness:  No notes on file    Post-Op Info:  Procedure(s) (LRB):  CYSTOSCOPY, WITH DIRECT VISION INTERNAL URETHROTOMY (N/A)   1 Day Post-Op     Interval History:  Feels a bit bloated.  BUN and creatinine are still elevated.  Complains of shortness of breath.  Decreased breath sounds in left chest x-ray ordered.    Medications:  Continuous Infusions:  Scheduled Meds:   ARIPiprazole  30 mg Oral QHS    aspirin  81 mg Oral QHS    atorvastatin  80 mg Oral Daily    chlorhexidine  10 mL Mouth/Throat BID    heparin (porcine)  5,000 Units Subcutaneous Q8H    hydrALAZINE  50 mg Oral Q8H    NIFEdipine  60 mg Oral Daily    nozaseptin   Each Nostril BID    polyethylene glycol  17 g Oral BID    sertraline  100 mg Oral Daily    tamsulosin  0.4 mg Oral Daily     PRN Meds:acetaminophen, dextrose 50%, dextrose 50%, diphenhydrAMINE, glucagon (human recombinant), glucose, glucose, HYDROcodone-acetaminophen, influenza, insulin aspart U-100, labetalol, melatonin, metoclopramide HCl, morphine, ondansetron, promethazine (PHENERGAN) IVPB, simethicone     Review of patient's allergies indicates:   Allergen Reactions    Benadryl [diphenhydramine hcl] Other (See Comments)     Pt states benadryl makes him feel numb    Ace inhibitors      YIN     Objective:     Vital Signs (Most Recent):  Temp: 97.9 °F (36.6 °C) (10/24/20 0715)  Pulse: 74 (10/24/20 0715)  Resp: 18 (10/24/20 0738)  BP: 134/63 (10/24/20 0715)  SpO2: 95 % (10/24/20 0738) Vital Signs (24h Range):  Temp:  [97.9 °F (36.6 °C)-99.9 °F (37.7 °C)] 97.9 °F (36.6 °C)  Pulse:  [64-87] 74  Resp:  [18-34] 18  SpO2:  [95 %-99 %] 95 %  BP: (134-176)/(63-75) 134/63     Weight: 83.3 kg (183 lb 10.3 oz)  Body mass index is 33.59 kg/m².    Intake/Output - Last 3 Shifts       10/22 0700 - 10/23 0659 10/23 0700 - 10/24 0659 10/24 0700 - 10/25 0659    P.O. 480 320     I.V. (mL/kg) 3 (0)       Total Intake(mL/kg) 483 (5.8) 320 (3.8)     Urine (mL/kg/hr) 1000 (0.5) 1875 (0.9)     Stool  0     Total Output 1000 1875     Net -517 -1555            Stool Occurrence 1 x 1 x           Physical Exam  Constitutional:       Appearance: He is normal weight.      Comments: Overweight   HENT:      Head: Normocephalic and atraumatic.   Eyes:      General: No scleral icterus.  Neck:      Musculoskeletal: Normal range of motion and neck supple.   Cardiovascular:      Rate and Rhythm: Normal rate and regular rhythm.   Pulmonary:      Effort: Pulmonary effort is normal.      Comments: Decreased breath sounds left base  Abdominal:      General: There is distension (Mild).      Tenderness: There is no abdominal tenderness.      Comments: Obese, incisions are clean   Musculoskeletal:      Right lower leg: No edema.      Left lower leg: No edema.   Skin:     General: Skin is warm and dry.   Neurological:      General: No focal deficit present.      Mental Status: He is alert and oriented to person, place, and time.   Psychiatric:         Mood and Affect: Mood normal.         Significant Labs:  CBC:   Recent Labs   Lab 10/24/20  0658   WBC 8.06   RBC 3.08*   HGB 9.7*   HCT 28.7*      MCV 93   MCH 31.5*   MCHC 33.8     BMP:   Recent Labs   Lab 10/24/20  0658   *      K 3.8      CO2 22*   BUN 79*   CREATININE 10.8*   CALCIUM 7.9*     CMP:   Recent Labs   Lab 10/24/20  0658   *   CALCIUM 7.9*   ALBUMIN 2.2*   PROT 5.6*      K 3.8   CO2 22*      BUN 79*   CREATININE 10.8*   ALKPHOS 49*   ALT <5*   AST 22   BILITOT 0.3       Significant Diagnostics:  Chest x-ray ordered    Assessment/Plan:     * Hyperaldosteronism  Status post left adrenalectomy  Regular diet as tolerated   Bloated monitor for return of bowel function, antiemetics p.r.n.  Bowel regimen  Ambulate  Pain control  DVT/GI prophylaxis  Awaiting improvement kidney function prior to discharge    Shortness of breath  Monitor,  check a chest x-ray    CKD (chronic kidney disease) stage 4, GFR 15-29 ml/min  Monitor urine output, catheter in place for urinary retention/strict I's and O's, creatinine  Nephrology following    Diabetes mellitus  Monitor blood glucose Insulin sliding scale    HTN (hypertension)  On p.o. antihypertensives per Nephrology        Beny Cortez MD  General Surgery  [facility]

## 2020-10-24 NOTE — PROGRESS NOTES
Chest x-ray reviewed.    Small left effusions and atelectasis.    Will encourage incentive spirometer use

## 2020-10-24 NOTE — PROGRESS NOTES
[facility] -   Nephrology  Progress Note    Patient Name: Colt Stacy Jr.  MRN: [id number]  Admission Date: 10/20/2020  Hospital Length of Stay: 4 days  Attending Provider: Livan Quiñones MD   Primary Care Physician: Primary Doctor No  Principal Problem:Hyperaldosteronism    Subjective:     HPI: Colt Stacy Jr. is a pleasant 75-year-old  man with longstanding history of hypertension, CKD stage 4 bordering on stage 5, was diagnosed with primary hyperaldosteronism, further workup revealed left adrenal adenoma, adrenal vein sampling positive for increased activity on the left side, s/p  left adrenalectomy today , patient being admitted to hospital for observation overnight following his surgery, we were consulted due to his advanced renal failure, patient seen and examined in the room, denies any complaints at this time, comfortably eating his supper     Interval History:     10/23/2020:  Creatinine has further increased to 10. Noe was placed at 2 am this morning and patient has made about 2 liters of bloody urine. Patient states that he is feeling fine.     10/24/2020:  Urology placed Noe catheter last night. Good UOP of 1.8 liters over past 24 hours. Creatinine at 10.8 today.           Review of patient's allergies indicates:   Allergen Reactions    Benadryl [diphenhydramine hcl] Other (See Comments)     Pt states benadryl makes him feel numb    Ace inhibitors      YIN     Current Facility-Administered Medications   Medication Frequency    acetaminophen tablet 650 mg Q6H PRN    ARIPiprazole tablet 30 mg QHS    aspirin EC tablet 81 mg QHS    atorvastatin tablet 80 mg Daily    chlorhexidine 0.12 % solution 10 mL BID    dextrose 50% injection 12.5 g PRN    dextrose 50% injection 25 g PRN    diphenhydrAMINE injection 25 mg Q6H PRN    glucagon (human recombinant) injection 1 mg PRN    glucose chewable tablet 16 g PRN    glucose chewable tablet 24 g PRN    heparin  (porcine) injection 5,000 Units Q8H    hydrALAZINE tablet 50 mg Q8H    HYDROcodone-acetaminophen 5-325 mg per tablet 2 tablet Q4H PRN    influenza (QUADRIVALENT ADJUVANTED PF) vaccine 0.5 mL Prior to discharge    insulin aspart U-100 pen 0-5 Units QID (AC + HS) PRN    labetaloL injection 10 mg Q4H PRN    melatonin tablet 9 mg Nightly PRN    metoclopramide HCl injection 10 mg Q10 Min PRN    morphine injection 2 mg Q3H PRN    NIFEdipine 24 hr tablet 60 mg Daily    nozaseptin (NOZIN) nasal  BID    ondansetron injection 4 mg Q8H PRN    polyethylene glycol packet 17 g BID    promethazine (PHENERGAN) 6.25 mg in dextrose 5 % 50 mL IVPB Q6H PRN    sertraline tablet 100 mg Daily    simethicone chewable tablet 80 mg TID PRN    tamsulosin 24 hr capsule 0.4 mg Daily       Objective:     Vital Signs (Most Recent):  Temp: 97.9 °F (36.6 °C) (10/24/20 0715)  Pulse: 74 (10/24/20 0715)  Resp: 18 (10/24/20 0738)  BP: 134/63 (10/24/20 0715)  SpO2: 95 % (10/24/20 0738)  O2 Device (Oxygen Therapy): nasal cannula (10/24/20 0738) Vital Signs (24h Range):  Temp:  [97.9 °F (36.6 °C)-99.9 °F (37.7 °C)] 97.9 °F (36.6 °C)  Pulse:  [64-87] 74  Resp:  [18-34] 18  SpO2:  [95 %-99 %] 95 %  BP: (134-176)/(63-75) 134/63     Weight: 83.3 kg (183 lb 10.3 oz) (10/20/20 0735)  Body mass index is 33.59 kg/m².  Body surface area is 1.91 meters squared.    I/O last 3 completed shifts:  In: 560 [P.O.:560]  Out: 2875 [Urine:2875]    Physical Exam  Constitutional:       Appearance: He is well-developed.   HENT:      Head: Normocephalic.      Nose: No rhinorrhea.      Mouth/Throat:      Pharynx: No oropharyngeal exudate.   Eyes:      Pupils: Pupils are equal, round, and reactive to light.   Neck:      Thyroid: No thyroid mass.   Cardiovascular:      Rate and Rhythm: Normal rate and regular rhythm.      Heart sounds: S1 normal and S2 normal.   Pulmonary:      Effort: Pulmonary effort is normal. No respiratory distress.      Breath  sounds: No wheezing.   Abdominal:      General: Bowel sounds are normal. There is distension.      Palpations: Abdomen is soft.      Tenderness: There is no abdominal tenderness.      Hernia: No hernia is present.   Genitourinary:     Comments: Noe cath in place, draining pinkish urine.   Musculoskeletal:      Comments: Trace bilateral LE edema   Lymphadenopathy:      Cervical: No cervical adenopathy.   Skin:     General: Skin is warm and dry.   Neurological:      Mental Status: He is alert and oriented to person, place, and time.   Psychiatric:         Behavior: Behavior is cooperative.         Significant Labs:  Lab Results   Component Value Date    CREATININE 10.8 (H) 10/24/2020    BUN 79 (H) 10/24/2020     10/24/2020    K 3.8 10/24/2020     10/24/2020    CO2 22 (L) 10/24/2020     Lab Results   Component Value Date    .0 (H) 09/04/2020    CALCIUM 7.9 (L) 10/24/2020    PHOS 5.0 (H) 10/23/2020     Lab Results   Component Value Date    ALBUMIN 2.2 (L) 10/24/2020       Lab Results   Component Value Date    WBC 8.06 10/24/2020    HGB 9.7 (L) 10/24/2020    HCT 28.7 (L) 10/24/2020    MCV 93 10/24/2020     10/24/2020       No results for input(s): MG in the last 168 hours.      Significant Imaging:          Assessment/Plan:     CKD (chronic kidney disease) stage 4, GFR 15-29 ml/min  1. YIN on  Chronic kidney disease stage 5, serum creatinine increased from about 5 mg/dL to 10 on 10/23, lisinopril was stopped, urinary retention was suspected and Noe catheter was replaced by Urology last night (10/23). YIN likely due to urinary retention. Will monitor closely.     2.  Hypertension, due to primary hyperaldosteronism, s/p left adrenalectomy, SBP in 130s. Monitor closely.      3.  Anemia, recent hemoglobin is 9.7 g, monitor.     4.   hypernatremia/hypokalemia, has resolved.      5.  Euvolemic on exam.     6.  Proteinuria, likely due to diabetic nephropathy.    7. Hyperphosphatemia: mild, monitor.                   Thank you for your consult. I will follow-up with patient. Please contact us if you have any additional questions.    Soren Contreras MD  Nephrology  [facility]

## 2020-10-24 NOTE — ASSESSMENT & PLAN NOTE
---nephrology following    10/22/2020  Worsening renal failure today. Bladder scan shows mild retention at 298ml. In and out catheter today. Will repeat bladder scan in 6 hours.   --renal ultrasound ordered    10/23/20 Creatinine worsened overnight from 8.9 to 10, Nephrology following. Noe placed overnight for urinary retention. Patient drained 2 liters of bloody urine overnight. Urology consulted and recommended continuous bladder irrigation. Will continue to monitor renal function.   10/24/20 Creatinine increased to 10.8 overnight, Nephrology following. Continuous bladder irrigation was stopped per Urology. No further hematuria noted.

## 2020-10-24 NOTE — SUBJECTIVE & OBJECTIVE
Interval History:  Feels a bit bloated.  BUN and creatinine are still elevated.  Complains of shortness of breath.  Decreased breath sounds in left chest x-ray ordered.    Medications:  Continuous Infusions:  Scheduled Meds:   ARIPiprazole  30 mg Oral QHS    aspirin  81 mg Oral QHS    atorvastatin  80 mg Oral Daily    chlorhexidine  10 mL Mouth/Throat BID    heparin (porcine)  5,000 Units Subcutaneous Q8H    hydrALAZINE  50 mg Oral Q8H    NIFEdipine  60 mg Oral Daily    nozaseptin   Each Nostril BID    polyethylene glycol  17 g Oral BID    sertraline  100 mg Oral Daily    tamsulosin  0.4 mg Oral Daily     PRN Meds:acetaminophen, dextrose 50%, dextrose 50%, diphenhydrAMINE, glucagon (human recombinant), glucose, glucose, HYDROcodone-acetaminophen, influenza, insulin aspart U-100, labetalol, melatonin, metoclopramide HCl, morphine, ondansetron, promethazine (PHENERGAN) IVPB, simethicone     Review of patient's allergies indicates:   Allergen Reactions    Benadryl [diphenhydramine hcl] Other (See Comments)     Pt states benadryl makes him feel numb    Ace inhibitors      YIN     Objective:     Vital Signs (Most Recent):  Temp: 97.9 °F (36.6 °C) (10/24/20 0715)  Pulse: 74 (10/24/20 0715)  Resp: 18 (10/24/20 0738)  BP: 134/63 (10/24/20 0715)  SpO2: 95 % (10/24/20 0738) Vital Signs (24h Range):  Temp:  [97.9 °F (36.6 °C)-99.9 °F (37.7 °C)] 97.9 °F (36.6 °C)  Pulse:  [64-87] 74  Resp:  [18-34] 18  SpO2:  [95 %-99 %] 95 %  BP: (134-176)/(63-75) 134/63     Weight: 83.3 kg (183 lb 10.3 oz)  Body mass index is 33.59 kg/m².    Intake/Output - Last 3 Shifts       10/22 0700 - 10/23 0659 10/23 0700 - 10/24 0659 10/24 0700 - 10/25 0659    P.O. 480 320     I.V. (mL/kg) 3 (0)      Total Intake(mL/kg) 483 (5.8) 320 (3.8)     Urine (mL/kg/hr) 1000 (0.5) 1875 (0.9)     Stool  0     Total Output 1000 1875     Net -517 -1555            Stool Occurrence 1 x 1 x           Physical Exam  Constitutional:       Appearance: He is  normal weight.      Comments: Overweight   HENT:      Head: Normocephalic and atraumatic.   Eyes:      General: No scleral icterus.  Neck:      Musculoskeletal: Normal range of motion and neck supple.   Cardiovascular:      Rate and Rhythm: Normal rate and regular rhythm.   Pulmonary:      Effort: Pulmonary effort is normal.      Comments: Decreased breath sounds left base  Abdominal:      General: There is distension (Mild).      Tenderness: There is no abdominal tenderness.      Comments: Obese, incisions are clean   Musculoskeletal:      Right lower leg: No edema.      Left lower leg: No edema.   Skin:     General: Skin is warm and dry.   Neurological:      General: No focal deficit present.      Mental Status: He is alert and oriented to person, place, and time.   Psychiatric:         Mood and Affect: Mood normal.         Significant Labs:  CBC:   Recent Labs   Lab 10/24/20  0658   WBC 8.06   RBC 3.08*   HGB 9.7*   HCT 28.7*      MCV 93   MCH 31.5*   MCHC 33.8     BMP:   Recent Labs   Lab 10/24/20  0658   *      K 3.8      CO2 22*   BUN 79*   CREATININE 10.8*   CALCIUM 7.9*     CMP:   Recent Labs   Lab 10/24/20  0658   *   CALCIUM 7.9*   ALBUMIN 2.2*   PROT 5.6*      K 3.8   CO2 22*      BUN 79*   CREATININE 10.8*   ALKPHOS 49*   ALT <5*   AST 22   BILITOT 0.3       Significant Diagnostics:  Chest x-ray ordered

## 2020-10-24 NOTE — SUBJECTIVE & OBJECTIVE
Interval History: Creatinine increased to 10.8 overnight, Nephrology following. Continuous bladder irrigation was stopped per Urology. No further hematuria noted. Continue current management per the primary team.       Review of Systems   Constitutional: Negative for activity change, appetite change, chills, diaphoresis, fatigue, fever and unexpected weight change.   HENT: Negative for congestion, drooling, ear pain, facial swelling, rhinorrhea, sinus pressure, sneezing and sore throat.    Eyes: Negative.  Negative for discharge, redness and visual disturbance.   Respiratory: Positive for cough and shortness of breath. Negative for apnea, chest tightness, wheezing and stridor.    Cardiovascular: Negative for chest pain, palpitations and leg swelling.   Gastrointestinal: Negative for abdominal distention, abdominal pain, anal bleeding, blood in stool, constipation, diarrhea, nausea and vomiting.   Endocrine: Negative.    Genitourinary: Positive for difficulty urinating. Negative for discharge, dysuria, frequency, hematuria and urgency.   Musculoskeletal: Negative.  Negative for arthralgias, back pain, gait problem, joint swelling, myalgias, neck pain and neck stiffness.   Skin: Negative for color change, pallor and wound.   Allergic/Immunologic: Negative.    Neurological: Negative for dizziness, tremors, seizures, syncope, facial asymmetry, speech difficulty, weakness, light-headedness, numbness and headaches.   Hematological: Negative.    Psychiatric/Behavioral: Negative.  Negative for behavioral problems, confusion, hallucinations and suicidal ideas. The patient is not nervous/anxious.    All other systems reviewed and are negative.    Objective:     Vital Signs (Most Recent):  Temp: 97.2 °F (36.2 °C) (10/24/20 1209)  Pulse: 83 (10/24/20 1209)  Resp: 18 (10/24/20 1209)  BP: (!) 151/67 (10/24/20 1209)  SpO2: 96 % (10/24/20 1209) Vital Signs (24h Range):  Temp:  [97.2 °F (36.2 °C)-99.9 °F (37.7 °C)] 97.2 °F (36.2  °C)  Pulse:  [64-87] 83  Resp:  [18-34] 18  SpO2:  [95 %-99 %] 96 %  BP: (134-176)/(63-75) 151/67     Weight: 83.3 kg (183 lb 10.3 oz)  Body mass index is 33.59 kg/m².    Intake/Output Summary (Last 24 hours) at 10/24/2020 1622  Last data filed at 10/24/2020 0518  Gross per 24 hour   Intake 120 ml   Output 775 ml   Net -655 ml      Physical Exam  Vitals signs and nursing note reviewed.   Constitutional:       General: He is not in acute distress.     Appearance: He is well-developed.   HENT:      Head: Normocephalic and atraumatic.   Eyes:      Conjunctiva/sclera: Conjunctivae normal.      Pupils: Pupils are equal, round, and reactive to light.   Neck:      Musculoskeletal: Normal range of motion and neck supple.   Cardiovascular:      Rate and Rhythm: Normal rate and regular rhythm.      Heart sounds: Normal heart sounds. No murmur.   Pulmonary:      Effort: Pulmonary effort is normal. No respiratory distress.      Breath sounds: Normal breath sounds.   Abdominal:      General: Bowel sounds are normal. There is distension.      Palpations: Abdomen is soft.      Tenderness: There is no abdominal tenderness.      Comments: Left lateral incision approximated    Musculoskeletal: Normal range of motion.         General: No tenderness or deformity.   Skin:     General: Skin is warm and dry.      Findings: No erythema or rash.   Neurological:      Mental Status: He is alert and oriented to person, place, and time.   Psychiatric:         Behavior: Behavior normal.         Significant Labs: All pertinent labs within the past 24 hours have been reviewed.    Significant Imaging:

## 2020-10-24 NOTE — ASSESSMENT & PLAN NOTE
- CXR showed small left pleural effusion with associated atelectasis.   - Inecentive spiraometry

## 2020-10-24 NOTE — ASSESSMENT & PLAN NOTE
1. YIN on  Chronic kidney disease stage 5, serum creatinine increased from about 5 mg/dL to 10 on 10/23, lisinopril was stopped, urinary retention was suspected and Noe catheter was replaced by Urology last night (10/23). YIN likely due to urinary retention. Will monitor closely.     2.  Hypertension, due to primary hyperaldosteronism, s/p left adrenalectomy, SBP in 130s. Monitor closely.      3.  Anemia, recent hemoglobin is 9.7 g, monitor.     4.   hypernatremia/hypokalemia, has resolved.      5.  Euvolemic on exam.     6.  Proteinuria, likely due to diabetic nephropathy.    7. Hyperphosphatemia: mild, monitor.

## 2020-10-25 PROBLEM — N17.9 ACUTE RENAL FAILURE: Status: ACTIVE | Noted: 2020-10-25

## 2020-10-25 LAB
ALBUMIN SERPL BCP-MCNC: 2.1 G/DL (ref 3.5–5.2)
ANION GAP SERPL CALC-SCNC: 15 MMOL/L (ref 8–16)
BASOPHILS # BLD AUTO: 0.03 K/UL (ref 0–0.2)
BASOPHILS NFR BLD: 0.5 % (ref 0–1.9)
BUN SERPL-MCNC: 87 MG/DL (ref 8–23)
CALCIUM SERPL-MCNC: 7.9 MG/DL (ref 8.7–10.5)
CHLORIDE SERPL-SCNC: 101 MMOL/L (ref 95–110)
CO2 SERPL-SCNC: 21 MMOL/L (ref 23–29)
CREAT SERPL-MCNC: 11.4 MG/DL (ref 0.5–1.4)
DIFFERENTIAL METHOD: ABNORMAL
EOSINOPHIL # BLD AUTO: 0.2 K/UL (ref 0–0.5)
EOSINOPHIL NFR BLD: 3.3 % (ref 0–8)
ERYTHROCYTE [DISTWIDTH] IN BLOOD BY AUTOMATED COUNT: 13.4 % (ref 11.5–14.5)
EST. GFR  (AFRICAN AMERICAN): 5 ML/MIN/1.73 M^2
EST. GFR  (NON AFRICAN AMERICAN): 4 ML/MIN/1.73 M^2
GLUCOSE SERPL-MCNC: 140 MG/DL (ref 70–110)
HCT VFR BLD AUTO: 29 % (ref 40–54)
HGB BLD-MCNC: 9.7 G/DL (ref 14–18)
IMM GRANULOCYTES # BLD AUTO: 0.03 K/UL (ref 0–0.04)
IMM GRANULOCYTES NFR BLD AUTO: 0.5 % (ref 0–0.5)
LYMPHOCYTES # BLD AUTO: 0.7 K/UL (ref 1–4.8)
LYMPHOCYTES NFR BLD: 11.7 % (ref 18–48)
MCH RBC QN AUTO: 31.3 PG (ref 27–31)
MCHC RBC AUTO-ENTMCNC: 33.4 G/DL (ref 32–36)
MCV RBC AUTO: 94 FL (ref 82–98)
MONOCYTES # BLD AUTO: 0.6 K/UL (ref 0.3–1)
MONOCYTES NFR BLD: 10.5 % (ref 4–15)
NEUTROPHILS # BLD AUTO: 4.4 K/UL (ref 1.8–7.7)
NEUTROPHILS NFR BLD: 73.5 % (ref 38–73)
NRBC BLD-RTO: 0 /100 WBC
PHOSPHATE SERPL-MCNC: 7.6 MG/DL (ref 2.7–4.5)
PHOSPHATE SERPL-MCNC: 7.6 MG/DL (ref 2.7–4.5)
PLATELET # BLD AUTO: 171 K/UL (ref 150–350)
PMV BLD AUTO: 10.6 FL (ref 9.2–12.9)
POCT GLUCOSE: 137 MG/DL (ref 70–110)
POCT GLUCOSE: 155 MG/DL (ref 70–110)
POCT GLUCOSE: 162 MG/DL (ref 70–110)
POCT GLUCOSE: 193 MG/DL (ref 70–110)
POTASSIUM SERPL-SCNC: 3.8 MMOL/L (ref 3.5–5.1)
RBC # BLD AUTO: 3.1 M/UL (ref 4.6–6.2)
SODIUM SERPL-SCNC: 137 MMOL/L (ref 136–145)
WBC # BLD AUTO: 6 K/UL (ref 3.9–12.7)

## 2020-10-25 PROCEDURE — 25000003 PHARM REV CODE 250: Performed by: INTERNAL MEDICINE

## 2020-10-25 PROCEDURE — 51702 INSERT TEMP BLADDER CATH: CPT | Mod: ,,, | Performed by: UROLOGY

## 2020-10-25 PROCEDURE — 36415 COLL VENOUS BLD VENIPUNCTURE: CPT

## 2020-10-25 PROCEDURE — 21400001 HC TELEMETRY ROOM

## 2020-10-25 PROCEDURE — 99900035 HC TECH TIME PER 15 MIN (STAT)

## 2020-10-25 PROCEDURE — 99233 PR SUBSEQUENT HOSPITAL CARE,LEVL III: ICD-10-PCS | Mod: ,,, | Performed by: INTERNAL MEDICINE

## 2020-10-25 PROCEDURE — 85025 COMPLETE CBC W/AUTO DIFF WBC: CPT

## 2020-10-25 PROCEDURE — 94761 N-INVAS EAR/PLS OXIMETRY MLT: CPT

## 2020-10-25 PROCEDURE — 27000221 HC OXYGEN, UP TO 24 HOURS

## 2020-10-25 PROCEDURE — 25000003 PHARM REV CODE 250: Performed by: SURGERY

## 2020-10-25 PROCEDURE — 99232 PR SUBSEQUENT HOSPITAL CARE,LEVL II: ICD-10-PCS | Mod: 25,,, | Performed by: UROLOGY

## 2020-10-25 PROCEDURE — 80069 RENAL FUNCTION PANEL: CPT

## 2020-10-25 PROCEDURE — 63600175 PHARM REV CODE 636 W HCPCS: Performed by: SURGERY

## 2020-10-25 PROCEDURE — 99232 SBSQ HOSP IP/OBS MODERATE 35: CPT | Mod: 25,,, | Performed by: UROLOGY

## 2020-10-25 PROCEDURE — 99233 SBSQ HOSP IP/OBS HIGH 50: CPT | Mod: ,,, | Performed by: INTERNAL MEDICINE

## 2020-10-25 PROCEDURE — 51702 PR INSERTION OF TEMPORARY INDWELLING BLADDER CATHETER, SIMPLE: ICD-10-PCS | Mod: ,,, | Performed by: UROLOGY

## 2020-10-25 RX ORDER — SEVELAMER CARBONATE 800 MG/1
800 TABLET, FILM COATED ORAL
Status: DISCONTINUED | OUTPATIENT
Start: 2020-10-25 | End: 2020-10-28 | Stop reason: HOSPADM

## 2020-10-25 RX ORDER — SODIUM CHLORIDE 9 MG/ML
INJECTION, SOLUTION INTRAVENOUS CONTINUOUS
Status: DISCONTINUED | OUTPATIENT
Start: 2020-10-25 | End: 2020-10-28

## 2020-10-25 RX ADMIN — HEPARIN SODIUM 5000 UNITS: 5000 INJECTION INTRAVENOUS; SUBCUTANEOUS at 01:10

## 2020-10-25 RX ADMIN — HEPARIN SODIUM 5000 UNITS: 5000 INJECTION INTRAVENOUS; SUBCUTANEOUS at 09:10

## 2020-10-25 RX ADMIN — NIFEDIPINE 60 MG: 30 TABLET, FILM COATED, EXTENDED RELEASE ORAL at 09:10

## 2020-10-25 RX ADMIN — SEVELAMER CARBONATE 800 MG: 800 TABLET, FILM COATED ORAL at 11:10

## 2020-10-25 RX ADMIN — ATORVASTATIN CALCIUM 80 MG: 40 TABLET, FILM COATED ORAL at 09:10

## 2020-10-25 RX ADMIN — HEPARIN SODIUM 5000 UNITS: 5000 INJECTION INTRAVENOUS; SUBCUTANEOUS at 05:10

## 2020-10-25 RX ADMIN — HYDRALAZINE HYDROCHLORIDE 50 MG: 50 TABLET, FILM COATED ORAL at 09:10

## 2020-10-25 RX ADMIN — SERTRALINE HYDROCHLORIDE 100 MG: 50 TABLET ORAL at 09:10

## 2020-10-25 RX ADMIN — ARIPIPRAZOLE 30 MG: 5 TABLET ORAL at 09:10

## 2020-10-25 RX ADMIN — SEVELAMER CARBONATE 800 MG: 800 TABLET, FILM COATED ORAL at 05:10

## 2020-10-25 RX ADMIN — CHLORHEXIDINE GLUCONATE 0.12% ORAL RINSE 10 ML: 1.2 LIQUID ORAL at 09:10

## 2020-10-25 RX ADMIN — HYDRALAZINE HYDROCHLORIDE 50 MG: 50 TABLET, FILM COATED ORAL at 05:10

## 2020-10-25 RX ADMIN — ASPIRIN 81 MG: 81 TABLET, COATED ORAL at 09:10

## 2020-10-25 RX ADMIN — TAMSULOSIN HYDROCHLORIDE 0.4 MG: 0.4 CAPSULE ORAL at 09:10

## 2020-10-25 RX ADMIN — HYDRALAZINE HYDROCHLORIDE 50 MG: 50 TABLET, FILM COATED ORAL at 01:10

## 2020-10-25 RX ADMIN — SODIUM CHLORIDE: 0.9 INJECTION, SOLUTION INTRAVENOUS at 12:10

## 2020-10-25 NOTE — SUBJECTIVE & OBJECTIVE
Interval History:     10/23/2020:  Creatinine has further increased to 10. Noe was placed at 2 am this morning and patient has made about 2 liters of bloody urine. Patient states that he is feeling fine.     10/24/2020:  Urology placed Noe catheter last night. Good UOP of 1.8 liters over past 24 hours. Creatinine at 10.8 today.     10/25/2020:  Creatinine has increased to 11.8. UOP has again declined. Patient denies any complaints.             Review of patient's allergies indicates:   Allergen Reactions    Benadryl [diphenhydramine hcl] Other (See Comments)     Pt states benadryl makes him feel numb    Ace inhibitors      YIN     Current Facility-Administered Medications   Medication Frequency    acetaminophen tablet 650 mg Q6H PRN    ARIPiprazole tablet 30 mg QHS    aspirin EC tablet 81 mg QHS    atorvastatin tablet 80 mg Daily    chlorhexidine 0.12 % solution 10 mL BID    dextrose 50% injection 12.5 g PRN    dextrose 50% injection 25 g PRN    diphenhydrAMINE injection 25 mg Q6H PRN    glucagon (human recombinant) injection 1 mg PRN    glucose chewable tablet 16 g PRN    glucose chewable tablet 24 g PRN    heparin (porcine) injection 5,000 Units Q8H    hydrALAZINE tablet 50 mg Q8H    HYDROcodone-acetaminophen 5-325 mg per tablet 2 tablet Q4H PRN    influenza (QUADRIVALENT ADJUVANTED PF) vaccine 0.5 mL Prior to discharge    insulin aspart U-100 pen 0-5 Units QID (AC + HS) PRN    labetaloL injection 10 mg Q4H PRN    melatonin tablet 9 mg Nightly PRN    metoclopramide HCl injection 10 mg Q10 Min PRN    morphine injection 2 mg Q3H PRN    NIFEdipine 24 hr tablet 60 mg Daily    nozaseptin (NOZIN) nasal  BID    ondansetron injection 4 mg Q8H PRN    polyethylene glycol packet 17 g BID    promethazine (PHENERGAN) 6.25 mg in dextrose 5 % 50 mL IVPB Q6H PRN    sertraline tablet 100 mg Daily    simethicone chewable tablet 80 mg TID PRN    tamsulosin 24 hr capsule 0.4 mg Daily        Objective:     Vital Signs (Most Recent):  Temp: 97.7 °F (36.5 °C) (10/25/20 0752)  Pulse: 78 (10/25/20 0752)  Resp: 18 (10/25/20 0800)  BP: (!) 145/63 (10/25/20 0752)  SpO2: 98 % (10/25/20 0800)  O2 Device (Oxygen Therapy): nasal cannula (10/25/20 0800) Vital Signs (24h Range):  Temp:  [96.3 °F (35.7 °C)-98.6 °F (37 °C)] 97.7 °F (36.5 °C)  Pulse:  [64-87] 78  Resp:  [17-20] 18  SpO2:  [92 %-98 %] 98 %  BP: (129-157)/(63-67) 145/63     Weight: 83.3 kg (183 lb 10.3 oz) (10/20/20 0735)  Body mass index is 33.59 kg/m².  Body surface area is 1.91 meters squared.    I/O last 3 completed shifts:  In: 360 [P.O.:360]  Out: 1700 [Urine:1700]    Physical Exam  Constitutional:       Appearance: He is well-developed.   HENT:      Head: Normocephalic.      Nose: No rhinorrhea.      Mouth/Throat:      Pharynx: No oropharyngeal exudate.   Eyes:      Pupils: Pupils are equal, round, and reactive to light.   Neck:      Thyroid: No thyroid mass.   Cardiovascular:      Rate and Rhythm: Normal rate and regular rhythm.      Heart sounds: S1 normal and S2 normal.   Pulmonary:      Effort: Pulmonary effort is normal. No respiratory distress.      Breath sounds: No wheezing.   Abdominal:      General: Bowel sounds are normal. There is distension.      Palpations: Abdomen is soft.      Tenderness: There is no abdominal tenderness.      Hernia: No hernia is present.   Genitourinary:     Comments: Noe cath in place, draining pinkish urine.   Musculoskeletal:      Comments: Trace bilateral LE edema   Lymphadenopathy:      Cervical: No cervical adenopathy.   Skin:     General: Skin is warm and dry.   Neurological:      Mental Status: He is alert and oriented to person, place, and time.   Psychiatric:         Behavior: Behavior is cooperative.         Significant Labs:  Lab Results   Component Value Date    CREATININE 11.4 (H) 10/25/2020    BUN 87 (H) 10/25/2020     10/25/2020    K 3.8 10/25/2020     10/25/2020    CO2 21 (L)  10/25/2020     Lab Results   Component Value Date    .0 (H) 09/04/2020    CALCIUM 7.9 (L) 10/25/2020    PHOS 7.6 (H) 10/25/2020    PHOS 7.6 (H) 10/25/2020     Lab Results   Component Value Date    ALBUMIN 2.1 (L) 10/25/2020       Lab Results   Component Value Date    WBC 6.00 10/25/2020    HGB 9.7 (L) 10/25/2020    HCT 29.0 (L) 10/25/2020    MCV 94 10/25/2020     10/25/2020       No results for input(s): MG in the last 168 hours.      Significant Imaging:

## 2020-10-25 NOTE — ASSESSMENT & PLAN NOTE
10/25/20 The patients creatinine increased to 11.4 overnight. Nephrology and Urology following. CT Abd/pelvis showed mild inflammatory changes adjacent to the midportion of the left ureter but no obstruction. Continue to monitor.

## 2020-10-25 NOTE — PROGRESS NOTES
[facility]  General Surgery  Progress Note    Subjective:     History of Present Illness:  No notes on file    Post-Op Info:  Procedure(s) (LRB):  CYSTOSCOPY, WITH DIRECT VISION INTERNAL URETHROTOMY (N/A)   2 Days Post-Op     Interval History:  Patient states he is feeling better less shortness of breath.    His Noe catheter came out and was replaced by Urology.  Getting a CT scan with stone protocol.  He will need to be made NPO    Medications:  Continuous Infusions:  Scheduled Meds:   ARIPiprazole  30 mg Oral QHS    aspirin  81 mg Oral QHS    atorvastatin  80 mg Oral Daily    heparin (porcine)  5,000 Units Subcutaneous Q8H    hydrALAZINE  50 mg Oral Q8H    NIFEdipine  60 mg Oral Daily    nozaseptin   Each Nostril BID    polyethylene glycol  17 g Oral BID    sertraline  100 mg Oral Daily    sevelamer carbonate  800 mg Oral TID WM    tamsulosin  0.4 mg Oral Daily     PRN Meds:acetaminophen, dextrose 50%, dextrose 50%, diphenhydrAMINE, glucagon (human recombinant), glucose, glucose, HYDROcodone-acetaminophen, influenza, insulin aspart U-100, labetalol, melatonin, metoclopramide HCl, morphine, ondansetron, promethazine (PHENERGAN) IVPB, simethicone     Review of patient's allergies indicates:   Allergen Reactions    Benadryl [diphenhydramine hcl] Other (See Comments)     Pt states benadryl makes him feel numb    Ace inhibitors      YIN     Objective:     Vital Signs (Most Recent):  Temp: 97.7 °F (36.5 °C) (10/25/20 0752)  Pulse: 78 (10/25/20 0752)  Resp: 18 (10/25/20 0800)  BP: (!) 145/63 (10/25/20 0752)  SpO2: 98 % (10/25/20 0800) Vital Signs (24h Range):  Temp:  [96.3 °F (35.7 °C)-98.6 °F (37 °C)] 97.7 °F (36.5 °C)  Pulse:  [64-87] 78  Resp:  [17-20] 18  SpO2:  [92 %-98 %] 98 %  BP: (129-157)/(63-67) 145/63     Weight: 83.3 kg (183 lb 10.3 oz)  Body mass index is 33.59 kg/m².    Intake/Output - Last 3 Shifts       10/23 0700 - 10/24 0659 10/24 0700 - 10/25 0659 10/25 0700 - 10/26 0659     P.O. 320 240     I.V. (mL/kg)       Total Intake(mL/kg) 320 (3.8) 240 (2.9)     Urine (mL/kg/hr) 1875 (0.9) 925 (0.5) 177 (0.7)    Stool 0  0    Total Output 1875 925 177    Net -1555 -685 -177           Stool Occurrence 1 x  1 x          Physical Exam  Vitals signs and nursing note reviewed.   Constitutional:       Appearance: He is obese.   Cardiovascular:      Rate and Rhythm: Normal rate and regular rhythm.   Pulmonary:      Effort: Pulmonary effort is normal.   Abdominal:      General: Abdomen is flat. Bowel sounds are normal.      Palpations: Abdomen is soft.      Comments: Obese, incisions are clean   Genitourinary:     Comments: Noe that had been dislodged,was replace by Urology  Musculoskeletal:      Right lower leg: No edema.      Left lower leg: No edema.   Skin:     General: Skin is warm and dry.      Capillary Refill: Capillary refill takes less than 2 seconds.      Comments: Areas of pigment loss   Neurological:      General: No focal deficit present.      Mental Status: He is alert and oriented to person, place, and time.   Psychiatric:         Mood and Affect: Mood normal.         Behavior: Behavior normal.         Thought Content: Thought content normal.         Judgment: Judgment normal.         Significant Labs:  CBC:   Recent Labs   Lab 10/25/20  0652   WBC 6.00   RBC 3.10*   HGB 9.7*   HCT 29.0*      MCV 94   MCH 31.3*   MCHC 33.4     BMP:   Recent Labs   Lab 10/25/20  0652   *      K 3.8      CO2 21*   BUN 87*   CREATININE 11.4*   CALCIUM 7.9*     CMP:   Recent Labs   Lab 10/24/20  0658 10/25/20  0652   * 140*   CALCIUM 7.9* 7.9*   ALBUMIN 2.2* 2.1*   PROT 5.6*  --     137   K 3.8 3.8   CO2 22* 21*    101   BUN 79* 87*   CREATININE 10.8* 11.4*   ALKPHOS 49*  --    ALT <5*  --    AST 22  --    BILITOT 0.3  --        Significant Diagnostics:  CT renal stone protocol ordered by Urology    Assessment/Plan:     * Hyperaldosteronism  Status post left  adrenalectomy  Regular diet as tolerated   Bloated monitor for return of bowel function, antiemetics p.r.n.  Bowel regimen  Ambulate  Pain control  DVT/GI prophylaxis  Awaiting improvement kidney function prior to discharge    Shortness of breath  Monitor, check a chest x-ray    CKD (chronic kidney disease) stage 4, GFR 15-29 ml/min  Monitor urine output, catheter in place for urinary retention/strict I's and O's, creatinine  Nephrology following    Diabetes mellitus  Monitor blood glucose Insulin sliding scale    HTN (hypertension)  On p.o. antihypertensives per Nephrology        Beny Cortez MD  General Surgery  [facility]

## 2020-10-25 NOTE — PROGRESS NOTES
Chief Complaint:  Gross hematuria, urinary retention    HPI:    10/25/20- patient with no complaints this am, urine slight pink in tubing.  10/24/20- patient well, with no complaints.  75-year-old gentleman status post robotically assisted laparoscopic adrenalectomy, postoperative day 1.  Unfortunately over the evening, the patient was not voiding a Noe catheter was placed.  All the patient did have significant output of almost 900 mL, he also had significant gross hematuria.  A 24 Yor[place] 3 way Noe catheter was attempted to be placed by the nursing staff, and then by myself with no passage beyond the prosthetic urethra.  We also attempted coude catheters, could not get beyond and into the bladder neck.  Patient states that he has never had gross hematuria before, he has never been a smoker.  States that he does have significant BPH, but on treated.  Patient states that he gets up about 8-10 times during the evening, with increased frequency and urgency during the daytime.  Patient has chronic renal insufficiency, with acute renal failure today.  No other urological history, no family history of urological cancers or stones.    Allergies:  Benadryl [diphenhydramine hcl] and Ace inhibitors    Medications:  See MAR    Review of Systems:  General: No fever, chills, fatigability, or weight loss.  Skin: No rashes, itching, or changes in color or texture of skin.  Chest: Denies CABRERA, cyanosis, wheezing, cough, and sputum production.  Abdomen: Appetite fine. No weight loss. Denies diarrhea, abdominal pain, hematemesis, or blood in stool.  Musculoskeletal: No joint stiffness or swelling. Denies back pain.  : As above.  All other review of systems negative.    PMH:   has a past medical history of Diabetes mellitus, Hyperlipidemia, Hypertension, Retinitis, and Stroke.    PSH:   has a past surgical history that includes Fluoroscopy (Bilateral, 7/20/2020) and Robot-assisted surgical removal of adrenal gland using da Min  "Xi (Left, 10/20/2020).    FamHx: family history is not on file.    SocHx:  reports that he has never smoked. He has never used smokeless tobacco. He reports that he does not drink alcohol or use drugs.      Physical Exam:  Vitals:    10/25/20 0800   BP:    Pulse:    Resp: 18   Temp:      General: A&Ox3, no apparent distress, no deformities  Neck: No masses, normal ROM  Lungs: normal inspiration, no use of accessory muscles  Heart: normal pulse, no arrhythmias  Abdomen: Soft, NT, ND, no masses, no hernias, no hepatosplenomegaly  Skin: The skin is warm and dry. No jaundice.  Ext: No c/c/e.  : 10/25/20- urine slight pink, but clear.  10/23/20- Test desc hitesh, no abnormalities of epididymus. Normal penile and scrotal skin, except for slight phimosis.  Meatus normal, blood at the head of the meatus.    Labs/Studies:   CBC is relatively stable, please see the chart notes.  BMP demonstrated BUN and creatinine of 87 and 11.4 respectively and rising.  Renal ultrasound medical renal disease, renal cysts, no hydronephrosis 10/22/20  CT non contrasted no nephrolithiasis or hydronephrosis, bilateral renal cysts, left adrenal lesion 5/20    Irrigation attempted and noted to have greenfield coming out; I went ahead and removed, it appears that the balloon has "popped".  After sterile prep and draping of the patient, a 14 Chinese coude catheter was placed without difficulty and 150 ml of urine pvr returned.  Irrigated clear without issue.    Impression/Plan:     1.  Gross hematuria- greenfield catheter placed in the PACU, fell out this am and replaced at bedside by me.  Urine draining light pink to clear, but only 150 ml pvr.  Recommend CT to r/o any upper tract obstruction to explain his increase in creatinine.    2.  BPH- continue tamsulosin and Greenfield catheter as an outpatient, I will arrange for follow up.  "

## 2020-10-25 NOTE — PLAN OF CARE
VSS. NSR on monitor. Updated family via phone call with POC. Pt turned Q2. Bed alarm activated.   Fall precautions in place. Call light and personal items within reach. Educated patient on side effects of medication administered. Pt verbalized understanding. 24 hour order check done.  Will continue to monitor.

## 2020-10-25 NOTE — ASSESSMENT & PLAN NOTE
1. YIN on  Chronic kidney disease stage 5, serum creatinine increased from about 5 mg/dL to 10 on 10/23, lisinopril was stopped, urinary retention was suspected and Noe catheter was replaced by Urology on (10/23). YIN likely due to urinary retention. UOP has again declined overnight and creatinine has increased to 11.8. Will contact Urology to discuss patient.     2.  Hypertension, due to primary hyperaldosteronism, s/p left adrenalectomy, SBP in 140s. Monitor closely.      3.  Anemia, recent hemoglobin is 9.7 g, stable.      4.   hypernatremia/hypokalemia, has resolved.      5.  Euvolemic on exam.     6.  Proteinuria, likely due to diabetic nephropathy.    7. Hyperphosphatemia: will add sevelamer.

## 2020-10-25 NOTE — PROGRESS NOTES
[facility] Hospital Medicine  Progress Note    Patient Name: Colt Stacy Jr.  MRN: [id number]  Patient Class: IP- Inpatient   Admission Date: 10/20/2020  Length of Stay: 5 days  Attending Physician: Livan Quiñones MD  Primary Care Provider: Primary Doctor No        Subjective:     Principal Problem:Hyperaldosteronism        HPI:  Colt Stacy is a 75 year old male with history of primary hyperaldosteronism. 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 Quiñones. Surgery without acute complications. Hospital medicine has been consulted for medical management. Given his secondary hypertension, nephrology has been consulted to assist with antihypertensives.       Overview/Hospital Course:  S/p left adrenalectomy on 10/20/2020 performed by Dr. Quiñones. Hospital medicine consulted for DM and hypertension    10/21/2020  blood pressure elevated today, but much more controlled after all antihypertensives were restarted. Still has abdominal distention will monitor overnight.     10/22/2020  Worsening renal function today. Bladder scan only shows 298 mls. Will perform in and out catheter today. BM last night. Patient states he is coughing up blood tinged sputum. Unwitnessed and there is not a sample at bedside. No objective findings of bleeding. Ask the patient and nurse to notify NP so this can be assessed. Check CBC and CXR.   10/23/20 Creatinine worsened overnight from 8.9 to 10, Nephrology following. Noe placed overnight for urinary retention. Patient drained 2 liters of bloody urine overnight. Urology consulted and recommended continuous bladder irrigation. Will continue to monitor renal function. 10/24/20 Creatinine increased to 10.8 overnight, Nephrology following. Continuous bladder irrigation was stopped per Urology. No further hematuria noted. Continue current management per the primary team. 10/25/20 The patients creatinine increased  to 11.4 overnight. Nephrology and Urology following. CT Abd/pelvis showed mild inflammatory changes adjacent to the midportion of the left ureter but no obstruction. Continue to monitor. CXR showed small left pleural effusion with associated atelectasis, encouraged IS use.     Interval History: The patients creatinine increased to 11.4 overnight. Nephrology and Urology following. CT Abd/pelvis showed mild inflammatory changes adjacent to the midportion of the left ureter but no obstruction. Continue to monitor. CXR showed small left pleural effusion with associated atelectasis, encouraged IS use.     Review of Systems   Constitutional: Negative for activity change, appetite change, chills, diaphoresis, fatigue, fever and unexpected weight change.   HENT: Negative for congestion, drooling, ear pain, facial swelling, rhinorrhea, sinus pressure, sneezing and sore throat.    Eyes: Negative.  Negative for discharge, redness and visual disturbance.   Respiratory: Positive for cough and shortness of breath. Negative for apnea, chest tightness, wheezing and stridor.    Cardiovascular: Negative for chest pain, palpitations and leg swelling.   Gastrointestinal: Negative for abdominal distention, abdominal pain, anal bleeding, blood in stool, constipation, diarrhea, nausea and vomiting.   Endocrine: Negative.    Genitourinary: Positive for difficulty urinating. Negative for discharge, dysuria, frequency, hematuria and urgency.   Musculoskeletal: Negative.  Negative for arthralgias, back pain, gait problem, joint swelling, myalgias, neck pain and neck stiffness.   Skin: Negative for color change, pallor and wound.   Allergic/Immunologic: Negative.    Neurological: Negative for dizziness, tremors, seizures, syncope, facial asymmetry, speech difficulty, weakness, light-headedness, numbness and headaches.   Hematological: Negative.    Psychiatric/Behavioral: Negative.  Negative for behavioral problems, confusion, hallucinations  and suicidal ideas. The patient is not nervous/anxious.    All other systems reviewed and are negative.    Objective:     Vital Signs (Most Recent):  Temp: 98.7 °F (37.1 °C) (10/25/20 1644)  Pulse: 78 (10/25/20 1644)  Resp: 18 (10/25/20 1644)  BP: (!) 146/65 (10/25/20 1644)  SpO2: 98 % (10/25/20 1644) Vital Signs (24h Range):  Temp:  [96.3 °F (35.7 °C)-98.7 °F (37.1 °C)] 98.7 °F (37.1 °C)  Pulse:  [64-88] 78  Resp:  [17-20] 18  SpO2:  [92 %-98 %] 98 %  BP: (129-157)/(63-70) 146/65     Weight: 83.3 kg (183 lb 10.3 oz)  Body mass index is 33.59 kg/m².    Intake/Output Summary (Last 24 hours) at 10/25/2020 1745  Last data filed at 10/25/2020 1500  Gross per 24 hour   Intake 240 ml   Output 1452 ml   Net -1212 ml      Physical Exam  Vitals signs and nursing note reviewed.   Constitutional:       General: He is not in acute distress.     Appearance: He is well-developed.   HENT:      Head: Normocephalic and atraumatic.   Eyes:      Conjunctiva/sclera: Conjunctivae normal.      Pupils: Pupils are equal, round, and reactive to light.   Neck:      Musculoskeletal: Normal range of motion and neck supple.   Cardiovascular:      Rate and Rhythm: Normal rate and regular rhythm.      Heart sounds: Normal heart sounds. No murmur.   Pulmonary:      Effort: Pulmonary effort is normal. No respiratory distress.      Breath sounds: Normal breath sounds.   Abdominal:      General: Bowel sounds are normal. There is distension.      Palpations: Abdomen is soft.      Tenderness: There is no abdominal tenderness.      Comments: Left lateral incision approximated    Musculoskeletal: Normal range of motion.         General: No tenderness or deformity.   Skin:     General: Skin is warm and dry.      Findings: No erythema or rash.   Neurological:      Mental Status: He is alert and oriented to person, place, and time.   Psychiatric:         Behavior: Behavior normal.         Significant Labs: All pertinent labs within the past 24 hours have  been reviewed.    Significant Imaging:             Assessment/Plan:      * Hyperaldosteronism  S/p left adrenalectomy  --primary team managing  --monitor potassium    10/21/20  Primary team managing  Electrolytes stable  Still has some abdominal distention; diet advanced    10/22/20  +BM today  Still has some distention defer to Surgery    Per primary team    Acute renal failure  10/25/20 The patients creatinine increased to 11.4 overnight. Nephrology and Urology following. CT Abd/pelvis showed mild inflammatory changes adjacent to the midportion of the left ureter but no obstruction. Continue to monitor.     Shortness of breath  - CXR showed small left pleural effusion with associated atelectasis.   - Inecentive spiraometry      CKD (chronic kidney disease) stage 4, GFR 15-29 ml/min  ---nephrology following    10/22/2020  Worsening renal failure today. Bladder scan shows mild retention at 298ml. In and out catheter today. Will repeat bladder scan in 6 hours.   --renal ultrasound ordered    10/23/20 Creatinine worsened overnight from 8.9 to 10, Nephrology following. Noe placed overnight for urinary retention. Patient drained 2 liters of bloody urine overnight. Urology consulted and recommended continuous bladder irrigation. Will continue to monitor renal function.   10/24/20 Creatinine increased to 10.8 overnight, Nephrology following. Continuous bladder irrigation was stopped per Urology. No further hematuria noted.    Diabetes mellitus  Hemoglobin A1C   Date Value Ref Range Status   10/20/2020 6.0 (H) 4.0 - 5.6 % Final     Comment:     ADA Screening Guidelines:  5.7-6.4%  Consistent with prediabetes  >or=6.5%  Consistent with diabetes  High levels of fetal hemoglobin interfere with the HbA1C  assay. Heterozygous hemoglobin variants (HbS, HgC, etc)do  not significantly interfere with this assay.   However, presence of multiple variants may affect accuracy.     02/04/2020 7.3 (H) 4.0 - 5.6 % Final     Comment:      ADA Screening Guidelines:  5.7-6.4%  Consistent with prediabetes  >or=6.5%  Consistent with diabetes  High levels of fetal hemoglobin interfere with the HbA1C  assay. Heterozygous hemoglobin variants (HbS, HgC, etc)do  not significantly interfere with this assay.   However, presence of multiple variants may affect accuracy.     --insulin sliding scale  --Diabetic diet      HTN (hypertension)  --blood pressure currently stable in 150's.   --agree with nephrology with only adding amlodipine for now. Titrate up as needed.   Labetalol prn    10/21/20  Blood pressure elevated this AM  Much improved after all medications were resumed    10/22/20  Controlled today    Continue to monitor      VTE Risk Mitigation (From admission, onward)         Ordered     heparin (porcine) injection 5,000 Units  Every 8 hours      10/22/20 1805                Discharge Planning   RICH:      Code Status: Prior   Is the patient medically ready for discharge?:     Reason for patient still in hospital (select all that apply): Patient trending condition, Laboratory test, Treatment and Consult recommendations  Discharge Plan A: Home with family                  Rober Rubio NP  Department of Hospital Medicine   [facility]

## 2020-10-25 NOTE — PROGRESS NOTES
[facility]   Nephrology  Progress Note    Patient Name: Colt Stacy Jr.  MRN: [id number]  Admission Date: 10/20/2020  Hospital Length of Stay: 5 days  Attending Provider: Livan Quiñones MD   Primary Care Physician: Primary Doctor No  Principal Problem:Hyperaldosteronism    Subjective:     HPI: Colt Stacy Jr. is a pleasant 75-year-old  man with longstanding history of hypertension, CKD stage 4 bordering on stage 5, was diagnosed with primary hyperaldosteronism, further workup revealed left adrenal adenoma, adrenal vein sampling positive for increased activity on the left side, s/p  left adrenalectomy today , patient being admitted to hospital for observation overnight following his surgery, we were consulted due to his advanced renal failure, patient seen and examined in the room, denies any complaints at this time, comfortably eating his supper     Interval History:     10/23/2020:  Creatinine has further increased to 10. Noe was placed at 2 am this morning and patient has made about 2 liters of bloody urine. Patient states that he is feeling fine.     10/24/2020:  Urology placed Noe catheter last night. Good UOP of 1.8 liters over past 24 hours. Creatinine at 10.8 today.     10/25/2020:  Creatinine has increased to 11.8. UOP has again declined. Patient denies any complaints.             Review of patient's allergies indicates:   Allergen Reactions    Benadryl [diphenhydramine hcl] Other (See Comments)     Pt states benadryl makes him feel numb    Ace inhibitors      YIN     Current Facility-Administered Medications   Medication Frequency    acetaminophen tablet 650 mg Q6H PRN    ARIPiprazole tablet 30 mg QHS    aspirin EC tablet 81 mg QHS    atorvastatin tablet 80 mg Daily    chlorhexidine 0.12 % solution 10 mL BID    dextrose 50% injection 12.5 g PRN    dextrose 50% injection 25 g PRN    diphenhydrAMINE injection 25 mg Q6H PRN    glucagon (human recombinant)  injection 1 mg PRN    glucose chewable tablet 16 g PRN    glucose chewable tablet 24 g PRN    heparin (porcine) injection 5,000 Units Q8H    hydrALAZINE tablet 50 mg Q8H    HYDROcodone-acetaminophen 5-325 mg per tablet 2 tablet Q4H PRN    influenza (QUADRIVALENT ADJUVANTED PF) vaccine 0.5 mL Prior to discharge    insulin aspart U-100 pen 0-5 Units QID (AC + HS) PRN    labetaloL injection 10 mg Q4H PRN    melatonin tablet 9 mg Nightly PRN    metoclopramide HCl injection 10 mg Q10 Min PRN    morphine injection 2 mg Q3H PRN    NIFEdipine 24 hr tablet 60 mg Daily    nozaseptin (NOZIN) nasal  BID    ondansetron injection 4 mg Q8H PRN    polyethylene glycol packet 17 g BID    promethazine (PHENERGAN) 6.25 mg in dextrose 5 % 50 mL IVPB Q6H PRN    sertraline tablet 100 mg Daily    simethicone chewable tablet 80 mg TID PRN    tamsulosin 24 hr capsule 0.4 mg Daily       Objective:     Vital Signs (Most Recent):  Temp: 97.7 °F (36.5 °C) (10/25/20 0752)  Pulse: 78 (10/25/20 0752)  Resp: 18 (10/25/20 0800)  BP: (!) 145/63 (10/25/20 0752)  SpO2: 98 % (10/25/20 0800)  O2 Device (Oxygen Therapy): nasal cannula (10/25/20 0800) Vital Signs (24h Range):  Temp:  [96.3 °F (35.7 °C)-98.6 °F (37 °C)] 97.7 °F (36.5 °C)  Pulse:  [64-87] 78  Resp:  [17-20] 18  SpO2:  [92 %-98 %] 98 %  BP: (129-157)/(63-67) 145/63     Weight: 83.3 kg (183 lb 10.3 oz) (10/20/20 0735)  Body mass index is 33.59 kg/m².  Body surface area is 1.91 meters squared.    I/O last 3 completed shifts:  In: 360 [P.O.:360]  Out: 1700 [Urine:1700]    Physical Exam  Constitutional:       Appearance: He is well-developed.   HENT:      Head: Normocephalic.      Nose: No rhinorrhea.      Mouth/Throat:      Pharynx: No oropharyngeal exudate.   Eyes:      Pupils: Pupils are equal, round, and reactive to light.   Neck:      Thyroid: No thyroid mass.   Cardiovascular:      Rate and Rhythm: Normal rate and regular rhythm.      Heart sounds: S1 normal and  S2 normal.   Pulmonary:      Effort: Pulmonary effort is normal. No respiratory distress.      Breath sounds: No wheezing.   Abdominal:      General: Bowel sounds are normal. There is distension.      Palpations: Abdomen is soft.      Tenderness: There is no abdominal tenderness.      Hernia: No hernia is present.   Genitourinary:     Comments: Noe cath in place, draining pinkish urine.   Musculoskeletal:      Comments: Trace bilateral LE edema   Lymphadenopathy:      Cervical: No cervical adenopathy.   Skin:     General: Skin is warm and dry.   Neurological:      Mental Status: He is alert and oriented to person, place, and time.   Psychiatric:         Behavior: Behavior is cooperative.         Significant Labs:  Lab Results   Component Value Date    CREATININE 11.4 (H) 10/25/2020    BUN 87 (H) 10/25/2020     10/25/2020    K 3.8 10/25/2020     10/25/2020    CO2 21 (L) 10/25/2020     Lab Results   Component Value Date    .0 (H) 09/04/2020    CALCIUM 7.9 (L) 10/25/2020    PHOS 7.6 (H) 10/25/2020    PHOS 7.6 (H) 10/25/2020     Lab Results   Component Value Date    ALBUMIN 2.1 (L) 10/25/2020       Lab Results   Component Value Date    WBC 6.00 10/25/2020    HGB 9.7 (L) 10/25/2020    HCT 29.0 (L) 10/25/2020    MCV 94 10/25/2020     10/25/2020       No results for input(s): MG in the last 168 hours.      Significant Imaging:          Assessment/Plan:     CKD (chronic kidney disease) stage 4, GFR 15-29 ml/min  1. YIN on  Chronic kidney disease stage 5, serum creatinine increased from about 5 mg/dL to 10 on 10/23, lisinopril was stopped, urinary retention was suspected and Noe catheter was replaced by Urology on (10/23). YIN likely due to urinary retention. UOP has again declined overnight and creatinine has increased to 11.8. Will contact Urology to discuss patient.     2.  Hypertension, due to primary hyperaldosteronism, s/p left adrenalectomy, SBP in 140s. Monitor closely.      3.  Anemia,  recent hemoglobin is 9.7 g, stable.      4.   hypernatremia/hypokalemia, has resolved.      5.  Euvolemic on exam.     6.  Proteinuria, likely due to diabetic nephropathy.    7. Hyperphosphatemia: will add sevelamer.                  Thank you for your consult. I will follow-up with patient. Please contact us if you have any additional questions.    Soren Contreras MD  Nephrology  [facility]

## 2020-10-25 NOTE — SUBJECTIVE & OBJECTIVE
Interval History:  Patient states he is feeling better less shortness of breath.    His Noe catheter came out and was replaced by Urology.  Getting a CT scan with stone protocol.  He will need to be made NPO    Medications:  Continuous Infusions:  Scheduled Meds:   ARIPiprazole  30 mg Oral QHS    aspirin  81 mg Oral QHS    atorvastatin  80 mg Oral Daily    heparin (porcine)  5,000 Units Subcutaneous Q8H    hydrALAZINE  50 mg Oral Q8H    NIFEdipine  60 mg Oral Daily    nozaseptin   Each Nostril BID    polyethylene glycol  17 g Oral BID    sertraline  100 mg Oral Daily    sevelamer carbonate  800 mg Oral TID WM    tamsulosin  0.4 mg Oral Daily     PRN Meds:acetaminophen, dextrose 50%, dextrose 50%, diphenhydrAMINE, glucagon (human recombinant), glucose, glucose, HYDROcodone-acetaminophen, influenza, insulin aspart U-100, labetalol, melatonin, metoclopramide HCl, morphine, ondansetron, promethazine (PHENERGAN) IVPB, simethicone     Review of patient's allergies indicates:   Allergen Reactions    Benadryl [diphenhydramine hcl] Other (See Comments)     Pt states benadryl makes him feel numb    Ace inhibitors      YIN     Objective:     Vital Signs (Most Recent):  Temp: 97.7 °F (36.5 °C) (10/25/20 0752)  Pulse: 78 (10/25/20 0752)  Resp: 18 (10/25/20 0800)  BP: (!) 145/63 (10/25/20 0752)  SpO2: 98 % (10/25/20 0800) Vital Signs (24h Range):  Temp:  [96.3 °F (35.7 °C)-98.6 °F (37 °C)] 97.7 °F (36.5 °C)  Pulse:  [64-87] 78  Resp:  [17-20] 18  SpO2:  [92 %-98 %] 98 %  BP: (129-157)/(63-67) 145/63     Weight: 83.3 kg (183 lb 10.3 oz)  Body mass index is 33.59 kg/m².    Intake/Output - Last 3 Shifts       10/23 0700 - 10/24 0659 10/24 0700 - 10/25 0659 10/25 0700 - 10/26 0659    P.O. 320 240     I.V. (mL/kg)       Total Intake(mL/kg) 320 (3.8) 240 (2.9)     Urine (mL/kg/hr) 1875 (0.9) 925 (0.5) 177 (0.7)    Stool 0  0    Total Output 1875 925 177    Net -1555 -685 -177           Stool Occurrence 1 x  1 x           Physical Exam  Vitals signs and nursing note reviewed.   Constitutional:       Appearance: He is obese.   Cardiovascular:      Rate and Rhythm: Normal rate and regular rhythm.   Pulmonary:      Effort: Pulmonary effort is normal.   Abdominal:      General: Abdomen is flat. Bowel sounds are normal.      Palpations: Abdomen is soft.      Comments: Obese, incisions are clean   Genitourinary:     Comments: Noe that had been dislodged,was replace by Urology  Musculoskeletal:      Right lower leg: No edema.      Left lower leg: No edema.   Skin:     General: Skin is warm and dry.      Capillary Refill: Capillary refill takes less than 2 seconds.      Comments: Areas of pigment loss   Neurological:      General: No focal deficit present.      Mental Status: He is alert and oriented to person, place, and time.   Psychiatric:         Mood and Affect: Mood normal.         Behavior: Behavior normal.         Thought Content: Thought content normal.         Judgment: Judgment normal.         Significant Labs:  CBC:   Recent Labs   Lab 10/25/20  0652   WBC 6.00   RBC 3.10*   HGB 9.7*   HCT 29.0*      MCV 94   MCH 31.3*   MCHC 33.4     BMP:   Recent Labs   Lab 10/25/20  0652   *      K 3.8      CO2 21*   BUN 87*   CREATININE 11.4*   CALCIUM 7.9*     CMP:   Recent Labs   Lab 10/24/20  0658 10/25/20  0652   * 140*   CALCIUM 7.9* 7.9*   ALBUMIN 2.2* 2.1*   PROT 5.6*  --     137   K 3.8 3.8   CO2 22* 21*    101   BUN 79* 87*   CREATININE 10.8* 11.4*   ALKPHOS 49*  --    ALT <5*  --    AST 22  --    BILITOT 0.3  --        Significant Diagnostics:  CT renal stone protocol ordered by Urology

## 2020-10-25 NOTE — SUBJECTIVE & OBJECTIVE
Interval History: The patients creatinine increased to 11.4 overnight. Nephrology and Urology following. CT Abd/pelvis showed mild inflammatory changes adjacent to the midportion of the left ureter but no obstruction. Continue to monitor. CXR showed small left pleural effusion with associated atelectasis, encouraged IS use.     Review of Systems   Constitutional: Negative for activity change, appetite change, chills, diaphoresis, fatigue, fever and unexpected weight change.   HENT: Negative for congestion, drooling, ear pain, facial swelling, rhinorrhea, sinus pressure, sneezing and sore throat.    Eyes: Negative.  Negative for discharge, redness and visual disturbance.   Respiratory: Positive for cough and shortness of breath. Negative for apnea, chest tightness, wheezing and stridor.    Cardiovascular: Negative for chest pain, palpitations and leg swelling.   Gastrointestinal: Negative for abdominal distention, abdominal pain, anal bleeding, blood in stool, constipation, diarrhea, nausea and vomiting.   Endocrine: Negative.    Genitourinary: Positive for difficulty urinating. Negative for discharge, dysuria, frequency, hematuria and urgency.   Musculoskeletal: Negative.  Negative for arthralgias, back pain, gait problem, joint swelling, myalgias, neck pain and neck stiffness.   Skin: Negative for color change, pallor and wound.   Allergic/Immunologic: Negative.    Neurological: Negative for dizziness, tremors, seizures, syncope, facial asymmetry, speech difficulty, weakness, light-headedness, numbness and headaches.   Hematological: Negative.    Psychiatric/Behavioral: Negative.  Negative for behavioral problems, confusion, hallucinations and suicidal ideas. The patient is not nervous/anxious.    All other systems reviewed and are negative.    Objective:     Vital Signs (Most Recent):  Temp: 98.7 °F (37.1 °C) (10/25/20 1644)  Pulse: 78 (10/25/20 1644)  Resp: 18 (10/25/20 1644)  BP: (!) 146/65 (10/25/20  1644)  SpO2: 98 % (10/25/20 1644) Vital Signs (24h Range):  Temp:  [96.3 °F (35.7 °C)-98.7 °F (37.1 °C)] 98.7 °F (37.1 °C)  Pulse:  [64-88] 78  Resp:  [17-20] 18  SpO2:  [92 %-98 %] 98 %  BP: (129-157)/(63-70) 146/65     Weight: 83.3 kg (183 lb 10.3 oz)  Body mass index is 33.59 kg/m².    Intake/Output Summary (Last 24 hours) at 10/25/2020 1745  Last data filed at 10/25/2020 1500  Gross per 24 hour   Intake 240 ml   Output 1452 ml   Net -1212 ml      Physical Exam  Vitals signs and nursing note reviewed.   Constitutional:       General: He is not in acute distress.     Appearance: He is well-developed.   HENT:      Head: Normocephalic and atraumatic.   Eyes:      Conjunctiva/sclera: Conjunctivae normal.      Pupils: Pupils are equal, round, and reactive to light.   Neck:      Musculoskeletal: Normal range of motion and neck supple.   Cardiovascular:      Rate and Rhythm: Normal rate and regular rhythm.      Heart sounds: Normal heart sounds. No murmur.   Pulmonary:      Effort: Pulmonary effort is normal. No respiratory distress.      Breath sounds: Normal breath sounds.   Abdominal:      General: Bowel sounds are normal. There is distension.      Palpations: Abdomen is soft.      Tenderness: There is no abdominal tenderness.      Comments: Left lateral incision approximated    Musculoskeletal: Normal range of motion.         General: No tenderness or deformity.   Skin:     General: Skin is warm and dry.      Findings: No erythema or rash.   Neurological:      Mental Status: He is alert and oriented to person, place, and time.   Psychiatric:         Behavior: Behavior normal.         Significant Labs: All pertinent labs within the past 24 hours have been reviewed.    Significant Imaging:

## 2020-10-26 LAB
ALBUMIN SERPL BCP-MCNC: 2.1 G/DL (ref 3.5–5.2)
ALDOST SERPL-MCNC: 41.2 NG/DL
ANION GAP SERPL CALC-SCNC: 12 MMOL/L (ref 8–16)
BASOPHILS # BLD AUTO: 0.02 K/UL (ref 0–0.2)
BASOPHILS NFR BLD: 0.4 % (ref 0–1.9)
BUN SERPL-MCNC: 92 MG/DL (ref 8–23)
CALCIUM SERPL-MCNC: 7.6 MG/DL (ref 8.7–10.5)
CHLORIDE SERPL-SCNC: 103 MMOL/L (ref 95–110)
CO2 SERPL-SCNC: 22 MMOL/L (ref 23–29)
CREAT SERPL-MCNC: 11.2 MG/DL (ref 0.5–1.4)
DIFFERENTIAL METHOD: ABNORMAL
EOSINOPHIL # BLD AUTO: 0.2 K/UL (ref 0–0.5)
EOSINOPHIL NFR BLD: 3 % (ref 0–8)
ERYTHROCYTE [DISTWIDTH] IN BLOOD BY AUTOMATED COUNT: 13.4 % (ref 11.5–14.5)
EST. GFR  (AFRICAN AMERICAN): 5 ML/MIN/1.73 M^2
EST. GFR  (NON AFRICAN AMERICAN): 4 ML/MIN/1.73 M^2
GLUCOSE SERPL-MCNC: 144 MG/DL (ref 70–110)
HCT VFR BLD AUTO: 29.2 % (ref 40–54)
HGB BLD-MCNC: 9.4 G/DL (ref 14–18)
IMM GRANULOCYTES # BLD AUTO: 0.04 K/UL (ref 0–0.04)
IMM GRANULOCYTES NFR BLD AUTO: 0.7 % (ref 0–0.5)
LYMPHOCYTES # BLD AUTO: 0.6 K/UL (ref 1–4.8)
LYMPHOCYTES NFR BLD: 10.5 % (ref 18–48)
MCH RBC QN AUTO: 30.4 PG (ref 27–31)
MCHC RBC AUTO-ENTMCNC: 32.2 G/DL (ref 32–36)
MCV RBC AUTO: 95 FL (ref 82–98)
MONOCYTES # BLD AUTO: 0.6 K/UL (ref 0.3–1)
MONOCYTES NFR BLD: 10.9 % (ref 4–15)
NEUTROPHILS # BLD AUTO: 4.2 K/UL (ref 1.8–7.7)
NEUTROPHILS NFR BLD: 74.5 % (ref 38–73)
NRBC BLD-RTO: 0 /100 WBC
PHOSPHATE SERPL-MCNC: 7.3 MG/DL (ref 2.7–4.5)
PHOSPHATE SERPL-MCNC: 7.3 MG/DL (ref 2.7–4.5)
PLATELET # BLD AUTO: 188 K/UL (ref 150–350)
PMV BLD AUTO: 10.7 FL (ref 9.2–12.9)
POCT GLUCOSE: 122 MG/DL (ref 70–110)
POCT GLUCOSE: 135 MG/DL (ref 70–110)
POCT GLUCOSE: 174 MG/DL (ref 70–110)
POTASSIUM SERPL-SCNC: 4 MMOL/L (ref 3.5–5.1)
RBC # BLD AUTO: 3.09 M/UL (ref 4.6–6.2)
RENIN PLAS-CCNC: 0.7 NG/ML/H
SODIUM SERPL-SCNC: 137 MMOL/L (ref 136–145)
WBC # BLD AUTO: 5.6 K/UL (ref 3.9–12.7)

## 2020-10-26 PROCEDURE — 25000003 PHARM REV CODE 250: Performed by: INTERNAL MEDICINE

## 2020-10-26 PROCEDURE — 96372 THER/PROPH/DIAG INJ SC/IM: CPT

## 2020-10-26 PROCEDURE — 99232 SBSQ HOSP IP/OBS MODERATE 35: CPT | Mod: ,,, | Performed by: UROLOGY

## 2020-10-26 PROCEDURE — 25000003 PHARM REV CODE 250: Performed by: SURGERY

## 2020-10-26 PROCEDURE — 25000003 PHARM REV CODE 250: Performed by: NURSE PRACTITIONER

## 2020-10-26 PROCEDURE — 80069 RENAL FUNCTION PANEL: CPT

## 2020-10-26 PROCEDURE — 21400001 HC TELEMETRY ROOM

## 2020-10-26 PROCEDURE — 99233 PR SUBSEQUENT HOSPITAL CARE,LEVL III: ICD-10-PCS | Mod: ,,, | Performed by: INTERNAL MEDICINE

## 2020-10-26 PROCEDURE — 85025 COMPLETE CBC W/AUTO DIFF WBC: CPT

## 2020-10-26 PROCEDURE — 63600175 PHARM REV CODE 636 W HCPCS: Performed by: SURGERY

## 2020-10-26 PROCEDURE — 99233 SBSQ HOSP IP/OBS HIGH 50: CPT | Mod: ,,, | Performed by: INTERNAL MEDICINE

## 2020-10-26 PROCEDURE — 99232 PR SUBSEQUENT HOSPITAL CARE,LEVL II: ICD-10-PCS | Mod: ,,, | Performed by: UROLOGY

## 2020-10-26 PROCEDURE — 94761 N-INVAS EAR/PLS OXIMETRY MLT: CPT

## 2020-10-26 PROCEDURE — 36415 COLL VENOUS BLD VENIPUNCTURE: CPT

## 2020-10-26 RX ORDER — DOCUSATE SODIUM 100 MG/1
100 CAPSULE, LIQUID FILLED ORAL 2 TIMES DAILY
Status: DISCONTINUED | OUTPATIENT
Start: 2020-10-26 | End: 2020-10-28 | Stop reason: HOSPADM

## 2020-10-26 RX ADMIN — LABETALOL HYDROCHLORIDE 10 MG: 5 INJECTION INTRAVENOUS at 12:10

## 2020-10-26 RX ADMIN — HYDRALAZINE HYDROCHLORIDE 50 MG: 50 TABLET, FILM COATED ORAL at 02:10

## 2020-10-26 RX ADMIN — TAMSULOSIN HYDROCHLORIDE 0.4 MG: 0.4 CAPSULE ORAL at 08:10

## 2020-10-26 RX ADMIN — ATORVASTATIN CALCIUM 80 MG: 40 TABLET, FILM COATED ORAL at 08:10

## 2020-10-26 RX ADMIN — HEPARIN SODIUM 5000 UNITS: 5000 INJECTION INTRAVENOUS; SUBCUTANEOUS at 05:10

## 2020-10-26 RX ADMIN — NIFEDIPINE 60 MG: 30 TABLET, FILM COATED, EXTENDED RELEASE ORAL at 08:10

## 2020-10-26 RX ADMIN — DOCUSATE SODIUM 100 MG: 100 CAPSULE, LIQUID FILLED ORAL at 10:10

## 2020-10-26 RX ADMIN — HEPARIN SODIUM 5000 UNITS: 5000 INJECTION INTRAVENOUS; SUBCUTANEOUS at 10:10

## 2020-10-26 RX ADMIN — HYDRALAZINE HYDROCHLORIDE 50 MG: 50 TABLET, FILM COATED ORAL at 10:10

## 2020-10-26 RX ADMIN — DOCUSATE SODIUM 100 MG: 100 CAPSULE, LIQUID FILLED ORAL at 08:10

## 2020-10-26 RX ADMIN — HEPARIN SODIUM 5000 UNITS: 5000 INJECTION INTRAVENOUS; SUBCUTANEOUS at 02:10

## 2020-10-26 RX ADMIN — HYDRALAZINE HYDROCHLORIDE 50 MG: 50 TABLET, FILM COATED ORAL at 05:10

## 2020-10-26 RX ADMIN — ASPIRIN 81 MG: 81 TABLET, COATED ORAL at 10:10

## 2020-10-26 RX ADMIN — SEVELAMER CARBONATE 800 MG: 800 TABLET, FILM COATED ORAL at 08:10

## 2020-10-26 RX ADMIN — SEVELAMER CARBONATE 800 MG: 800 TABLET, FILM COATED ORAL at 11:10

## 2020-10-26 RX ADMIN — LABETALOL HYDROCHLORIDE 10 MG: 5 INJECTION INTRAVENOUS at 04:10

## 2020-10-26 RX ADMIN — SERTRALINE HYDROCHLORIDE 100 MG: 50 TABLET ORAL at 08:10

## 2020-10-26 RX ADMIN — ARIPIPRAZOLE 30 MG: 5 TABLET ORAL at 10:10

## 2020-10-26 RX ADMIN — SEVELAMER CARBONATE 800 MG: 800 TABLET, FILM COATED ORAL at 04:10

## 2020-10-26 NOTE — ASSESSMENT & PLAN NOTE
Monitor urine output, catheter in place for urinary retention/strict I's and O's, creatinine  Labs pending  Nephrology following

## 2020-10-26 NOTE — SUBJECTIVE & OBJECTIVE
Interval History: 10/26/20 No acute events overnight. The patients hematuria has resolved, Urology following. Creatinine improved slightly to 11.2, Nephrology following. Continue current management.     Review of Systems   Constitutional: Negative for activity change, appetite change, chills, diaphoresis, fatigue, fever and unexpected weight change.   HENT: Negative for congestion, drooling, ear pain, facial swelling, rhinorrhea, sinus pressure, sneezing and sore throat.    Eyes: Negative.  Negative for discharge, redness and visual disturbance.   Respiratory: Positive for cough and shortness of breath. Negative for apnea, chest tightness, wheezing and stridor.    Cardiovascular: Negative for chest pain, palpitations and leg swelling.   Gastrointestinal: Negative for abdominal distention, abdominal pain, anal bleeding, blood in stool, constipation, diarrhea, nausea and vomiting.   Endocrine: Negative.    Genitourinary: Positive for difficulty urinating. Negative for discharge, dysuria, frequency, hematuria and urgency.   Musculoskeletal: Negative.  Negative for arthralgias, back pain, gait problem, joint swelling, myalgias, neck pain and neck stiffness.   Skin: Negative for color change, pallor and wound.   Allergic/Immunologic: Negative.    Neurological: Negative for dizziness, tremors, seizures, syncope, facial asymmetry, speech difficulty, weakness, light-headedness, numbness and headaches.   Hematological: Negative.    Psychiatric/Behavioral: Negative.  Negative for behavioral problems, confusion, hallucinations and suicidal ideas. The patient is not nervous/anxious.    All other systems reviewed and are negative.    Objective:     Vital Signs (Most Recent):  Temp: 98.4 °F (36.9 °C) (10/26/20 1155)  Pulse: 78 (10/26/20 1155)  Resp: 18 (10/26/20 1155)  BP: (!) 156/68 (10/26/20 1447)  SpO2: (!) 93 % (10/26/20 1155) Vital Signs (24h Range):  Temp:  [97.6 °F (36.4 °C)-99 °F (37.2 °C)] 98.4 °F (36.9 °C)  Pulse:   [68-87] 78  Resp:  [18-20] 18  SpO2:  [93 %-98 %] 93 %  BP: (122-179)/(62-78) 156/68     Weight: 83.3 kg (183 lb 10.3 oz)  Body mass index is 33.59 kg/m².    Intake/Output Summary (Last 24 hours) at 10/26/2020 1539  Last data filed at 10/26/2020 1150  Gross per 24 hour   Intake 900 ml   Output 1750 ml   Net -850 ml      Physical Exam  Vitals signs and nursing note reviewed.   Constitutional:       General: He is not in acute distress.     Appearance: He is well-developed.   HENT:      Head: Normocephalic and atraumatic.   Eyes:      Conjunctiva/sclera: Conjunctivae normal.      Pupils: Pupils are equal, round, and reactive to light.   Neck:      Musculoskeletal: Normal range of motion and neck supple.   Cardiovascular:      Rate and Rhythm: Normal rate and regular rhythm.      Heart sounds: Normal heart sounds. No murmur.   Pulmonary:      Effort: Pulmonary effort is normal. No respiratory distress.      Breath sounds: Normal breath sounds.   Abdominal:      General: Bowel sounds are normal. There is distension.      Palpations: Abdomen is soft.      Tenderness: There is no abdominal tenderness.      Comments: Left lateral incision approximated    Musculoskeletal: Normal range of motion.         General: No tenderness or deformity.   Skin:     General: Skin is warm and dry.      Findings: No erythema or rash.   Neurological:      Mental Status: He is alert and oriented to person, place, and time.   Psychiatric:         Behavior: Behavior normal.         Significant Labs: All pertinent labs within the past 24 hours have been reviewed.    Significant Imaging:

## 2020-10-26 NOTE — SUBJECTIVE & OBJECTIVE
Interval History:     10/23/2020:  Creatinine has further increased to 10. Noe was placed at 2 am this morning and patient has made about 2 liters of bloody urine. Patient states that he is feeling fine.     10/24/2020:  Urology placed Noe catheter last night. Good UOP of 1.8 liters over past 24 hours. Creatinine at 10.8 today.     10/25/2020:  Creatinine has increased to 11.8. UOP has again declined. Patient denies any complaints.     10/26/2020:  Creatinine has declined to 11.2. Currently, patient makes about 100 cc of urine per hour.             Review of patient's allergies indicates:   Allergen Reactions    Benadryl [diphenhydramine hcl] Other (See Comments)     Pt states benadryl makes him feel numb    Ace inhibitors      YIN     Current Facility-Administered Medications   Medication Frequency    0.9%  NaCl infusion Continuous    acetaminophen tablet 650 mg Q6H PRN    ARIPiprazole tablet 30 mg QHS    aspirin EC tablet 81 mg QHS    atorvastatin tablet 80 mg Daily    dextrose 50% injection 12.5 g PRN    dextrose 50% injection 25 g PRN    diphenhydrAMINE injection 25 mg Q6H PRN    docusate sodium capsule 100 mg BID    glucagon (human recombinant) injection 1 mg PRN    glucose chewable tablet 16 g PRN    glucose chewable tablet 24 g PRN    heparin (porcine) injection 5,000 Units Q8H    hydrALAZINE tablet 50 mg Q8H    HYDROcodone-acetaminophen 5-325 mg per tablet 2 tablet Q4H PRN    influenza (QUADRIVALENT ADJUVANTED PF) vaccine 0.5 mL Prior to discharge    insulin aspart U-100 pen 0-5 Units QID (AC + HS) PRN    labetaloL injection 10 mg Q4H PRN    melatonin tablet 9 mg Nightly PRN    metoclopramide HCl injection 10 mg Q10 Min PRN    morphine injection 2 mg Q3H PRN    NIFEdipine 24 hr tablet 60 mg Daily    nozaseptin (NOZIN) nasal  BID    ondansetron injection 4 mg Q8H PRN    promethazine (PHENERGAN) 6.25 mg in dextrose 5 % 50 mL IVPB Q6H PRN    sertraline tablet 100 mg  Daily    sevelamer carbonate tablet 800 mg TID WM    simethicone chewable tablet 80 mg TID PRN    tamsulosin 24 hr capsule 0.4 mg Daily       Objective:     Vital Signs (Most Recent):  Temp: 99 °F (37.2 °C) (10/26/20 0732)  Pulse: 84 (10/26/20 0732)  Resp: 20 (10/26/20 0732)  BP: 122/62 (10/26/20 0732)  SpO2: 97 % (10/26/20 0900)  O2 Device (Oxygen Therapy): room air (10/26/20 0900) Vital Signs (24h Range):  Temp:  [97.6 °F (36.4 °C)-99 °F (37.2 °C)] 99 °F (37.2 °C)  Pulse:  [68-88] 84  Resp:  [18-20] 20  SpO2:  [95 %-98 %] 97 %  BP: (122-168)/(62-74) 122/62     Weight: 83.3 kg (183 lb 10.3 oz) (10/20/20 0735)  Body mass index is 33.59 kg/m².  Body surface area is 1.91 meters squared.    I/O last 3 completed shifts:  In: 1140 [P.O.:240; I.V.:900]  Out: 1802 [Urine:1802]    Physical Exam  Constitutional:       Appearance: He is well-developed.   HENT:      Head: Normocephalic.      Nose: No rhinorrhea.      Mouth/Throat:      Pharynx: No oropharyngeal exudate.   Eyes:      Pupils: Pupils are equal, round, and reactive to light.   Neck:      Thyroid: No thyroid mass.   Cardiovascular:      Rate and Rhythm: Normal rate and regular rhythm.      Heart sounds: S1 normal and S2 normal.   Pulmonary:      Effort: Pulmonary effort is normal. No respiratory distress.      Breath sounds: No wheezing.   Abdominal:      General: Bowel sounds are normal. There is distension.      Palpations: Abdomen is soft.      Tenderness: There is no abdominal tenderness.      Hernia: No hernia is present.   Genitourinary:     Comments: Noe cath in place, draining pinkish urine.   Musculoskeletal:      Comments: Trace bilateral LE edema   Lymphadenopathy:      Cervical: No cervical adenopathy.   Skin:     General: Skin is warm and dry.   Neurological:      Mental Status: He is alert and oriented to person, place, and time.   Psychiatric:         Behavior: Behavior is cooperative.         Significant Labs:  Lab Results   Component Value  Date    CREATININE 11.2 (H) 10/26/2020    BUN 92 (H) 10/26/2020     10/26/2020    K 4.0 10/26/2020     10/26/2020    CO2 22 (L) 10/26/2020     Lab Results   Component Value Date    .0 (H) 09/04/2020    CALCIUM 7.6 (L) 10/26/2020    PHOS 7.3 (H) 10/26/2020    PHOS 7.3 (H) 10/26/2020     Lab Results   Component Value Date    ALBUMIN 2.1 (L) 10/26/2020       Lab Results   Component Value Date    WBC 5.60 10/26/2020    HGB 9.4 (L) 10/26/2020    HCT 29.2 (L) 10/26/2020    MCV 95 10/26/2020     10/26/2020       No results for input(s): MG in the last 168 hours.      Significant Imaging:

## 2020-10-26 NOTE — PLAN OF CARE
Pt Aox4. VSS. Pt greenfield was unintentionally removed. New greenfield was placed by urology. UO WNL. BG WNL. Pt5 remained injury free [id number] shift. Will continue to monitor and POC.

## 2020-10-26 NOTE — PROGRESS NOTES
Chief Complaint:  Gross hematuria, urinary retention    HPI:    10/26/2020- patient well with no complaints, urine is relatively clear.  Volume has increased.  10/25/20- patient with no complaints this am, urine slight pink in tubing.  10/24/20- patient well, with no complaints.  75-year-old gentleman status post robotically assisted laparoscopic adrenalectomy, postoperative day 1.  Unfortunately over the evening, the patient was not voiding a Noe catheter was placed.  All the patient did have significant output of almost 900 mL, he also had significant gross hematuria.  A 24 [place] 3 way Noe catheter was attempted to be placed by the nursing staff, and then by myself with no passage beyond the prosthetic urethra.  We also attempted coude catheters, could not get beyond and into the bladder neck.  Patient states that he has never had gross hematuria before, he has never been a smoker.  States that he does have significant BPH, but on treated.  Patient states that he gets up about 8-10 times during the evening, with increased frequency and urgency during the daytime.  Patient has chronic renal insufficiency, with acute renal failure today.  No other urological history, no family history of urological cancers or stones.    Allergies:  Benadryl [diphenhydramine hcl] and Ace inhibitors    Medications:  See MAR    Review of Systems:  General: No fever, chills, fatigability, or weight loss.  Skin: No rashes, itching, or changes in color or texture of skin.  Chest: Denies CABRERA, cyanosis, wheezing, cough, and sputum production.  Abdomen: Appetite fine. No weight loss. Denies diarrhea, abdominal pain, hematemesis, or blood in stool.  Musculoskeletal: No joint stiffness or swelling. Denies back pain.  : As above.  All other review of systems negative.    PMH:   has a past medical history of Diabetes mellitus, Hyperlipidemia, Hypertension, Retinitis, and Stroke.    PSH:   has a past surgical history that includes  Fluoroscopy (Bilateral, 7/20/2020) and Robot-assisted surgical removal of adrenal gland using da Min Xi (Left, 10/20/2020).    FamHx: family history is not on file.    SocHx:  reports that he has never smoked. He has never used smokeless tobacco. He reports that he does not drink alcohol or use drugs.      Physical Exam:  Vitals:    10/26/20 1554   BP: (!) 184/74   Pulse: 78   Resp: 18   Temp: 99.1 °F (37.3 °C)     General: A&Ox3, no apparent distress, no deformities  Neck: No masses, normal ROM  Lungs: normal inspiration, no use of accessory muscles  Heart: normal pulse, no arrhythmias  Abdomen: Soft, NT, ND, no masses, no hernias, no hepatosplenomegaly  Skin: The skin is warm and dry. No jaundice.  Ext: No c/c/e.  : 10/26/20- urine clear.  10/23/20- Test desc hitesh, no abnormalities of epididymus. Normal penile and scrotal skin, except for slight phimosis.  Meatus normal, blood at the head of the meatus.    Labs/Studies:   CBC is relatively stable, please see the chart notes.  BMP demonstrated BUN and creatinine of 87 and 11.4 respectively and rising.  Renal ultrasound medical renal disease, renal cysts, no hydronephrosis 10/22/20  CT non contrasted no nephrolithiasis or hydronephrosis, bilateral renal cysts, left adrenal lesion 5/20    Impression/Plan:     1.  Gross hematuria- greenfield catheter in good position, with clear urine and good volume.  Do not believe that the increased creatinine is secondary to postrenal obstruction.  Continue Greenfield catheter, I have arranged outpatient follow-up for Greenfield catheter voiding trial.    2.  BPH- continue tamsulosin and Greenfield catheter as an outpatient.

## 2020-10-26 NOTE — PROGRESS NOTES
[facility]  Nephrology  Progress Note    Patient Name: Colt Stacy Jr.  MRN: [id number]  Admission Date: 10/20/2020  Hospital Length of Stay: 6 days  Attending Provider: Livan Quiñones MD   Primary Care Physician: Primary Doctor No  Principal Problem:Hyperaldosteronism    Subjective:     HPI: Colt Stacy Jr. is a pleasant 75-year-old  man with longstanding history of hypertension, CKD stage 4 bordering on stage 5, was diagnosed with primary hyperaldosteronism, further workup revealed left adrenal adenoma, adrenal vein sampling positive for increased activity on the left side, s/p  left adrenalectomy today , patient being admitted to hospital for observation overnight following his surgery, we were consulted due to his advanced renal failure, patient seen and examined in the room, denies any complaints at this time, comfortably eating his supper     Interval History:     10/23/2020:  Creatinine has further increased to 10. Noe was placed at 2 am this morning and patient has made about 2 liters of bloody urine. Patient states that he is feeling fine.     10/24/2020:  Urology placed Noe catheter last night. Good UOP of 1.8 liters over past 24 hours. Creatinine at 10.8 today.     10/25/2020:  Creatinine has increased to 11.8. UOP has again declined. Patient denies any complaints.     10/26/2020:  Creatinine has declined to 11.2. Currently, patient makes about 100 cc of urine per hour.             Review of patient's allergies indicates:   Allergen Reactions    Benadryl [diphenhydramine hcl] Other (See Comments)     Pt states benadryl makes him feel numb    Ace inhibitors      YIN     Current Facility-Administered Medications   Medication Frequency    0.9%  NaCl infusion Continuous    acetaminophen tablet 650 mg Q6H PRN    ARIPiprazole tablet 30 mg QHS    aspirin EC tablet 81 mg QHS    atorvastatin tablet 80 mg Daily    dextrose 50% injection 12.5 g PRN    dextrose 50%  injection 25 g PRN    diphenhydrAMINE injection 25 mg Q6H PRN    docusate sodium capsule 100 mg BID    glucagon (human recombinant) injection 1 mg PRN    glucose chewable tablet 16 g PRN    glucose chewable tablet 24 g PRN    heparin (porcine) injection 5,000 Units Q8H    hydrALAZINE tablet 50 mg Q8H    HYDROcodone-acetaminophen 5-325 mg per tablet 2 tablet Q4H PRN    influenza (QUADRIVALENT ADJUVANTED PF) vaccine 0.5 mL Prior to discharge    insulin aspart U-100 pen 0-5 Units QID (AC + HS) PRN    labetaloL injection 10 mg Q4H PRN    melatonin tablet 9 mg Nightly PRN    metoclopramide HCl injection 10 mg Q10 Min PRN    morphine injection 2 mg Q3H PRN    NIFEdipine 24 hr tablet 60 mg Daily    nozaseptin (NOZIN) nasal  BID    ondansetron injection 4 mg Q8H PRN    promethazine (PHENERGAN) 6.25 mg in dextrose 5 % 50 mL IVPB Q6H PRN    sertraline tablet 100 mg Daily    sevelamer carbonate tablet 800 mg TID WM    simethicone chewable tablet 80 mg TID PRN    tamsulosin 24 hr capsule 0.4 mg Daily       Objective:     Vital Signs (Most Recent):  Temp: 99 °F (37.2 °C) (10/26/20 0732)  Pulse: 84 (10/26/20 0732)  Resp: 20 (10/26/20 0732)  BP: 122/62 (10/26/20 0732)  SpO2: 97 % (10/26/20 0900)  O2 Device (Oxygen Therapy): room air (10/26/20 0900) Vital Signs (24h Range):  Temp:  [97.6 °F (36.4 °C)-99 °F (37.2 °C)] 99 °F (37.2 °C)  Pulse:  [68-88] 84  Resp:  [18-20] 20  SpO2:  [95 %-98 %] 97 %  BP: (122-168)/(62-74) 122/62     Weight: 83.3 kg (183 lb 10.3 oz) (10/20/20 0735)  Body mass index is 33.59 kg/m².  Body surface area is 1.91 meters squared.    I/O last 3 completed shifts:  In: 1140 [P.O.:240; I.V.:900]  Out: 1802 [Urine:1802]    Physical Exam  Constitutional:       Appearance: He is well-developed.   HENT:      Head: Normocephalic.      Nose: No rhinorrhea.      Mouth/Throat:      Pharynx: No oropharyngeal exudate.   Eyes:      Pupils: Pupils are equal, round, and reactive to light.   Neck:       Thyroid: No thyroid mass.   Cardiovascular:      Rate and Rhythm: Normal rate and regular rhythm.      Heart sounds: S1 normal and S2 normal.   Pulmonary:      Effort: Pulmonary effort is normal. No respiratory distress.      Breath sounds: No wheezing.   Abdominal:      General: Bowel sounds are normal. There is distension.      Palpations: Abdomen is soft.      Tenderness: There is no abdominal tenderness.      Hernia: No hernia is present.   Genitourinary:     Comments: Noe cath in place, draining pinkish urine.   Musculoskeletal:      Comments: Trace bilateral LE edema   Lymphadenopathy:      Cervical: No cervical adenopathy.   Skin:     General: Skin is warm and dry.   Neurological:      Mental Status: He is alert and oriented to person, place, and time.   Psychiatric:         Behavior: Behavior is cooperative.         Significant Labs:  Lab Results   Component Value Date    CREATININE 11.2 (H) 10/26/2020    BUN 92 (H) 10/26/2020     10/26/2020    K 4.0 10/26/2020     10/26/2020    CO2 22 (L) 10/26/2020     Lab Results   Component Value Date    .0 (H) 09/04/2020    CALCIUM 7.6 (L) 10/26/2020    PHOS 7.3 (H) 10/26/2020    PHOS 7.3 (H) 10/26/2020     Lab Results   Component Value Date    ALBUMIN 2.1 (L) 10/26/2020       Lab Results   Component Value Date    WBC 5.60 10/26/2020    HGB 9.4 (L) 10/26/2020    HCT 29.2 (L) 10/26/2020    MCV 95 10/26/2020     10/26/2020       No results for input(s): MG in the last 168 hours.      Significant Imaging:          Assessment/Plan:     CKD (chronic kidney disease) stage 4, GFR 15-29 ml/min  1. YIN on  Chronic kidney disease stage 5, serum creatinine increased from about 5 mg/dL to 10 on 10/23, lisinopril was stopped, urinary retention was suspected and Noe catheter was replaced by Urology on (10/23). Urology replace catheter on 10/25. Good UOP this am (300 ml/hour). Creatinine has been declining. No hydronephrosis as per CT scan from  10/25.  Will monitor closely.     2.  Hypertension, due to primary hyperaldosteronism, s/p left adrenalectomy, SBP in 140s. Monitor closely.      3.  Anemia, recent hemoglobin is 9.4 g, stable.      4.   hypernatremia/hypokalemia, has resolved.      5.  Euvolemic on exam.     6.  Proteinuria, likely due to diabetic nephropathy.    7. Hyperphosphatemia: will continue sevelamer.                  Thank you for your consult. I will follow-up with patient. Please contact us if you have any additional questions.    Soren Contreras MD  Nephrology  [facility]

## 2020-10-26 NOTE — PLAN OF CARE
Pt resting in bed taking a nap, remains free of falls and injuries this shift. BP has been running high, being controlled with PRN labetolol. Remainder of VS stable. No obvious signs or symptoms of distress noted. Blood is still leaking from his penis around the greenfield, nephrology is aware and just requests the greenfield be monitored closely. POC reviewed with the pt, verbalized understanding. No further needs at this time, will continue to monitor.

## 2020-10-26 NOTE — ASSESSMENT & PLAN NOTE
1. YIN on  Chronic kidney disease stage 5, serum creatinine increased from about 5 mg/dL to 10 on 10/23, lisinopril was stopped, urinary retention was suspected and Noe catheter was replaced by Urology on (10/23). Urology replace catheter on 10/25. Good UOP this am (300 ml/hour). Creatinine has been declining. No hydronephrosis as per CT scan from 10/25.  Will monitor closely.     2.  Hypertension, due to primary hyperaldosteronism, s/p left adrenalectomy, SBP in 140s. Monitor closely.      3.  Anemia, recent hemoglobin is 9.4 g, stable.      4.   hypernatremia/hypokalemia, has resolved.      5.  Euvolemic on exam.     6.  Proteinuria, likely due to diabetic nephropathy.    7. Hyperphosphatemia: will continue sevelamer.

## 2020-10-26 NOTE — PLAN OF CARE
Problem: Adult Inpatient Plan of Care  Goal: Plan of Care Review  Outcome: Ongoing, Progressing     POC reviewed, including indications and possible side effects of administered medications. Patient verbalized understanding and teach back. No adverse reactions noted. Patient c/o mild abdominal and penis pain. Administered medications per order. Incisions remain CDI. Noe care performed. Monitoring CBG's and strict I & O's. Breathing exercises and repositioning encouraged. NSR on heart monitor. VSS. NADN. Patient remains free of falls and injuries during shift. Will continue to monitor.    Chart check complete.

## 2020-10-26 NOTE — PROGRESS NOTES
[facility]  General Surgery  Progress Note    Subjective:     History of Present Illness:  No notes on file    Post-Op Info:  Procedure(s) (LRB):  CYSTOSCOPY, WITH DIRECT VISION INTERNAL URETHROTOMY (N/A)   3 Days Post-Op     Interval History:  CT yesterday with no significant jacy/ureteral abnormalities, continues to tolerate diet, minimal supplemental O2  Medications:  Continuous Infusions:   sodium chloride 0.9% 75 mL/hr at 10/25/20 1201     Scheduled Meds:   ARIPiprazole  30 mg Oral QHS    aspirin  81 mg Oral QHS    atorvastatin  80 mg Oral Daily    heparin (porcine)  5,000 Units Subcutaneous Q8H    hydrALAZINE  50 mg Oral Q8H    NIFEdipine  60 mg Oral Daily    nozaseptin   Each Nostril BID    polyethylene glycol  17 g Oral BID    sertraline  100 mg Oral Daily    sevelamer carbonate  800 mg Oral TID WM    tamsulosin  0.4 mg Oral Daily     PRN Meds:acetaminophen, dextrose 50%, dextrose 50%, diphenhydrAMINE, glucagon (human recombinant), glucose, glucose, HYDROcodone-acetaminophen, influenza, insulin aspart U-100, labetalol, melatonin, metoclopramide HCl, morphine, ondansetron, promethazine (PHENERGAN) IVPB, simethicone     Review of patient's allergies indicates:   Allergen Reactions    Benadryl [diphenhydramine hcl] Other (See Comments)     Pt states benadryl makes him feel numb    Ace inhibitors      YIN     Objective:     Vital Signs (Most Recent):  Temp: 99 °F (37.2 °C) (10/26/20 0732)  Pulse: 84 (10/26/20 0732)  Resp: 20 (10/26/20 0732)  BP: 122/62 (10/26/20 0732)  SpO2: 98 % (10/26/20 0732) Vital Signs (24h Range):  Temp:  [97.6 °F (36.4 °C)-99 °F (37.2 °C)] 99 °F (37.2 °C)  Pulse:  [68-88] 84  Resp:  [18-20] 20  SpO2:  [95 %-98 %] 98 %  BP: (122-168)/(62-74) 122/62     Weight: 83.3 kg (183 lb 10.3 oz)  Body mass index is 33.59 kg/m².    Intake/Output - Last 3 Shifts       10/24 0700 - 10/25 0659 10/25 0700 - 10/26 0659 10/26 0700 - 10/27 0659    P.O. 240      I.V. (mL/kg)   900 (10.8)     Total Intake(mL/kg) 240 (2.9) 900 (10.8)     Urine (mL/kg/hr) 925 (0.5) 877 (0.4)     Stool  0     Total Output 925 877     Net -685 +23            Stool Occurrence  2 x           Physical Exam  Vitals signs reviewed.   Constitutional:       Appearance: He is well-developed.   HENT:      Head: Normocephalic and atraumatic.   Neck:      Musculoskeletal: Normal range of motion and neck supple.   Cardiovascular:      Rate and Rhythm: Normal rate and regular rhythm.   Pulmonary:      Effort: Pulmonary effort is normal.      Breath sounds: Normal breath sounds.   Abdominal:      General: There is no distension.      Palpations: Abdomen is soft.      Tenderness: There is no abdominal tenderness (ATTP).      Comments: Incisions clean dry and intact     Skin:     General: Skin is warm and dry.   Neurological:      Mental Status: He is alert and oriented to person, place, and time.         Significant Labs:  CBC:   Recent Labs   Lab 10/26/20  0729   WBC 5.60   RBC 3.09*   HGB 9.4*   HCT 29.2*      MCV 95   MCH 30.4   MCHC 32.2     BMP:   Recent Labs   Lab 10/25/20  0652   *      K 3.8      CO2 21*   BUN 87*   CREATININE 11.4*   CALCIUM 7.9*       Significant Diagnostics:  I have reviewed all pertinent imaging results/findings within the past 24 hours.   CT abdomen pelvis  1. The left adrenal is absent. There is a tiny amount of gas in the anterolateral aspect of the left perirenal space. There is a small amount of subcutaneous emphysema anterolateral to the left side of the superior portion of the abdomen.  This is consistent with the patient's history of a recent surgery.  2. There has been interval development of a small left pleural effusion with associated atelectasis. There has been interval development of a tiny right pleural effusion with associated atelectasis.  3. There is a mild amount of haziness adjacent to the tail of the pancreas.  Pancreatitis cannot be excluded.  4.  There are mild inflammatory changes adjacent to the midportion of the left ureter.  A urinary tract infection cannot be excluded.  5. There is a small fat filled umbilical hernia. The width of the mouth of this hernia measures 16 mm.  6. There is an 18 mm oval shaped area of hypodensity in the anterior aspect of the left lobe of the liver. This area has a Hounsfield measurement -8.  This is characteristic of a cyst.  It measured 18 mm in size on the prior examination.  7. The prostate is prominent in size. There is a mild amount of calcification within the prostate.    Assessment/Plan:     * Hyperaldosteronism  Status post left adrenalectomy  Regular diet as tolerated   Bloated monitor for return of bowel function, antiemetics p.r.n.  Bowel regimen  Ambulate  Pain control  DVT/GI prophylaxis  Awaiting improvement kidney function prior to discharge    Shortness of breath  Monitor  Pulmonary toileting  Incentive spirometry    CKD (chronic kidney disease) stage 4, GFR 15-29 ml/min  Monitor urine output, catheter in place for urinary retention/strict I's and O's, creatinine  Labs pending  Nephrology following    Diabetes mellitus  Monitor blood glucose Insulin sliding scale    HTN (hypertension)  On p.o. antihypertensives per Nephrology        Livan Quiñones MD  General Surgery  [facility]

## 2020-10-26 NOTE — PROGRESS NOTES
[facility] Hospital Medicine  Progress Note    Patient Name: Colt Stacy Jr.  MRN: [id number]  Patient Class: IP- Inpatient   Admission Date: 10/20/2020  Length of Stay: 6 days  Attending Physician: Livan Quiñones MD  Primary Care Provider: Primary Doctor No        Subjective:     Principal Problem:Hyperaldosteronism        HPI:  Colt Stacy is a 75 year old male with history of primary hyperaldosteronism. 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 Quiñones. Surgery without acute complications. Hospital medicine has been consulted for medical management. Given his secondary hypertension, nephrology has been consulted to assist with antihypertensives.       Overview/Hospital Course:  S/p left adrenalectomy on 10/20/2020 performed by Dr. Quiñones. Hospital medicine consulted for DM and hypertension    10/21/2020  blood pressure elevated today, but much more controlled after all antihypertensives were restarted. Still has abdominal distention will monitor overnight.     10/22/2020  Worsening renal function today. Bladder scan only shows 298 mls. Will perform in and out catheter today. BM last night. Patient states he is coughing up blood tinged sputum. Unwitnessed and there is not a sample at bedside. No objective findings of bleeding. Ask the patient and nurse to notify NP so this can be assessed. Check CBC and CXR.   10/23/20 Creatinine worsened overnight from 8.9 to 10, Nephrology following. Noe placed overnight for urinary retention. Patient drained 2 liters of bloody urine overnight. Urology consulted and recommended continuous bladder irrigation. Will continue to monitor renal function. 10/24/20 Creatinine increased to 10.8 overnight, Nephrology following. Continuous bladder irrigation was stopped per Urology. No further hematuria noted. Continue current management per the primary team. 10/25/20 The patients creatinine increased  to 11.4 overnight. Nephrology and Urology following. CT Abd/pelvis showed mild inflammatory changes adjacent to the midportion of the left ureter but no obstruction. Continue to monitor. CXR showed small left pleural effusion with associated atelectasis, encouraged IS use.     Interval History: 10/26/20 No acute events overnight. The patients hematuria has resolved, Urology following. Creatinine improved slightly to 11.2, Nephrology following. Continue current management.     Review of Systems   Constitutional: Negative for activity change, appetite change, chills, diaphoresis, fatigue, fever and unexpected weight change.   HENT: Negative for congestion, drooling, ear pain, facial swelling, rhinorrhea, sinus pressure, sneezing and sore throat.    Eyes: Negative.  Negative for discharge, redness and visual disturbance.   Respiratory: Positive for cough and shortness of breath. Negative for apnea, chest tightness, wheezing and stridor.    Cardiovascular: Negative for chest pain, palpitations and leg swelling.   Gastrointestinal: Negative for abdominal distention, abdominal pain, anal bleeding, blood in stool, constipation, diarrhea, nausea and vomiting.   Endocrine: Negative.    Genitourinary: Positive for difficulty urinating. Negative for discharge, dysuria, frequency, hematuria and urgency.   Musculoskeletal: Negative.  Negative for arthralgias, back pain, gait problem, joint swelling, myalgias, neck pain and neck stiffness.   Skin: Negative for color change, pallor and wound.   Allergic/Immunologic: Negative.    Neurological: Negative for dizziness, tremors, seizures, syncope, facial asymmetry, speech difficulty, weakness, light-headedness, numbness and headaches.   Hematological: Negative.    Psychiatric/Behavioral: Negative.  Negative for behavioral problems, confusion, hallucinations and suicidal ideas. The patient is not nervous/anxious.    All other systems reviewed and are negative.    Objective:     Vital  Signs (Most Recent):  Temp: 98.4 °F (36.9 °C) (10/26/20 1155)  Pulse: 78 (10/26/20 1155)  Resp: 18 (10/26/20 1155)  BP: (!) 156/68 (10/26/20 1447)  SpO2: (!) 93 % (10/26/20 1155) Vital Signs (24h Range):  Temp:  [97.6 °F (36.4 °C)-99 °F (37.2 °C)] 98.4 °F (36.9 °C)  Pulse:  [68-87] 78  Resp:  [18-20] 18  SpO2:  [93 %-98 %] 93 %  BP: (122-179)/(62-78) 156/68     Weight: 83.3 kg (183 lb 10.3 oz)  Body mass index is 33.59 kg/m².    Intake/Output Summary (Last 24 hours) at 10/26/2020 1539  Last data filed at 10/26/2020 1150  Gross per 24 hour   Intake 900 ml   Output 1750 ml   Net -850 ml      Physical Exam  Vitals signs and nursing note reviewed.   Constitutional:       General: He is not in acute distress.     Appearance: He is well-developed.   HENT:      Head: Normocephalic and atraumatic.   Eyes:      Conjunctiva/sclera: Conjunctivae normal.      Pupils: Pupils are equal, round, and reactive to light.   Neck:      Musculoskeletal: Normal range of motion and neck supple.   Cardiovascular:      Rate and Rhythm: Normal rate and regular rhythm.      Heart sounds: Normal heart sounds. No murmur.   Pulmonary:      Effort: Pulmonary effort is normal. No respiratory distress.      Breath sounds: Normal breath sounds.   Abdominal:      General: Bowel sounds are normal. There is distension.      Palpations: Abdomen is soft.      Tenderness: There is no abdominal tenderness.      Comments: Left lateral incision approximated    Musculoskeletal: Normal range of motion.         General: No tenderness or deformity.   Skin:     General: Skin is warm and dry.      Findings: No erythema or rash.   Neurological:      Mental Status: He is alert and oriented to person, place, and time.   Psychiatric:         Behavior: Behavior normal.         Significant Labs: All pertinent labs within the past 24 hours have been reviewed.    Significant Imaging:             Assessment/Plan:      * Hyperaldosteronism  S/p left adrenalectomy  --primary  team managing  --monitor potassium    10/21/20  Primary team managing  Electrolytes stable  Still has some abdominal distention; diet advanced    10/22/20  +BM today  Still has some distention defer to Surgery    Per primary team    Acute renal failure  10/25/20 The patients creatinine increased to 11.4 overnight. Nephrology and Urology following. CT Abd/pelvis showed mild inflammatory changes adjacent to the midportion of the left ureter but no obstruction. Continue to monitor.   10/26/20 No acute events overnight. The patients hematuria has resolved, Urology following. Creatinine improved slightly to 11.2, Nephrology following. Continue current management.         Shortness of breath  - CXR showed small left pleural effusion with associated atelectasis.   - Inecentive spiraometry      CKD (chronic kidney disease) stage 4, GFR 15-29 ml/min  ---nephrology following    10/22/2020  Worsening renal failure today. Bladder scan shows mild retention at 298ml. In and out catheter today. Will repeat bladder scan in 6 hours.   --renal ultrasound ordered    10/23/20 Creatinine worsened overnight from 8.9 to 10, Nephrology following. Noe placed overnight for urinary retention. Patient drained 2 liters of bloody urine overnight. Urology consulted and recommended continuous bladder irrigation. Will continue to monitor renal function.   10/24/20 Creatinine increased to 10.8 overnight, Nephrology following. Continuous bladder irrigation was stopped per Urology. No further hematuria noted.  10/26/20 The patients hematuria has resolved, Urology following. Creatinine improved slightly to 11.2, Nephrology following. Continue current management.         Diabetes mellitus  Hemoglobin A1C   Date Value Ref Range Status   10/20/2020 6.0 (H) 4.0 - 5.6 % Final     Comment:     ADA Screening Guidelines:  5.7-6.4%  Consistent with prediabetes  >or=6.5%  Consistent with diabetes  High levels of fetal hemoglobin interfere with the  HbA1C  assay. Heterozygous hemoglobin variants (HbS, HgC, etc)do  not significantly interfere with this assay.   However, presence of multiple variants may affect accuracy.     02/04/2020 7.3 (H) 4.0 - 5.6 % Final     Comment:     ADA Screening Guidelines:  5.7-6.4%  Consistent with prediabetes  >or=6.5%  Consistent with diabetes  High levels of fetal hemoglobin interfere with the HbA1C  assay. Heterozygous hemoglobin variants (HbS, HgC, etc)do  not significantly interfere with this assay.   However, presence of multiple variants may affect accuracy.     --insulin sliding scale  --Diabetic diet      HTN (hypertension)  --blood pressure currently stable in 150's.   --agree with nephrology with only adding amlodipine for now. Titrate up as needed.   Labetalol prn    10/21/20  Blood pressure elevated this AM  Much improved after all medications were resumed    10/22/20  Controlled today    Continue to monitor      VTE Risk Mitigation (From admission, onward)         Ordered     heparin (porcine) injection 5,000 Units  Every 8 hours      10/22/20 1808                Discharge Planning   RICH:      Code Status: Prior   Is the patient medically ready for discharge?:     Reason for patient still in hospital (select all that apply): Patient trending condition, Laboratory test, Treatment and Consult recommendations  Discharge Plan A: Home with family              Rober Rubio NP  Department of Hospital Medicine   [facility]

## 2020-10-26 NOTE — ASSESSMENT & PLAN NOTE
10/25/20 The patients creatinine increased to 11.4 overnight. Nephrology and Urology following. CT Abd/pelvis showed mild inflammatory changes adjacent to the midportion of the left ureter but no obstruction. Continue to monitor.   10/26/20 No acute events overnight. The patients hematuria has resolved, Urology following. Creatinine improved slightly to 11.2, Nephrology following. Continue current management.

## 2020-10-26 NOTE — ASSESSMENT & PLAN NOTE
---nephrology following    10/22/2020  Worsening renal failure today. Bladder scan shows mild retention at 298ml. In and out catheter today. Will repeat bladder scan in 6 hours.   --renal ultrasound ordered    10/23/20 Creatinine worsened overnight from 8.9 to 10, Nephrology following. Noe placed overnight for urinary retention. Patient drained 2 liters of bloody urine overnight. Urology consulted and recommended continuous bladder irrigation. Will continue to monitor renal function.   10/24/20 Creatinine increased to 10.8 overnight, Nephrology following. Continuous bladder irrigation was stopped per Urology. No further hematuria noted.  10/26/20 The patients hematuria has resolved, Urology following. Creatinine improved slightly to 11.2, Nephrology following. Continue current management.

## 2020-10-26 NOTE — SUBJECTIVE & OBJECTIVE
Interval History:  CT yesterday with no significant jacy/ureteral abnormalities, continues to tolerate diet, minimal supplemental O2  Medications:  Continuous Infusions:   sodium chloride 0.9% 75 mL/hr at 10/25/20 1201     Scheduled Meds:   ARIPiprazole  30 mg Oral QHS    aspirin  81 mg Oral QHS    atorvastatin  80 mg Oral Daily    heparin (porcine)  5,000 Units Subcutaneous Q8H    hydrALAZINE  50 mg Oral Q8H    NIFEdipine  60 mg Oral Daily    nozaseptin   Each Nostril BID    polyethylene glycol  17 g Oral BID    sertraline  100 mg Oral Daily    sevelamer carbonate  800 mg Oral TID WM    tamsulosin  0.4 mg Oral Daily     PRN Meds:acetaminophen, dextrose 50%, dextrose 50%, diphenhydrAMINE, glucagon (human recombinant), glucose, glucose, HYDROcodone-acetaminophen, influenza, insulin aspart U-100, labetalol, melatonin, metoclopramide HCl, morphine, ondansetron, promethazine (PHENERGAN) IVPB, simethicone     Review of patient's allergies indicates:   Allergen Reactions    Benadryl [diphenhydramine hcl] Other (See Comments)     Pt states benadryl makes him feel numb    Ace inhibitors      YIN     Objective:     Vital Signs (Most Recent):  Temp: 99 °F (37.2 °C) (10/26/20 0732)  Pulse: 84 (10/26/20 0732)  Resp: 20 (10/26/20 0732)  BP: 122/62 (10/26/20 0732)  SpO2: 98 % (10/26/20 0732) Vital Signs (24h Range):  Temp:  [97.6 °F (36.4 °C)-99 °F (37.2 °C)] 99 °F (37.2 °C)  Pulse:  [68-88] 84  Resp:  [18-20] 20  SpO2:  [95 %-98 %] 98 %  BP: (122-168)/(62-74) 122/62     Weight: 83.3 kg (183 lb 10.3 oz)  Body mass index is 33.59 kg/m².    Intake/Output - Last 3 Shifts       10/24 0700 - 10/25 0659 10/25 0700 - 10/26 0659 10/26 0700 - 10/27 0659    P.O. 240      I.V. (mL/kg)  900 (10.8)     Total Intake(mL/kg) 240 (2.9) 900 (10.8)     Urine (mL/kg/hr) 925 (0.5) 877 (0.4)     Stool  0     Total Output 925 877     Net -685 +23            Stool Occurrence  2 x           Physical Exam  Vitals signs reviewed.    Constitutional:       Appearance: He is well-developed.   HENT:      Head: Normocephalic and atraumatic.   Neck:      Musculoskeletal: Normal range of motion and neck supple.   Cardiovascular:      Rate and Rhythm: Normal rate and regular rhythm.   Pulmonary:      Effort: Pulmonary effort is normal.      Breath sounds: Normal breath sounds.   Abdominal:      General: There is no distension.      Palpations: Abdomen is soft.      Tenderness: There is no abdominal tenderness (ATTP).      Comments: Incisions clean dry and intact     Skin:     General: Skin is warm and dry.   Neurological:      Mental Status: He is alert and oriented to person, place, and time.         Significant Labs:  CBC:   Recent Labs   Lab 10/26/20  0729   WBC 5.60   RBC 3.09*   HGB 9.4*   HCT 29.2*      MCV 95   MCH 30.4   MCHC 32.2     BMP:   Recent Labs   Lab 10/25/20  0652   *      K 3.8      CO2 21*   BUN 87*   CREATININE 11.4*   CALCIUM 7.9*       Significant Diagnostics:  I have reviewed all pertinent imaging results/findings within the past 24 hours.   CT abdomen pelvis  1. The left adrenal is absent. There is a tiny amount of gas in the anterolateral aspect of the left perirenal space. There is a small amount of subcutaneous emphysema anterolateral to the left side of the superior portion of the abdomen.  This is consistent with the patient's history of a recent surgery.  2. There has been interval development of a small left pleural effusion with associated atelectasis. There has been interval development of a tiny right pleural effusion with associated atelectasis.  3. There is a mild amount of haziness adjacent to the tail of the pancreas.  Pancreatitis cannot be excluded.  4. There are mild inflammatory changes adjacent to the midportion of the left ureter.  A urinary tract infection cannot be excluded.  5. There is a small fat filled umbilical hernia. The width of the mouth of this hernia measures 16  mm.  6. There is an 18 mm oval shaped area of hypodensity in the anterior aspect of the left lobe of the liver. This area has a Hounsfield measurement -8.  This is characteristic of a cyst.  It measured 18 mm in size on the prior examination.  7. The prostate is prominent in size. There is a mild amount of calcification within the prostate.

## 2020-10-27 LAB
ALBUMIN SERPL BCP-MCNC: 2.1 G/DL (ref 3.5–5.2)
ANION GAP SERPL CALC-SCNC: 11 MMOL/L (ref 8–16)
BASOPHILS # BLD AUTO: 0.03 K/UL (ref 0–0.2)
BASOPHILS NFR BLD: 0.5 % (ref 0–1.9)
BUN SERPL-MCNC: 91 MG/DL (ref 8–23)
CALCIUM SERPL-MCNC: 7.7 MG/DL (ref 8.7–10.5)
CHLORIDE SERPL-SCNC: 106 MMOL/L (ref 95–110)
CO2 SERPL-SCNC: 22 MMOL/L (ref 23–29)
CREAT SERPL-MCNC: 10.8 MG/DL (ref 0.5–1.4)
DIFFERENTIAL METHOD: ABNORMAL
EOSINOPHIL # BLD AUTO: 0.1 K/UL (ref 0–0.5)
EOSINOPHIL NFR BLD: 2.1 % (ref 0–8)
ERYTHROCYTE [DISTWIDTH] IN BLOOD BY AUTOMATED COUNT: 13.5 % (ref 11.5–14.5)
EST. GFR  (AFRICAN AMERICAN): 5 ML/MIN/1.73 M^2
EST. GFR  (NON AFRICAN AMERICAN): 4 ML/MIN/1.73 M^2
GLUCOSE SERPL-MCNC: 157 MG/DL (ref 70–110)
HCT VFR BLD AUTO: 27.1 % (ref 40–54)
HGB BLD-MCNC: 8.9 G/DL (ref 14–18)
IMM GRANULOCYTES # BLD AUTO: 0.06 K/UL (ref 0–0.04)
IMM GRANULOCYTES NFR BLD AUTO: 1 % (ref 0–0.5)
LYMPHOCYTES # BLD AUTO: 0.8 K/UL (ref 1–4.8)
LYMPHOCYTES NFR BLD: 13.6 % (ref 18–48)
MCH RBC QN AUTO: 30.9 PG (ref 27–31)
MCHC RBC AUTO-ENTMCNC: 32.8 G/DL (ref 32–36)
MCV RBC AUTO: 94 FL (ref 82–98)
MONOCYTES # BLD AUTO: 0.7 K/UL (ref 0.3–1)
MONOCYTES NFR BLD: 11.5 % (ref 4–15)
NEUTROPHILS # BLD AUTO: 4.2 K/UL (ref 1.8–7.7)
NEUTROPHILS NFR BLD: 71.3 % (ref 38–73)
NRBC BLD-RTO: 0 /100 WBC
PHOSPHATE SERPL-MCNC: 6.9 MG/DL (ref 2.7–4.5)
PHOSPHATE SERPL-MCNC: 6.9 MG/DL (ref 2.7–4.5)
PLATELET # BLD AUTO: 200 K/UL (ref 150–350)
PMV BLD AUTO: 10.6 FL (ref 9.2–12.9)
POCT GLUCOSE: 105 MG/DL (ref 70–110)
POCT GLUCOSE: 118 MG/DL (ref 70–110)
POCT GLUCOSE: 151 MG/DL (ref 70–110)
POCT GLUCOSE: 179 MG/DL (ref 70–110)
POCT GLUCOSE: 182 MG/DL (ref 70–110)
POTASSIUM SERPL-SCNC: 4.1 MMOL/L (ref 3.5–5.1)
RBC # BLD AUTO: 2.88 M/UL (ref 4.6–6.2)
SODIUM SERPL-SCNC: 139 MMOL/L (ref 136–145)
WBC # BLD AUTO: 5.82 K/UL (ref 3.9–12.7)

## 2020-10-27 PROCEDURE — 25000003 PHARM REV CODE 250: Performed by: SURGERY

## 2020-10-27 PROCEDURE — 63600175 PHARM REV CODE 636 W HCPCS: Performed by: SURGERY

## 2020-10-27 PROCEDURE — 25000003 PHARM REV CODE 250: Performed by: INTERNAL MEDICINE

## 2020-10-27 PROCEDURE — 36415 COLL VENOUS BLD VENIPUNCTURE: CPT

## 2020-10-27 PROCEDURE — 99233 PR SUBSEQUENT HOSPITAL CARE,LEVL III: ICD-10-PCS | Mod: ,,, | Performed by: INTERNAL MEDICINE

## 2020-10-27 PROCEDURE — 80069 RENAL FUNCTION PANEL: CPT

## 2020-10-27 PROCEDURE — 85025 COMPLETE CBC W/AUTO DIFF WBC: CPT

## 2020-10-27 PROCEDURE — 21400001 HC TELEMETRY ROOM

## 2020-10-27 PROCEDURE — 27000221 HC OXYGEN, UP TO 24 HOURS

## 2020-10-27 PROCEDURE — 25000003 PHARM REV CODE 250: Performed by: NURSE PRACTITIONER

## 2020-10-27 PROCEDURE — 94761 N-INVAS EAR/PLS OXIMETRY MLT: CPT

## 2020-10-27 PROCEDURE — 99233 SBSQ HOSP IP/OBS HIGH 50: CPT | Mod: ,,, | Performed by: INTERNAL MEDICINE

## 2020-10-27 PROCEDURE — 99900035 HC TECH TIME PER 15 MIN (STAT)

## 2020-10-27 RX ORDER — METOPROLOL TARTRATE 25 MG/1
25 TABLET, FILM COATED ORAL 2 TIMES DAILY
Status: DISCONTINUED | OUTPATIENT
Start: 2020-10-27 | End: 2020-10-28

## 2020-10-27 RX ORDER — LABETALOL HYDROCHLORIDE 5 MG/ML
20 INJECTION, SOLUTION INTRAVENOUS EVERY 4 HOURS PRN
Status: DISCONTINUED | OUTPATIENT
Start: 2020-10-27 | End: 2020-10-28 | Stop reason: HOSPADM

## 2020-10-27 RX ORDER — METOPROLOL TARTRATE 25 MG/1
25 TABLET, FILM COATED ORAL 2 TIMES DAILY
Status: DISCONTINUED | OUTPATIENT
Start: 2020-10-27 | End: 2020-10-27

## 2020-10-27 RX ORDER — LABETALOL HYDROCHLORIDE 5 MG/ML
20 INJECTION, SOLUTION INTRAVENOUS EVERY 4 HOURS PRN
Status: DISCONTINUED | OUTPATIENT
Start: 2020-10-27 | End: 2020-10-27

## 2020-10-27 RX ADMIN — ASPIRIN 81 MG: 81 TABLET, COATED ORAL at 09:10

## 2020-10-27 RX ADMIN — HEPARIN SODIUM 5000 UNITS: 5000 INJECTION INTRAVENOUS; SUBCUTANEOUS at 05:10

## 2020-10-27 RX ADMIN — METOPROLOL TARTRATE 25 MG: 25 TABLET, FILM COATED ORAL at 01:10

## 2020-10-27 RX ADMIN — METOPROLOL TARTRATE 25 MG: 25 TABLET, FILM COATED ORAL at 09:10

## 2020-10-27 RX ADMIN — Medication 9 MG: at 09:10

## 2020-10-27 RX ADMIN — SODIUM CHLORIDE: 0.9 INJECTION, SOLUTION INTRAVENOUS at 01:10

## 2020-10-27 RX ADMIN — DOCUSATE SODIUM 100 MG: 100 CAPSULE, LIQUID FILLED ORAL at 09:10

## 2020-10-27 RX ADMIN — SEVELAMER CARBONATE 800 MG: 800 TABLET, FILM COATED ORAL at 01:10

## 2020-10-27 RX ADMIN — SERTRALINE HYDROCHLORIDE 100 MG: 50 TABLET ORAL at 10:10

## 2020-10-27 RX ADMIN — HYDRALAZINE HYDROCHLORIDE 50 MG: 50 TABLET, FILM COATED ORAL at 01:10

## 2020-10-27 RX ADMIN — HEPARIN SODIUM 5000 UNITS: 5000 INJECTION INTRAVENOUS; SUBCUTANEOUS at 01:10

## 2020-10-27 RX ADMIN — ARIPIPRAZOLE 30 MG: 5 TABLET ORAL at 09:10

## 2020-10-27 RX ADMIN — HYDRALAZINE HYDROCHLORIDE 50 MG: 50 TABLET, FILM COATED ORAL at 09:10

## 2020-10-27 RX ADMIN — ATORVASTATIN CALCIUM 80 MG: 40 TABLET, FILM COATED ORAL at 10:10

## 2020-10-27 RX ADMIN — HYDRALAZINE HYDROCHLORIDE 50 MG: 50 TABLET, FILM COATED ORAL at 05:10

## 2020-10-27 RX ADMIN — HEPARIN SODIUM 5000 UNITS: 5000 INJECTION INTRAVENOUS; SUBCUTANEOUS at 09:10

## 2020-10-27 RX ADMIN — NIFEDIPINE 60 MG: 30 TABLET, FILM COATED, EXTENDED RELEASE ORAL at 10:10

## 2020-10-27 RX ADMIN — SEVELAMER CARBONATE 800 MG: 800 TABLET, FILM COATED ORAL at 05:10

## 2020-10-27 RX ADMIN — TAMSULOSIN HYDROCHLORIDE 0.4 MG: 0.4 CAPSULE ORAL at 10:10

## 2020-10-27 RX ADMIN — LABETALOL HYDROCHLORIDE 10 MG: 5 INJECTION INTRAVENOUS at 01:10

## 2020-10-27 NOTE — ASSESSMENT & PLAN NOTE
10/25/20 The patients creatinine increased to 11.4 overnight. Nephrology and Urology following. CT Abd/pelvis showed mild inflammatory changes adjacent to the midportion of the left ureter but no obstruction. Continue to monitor.   10/26/20 No acute events overnight. The patients hematuria has resolved, Urology following. Creatinine improved slightly to 11.2, Nephrology following. Continue current management.   10/27/20 Creatinine improved from 11.2 to 10.8, Nephrology following.

## 2020-10-27 NOTE — ASSESSMENT & PLAN NOTE
1. YIN on  Chronic kidney disease stage 5, serum creatinine increased from about 5 mg/dL to 10 on 10/23, lisinopril was stopped, urinary retention was suspected and Noe catheter was replaced by Urology on (10/23). Urology replace catheter on 10/25. Good UOP (2.1 liters over past 24 hours). Creatinine has been declining to 10.8 (from peak of 11.4). No hydronephrosis as per CT scan from 10/25.  Will monitor closely.     2.  Hypertension, due to primary hyperaldosteronism, s/p left adrenalectomy, titrate BP meds as indicated.      3.  Anemia, recent hemoglobin is 8.9 g, monitor.      4.   hypernatremia/hypokalemia, has resolved.      5.  Euvolemic on exam.     6.  Proteinuria, likely due to diabetic nephropathy.    7. Hyperphosphatemia: will continue sevelamer.

## 2020-10-27 NOTE — ASSESSMENT & PLAN NOTE
--blood pressure currently stable in 150's.   --agree with nephrology with only adding amlodipine for now. Titrate up as needed.   Labetalol prn    10/21/20  Blood pressure elevated this AM  Much improved after all medications were resumed    10/22/20  Controlled today    Continue to monitor  10/27/20 The patients blood pressure was not well controlled overnight. WIll increase PRN labetolol to 20mg and add scheduled metoprolol 25 mg PO BID.

## 2020-10-27 NOTE — SUBJECTIVE & OBJECTIVE
Interval History: No acute events overnight. The patients blood pressure was not well controlled overnight. WIll increase PRN labetolol to 20mg and add scheduled metoprolol 25 mg PO BID.       Review of Systems   Constitutional: Negative for activity change, appetite change, chills, diaphoresis, fatigue, fever and unexpected weight change.   HENT: Negative for congestion, drooling, ear pain, facial swelling, rhinorrhea, sinus pressure, sneezing and sore throat.    Eyes: Negative.  Negative for discharge, redness and visual disturbance.   Respiratory: Positive for cough and shortness of breath. Negative for apnea, chest tightness, wheezing and stridor.    Cardiovascular: Negative for chest pain, palpitations and leg swelling.   Gastrointestinal: Negative for abdominal distention, abdominal pain, anal bleeding, blood in stool, constipation, diarrhea, nausea and vomiting.   Endocrine: Negative.    Genitourinary: Positive for difficulty urinating. Negative for discharge, dysuria, frequency, hematuria and urgency.   Musculoskeletal: Negative.  Negative for arthralgias, back pain, gait problem, joint swelling, myalgias, neck pain and neck stiffness.   Skin: Negative for color change, pallor and wound.   Allergic/Immunologic: Negative.    Neurological: Negative for dizziness, tremors, seizures, syncope, facial asymmetry, speech difficulty, weakness, light-headedness, numbness and headaches.   Hematological: Negative.    Psychiatric/Behavioral: Negative.  Negative for behavioral problems, confusion, hallucinations and suicidal ideas. The patient is not nervous/anxious.    All other systems reviewed and are negative.    Objective:     Vital Signs (Most Recent):  Temp: 98.7 °F (37.1 °C) (10/27/20 1646)  Pulse: 94 (10/27/20 1646)  Resp: 18 (10/27/20 1646)  BP: (!) 161/72 (10/27/20 1646)  SpO2: 96 % (10/27/20 1646) Vital Signs (24h Range):  Temp:  [97.3 °F (36.3 °C)-98.7 °F (37.1 °C)] 98.7 °F (37.1 °C)  Pulse:  [62-99]  94  Resp:  [18-20] 18  SpO2:  [94 %-97 %] 96 %  BP: (161-189)/(70-77) 161/72     Weight: 83.3 kg (183 lb 10.3 oz)  Body mass index is 33.59 kg/m².    Intake/Output Summary (Last 24 hours) at 10/27/2020 1713  Last data filed at 10/27/2020 0600  Gross per 24 hour   Intake 900 ml   Output 400 ml   Net 500 ml      Physical Exam  Vitals signs and nursing note reviewed.   Constitutional:       General: He is not in acute distress.     Appearance: He is well-developed.   HENT:      Head: Normocephalic and atraumatic.   Eyes:      Conjunctiva/sclera: Conjunctivae normal.      Pupils: Pupils are equal, round, and reactive to light.   Neck:      Musculoskeletal: Normal range of motion and neck supple.   Cardiovascular:      Rate and Rhythm: Normal rate and regular rhythm.      Heart sounds: Normal heart sounds. No murmur.   Pulmonary:      Effort: Pulmonary effort is normal. No respiratory distress.      Breath sounds: Normal breath sounds.   Abdominal:      General: Bowel sounds are normal. There is distension.      Palpations: Abdomen is soft.      Tenderness: There is no abdominal tenderness.      Comments: Left lateral incision approximated    Musculoskeletal: Normal range of motion.         General: No tenderness or deformity.   Skin:     General: Skin is warm and dry.      Findings: No erythema or rash.   Neurological:      Mental Status: He is alert and oriented to person, place, and time.   Psychiatric:         Behavior: Behavior normal.         Significant Labs: All pertinent labs within the past 24 hours have been reviewed.    Significant Imaging:

## 2020-10-27 NOTE — NURSING
/74. PRN labetolol not available. Informed ELZA Simpson NP. Instructed to give PO scheduled hydralazine now. NADN. Will continue to monitor.

## 2020-10-27 NOTE — PROGRESS NOTES
[facility] -   Nephrology  Progress Note    Patient Name: Colt Stacy Jr.  MRN: [id number]  Admission Date: 10/20/2020  Hospital Length of Stay: 7 days  Attending Provider: Livan Quiñones MD   Primary Care Physician: Primary Doctor No  Principal Problem:Hyperaldosteronism    Subjective:     HPI: Colt Stacy Jr. is a pleasant 75-year-old  man with longstanding history of hypertension, CKD stage 4 bordering on stage 5, was diagnosed with primary hyperaldosteronism, further workup revealed left adrenal adenoma, adrenal vein sampling positive for increased activity on the left side, s/p  left adrenalectomy today , patient being admitted to hospital for observation overnight following his surgery, we were consulted due to his advanced renal failure, patient seen and examined in the room, denies any complaints at this time, comfortably eating his supper     Interval History:     10/23/2020:  Creatinine has further increased to 10. Noe was placed at 2 am this morning and patient has made about 2 liters of bloody urine. Patient states that he is feeling fine.     10/24/2020:  Urology placed Noe catheter last night. Good UOP of 1.8 liters over past 24 hours. Creatinine at 10.8 today.     10/25/2020:  Creatinine has increased to 11.8. UOP has again declined. Patient denies any complaints.     10/26/2020:  Creatinine has declined to 11.2. Currently, patient makes about 100 cc of urine per hour.     10/27/2020:  Renal function has been improving with creatinine declining to 10.8. Good UOP of 2.2 liters/day.               Review of patient's allergies indicates:   Allergen Reactions    Benadryl [diphenhydramine hcl] Other (See Comments)     Pt states benadryl makes him feel numb    Ace inhibitors      YIN     Current Facility-Administered Medications   Medication Frequency    0.9%  NaCl infusion Continuous    acetaminophen tablet 650 mg Q6H PRN    ARIPiprazole tablet 30 mg QHS     aspirin EC tablet 81 mg QHS    atorvastatin tablet 80 mg Daily    dextrose 50% injection 12.5 g PRN    dextrose 50% injection 25 g PRN    diphenhydrAMINE injection 25 mg Q6H PRN    docusate sodium capsule 100 mg BID    glucagon (human recombinant) injection 1 mg PRN    glucose chewable tablet 16 g PRN    glucose chewable tablet 24 g PRN    heparin (porcine) injection 5,000 Units Q8H    hydrALAZINE tablet 50 mg Q8H    HYDROcodone-acetaminophen 5-325 mg per tablet 2 tablet Q4H PRN    influenza (QUADRIVALENT ADJUVANTED PF) vaccine 0.5 mL Prior to discharge    insulin aspart U-100 pen 0-5 Units QID (AC + HS) PRN    labetaloL injection 10 mg Q4H PRN    melatonin tablet 9 mg Nightly PRN    metoclopramide HCl injection 10 mg Q10 Min PRN    morphine injection 2 mg Q3H PRN    NIFEdipine 24 hr tablet 60 mg Daily    ondansetron injection 4 mg Q8H PRN    promethazine (PHENERGAN) 6.25 mg in dextrose 5 % 50 mL IVPB Q6H PRN    sertraline tablet 100 mg Daily    sevelamer carbonate tablet 800 mg TID WM    simethicone chewable tablet 80 mg TID PRN    tamsulosin 24 hr capsule 0.4 mg Daily       Objective:     Vital Signs (Most Recent):  Temp: 98.5 °F (36.9 °C) (10/27/20 0744)  Pulse: 81 (10/27/20 0909)  Resp: 18 (10/27/20 0909)  BP: (!) 184/77 (10/27/20 0744)  SpO2: 96 % (10/27/20 0909)  O2 Device (Oxygen Therapy): nasal cannula (10/27/20 0909) Vital Signs (24h Range):  Temp:  [97.3 °F (36.3 °C)-99.1 °F (37.3 °C)] 98.5 °F (36.9 °C)  Pulse:  [72-99] 81  Resp:  [18-20] 18  SpO2:  [93 %-97 %] 96 %  BP: (156-189)/(68-78) 184/77     Weight: 83.3 kg (183 lb 10.3 oz) (10/20/20 0735)  Body mass index is 33.59 kg/m².  Body surface area is 1.91 meters squared.    I/O last 3 completed shifts:  In: 1918 [P.O.:118; I.V.:1800]  Out: 2450 [Urine:2450]    Physical Exam  Constitutional:       Appearance: He is well-developed.   HENT:      Head: Normocephalic.      Nose: No rhinorrhea.      Mouth/Throat:      Pharynx: No  oropharyngeal exudate.   Eyes:      Pupils: Pupils are equal, round, and reactive to light.   Neck:      Thyroid: No thyroid mass.   Cardiovascular:      Rate and Rhythm: Normal rate and regular rhythm.      Heart sounds: S1 normal and S2 normal.   Pulmonary:      Effort: Pulmonary effort is normal. No respiratory distress.      Breath sounds: No wheezing.   Abdominal:      General: Bowel sounds are normal. There is distension.      Palpations: Abdomen is soft.      Tenderness: There is no abdominal tenderness.      Hernia: No hernia is present.   Genitourinary:     Comments: Noe cath in place, draining pinkish urine.   Musculoskeletal:      Comments: Trace bilateral LE edema   Lymphadenopathy:      Cervical: No cervical adenopathy.   Skin:     General: Skin is warm and dry.   Neurological:      Mental Status: He is alert and oriented to person, place, and time.   Psychiatric:         Behavior: Behavior is cooperative.         Significant Labs:  Lab Results   Component Value Date    CREATININE 10.8 (H) 10/27/2020    BUN 91 (H) 10/27/2020     10/27/2020    K 4.1 10/27/2020     10/27/2020    CO2 22 (L) 10/27/2020     Lab Results   Component Value Date    .0 (H) 09/04/2020    CALCIUM 7.7 (L) 10/27/2020    PHOS 6.9 (H) 10/27/2020    PHOS 6.9 (H) 10/27/2020     Lab Results   Component Value Date    ALBUMIN 2.1 (L) 10/27/2020       Lab Results   Component Value Date    WBC 5.82 10/27/2020    HGB 8.9 (L) 10/27/2020    HCT 27.1 (L) 10/27/2020    MCV 94 10/27/2020     10/27/2020       No results for input(s): MG in the last 168 hours.      Significant Imaging:          Assessment/Plan:     CKD (chronic kidney disease) stage 4, GFR 15-29 ml/min  1. YIN on  Chronic kidney disease stage 5, serum creatinine increased from about 5 mg/dL to 10 on 10/23, lisinopril was stopped, urinary retention was suspected and Noe catheter was replaced by Urology on (10/23). Urology replace catheter on 10/25. Good  UOP (2.1 liters over past 24 hours). Creatinine has been declining to 10.8 (from peak of 11.4). No hydronephrosis as per CT scan from 10/25.  Will monitor closely.     2.  Hypertension, due to primary hyperaldosteronism, s/p left adrenalectomy, titrate BP meds as indicated.      3.  Anemia, recent hemoglobin is 8.9 g, monitor.      4.   hypernatremia/hypokalemia, has resolved.      5.  Euvolemic on exam.     6.  Proteinuria, likely due to diabetic nephropathy.    7. Hyperphosphatemia: will continue sevelamer.                  Thank you for your consult. I will follow-up with patient. Please contact us if you have any additional questions.    Soren Contreras MD  Nephrology  [facility]

## 2020-10-27 NOTE — PROGRESS NOTES
[facility] Hospital Medicine  Progress Note    Patient Name: Colt Stacy Jr.  MRN: [id number]  Patient Class: IP- Inpatient   Admission Date: 10/20/2020  Length of Stay: 7 days  Attending Physician: Livan Quiñones MD  Primary Care Provider: Primary Doctor No        Subjective:     Principal Problem:Hyperaldosteronism        HPI:  Colt Stacy is a 75 year old male with history of primary hyperaldosteronism. 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 Quiñones. Surgery without acute complications. Hospital medicine has been consulted for medical management. Given his secondary hypertension, nephrology has been consulted to assist with antihypertensives.       Overview/Hospital Course:  S/p left adrenalectomy on 10/20/2020 performed by Dr. Quiñones. Hospital medicine consulted for DM and hypertension    10/21/2020  blood pressure elevated today, but much more controlled after all antihypertensives were restarted. Still has abdominal distention will monitor overnight.     10/22/2020  Worsening renal function today. Bladder scan only shows 298 mls. Will perform in and out catheter today. BM last night. Patient states he is coughing up blood tinged sputum. Unwitnessed and there is not a sample at bedside. No objective findings of bleeding. Ask the patient and nurse to notify NP so this can be assessed. Check CBC and CXR.   10/23/20 Creatinine worsened overnight from 8.9 to 10, Nephrology following. Noe placed overnight for urinary retention. Patient drained 2 liters of bloody urine overnight. Urology consulted and recommended continuous bladder irrigation. Will continue to monitor renal function. 10/24/20 Creatinine increased to 10.8 overnight, Nephrology following. Continuous bladder irrigation was stopped per Urology. No further hematuria noted. Continue current management per the primary team. 10/25/20 The patients creatinine increased  to 11.4 overnight. Nephrology and Urology following. CT Abd/pelvis showed mild inflammatory changes adjacent to the midportion of the left ureter but no obstruction. Continue to monitor. CXR showed small left pleural effusion with associated atelectasis, encouraged IS use. 10/27/20 No acute events overnight. The patients blood pressure was not well controlled overnight. WIll increase PRN labetolol to 20mg and add scheduled metoprolol 25 mg PO BID.     Interval History: No acute events overnight. The patients blood pressure was not well controlled overnight. WIll increase PRN labetolol to 20mg and add scheduled metoprolol 25 mg PO BID.       Review of Systems   Constitutional: Negative for activity change, appetite change, chills, diaphoresis, fatigue, fever and unexpected weight change.   HENT: Negative for congestion, drooling, ear pain, facial swelling, rhinorrhea, sinus pressure, sneezing and sore throat.    Eyes: Negative.  Negative for discharge, redness and visual disturbance.   Respiratory: Positive for cough and shortness of breath. Negative for apnea, chest tightness, wheezing and stridor.    Cardiovascular: Negative for chest pain, palpitations and leg swelling.   Gastrointestinal: Negative for abdominal distention, abdominal pain, anal bleeding, blood in stool, constipation, diarrhea, nausea and vomiting.   Endocrine: Negative.    Genitourinary: Positive for difficulty urinating. Negative for discharge, dysuria, frequency, hematuria and urgency.   Musculoskeletal: Negative.  Negative for arthralgias, back pain, gait problem, joint swelling, myalgias, neck pain and neck stiffness.   Skin: Negative for color change, pallor and wound.   Allergic/Immunologic: Negative.    Neurological: Negative for dizziness, tremors, seizures, syncope, facial asymmetry, speech difficulty, weakness, light-headedness, numbness and headaches.   Hematological: Negative.    Psychiatric/Behavioral: Negative.  Negative for  behavioral problems, confusion, hallucinations and suicidal ideas. The patient is not nervous/anxious.    All other systems reviewed and are negative.    Objective:     Vital Signs (Most Recent):  Temp: 98.7 °F (37.1 °C) (10/27/20 1646)  Pulse: 94 (10/27/20 1646)  Resp: 18 (10/27/20 1646)  BP: (!) 161/72 (10/27/20 1646)  SpO2: 96 % (10/27/20 1646) Vital Signs (24h Range):  Temp:  [97.3 °F (36.3 °C)-98.7 °F (37.1 °C)] 98.7 °F (37.1 °C)  Pulse:  [62-99] 94  Resp:  [18-20] 18  SpO2:  [94 %-97 %] 96 %  BP: (161-189)/(70-77) 161/72     Weight: 83.3 kg (183 lb 10.3 oz)  Body mass index is 33.59 kg/m².    Intake/Output Summary (Last 24 hours) at 10/27/2020 1713  Last data filed at 10/27/2020 0600  Gross per 24 hour   Intake 900 ml   Output 400 ml   Net 500 ml      Physical Exam  Vitals signs and nursing note reviewed.   Constitutional:       General: He is not in acute distress.     Appearance: He is well-developed.   HENT:      Head: Normocephalic and atraumatic.   Eyes:      Conjunctiva/sclera: Conjunctivae normal.      Pupils: Pupils are equal, round, and reactive to light.   Neck:      Musculoskeletal: Normal range of motion and neck supple.   Cardiovascular:      Rate and Rhythm: Normal rate and regular rhythm.      Heart sounds: Normal heart sounds. No murmur.   Pulmonary:      Effort: Pulmonary effort is normal. No respiratory distress.      Breath sounds: Normal breath sounds.   Abdominal:      General: Bowel sounds are normal. There is distension.      Palpations: Abdomen is soft.      Tenderness: There is no abdominal tenderness.      Comments: Left lateral incision approximated    Musculoskeletal: Normal range of motion.         General: No tenderness or deformity.   Skin:     General: Skin is warm and dry.      Findings: No erythema or rash.   Neurological:      Mental Status: He is alert and oriented to person, place, and time.   Psychiatric:         Behavior: Behavior normal.         Significant Labs: All  pertinent labs within the past 24 hours have been reviewed.    Significant Imaging:             Assessment/Plan:      * Hyperaldosteronism  S/p left adrenalectomy  --primary team managing  --monitor potassium    10/21/20  Primary team managing  Electrolytes stable  Still has some abdominal distention; diet advanced    10/22/20  +BM today  Still has some distention defer to Surgery    Per primary team    Acute renal failure  10/25/20 The patients creatinine increased to 11.4 overnight. Nephrology and Urology following. CT Abd/pelvis showed mild inflammatory changes adjacent to the midportion of the left ureter but no obstruction. Continue to monitor.   10/26/20 No acute events overnight. The patients hematuria has resolved, Urology following. Creatinine improved slightly to 11.2, Nephrology following. Continue current management.   10/27/20 Creatinine improved from 11.2 to 10.8, Nephrology following.         Shortness of breath  - CXR showed small left pleural effusion with associated atelectasis.   - Inecentive spiraometry      CKD (chronic kidney disease) stage 4, GFR 15-29 ml/min  ---nephrology following    10/22/2020  Worsening renal failure today. Bladder scan shows mild retention at 298ml. In and out catheter today. Will repeat bladder scan in 6 hours.   --renal ultrasound ordered    10/23/20 Creatinine worsened overnight from 8.9 to 10, Nephrology following. Noe placed overnight for urinary retention. Patient drained 2 liters of bloody urine overnight. Urology consulted and recommended continuous bladder irrigation. Will continue to monitor renal function.   10/24/20 Creatinine increased to 10.8 overnight, Nephrology following. Continuous bladder irrigation was stopped per Urology. No further hematuria noted.  10/26/20 The patients hematuria has resolved, Urology following. Creatinine improved slightly to 11.2, Nephrology following. Continue current management.         Diabetes mellitus  Hemoglobin A1C    Date Value Ref Range Status   10/20/2020 6.0 (H) 4.0 - 5.6 % Final     Comment:     ADA Screening Guidelines:  5.7-6.4%  Consistent with prediabetes  >or=6.5%  Consistent with diabetes  High levels of fetal hemoglobin interfere with the HbA1C  assay. Heterozygous hemoglobin variants (HbS, HgC, etc)do  not significantly interfere with this assay.   However, presence of multiple variants may affect accuracy.     02/04/2020 7.3 (H) 4.0 - 5.6 % Final     Comment:     ADA Screening Guidelines:  5.7-6.4%  Consistent with prediabetes  >or=6.5%  Consistent with diabetes  High levels of fetal hemoglobin interfere with the HbA1C  assay. Heterozygous hemoglobin variants (HbS, HgC, etc)do  not significantly interfere with this assay.   However, presence of multiple variants may affect accuracy.     --insulin sliding scale  --Diabetic diet      HTN (hypertension)  --blood pressure currently stable in 150's.   --agree with nephrology with only adding amlodipine for now. Titrate up as needed.   Labetalol prn    10/21/20  Blood pressure elevated this AM  Much improved after all medications were resumed    10/22/20  Controlled today    Continue to monitor  10/27/20 The patients blood pressure was not well controlled overnight. WIll increase PRN labetolol to 20mg and add scheduled metoprolol 25 mg PO BID.         VTE Risk Mitigation (From admission, onward)         Ordered     heparin (porcine) injection 5,000 Units  Every 8 hours      10/22/20 1805                Discharge Planning   RICH:      Code Status: Prior   Is the patient medically ready for discharge?:     Reason for patient still in hospital (select all that apply): Patient new problem, Patient trending condition, Laboratory test and Consult recommendations  Discharge Plan A: Home with family                  Rober Rubio NP  Department of Hospital Medicine   [facility]

## 2020-10-27 NOTE — PLAN OF CARE
Problem: Adult Inpatient Plan of Care  Goal: Plan of Care Review  Outcome: Ongoing, Progressing     POC reviewed, including indications and possible side effects of administered medications. Patient verbalized understanding and teach back. No adverse reactions noted. Patient c/o mild abdominal and penis pain. Administered medications per order. Incisions remain CDI. Noe care performed. Bleeding noted at penis. Monitoring CBG's and strict I & O's. Breathing exercises and repositioning encouraged. NSR on heart monitor. VSS. NADN. Patient remains free of falls and injuries during shift. Will continue to monitor.     Chart check complete.

## 2020-10-27 NOTE — SUBJECTIVE & OBJECTIVE
Interval History:     10/23/2020:  Creatinine has further increased to 10. Noe was placed at 2 am this morning and patient has made about 2 liters of bloody urine. Patient states that he is feeling fine.     10/24/2020:  Urology placed Noe catheter last night. Good UOP of 1.8 liters over past 24 hours. Creatinine at 10.8 today.     10/25/2020:  Creatinine has increased to 11.8. UOP has again declined. Patient denies any complaints.     10/26/2020:  Creatinine has declined to 11.2. Currently, patient makes about 100 cc of urine per hour.     10/27/2020:  Renal function has been improving with creatinine declining to 10.8. Good UOP of 2.2 liters/day.               Review of patient's allergies indicates:   Allergen Reactions    Benadryl [diphenhydramine hcl] Other (See Comments)     Pt states benadryl makes him feel numb    Ace inhibitors      YIN     Current Facility-Administered Medications   Medication Frequency    0.9%  NaCl infusion Continuous    acetaminophen tablet 650 mg Q6H PRN    ARIPiprazole tablet 30 mg QHS    aspirin EC tablet 81 mg QHS    atorvastatin tablet 80 mg Daily    dextrose 50% injection 12.5 g PRN    dextrose 50% injection 25 g PRN    diphenhydrAMINE injection 25 mg Q6H PRN    docusate sodium capsule 100 mg BID    glucagon (human recombinant) injection 1 mg PRN    glucose chewable tablet 16 g PRN    glucose chewable tablet 24 g PRN    heparin (porcine) injection 5,000 Units Q8H    hydrALAZINE tablet 50 mg Q8H    HYDROcodone-acetaminophen 5-325 mg per tablet 2 tablet Q4H PRN    influenza (QUADRIVALENT ADJUVANTED PF) vaccine 0.5 mL Prior to discharge    insulin aspart U-100 pen 0-5 Units QID (AC + HS) PRN    labetaloL injection 10 mg Q4H PRN    melatonin tablet 9 mg Nightly PRN    metoclopramide HCl injection 10 mg Q10 Min PRN    morphine injection 2 mg Q3H PRN    NIFEdipine 24 hr tablet 60 mg Daily    ondansetron injection 4 mg Q8H PRN    promethazine (PHENERGAN)  6.25 mg in dextrose 5 % 50 mL IVPB Q6H PRN    sertraline tablet 100 mg Daily    sevelamer carbonate tablet 800 mg TID WM    simethicone chewable tablet 80 mg TID PRN    tamsulosin 24 hr capsule 0.4 mg Daily       Objective:     Vital Signs (Most Recent):  Temp: 98.5 °F (36.9 °C) (10/27/20 0744)  Pulse: 81 (10/27/20 0909)  Resp: 18 (10/27/20 0909)  BP: (!) 184/77 (10/27/20 0744)  SpO2: 96 % (10/27/20 0909)  O2 Device (Oxygen Therapy): nasal cannula (10/27/20 0909) Vital Signs (24h Range):  Temp:  [97.3 °F (36.3 °C)-99.1 °F (37.3 °C)] 98.5 °F (36.9 °C)  Pulse:  [72-99] 81  Resp:  [18-20] 18  SpO2:  [93 %-97 %] 96 %  BP: (156-189)/(68-78) 184/77     Weight: 83.3 kg (183 lb 10.3 oz) (10/20/20 0735)  Body mass index is 33.59 kg/m².  Body surface area is 1.91 meters squared.    I/O last 3 completed shifts:  In: 1918 [P.O.:118; I.V.:1800]  Out: 2450 [Urine:2450]    Physical Exam  Constitutional:       Appearance: He is well-developed.   HENT:      Head: Normocephalic.      Nose: No rhinorrhea.      Mouth/Throat:      Pharynx: No oropharyngeal exudate.   Eyes:      Pupils: Pupils are equal, round, and reactive to light.   Neck:      Thyroid: No thyroid mass.   Cardiovascular:      Rate and Rhythm: Normal rate and regular rhythm.      Heart sounds: S1 normal and S2 normal.   Pulmonary:      Effort: Pulmonary effort is normal. No respiratory distress.      Breath sounds: No wheezing.   Abdominal:      General: Bowel sounds are normal. There is distension.      Palpations: Abdomen is soft.      Tenderness: There is no abdominal tenderness.      Hernia: No hernia is present.   Genitourinary:     Comments: Noe cath in place, draining pinkish urine.   Musculoskeletal:      Comments: Trace bilateral LE edema   Lymphadenopathy:      Cervical: No cervical adenopathy.   Skin:     General: Skin is warm and dry.   Neurological:      Mental Status: He is alert and oriented to person, place, and time.   Psychiatric:          Behavior: Behavior is cooperative.         Significant Labs:  Lab Results   Component Value Date    CREATININE 10.8 (H) 10/27/2020    BUN 91 (H) 10/27/2020     10/27/2020    K 4.1 10/27/2020     10/27/2020    CO2 22 (L) 10/27/2020     Lab Results   Component Value Date    .0 (H) 09/04/2020    CALCIUM 7.7 (L) 10/27/2020    PHOS 6.9 (H) 10/27/2020    PHOS 6.9 (H) 10/27/2020     Lab Results   Component Value Date    ALBUMIN 2.1 (L) 10/27/2020       Lab Results   Component Value Date    WBC 5.82 10/27/2020    HGB 8.9 (L) 10/27/2020    HCT 27.1 (L) 10/27/2020    MCV 94 10/27/2020     10/27/2020       No results for input(s): MG in the last 168 hours.      Significant Imaging:

## 2020-10-28 ENCOUNTER — TELEPHONE (OUTPATIENT)
Dept: NEPHROLOGY | Facility: CLINIC | Age: 75
End: 2020-10-28

## 2020-10-28 VITALS
HEART RATE: 60 BPM | TEMPERATURE: 99 F | DIASTOLIC BLOOD PRESSURE: 61 MMHG | WEIGHT: 183.63 LBS | HEIGHT: 62 IN | RESPIRATION RATE: 18 BRPM | BODY MASS INDEX: 33.79 KG/M2 | OXYGEN SATURATION: 100 % | SYSTOLIC BLOOD PRESSURE: 136 MMHG

## 2020-10-28 LAB
ALBUMIN SERPL BCP-MCNC: 2.2 G/DL (ref 3.5–5.2)
ALP SERPL-CCNC: 49 U/L (ref 55–135)
ALT SERPL W/O P-5'-P-CCNC: 9 U/L (ref 10–44)
ANION GAP SERPL CALC-SCNC: 11 MMOL/L (ref 8–16)
AST SERPL-CCNC: 25 U/L (ref 10–40)
BASOPHILS # BLD AUTO: 0.03 K/UL (ref 0–0.2)
BASOPHILS NFR BLD: 0.5 % (ref 0–1.9)
BILIRUB SERPL-MCNC: 0.2 MG/DL (ref 0.1–1)
BUN SERPL-MCNC: 87 MG/DL (ref 8–23)
CALCIUM SERPL-MCNC: 8 MG/DL (ref 8.7–10.5)
CHLORIDE SERPL-SCNC: 109 MMOL/L (ref 95–110)
CO2 SERPL-SCNC: 21 MMOL/L (ref 23–29)
CREAT SERPL-MCNC: 10.6 MG/DL (ref 0.5–1.4)
DIFFERENTIAL METHOD: ABNORMAL
EOSINOPHIL # BLD AUTO: 0.2 K/UL (ref 0–0.5)
EOSINOPHIL NFR BLD: 3 % (ref 0–8)
ERYTHROCYTE [DISTWIDTH] IN BLOOD BY AUTOMATED COUNT: 13.4 % (ref 11.5–14.5)
EST. GFR  (AFRICAN AMERICAN): 5 ML/MIN/1.73 M^2
EST. GFR  (NON AFRICAN AMERICAN): 4 ML/MIN/1.73 M^2
GLUCOSE SERPL-MCNC: 140 MG/DL (ref 70–110)
HCT VFR BLD AUTO: 29.3 % (ref 40–54)
HGB BLD-MCNC: 9.5 G/DL (ref 14–18)
IMM GRANULOCYTES # BLD AUTO: 0.06 K/UL (ref 0–0.04)
IMM GRANULOCYTES NFR BLD AUTO: 1 % (ref 0–0.5)
LYMPHOCYTES # BLD AUTO: 1.1 K/UL (ref 1–4.8)
LYMPHOCYTES NFR BLD: 17.4 % (ref 18–48)
MCH RBC QN AUTO: 31 PG (ref 27–31)
MCHC RBC AUTO-ENTMCNC: 32.4 G/DL (ref 32–36)
MCV RBC AUTO: 96 FL (ref 82–98)
MONOCYTES # BLD AUTO: 0.6 K/UL (ref 0.3–1)
MONOCYTES NFR BLD: 10.4 % (ref 4–15)
NEUTROPHILS # BLD AUTO: 4.1 K/UL (ref 1.8–7.7)
NEUTROPHILS NFR BLD: 67.7 % (ref 38–73)
NRBC BLD-RTO: 0 /100 WBC
PHOSPHATE SERPL-MCNC: 7.2 MG/DL (ref 2.7–4.5)
PLATELET # BLD AUTO: 217 K/UL (ref 150–350)
PMV BLD AUTO: 10.2 FL (ref 9.2–12.9)
POCT GLUCOSE: 102 MG/DL (ref 70–110)
POCT GLUCOSE: 191 MG/DL (ref 70–110)
POTASSIUM SERPL-SCNC: 4.7 MMOL/L (ref 3.5–5.1)
PROT SERPL-MCNC: 5.9 G/DL (ref 6–8.4)
RBC # BLD AUTO: 3.06 M/UL (ref 4.6–6.2)
SODIUM SERPL-SCNC: 141 MMOL/L (ref 136–145)
WBC # BLD AUTO: 6.04 K/UL (ref 3.9–12.7)

## 2020-10-28 PROCEDURE — 90472 IMMUNIZATION ADMIN EACH ADD: CPT | Performed by: SURGERY

## 2020-10-28 PROCEDURE — 25000003 PHARM REV CODE 250: Performed by: INTERNAL MEDICINE

## 2020-10-28 PROCEDURE — 90694 VACC AIIV4 NO PRSRV 0.5ML IM: CPT | Performed by: SURGERY

## 2020-10-28 PROCEDURE — G0008 ADMIN INFLUENZA VIRUS VAC: HCPCS | Performed by: SURGERY

## 2020-10-28 PROCEDURE — 36415 COLL VENOUS BLD VENIPUNCTURE: CPT

## 2020-10-28 PROCEDURE — 85025 COMPLETE CBC W/AUTO DIFF WBC: CPT

## 2020-10-28 PROCEDURE — 25000003 PHARM REV CODE 250: Performed by: NURSE PRACTITIONER

## 2020-10-28 PROCEDURE — 80053 COMPREHEN METABOLIC PANEL: CPT

## 2020-10-28 PROCEDURE — 25000003 PHARM REV CODE 250: Performed by: SURGERY

## 2020-10-28 PROCEDURE — 90471 IMMUNIZATION ADMIN: CPT | Performed by: SURGERY

## 2020-10-28 PROCEDURE — 84100 ASSAY OF PHOSPHORUS: CPT

## 2020-10-28 PROCEDURE — 63600175 PHARM REV CODE 636 W HCPCS: Performed by: SURGERY

## 2020-10-28 RX ORDER — HYDRALAZINE HYDROCHLORIDE 25 MG/1
75 TABLET, FILM COATED ORAL EVERY 8 HOURS
Qty: 270 TABLET | Refills: 11 | Status: ON HOLD | OUTPATIENT
Start: 2020-10-28 | End: 2020-11-13 | Stop reason: HOSPADM

## 2020-10-28 RX ORDER — METOPROLOL TARTRATE 25 MG/1
12.5 TABLET ORAL 2 TIMES DAILY
Status: DISCONTINUED | OUTPATIENT
Start: 2020-10-28 | End: 2020-10-28 | Stop reason: HOSPADM

## 2020-10-28 RX ORDER — METOPROLOL TARTRATE 25 MG/1
12.5 TABLET, FILM COATED ORAL 2 TIMES DAILY
Qty: 30 TABLET | Refills: 11 | Status: ON HOLD | OUTPATIENT
Start: 2020-10-28 | End: 2020-11-13 | Stop reason: HOSPADM

## 2020-10-28 RX ORDER — NIFEDIPINE 60 MG/1
60 TABLET, EXTENDED RELEASE ORAL DAILY
Qty: 30 TABLET | Refills: 11 | Status: SHIPPED | OUTPATIENT
Start: 2020-10-28 | End: 2021-10-28

## 2020-10-28 RX ORDER — TAMSULOSIN HYDROCHLORIDE 0.4 MG/1
0.4 CAPSULE ORAL DAILY
Qty: 30 CAPSULE | Refills: 11 | Status: SHIPPED | OUTPATIENT
Start: 2020-10-28 | End: 2021-10-28

## 2020-10-28 RX ORDER — SEVELAMER CARBONATE 800 MG/1
800 TABLET, FILM COATED ORAL
Qty: 90 TABLET | Refills: 11 | Status: SHIPPED | OUTPATIENT
Start: 2020-10-28 | End: 2021-10-28

## 2020-10-28 RX ORDER — HYDROCODONE BITARTRATE AND ACETAMINOPHEN 5; 325 MG/1; MG/1
1 TABLET ORAL EVERY 8 HOURS PRN
Qty: 10 TABLET | Refills: 0 | Status: SHIPPED | OUTPATIENT
Start: 2020-10-28

## 2020-10-28 RX ADMIN — HEPARIN SODIUM 5000 UNITS: 5000 INJECTION INTRAVENOUS; SUBCUTANEOUS at 05:10

## 2020-10-28 RX ADMIN — METOPROLOL TARTRATE 12.5 MG: 25 TABLET, FILM COATED ORAL at 10:10

## 2020-10-28 RX ADMIN — HEPARIN SODIUM 5000 UNITS: 5000 INJECTION INTRAVENOUS; SUBCUTANEOUS at 01:10

## 2020-10-28 RX ADMIN — SERTRALINE HYDROCHLORIDE 100 MG: 50 TABLET ORAL at 10:10

## 2020-10-28 RX ADMIN — TAMSULOSIN HYDROCHLORIDE 0.4 MG: 0.4 CAPSULE ORAL at 10:10

## 2020-10-28 RX ADMIN — ATORVASTATIN CALCIUM 80 MG: 40 TABLET, FILM COATED ORAL at 10:10

## 2020-10-28 RX ADMIN — SEVELAMER CARBONATE 800 MG: 800 TABLET, FILM COATED ORAL at 08:10

## 2020-10-28 RX ADMIN — A/SINGAPORE/GP1908/2015 IVR-180 (AN A/MICHIGAN/45/2015 (H1N1)PDM09-LIKE VIRUS, A/HONG KONG/4801/2014, NYMC X-263B (H3N2) (AN A/HONG KONG/4801/2014-LIKE VIRUS), AND B/BRISBANE/60/2008, WILD TYPE (A B/BRISBANE/60/2008-LIKE VIRUS) 0.5 ML: 15; 15; 15 INJECTION, SUSPENSION INTRAMUSCULAR at 01:10

## 2020-10-28 RX ADMIN — HYDRALAZINE HYDROCHLORIDE 75 MG: 50 TABLET, FILM COATED ORAL at 01:10

## 2020-10-28 RX ADMIN — HYDRALAZINE HYDROCHLORIDE 50 MG: 50 TABLET, FILM COATED ORAL at 05:10

## 2020-10-28 RX ADMIN — NIFEDIPINE 60 MG: 30 TABLET, FILM COATED, EXTENDED RELEASE ORAL at 10:10

## 2020-10-28 NOTE — ASSESSMENT & PLAN NOTE
10/25/20 The patients creatinine increased to 11.4 overnight. Nephrology and Urology following. CT Abd/pelvis showed mild inflammatory changes adjacent to the midportion of the left ureter but no obstruction. Continue to monitor.   10/26/20 No acute events overnight. The patients hematuria has resolved, Urology following. Creatinine improved slightly to 11.2, Nephrology following. Continue current management.   10/27/20 Creatinine improved from 11.2 to 10.8, Nephrology following.   10/28/20 The patients creatinine continues to improve slowly, Cr. 10.6 today. Nephrology following

## 2020-10-28 NOTE — PLAN OF CARE
10/28/20 1520   Post-Acute Status   Post-Acute Authorization HME   E Status Set-up Complete   Discharge Plan   Discharge Plan A Home with family   Discharge Plan B Home with family       Vito Chavarria LMSW 10/28/2020 3:21 PM

## 2020-10-28 NOTE — SUBJECTIVE & OBJECTIVE
Interval History: No acute issues overnight. The patients creatinine continues to improve slowly, Cr. 10.6 today. The patient is being discharged home per the primary team.       Review of Systems   Constitutional: Negative for activity change, appetite change, chills, diaphoresis, fatigue, fever and unexpected weight change.   HENT: Negative for congestion, drooling, ear pain, facial swelling, rhinorrhea, sinus pressure, sneezing and sore throat.    Eyes: Negative.  Negative for discharge, redness and visual disturbance.   Respiratory: Positive for cough and shortness of breath. Negative for apnea, chest tightness, wheezing and stridor.    Cardiovascular: Negative for chest pain, palpitations and leg swelling.   Gastrointestinal: Negative for abdominal distention, abdominal pain, anal bleeding, blood in stool, constipation, diarrhea, nausea and vomiting.   Endocrine: Negative.    Genitourinary: Positive for difficulty urinating. Negative for discharge, dysuria, frequency, hematuria and urgency.   Musculoskeletal: Negative.  Negative for arthralgias, back pain, gait problem, joint swelling, myalgias, neck pain and neck stiffness.   Skin: Negative for color change, pallor and wound.   Allergic/Immunologic: Negative.    Neurological: Negative for dizziness, tremors, seizures, syncope, facial asymmetry, speech difficulty, weakness, light-headedness, numbness and headaches.   Hematological: Negative.    Psychiatric/Behavioral: Negative.  Negative for behavioral problems, confusion, hallucinations and suicidal ideas. The patient is not nervous/anxious.    All other systems reviewed and are negative.    Objective:     Vital Signs (Most Recent):  Temp: 98.9 °F (37.2 °C) (10/28/20 1256)  Pulse: (!) 57 (10/28/20 1256)  Resp: 18 (10/28/20 1256)  BP: 136/61 (10/28/20 1256)  SpO2: 100 % (10/28/20 1256) Vital Signs (24h Range):  Temp:  [97.8 °F (36.6 °C)-98.9 °F (37.2 °C)] 98.9 °F (37.2 °C)  Pulse:  [55-94] 57  Resp:  [18-20]  18  SpO2:  [96 %-100 %] 100 %  BP: (136-172)/(61-90) 136/61     Weight: 83.3 kg (183 lb 10.3 oz)  Body mass index is 33.59 kg/m².    Intake/Output Summary (Last 24 hours) at 10/28/2020 1408  Last data filed at 10/28/2020 0600  Gross per 24 hour   Intake 1291 ml   Output 1700 ml   Net -409 ml      Physical Exam  Vitals signs and nursing note reviewed.   Constitutional:       General: He is not in acute distress.     Appearance: He is well-developed.   HENT:      Head: Normocephalic and atraumatic.   Eyes:      Conjunctiva/sclera: Conjunctivae normal.      Pupils: Pupils are equal, round, and reactive to light.   Neck:      Musculoskeletal: Normal range of motion and neck supple.   Cardiovascular:      Rate and Rhythm: Normal rate and regular rhythm.      Heart sounds: Normal heart sounds. No murmur.   Pulmonary:      Effort: Pulmonary effort is normal. No respiratory distress.      Breath sounds: Normal breath sounds.   Abdominal:      General: Bowel sounds are normal. There is distension.      Palpations: Abdomen is soft.      Tenderness: There is no abdominal tenderness.      Comments: Left lateral incision approximated    Musculoskeletal: Normal range of motion.         General: No tenderness or deformity.   Skin:     General: Skin is warm and dry.      Findings: No erythema or rash.   Neurological:      Mental Status: He is alert and oriented to person, place, and time.   Psychiatric:         Behavior: Behavior normal.         Significant Labs: All pertinent labs within the past 24 hours have been reviewed.    Significant Imaging:

## 2020-10-28 NOTE — TELEPHONE ENCOUNTER
----- Message from Cristina Fallon sent at 10/28/2020  2:52 PM CDT -----  Contact: [facility] nurse  Type:  Sooner Apoointment Request    Caller is requesting a sooner appointment.  Caller declined first available appointment listed below.  Caller will not accept being placed on the waitlist and is requesting a message be sent to doctor.  Name of Caller:[facility] nurse  When is the first available appointment?01/2021  Symptoms:1 wk hospt f/u  Would the patient rather a call back or a response via [facility]? call  Best Call Back Number:[phone]  Additional Information:

## 2020-10-28 NOTE — PROGRESS NOTES
[facility] Hospital Medicine  Progress Note    Patient Name: Colt Stacy Jr.  MRN: [id number]  Patient Class: IP- Inpatient   Admission Date: 10/20/2020  Length of Stay: 8 days  Attending Physician: Livan Quiñones MD  Primary Care Provider: Primary Doctor No        Subjective:     Principal Problem:Hyperaldosteronism        HPI:  Colt Stacy is a 75 year old male with history of primary hyperaldosteronism. 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 Quiñones. Surgery without acute complications. Hospital medicine has been consulted for medical management. Given his secondary hypertension, nephrology has been consulted to assist with antihypertensives.       Overview/Hospital Course:  S/p left adrenalectomy on 10/20/2020 performed by Dr. Quiñones. Hospital medicine consulted for DM and hypertension    10/21/2020  blood pressure elevated today, but much more controlled after all antihypertensives were restarted. Still has abdominal distention will monitor overnight.     10/22/2020  Worsening renal function today. Bladder scan only shows 298 mls. Will perform in and out catheter today. BM last night. Patient states he is coughing up blood tinged sputum. Unwitnessed and there is not a sample at bedside. No objective findings of bleeding. Ask the patient and nurse to notify NP so this can be assessed. Check CBC and CXR.   10/23/20 Creatinine worsened overnight from 8.9 to 10, Nephrology following. Noe placed overnight for urinary retention. Patient drained 2 liters of bloody urine overnight. Urology consulted and recommended continuous bladder irrigation. Will continue to monitor renal function. 10/24/20 Creatinine increased to 10.8 overnight, Nephrology following. Continuous bladder irrigation was stopped per Urology. No further hematuria noted. Continue current management per the primary team. 10/25/20 The patients creatinine increased  to 11.4 overnight. Nephrology and Urology following. CT Abd/pelvis showed mild inflammatory changes adjacent to the midportion of the left ureter but no obstruction. Continue to monitor. CXR showed small left pleural effusion with associated atelectasis, encouraged IS use. 10/27/20 No acute events overnight. The patients blood pressure was not well controlled overnight. WIll increase PRN labetolol to 20mg and add scheduled metoprolol 25 mg PO BID. 10/28/20 No acute issues overnight. The patients creatinine continues to improve slowly, Cr. 10.6 today. The patient is being discharged home per the primary team.     Interval History: No acute issues overnight. The patients creatinine continues to improve slowly, Cr. 10.6 today. The patient is being discharged home per the primary team.       Review of Systems   Constitutional: Negative for activity change, appetite change, chills, diaphoresis, fatigue, fever and unexpected weight change.   HENT: Negative for congestion, drooling, ear pain, facial swelling, rhinorrhea, sinus pressure, sneezing and sore throat.    Eyes: Negative.  Negative for discharge, redness and visual disturbance.   Respiratory: Positive for cough and shortness of breath. Negative for apnea, chest tightness, wheezing and stridor.    Cardiovascular: Negative for chest pain, palpitations and leg swelling.   Gastrointestinal: Negative for abdominal distention, abdominal pain, anal bleeding, blood in stool, constipation, diarrhea, nausea and vomiting.   Endocrine: Negative.    Genitourinary: Positive for difficulty urinating. Negative for discharge, dysuria, frequency, hematuria and urgency.   Musculoskeletal: Negative.  Negative for arthralgias, back pain, gait problem, joint swelling, myalgias, neck pain and neck stiffness.   Skin: Negative for color change, pallor and wound.   Allergic/Immunologic: Negative.    Neurological: Negative for dizziness, tremors, seizures, syncope, facial asymmetry, speech  difficulty, weakness, light-headedness, numbness and headaches.   Hematological: Negative.    Psychiatric/Behavioral: Negative.  Negative for behavioral problems, confusion, hallucinations and suicidal ideas. The patient is not nervous/anxious.    All other systems reviewed and are negative.    Objective:     Vital Signs (Most Recent):  Temp: 98.9 °F (37.2 °C) (10/28/20 1256)  Pulse: (!) 57 (10/28/20 1256)  Resp: 18 (10/28/20 1256)  BP: 136/61 (10/28/20 1256)  SpO2: 100 % (10/28/20 1256) Vital Signs (24h Range):  Temp:  [97.8 °F (36.6 °C)-98.9 °F (37.2 °C)] 98.9 °F (37.2 °C)  Pulse:  [55-94] 57  Resp:  [18-20] 18  SpO2:  [96 %-100 %] 100 %  BP: (136-172)/(61-90) 136/61     Weight: 83.3 kg (183 lb 10.3 oz)  Body mass index is 33.59 kg/m².    Intake/Output Summary (Last 24 hours) at 10/28/2020 1408  Last data filed at 10/28/2020 0600  Gross per 24 hour   Intake 1291 ml   Output 1700 ml   Net -409 ml      Physical Exam  Vitals signs and nursing note reviewed.   Constitutional:       General: He is not in acute distress.     Appearance: He is well-developed.   HENT:      Head: Normocephalic and atraumatic.   Eyes:      Conjunctiva/sclera: Conjunctivae normal.      Pupils: Pupils are equal, round, and reactive to light.   Neck:      Musculoskeletal: Normal range of motion and neck supple.   Cardiovascular:      Rate and Rhythm: Normal rate and regular rhythm.      Heart sounds: Normal heart sounds. No murmur.   Pulmonary:      Effort: Pulmonary effort is normal. No respiratory distress.      Breath sounds: Normal breath sounds.   Abdominal:      General: Bowel sounds are normal. There is distension.      Palpations: Abdomen is soft.      Tenderness: There is no abdominal tenderness.      Comments: Left lateral incision approximated    Musculoskeletal: Normal range of motion.         General: No tenderness or deformity.   Skin:     General: Skin is warm and dry.      Findings: No erythema or rash.   Neurological:       Mental Status: He is alert and oriented to person, place, and time.   Psychiatric:         Behavior: Behavior normal.         Significant Labs: All pertinent labs within the past 24 hours have been reviewed.    Significant Imaging:             Assessment/Plan:      * Hyperaldosteronism  S/p left adrenalectomy  --primary team managing  --monitor potassium    10/21/20  Primary team managing  Electrolytes stable  Still has some abdominal distention; diet advanced    10/22/20  +BM today  Still has some distention defer to Surgery    Per primary team  10/28/20 No acute issues overnight. The patient is being discharged home per the primary team.       Acute renal failure  10/25/20 The patients creatinine increased to 11.4 overnight. Nephrology and Urology following. CT Abd/pelvis showed mild inflammatory changes adjacent to the midportion of the left ureter but no obstruction. Continue to monitor.   10/26/20 No acute events overnight. The patients hematuria has resolved, Urology following. Creatinine improved slightly to 11.2, Nephrology following. Continue current management.   10/27/20 Creatinine improved from 11.2 to 10.8, Nephrology following.   10/28/20 The patients creatinine continues to improve slowly, Cr. 10.6 today. Nephrology following       Shortness of breath  - CXR showed small left pleural effusion with associated atelectasis.   - Inecentive spiraometry      CKD (chronic kidney disease) stage 4, GFR 15-29 ml/min  ---nephrology following    10/22/2020  Worsening renal failure today. Bladder scan shows mild retention at 298ml. In and out catheter today. Will repeat bladder scan in 6 hours.   --renal ultrasound ordered    10/23/20 Creatinine worsened overnight from 8.9 to 10, Nephrology following. Noe placed overnight for urinary retention. Patient drained 2 liters of bloody urine overnight. Urology consulted and recommended continuous bladder irrigation. Will continue to monitor renal function.   10/24/20  Creatinine increased to 10.8 overnight, Nephrology following. Continuous bladder irrigation was stopped per Urology. No further hematuria noted.  10/26/20 The patients hematuria has resolved, Urology following. Creatinine improved slightly to 11.2, Nephrology following. Continue current management.         Diabetes mellitus  Hemoglobin A1C   Date Value Ref Range Status   10/20/2020 6.0 (H) 4.0 - 5.6 % Final     Comment:     ADA Screening Guidelines:  5.7-6.4%  Consistent with prediabetes  >or=6.5%  Consistent with diabetes  High levels of fetal hemoglobin interfere with the HbA1C  assay. Heterozygous hemoglobin variants (HbS, HgC, etc)do  not significantly interfere with this assay.   However, presence of multiple variants may affect accuracy.     02/04/2020 7.3 (H) 4.0 - 5.6 % Final     Comment:     ADA Screening Guidelines:  5.7-6.4%  Consistent with prediabetes  >or=6.5%  Consistent with diabetes  High levels of fetal hemoglobin interfere with the HbA1C  assay. Heterozygous hemoglobin variants (HbS, HgC, etc)do  not significantly interfere with this assay.   However, presence of multiple variants may affect accuracy.     --insulin sliding scale  --Diabetic diet      HTN (hypertension)  --blood pressure currently stable in 150's.   --agree with nephrology with only adding amlodipine for now. Titrate up as needed.   Labetalol prn    10/21/20  Blood pressure elevated this AM  Much improved after all medications were resumed    10/22/20  Controlled today    Continue to monitor  10/27/20 The patients blood pressure was not well controlled overnight. WIll increase PRN labetolol to 20mg and add scheduled metoprolol 25 mg PO BID.   10/28/20 Decrease metoprolol to 12.5mg BID and increase hydralazine to75 mg Q8.       VTE Risk Mitigation (From admission, onward)         Ordered     heparin (porcine) injection 5,000 Units  Every 8 hours      10/22/20 1808                Discharge Planning   RICH: 10/28/2020     Code Status:  Prior   Is the patient medically ready for discharge?:     Reason for patient still in hospital (select all that apply): Patient new problem, Patient trending condition, Laboratory test and Consult recommendations  Discharge Plan A: Home with family                  Rober Rubio NP  Department of Hospital Medicine   [facility]

## 2020-10-28 NOTE — SUBJECTIVE & OBJECTIVE
Interval History:  No complaints this am    Medications:  Continuous Infusions:   sodium chloride 0.9% 75 mL/hr at 10/27/20 1323     Scheduled Meds:   ARIPiprazole  30 mg Oral QHS    aspirin  81 mg Oral QHS    atorvastatin  80 mg Oral Daily    docusate sodium  100 mg Oral BID    heparin (porcine)  5,000 Units Subcutaneous Q8H    hydrALAZINE  50 mg Oral Q8H    metoprolol tartrate  25 mg Oral BID    NIFEdipine  60 mg Oral Daily    sertraline  100 mg Oral Daily    sevelamer carbonate  800 mg Oral TID WM    tamsulosin  0.4 mg Oral Daily     PRN Meds:acetaminophen, dextrose 50%, dextrose 50%, diphenhydrAMINE, glucagon (human recombinant), glucose, glucose, HYDROcodone-acetaminophen, influenza, insulin aspart U-100, labetalol, melatonin, metoclopramide HCl, morphine, ondansetron, promethazine (PHENERGAN) IVPB, simethicone     Review of patient's allergies indicates:   Allergen Reactions    Benadryl [diphenhydramine hcl] Other (See Comments)     Pt states benadryl makes him feel numb    Ace inhibitors      YIN     Objective:     Vital Signs (Most Recent):  Temp: 98.7 °F (37.1 °C) (10/27/20 1646)  Pulse: 94 (10/27/20 1725)  Resp: 18 (10/27/20 1646)  BP: (!) 161/72 (10/27/20 1646)  SpO2: 96 % (10/27/20 1646) Vital Signs (24h Range):  Temp:  [97.3 °F (36.3 °C)-98.7 °F (37.1 °C)] 98.7 °F (37.1 °C)  Pulse:  [62-99] 94  Resp:  [18-20] 18  SpO2:  [94 %-97 %] 96 %  BP: (161-189)/(70-77) 161/72     Weight: 83.3 kg (183 lb 10.3 oz)  Body mass index is 33.59 kg/m².    Intake/Output - Last 3 Shifts       10/25 0700 - 10/26 0659 10/26 0700 - 10/27 0659 10/27 0700 - 10/28 0659    P.O.  118     I.V. (mL/kg) 900 (10.8) 900 (10.8)     Total Intake(mL/kg) 900 (10.8) 1018 (12.2)     Urine (mL/kg/hr) 877 (0.4) 2100 (1.1) 900 (0.9)    Stool 0 0     Total Output 877 2100 900    Net +23 -1082 -900           Stool Occurrence 2 x 3 x           Physical Exam  Vitals signs reviewed.   Constitutional:       Appearance: He is  well-developed.   HENT:      Head: Normocephalic and atraumatic.   Neck:      Musculoskeletal: Normal range of motion and neck supple.   Cardiovascular:      Rate and Rhythm: Normal rate and regular rhythm.   Pulmonary:      Effort: Pulmonary effort is normal.      Breath sounds: Normal breath sounds.   Abdominal:      General: There is no distension.      Palpations: Abdomen is soft.      Tenderness: There is no abdominal tenderness (ATTP).      Comments: Incisions clean dry and intact     Skin:     General: Skin is warm and dry.   Neurological:      Mental Status: He is alert and oriented to person, place, and time.         Significant Labs:  CBC:   Recent Labs   Lab 10/27/20  0627   WBC 5.82   RBC 2.88*   HGB 8.9*   HCT 27.1*      MCV 94   MCH 30.9   MCHC 32.8     BMP:   Recent Labs   Lab 10/27/20  0627   *      K 4.1      CO2 22*   BUN 91*   CREATININE 10.8*   CALCIUM 7.7*       Significant Diagnostics:  I have reviewed all pertinent imaging results/findings within the past 24 hours.

## 2020-10-28 NOTE — DISCHARGE SUMMARY
[facility]  General Surgery  Discharge Summary      Patient Name: Colt Stacy Jr.  MRN: [id number]  Admission Date: 10/20/2020  Hospital Length of Stay: 8 days  Discharge Date and Time:  10/28/2020 3:12 PM  Attending Physician: Devorah Sharpe MD   Discharging Provider: Devorah Sharpe MD  Primary Care Provider: Primary Doctor No    HPI:   No notes on file    Procedure(s) (LRB):  CYSTOSCOPY, WITH DIRECT VISION INTERNAL URETHROTOMY (N/A)      Indwelling Lines/Drains at time of discharge:   Lines/Drains/Airways     Drain                 Urethral Catheter 10/25/20 1005 [address];Non-latex 14 Fr. 3 days              Hospital Course: Status post robotic left adrenalectomy    Postop day 1 bloated although tolerating diet, no bowel function, adjusting blood pressure medications to control hypertension, pain controlled    Postop day 2 tolerating diet and having bowel function, elevated cr, low uop, BP control improved    Postop day 3 creatinine remains elevated, catheter placed for retention, tolerating diet although still somewhat distended    Postop day 4 Patient feels bloated.  He also complains of shortness of breath.  He had a Noe placed.  His BUN and creatinine are still elevated.  His urine is somewhat    Postop day 5.  Feeling better.  Less shortness of breath.  His Noe catheter became displaced.  His urine output is questionable.  He is getting a CT scan with stone protocol    Postop day 6 tolerating diet, on minimal O2, ambulating    Postop day 7 bun/cr improving, good uop    Postop day kidney function stable, good urine output, home oxygen and Walker set up patient stable and ready for discharge    Consults:   Consults (From admission, onward)        Status Ordering Provider     Consult to Case Management/Social Work  Once     Provider:  (Not yet assigned)    Completed DEVORAH SHARPE     Inpatient consult to Internal Medicine  Once     Provider:  (Not yet assigned)    Acknowledged DEVORAH SHARPE      "Inpatient consult to Nephrology  Once     Provider:  (Not yet assigned)    Completed DEVORAH SHARPE     Inpatient consult to Urology  Once     Provider:  (Not yet assigned)    Completed VIDAL GONZALES          Significant Diagnostic Studies:  None    Pending Diagnostic Studies:     None        Final Active Diagnoses:    Diagnosis Date Noted POA    PRINCIPAL PROBLEM:  Hyperaldosteronism [E26.9] 07/20/2020 Yes    Acute renal failure [N17.9] 10/25/2020 No    Shortness of breath [R06.02] 10/24/2020 No    CKD (chronic kidney disease) stage 4, GFR 15-29 ml/min [N18.4] 12/18/2019 Yes    Diabetes mellitus [E11.9] 12/11/2019 Yes    HTN (hypertension) [I10] 12/11/2019 Yes      Problems Resolved During this Admission:      Discharged Condition: good    Disposition: Home or Self Care    Follow Up:  Follow-up Information     Juni Lala MD. Call in 2 weeks.    Specialty: Urology  Why: Follow up for greenfield catheter  Contact information:  [address]  [place] [address]  [phone]             Thad Eastman MD In 1 week.    Specialty: Nephrology  Why: Needs to be seen in renal clinic within 1 week  Contact information:  [address]  [place] [address]  [phone]             Devorah Sharpe MD In 1 week.    Specialties: General Surgery, Bariatrics  Contact information:  [address] [facility]  [address]th [address]  [facility] [address]  [phone]                 Patient Instructions:      WALKER FOR HOME USE     Order Specific Question Answer Comments   Type of Walker: Adult (5'4"-6'6")    With wheels? Yes    Height: 5' 2" (1.575 m)    Weight: 83.3 kg (183 lb 10.3 oz)    Length of need (1-99 months): 99    Does patient have medical equipment at home? none    Please check all that apply: Patient is unable to safely ambulate without equipment.    Vendor: Other (use comments) OUT SOURCE TO [employer]   Expected Date of Delivery: 10/28/2020      OXYGEN FOR HOME USE     Order Specific Question Answer Comments " "  Liter Flow 2    Duration With activity    Qualifying SpO2: 87 improved to 93 with 2 L   Testing done at: Exercise/Activity    Route nasal cannula    Device home concentrator with portable unit    Height: 5' 2" (1.575 m)    Weight: 83.3 kg (183 lb 10.3 oz)    Does patient have medical equipment at home? none    Alternative treatment measures have been tried or considered and deemed clinically ineffective. Yes    Vendor: Other (use comments) OUT SOURCE TO [facility]   Expected Date of Delivery: 10/28/2020      Medications:  Reconciled Home Medications:      Medication List      START taking these medications    HYDROcodone-acetaminophen 5-325 mg per tablet  Commonly known as: NORCO  Take 1 tablet by mouth every 8 (eight) hours as needed.     metoprolol tartrate 25 MG tablet  Commonly known as: LOPRESSOR  Take 0.5 tablets (12.5 mg total) by mouth 2 (two) times daily.     NIFEdipine 60 MG (OSM) 24 hr tablet  Commonly known as: PROCARDIA-XL  Take 1 tablet (60 mg total) by mouth once daily.     sevelamer carbonate 800 mg Tab  Commonly known as: RENVELA  Take 1 tablet (800 mg total) by mouth 3 (three) times daily with meals.     tamsulosin 0.4 mg Cap  Commonly known as: FLOMAX  Take 1 capsule (0.4 mg total) by mouth once daily.        CHANGE how you take these medications    hydrALAZINE 25 MG tablet  Commonly known as: APRESOLINE  Take 3 tablets (75 mg total) by mouth every 8 (eight) hours.  What changed:   · medication strength  · how much to take  · when to take this        CONTINUE taking these medications    ARIPiprazole 30 MG Tab  Commonly known as: ABILIFY  Take 30 mg by mouth every evening.     ASPIR-81 ORAL  Take 81 mg by mouth every evening.     atorvastatin 80 MG tablet  Commonly known as: LIPITOR  Take 1 tablet (80 mg total) by mouth once daily.     insulin aspart U-100 100 unit/mL injection  Commonly known as: NOVOLOG  Inject into the skin 2 (two) times a day. 4 units q a.m, 7 units at dinner     insulin detemir " U-100 100 unit/mL injection  Commonly known as: LEVEMIR  Inject 31 Units into the skin every evening.     multivitamin capsule  Take 1 capsule by mouth once daily.     sertraline 100 MG tablet  Commonly known as: ZOLOFT  Take 100 mg by mouth once daily.        STOP taking these medications    amLODIPine 10 MG tablet  Commonly known as: NORVASC     candesartan 4 MG tablet  Commonly known as: ATACAND     prazosin 1 MG Cap  Commonly known as: MINIPRESS     spironolactone 25 MG tablet  Commonly known as: ALDACTONE          Time spent on the discharge of patient: 30 minutes    Livan Quiñones MD  General Surgery  [facility]

## 2020-10-28 NOTE — PLAN OF CARE
SW spoke with jeffrey at [facility], they will be delivering pt oxygen and RW today.     Vito Chavarria LMSW 10/28/2020 10:59 AM

## 2020-10-28 NOTE — PROGRESS NOTES
Unable to schedule 1 week follow up with nephrology. Message sent to nephrology dept nurse to schedule and notify pt of appt date and time.

## 2020-10-28 NOTE — ASSESSMENT & PLAN NOTE
Status post left adrenalectomy  Regular diet as tolerated   Bloated monitor for return of bowel function, antiemetics p.r.n.  Bowel regimen  Ambulate  Pain control  DVT/GI prophylaxis  Awaiting improvement kidney function prior to discharge  Possible discharge tomorrow

## 2020-10-28 NOTE — RESPIRATORY THERAPY
Home Oxygen Evaluation    Date Performed: 10/27/2020    1) Patient's Home O2 Sat on room air, while at rest: 92        If O2 sats on room air at rest are 88% or below, patient qualifies. No additional testing needed. Document N/A in steps 2 and 3. If 89% or above, complete steps 2.      2) Patient's O2 Sat on room air while exercisin. Patient also stated he is SOB                     Patient was kicking legs and moving arms around due to not being able to get up and walk on his own.        If O2 sats on room air while exercising remain 89% or above patient does not qualify, no further testing needed Document N/A in step 3. If O2 sats on room air while exercising are 88% or below, continue to step 3.      3) Patient's O2 Sat while exercising on O2: 93 at 2 LPM         (Must show improvement from #2 for patients to qualify)    If O2 sats improve on oxygen, patient qualifies for portable oxygen. If not, the patient does not qualify.

## 2020-10-28 NOTE — ASSESSMENT & PLAN NOTE
S/p left adrenalectomy  --primary team managing  --monitor potassium    10/21/20  Primary team managing  Electrolytes stable  Still has some abdominal distention; diet advanced    10/22/20  +BM today  Still has some distention defer to Surgery    Per primary team  10/28/20 No acute issues overnight. The patient is being discharged home per the primary team.

## 2020-10-28 NOTE — PLAN OF CARE
Problem: Adult Inpatient Plan of Care  Goal: Plan of Care Review  Outcome: Ongoing, Progressing     Pt remained free of injury during shift, stable condition, denies pain, receiving IVF, blood glucose monitored, surgical incisions CDI, greenfield care performed, Q2 turning encouraged, NAD, VSS, will continue to monitor. 24hr chart review performed.

## 2020-10-28 NOTE — PROGRESS NOTES
[facility]  General Surgery  Progress Note    Subjective:     History of Present Illness:  No notes on file    Post-Op Info:  Procedure(s) (LRB):  CYSTOSCOPY, WITH DIRECT VISION INTERNAL URETHROTOMY (N/A)   4 Days Post-Op     Interval History:  No complaints this am    Medications:  Continuous Infusions:   sodium chloride 0.9% 75 mL/hr at 10/27/20 1323     Scheduled Meds:   ARIPiprazole  30 mg Oral QHS    aspirin  81 mg Oral QHS    atorvastatin  80 mg Oral Daily    docusate sodium  100 mg Oral BID    heparin (porcine)  5,000 Units Subcutaneous Q8H    hydrALAZINE  50 mg Oral Q8H    metoprolol tartrate  25 mg Oral BID    NIFEdipine  60 mg Oral Daily    sertraline  100 mg Oral Daily    sevelamer carbonate  800 mg Oral TID WM    tamsulosin  0.4 mg Oral Daily     PRN Meds:acetaminophen, dextrose 50%, dextrose 50%, diphenhydrAMINE, glucagon (human recombinant), glucose, glucose, HYDROcodone-acetaminophen, influenza, insulin aspart U-100, labetalol, melatonin, metoclopramide HCl, morphine, ondansetron, promethazine (PHENERGAN) IVPB, simethicone     Review of patient's allergies indicates:   Allergen Reactions    Benadryl [diphenhydramine hcl] Other (See Comments)     Pt states benadryl makes him feel numb    Ace inhibitors      YIN     Objective:     Vital Signs (Most Recent):  Temp: 98.7 °F (37.1 °C) (10/27/20 1646)  Pulse: 94 (10/27/20 1725)  Resp: 18 (10/27/20 1646)  BP: (!) 161/72 (10/27/20 1646)  SpO2: 96 % (10/27/20 1646) Vital Signs (24h Range):  Temp:  [97.3 °F (36.3 °C)-98.7 °F (37.1 °C)] 98.7 °F (37.1 °C)  Pulse:  [62-99] 94  Resp:  [18-20] 18  SpO2:  [94 %-97 %] 96 %  BP: (161-189)/(70-77) 161/72     Weight: 83.3 kg (183 lb 10.3 oz)  Body mass index is 33.59 kg/m².    Intake/Output - Last 3 Shifts       10/25 0700 - 10/26 0659 10/26 0700 - 10/27 0659 10/27 0700 - 10/28 0659    P.O.  118     I.V. (mL/kg) 900 (10.8) 900 (10.8)     Total Intake(mL/kg) 900 (10.8) 1018 (12.2)     Urine  (mL/kg/hr) 877 (0.4) 2100 (1.1) 900 (0.9)    Stool 0 0     Total Output 877 2100 900    Net +23 -1082 -900           Stool Occurrence 2 x 3 x           Physical Exam  Vitals signs reviewed.   Constitutional:       Appearance: He is well-developed.   HENT:      Head: Normocephalic and atraumatic.   Neck:      Musculoskeletal: Normal range of motion and neck supple.   Cardiovascular:      Rate and Rhythm: Normal rate and regular rhythm.   Pulmonary:      Effort: Pulmonary effort is normal.      Breath sounds: Normal breath sounds.   Abdominal:      General: There is no distension.      Palpations: Abdomen is soft.      Tenderness: There is no abdominal tenderness (ATTP).      Comments: Incisions clean dry and intact     Skin:     General: Skin is warm and dry.   Neurological:      Mental Status: He is alert and oriented to person, place, and time.         Significant Labs:  CBC:   Recent Labs   Lab 10/27/20  0627   WBC 5.82   RBC 2.88*   HGB 8.9*   HCT 27.1*      MCV 94   MCH 30.9   MCHC 32.8     BMP:   Recent Labs   Lab 10/27/20  0627   *      K 4.1      CO2 22*   BUN 91*   CREATININE 10.8*   CALCIUM 7.7*       Significant Diagnostics:  I have reviewed all pertinent imaging results/findings within the past 24 hours.       Assessment/Plan:     * Hyperaldosteronism  Status post left adrenalectomy  Regular diet as tolerated   Bloated monitor for return of bowel function, antiemetics p.r.n.  Bowel regimen  Ambulate  Pain control  DVT/GI prophylaxis  Awaiting improvement kidney function prior to discharge  Possible discharge tomorrow    Acute renal failure  Improving    Shortness of breath  Monitor  Pulmonary toileting  Incentive spirometry  Home O2 eval    CKD (chronic kidney disease) stage 4, GFR 15-29 ml/min  Monitor urine output, catheter in place for urinary retention/strict I's and O's, creatinine      Diabetes mellitus  Monitor blood glucose Insulin sliding scale    HTN (hypertension)  On  p.o. antihypertensives per Nephrology        Livan Quiñones MD  General Surgery  [facility]

## 2020-10-28 NOTE — ASSESSMENT & PLAN NOTE
--blood pressure currently stable in 150's.   --agree with nephrology with only adding amlodipine for now. Titrate up as needed.   Labetalol prn    10/21/20  Blood pressure elevated this AM  Much improved after all medications were resumed    10/22/20  Controlled today    Continue to monitor  10/27/20 The patients blood pressure was not well controlled overnight. WIll increase PRN labetolol to 20mg and add scheduled metoprolol 25 mg PO BID.   10/28/20 Decrease metoprolol to 12.5mg BID and increase hydralazine to75 mg Q8.

## 2020-10-30 ENCOUNTER — NURSE TRIAGE (OUTPATIENT)
Dept: ADMINISTRATIVE | Facility: CLINIC | Age: 75
End: 2020-10-30

## 2020-10-30 ENCOUNTER — HOSPITAL ENCOUNTER (EMERGENCY)
Facility: HOSPITAL | Age: 75
Discharge: HOME | End: 2020-10-30
Attending: STUDENT IN AN ORGANIZED HEALTH CARE EDUCATION/TRAINING PROGRAM
Payer: MEDICARE

## 2020-10-30 ENCOUNTER — PATIENT OUTREACH (OUTPATIENT)
Dept: ADMINISTRATIVE | Facility: CLINIC | Age: 75
End: 2020-10-30

## 2020-10-30 ENCOUNTER — TELEPHONE (OUTPATIENT)
Dept: SURGERY | Facility: CLINIC | Age: 75
End: 2020-10-30

## 2020-10-30 VITALS
RESPIRATION RATE: 18 BRPM | SYSTOLIC BLOOD PRESSURE: 164 MMHG | OXYGEN SATURATION: 100 % | DIASTOLIC BLOOD PRESSURE: 74 MMHG | TEMPERATURE: 99 F | HEART RATE: 69 BPM

## 2020-10-30 DIAGNOSIS — R19.7 DIARRHEA, UNSPECIFIED TYPE: Primary | ICD-10-CM

## 2020-10-30 LAB
ALBUMIN SERPL BCP-MCNC: 2.3 G/DL (ref 3.5–5.2)
ALP SERPL-CCNC: 54 U/L (ref 55–135)
ALT SERPL W/O P-5'-P-CCNC: 18 U/L (ref 10–44)
ANION GAP SERPL CALC-SCNC: 12 MMOL/L (ref 8–16)
AST SERPL-CCNC: 34 U/L (ref 10–40)
BASOPHILS # BLD AUTO: 0.03 K/UL (ref 0–0.2)
BASOPHILS NFR BLD: 0.5 % (ref 0–1.9)
BILIRUB SERPL-MCNC: 0.3 MG/DL (ref 0.1–1)
BUN SERPL-MCNC: 97 MG/DL (ref 8–23)
CALCIUM SERPL-MCNC: 7.9 MG/DL (ref 8.7–10.5)
CHLORIDE SERPL-SCNC: 110 MMOL/L (ref 95–110)
CO2 SERPL-SCNC: 21 MMOL/L (ref 23–29)
CREAT SERPL-MCNC: 10.2 MG/DL (ref 0.5–1.4)
DIFFERENTIAL METHOD: ABNORMAL
EOSINOPHIL # BLD AUTO: 0.2 K/UL (ref 0–0.5)
EOSINOPHIL NFR BLD: 2.5 % (ref 0–8)
ERYTHROCYTE [DISTWIDTH] IN BLOOD BY AUTOMATED COUNT: 13.1 % (ref 11.5–14.5)
EST. GFR  (AFRICAN AMERICAN): 5 ML/MIN/1.73 M^2
EST. GFR  (NON AFRICAN AMERICAN): 4 ML/MIN/1.73 M^2
GLUCOSE SERPL-MCNC: 86 MG/DL (ref 70–110)
HCT VFR BLD AUTO: 27.3 % (ref 40–54)
HCV AB SERPL QL IA: NEGATIVE
HGB BLD-MCNC: 9 G/DL (ref 14–18)
IMM GRANULOCYTES # BLD AUTO: 0.08 K/UL (ref 0–0.04)
IMM GRANULOCYTES NFR BLD AUTO: 1.3 % (ref 0–0.5)
LYMPHOCYTES # BLD AUTO: 0.9 K/UL (ref 1–4.8)
LYMPHOCYTES NFR BLD: 14.8 % (ref 18–48)
MCH RBC QN AUTO: 31.3 PG (ref 27–31)
MCHC RBC AUTO-ENTMCNC: 33 G/DL (ref 32–36)
MCV RBC AUTO: 95 FL (ref 82–98)
MONOCYTES # BLD AUTO: 0.6 K/UL (ref 0.3–1)
MONOCYTES NFR BLD: 10.4 % (ref 4–15)
NEUTROPHILS # BLD AUTO: 4.3 K/UL (ref 1.8–7.7)
NEUTROPHILS NFR BLD: 70.5 % (ref 38–73)
NRBC BLD-RTO: 0 /100 WBC
PLATELET # BLD AUTO: 244 K/UL (ref 150–350)
PMV BLD AUTO: 10.3 FL (ref 9.2–12.9)
POTASSIUM SERPL-SCNC: 4.7 MMOL/L (ref 3.5–5.1)
PROT SERPL-MCNC: 6 G/DL (ref 6–8.4)
RBC # BLD AUTO: 2.88 M/UL (ref 4.6–6.2)
SODIUM SERPL-SCNC: 143 MMOL/L (ref 136–145)
WBC # BLD AUTO: 6.08 K/UL (ref 3.9–12.7)

## 2020-10-30 PROCEDURE — 80053 COMPREHEN METABOLIC PANEL: CPT

## 2020-10-30 PROCEDURE — 36415 COLL VENOUS BLD VENIPUNCTURE: CPT

## 2020-10-30 PROCEDURE — 25000003 PHARM REV CODE 250: Performed by: PHYSICIAN ASSISTANT

## 2020-10-30 PROCEDURE — 86803 HEPATITIS C AB TEST: CPT

## 2020-10-30 PROCEDURE — 99284 EMERGENCY DEPT VISIT MOD MDM: CPT | Mod: 25

## 2020-10-30 PROCEDURE — 85025 COMPLETE CBC W/AUTO DIFF WBC: CPT

## 2020-10-30 PROCEDURE — 96361 HYDRATE IV INFUSION ADD-ON: CPT

## 2020-10-30 PROCEDURE — 96360 HYDRATION IV INFUSION INIT: CPT

## 2020-10-30 RX ORDER — DIPHENOXYLATE HYDROCHLORIDE AND ATROPINE SULFATE 2.5; .025 MG/1; MG/1
1 TABLET ORAL 4 TIMES DAILY PRN
Qty: 12 TABLET | Refills: 0 | Status: SHIPPED | OUTPATIENT
Start: 2020-10-30

## 2020-10-30 RX ADMIN — SODIUM CHLORIDE 250 ML: 0.9 INJECTION, SOLUTION INTRAVENOUS at 05:10

## 2020-10-30 NOTE — ED PROVIDER NOTES
4:11 PM: Diarrhea since 10/28 after adrenal gland removed.  Concern that he is dehydrated.  Pt was placed back in [facility]. I explained to pt that due to lack of available rooms pt was seen by myself to begin the workup. Pt understands they will be seen and dispositioned by the next available physician. Pt voices understanding and agrees with plan. Pt also understands the longer than normal wait time. I am removing myself from the care of pt and pt will now be assigned to the next available physician.     Kori Leger PA-C  4:11 PM    SCRIBE #1 NOTE: I, Vibha Xavier, am scribing for, and in the presence of, Nabil Modi MD. I have scribed the entire note.       History     Chief Complaint   Patient presents with    Diarrhea     discharged 10/28 following adrenal gland removal; has had diarrhea since then     Review of patient's allergies indicates:   Allergen Reactions    Benadryl [diphenhydramine hcl] Other (See Comments)     Pt states benadryl makes him feel numb    Ace inhibitors      YIN         History of Present Illness     HPI    10/30/2020, 5:31 PM  History obtained from the patient      History of Present Illness: Colt Stacy Jr. is a 75 y.o. male patient with a PMHx of DM, HLD, HTN, Retinitis, and stroke who presents to the Emergency Department for evaluation of diarrhea which onset gradually approximately 5-6 days ago. Pt's daughter reports that sxs onset shortly following adrenal gland removal surgery 10 days ago (10/20/2020). Pt was referred here by Dr. Quiñones's office (general surgery). Symptoms are episodic and moderate in severity. Per daughter, pt has had at least 4-6 loose BM daily since onset of sxs. No mitigating or exacerbating factors reported. Associated sxs include worsening SOB s/p surgery 10 days ago. Pt is on 2 L or home O2, per daughter. Pt's daughter also notes hematuria, stating that pt has a greenfield catheter in place. Patient denies any blood in stool, loss of appetite, n/v,  "abdominal pain, fever, chills, CP, SOB, and all other sxs at this time. Prior Tx includes none. No hx of heart failure. Pt is not on dialysis. No further complaints or concerns at this time.       Arrival mode: Personal vehicle    PCP: Primary Doctor No        Past Medical History:  Past Medical History:   Diagnosis Date    Diabetes mellitus     Hyperlipidemia     Hypertension     Retinitis     Stroke     Pt states residual sx is "I walk funny".       Past Surgical History:  Past Surgical History:   Procedure Laterality Date    FLUOROSCOPY Bilateral 7/20/2020    Procedure: Angiogram- Bilateral Adrenal;  Surgeon: Jose R Laws MD;  Location: [facility] CATH LAB;  Service: General;  Laterality: Bilateral;    ROBOT-ASSISTED SURGICAL REMOVAL OF ADRENAL GLAND USING DA DEMETRI XI Left 10/20/2020    Procedure: XI ROBOTIC ADRENALECTOMY;  Surgeon: Livan Quiñones MD;  Location: [facility] OR;  Service: General;  Laterality: Left;         Family History:  History reviewed. No pertinent family history.    Social History:  Social History     Tobacco Use    Smoking status: Never Smoker    Smokeless tobacco: Never Used   Substance and Sexual Activity    Alcohol use: No    Drug use: No    Sexual activity: Unknown        Review of Systems     Review of Systems   Constitutional: Negative for appetite change, chills and fever.   HENT: Negative for congestion, rhinorrhea and sore throat.    Respiratory: Positive for shortness of breath. Negative for cough.    Cardiovascular: Negative for chest pain.   Gastrointestinal: Positive for diarrhea. Negative for abdominal pain, anal bleeding, blood in stool, nausea, rectal pain and vomiting.   Genitourinary: Positive for hematuria. Negative for dysuria.   Musculoskeletal: Negative for back pain.   Skin: Negative for rash.   Neurological: Negative for dizziness, weakness and headaches.   Hematological: Does not bruise/bleed easily.   Psychiatric/Behavioral: Negative for confusion.   All other " systems reviewed and are negative.     Physical Exam     Initial Vitals [10/30/20 1610]   BP Pulse Resp Temp SpO2   (!) 191/85 61 18 98.7 °F (37.1 °C) 96 %      MAP       --          Physical Exam  Nursing Notes and Vital Signs Reviewed.  Constitutional: Patient is in no acute distress.  Head: Atraumatic. Normocephalic.  Eyes: PERRL. EOM intact. Conjunctivae are not pale. No scleral icterus.  ENT: Mucous membranes are moist. Oropharynx is clear and symmetric.    Neck: Supple. Full ROM. No lymphadenopathy.  Cardiovascular: Regular rate. Regular rhythm. No murmurs, rubs, or gallops. Distal pulses are 2+ and symmetric.  Pulmonary/Chest: No respiratory distress. Clear to auscultation bilaterally. No wheezing or rales.  Abdominal: Distended abdomen. Laparoscopic incisions with no signs of erythema or infection. There is no tenderness.  No rebound, guarding, or rigidity. Good bowel sounds.  Musculoskeletal: Moves all extremities. No obvious deformities. No edema. No calf tenderness.  Skin: Warm and dry.  Neurological:  Alert, awake, and appropriate.  Normal speech.  No acute focal neurological deficits are appreciated.  Psychiatric: Normal affect. Good eye contact. Appropriate in content.     ED Course   Procedures  ED Vital Signs:  Vitals:    10/30/20 1610 10/30/20 1735 10/30/20 1955 10/30/20 2104   BP: (!) 191/85 (!) 173/67 (!) 176/75 (!) 164/74   Pulse: 61 60 69 69   Resp: 18 20 18 18   Temp: 98.7 °F (37.1 °C) 98.7 °F (37.1 °C) 98.6 °F (37 °C) 98.6 °F (37 °C)   TempSrc: Oral Oral     SpO2: 96% 100% 100% 100%       Abnormal Lab Results:  Labs Reviewed   CBC W/ AUTO DIFFERENTIAL - Abnormal; Notable for the following components:       Result Value    RBC 2.88 (*)     Hemoglobin 9.0 (*)     Hematocrit 27.3 (*)     MCH 31.3 (*)     Immature Granulocytes 1.3 (*)     Immature Grans (Abs) 0.08 (*)     Lymph # 0.9 (*)     Lymph % 14.8 (*)     All other components within normal limits   COMPREHENSIVE METABOLIC PANEL - Abnormal;  Notable for the following components:    CO2 21 (*)     BUN 97 (*)     Creatinine 10.2 (*)     Calcium 7.9 (*)     Albumin 2.3 (*)     Alkaline Phosphatase 54 (*)     eGFR if  5 (*)     eGFR if non  4 (*)     All other components within normal limits   HEPATITIS C ANTIBODY        All Lab Results:  Results for orders placed or performed during the hospital encounter of 10/30/20   Hepatitis C antibody   Result Value Ref Range    Hepatitis C Ab Negative Negative   CBC auto differential   Result Value Ref Range    WBC 6.08 3.90 - 12.70 K/uL    RBC 2.88 (L) 4.60 - 6.20 M/uL    Hemoglobin 9.0 (L) 14.0 - 18.0 g/dL    Hematocrit 27.3 (L) 40.0 - 54.0 %    MCV 95 82 - 98 fL    MCH 31.3 (H) 27.0 - 31.0 pg    MCHC 33.0 32.0 - 36.0 g/dL    RDW 13.1 11.5 - 14.5 %    Platelets 244 150 - 350 K/uL    MPV 10.3 9.2 - 12.9 fL    Immature Granulocytes 1.3 (H) 0.0 - 0.5 %    Gran # (ANC) 4.3 1.8 - 7.7 K/uL    Immature Grans (Abs) 0.08 (H) 0.00 - 0.04 K/uL    Lymph # 0.9 (L) 1.0 - 4.8 K/uL    Mono # 0.6 0.3 - 1.0 K/uL    Eos # 0.2 0.0 - 0.5 K/uL    Baso # 0.03 0.00 - 0.20 K/uL    nRBC 0 0 /100 WBC    Gran % 70.5 38.0 - 73.0 %    Lymph % 14.8 (L) 18.0 - 48.0 %    Mono % 10.4 4.0 - 15.0 %    Eosinophil % 2.5 0.0 - 8.0 %    Basophil % 0.5 0.0 - 1.9 %    Differential Method Automated    Comprehensive metabolic panel   Result Value Ref Range    Sodium 143 136 - 145 mmol/L    Potassium 4.7 3.5 - 5.1 mmol/L    Chloride 110 95 - 110 mmol/L    CO2 21 (L) 23 - 29 mmol/L    Glucose 86 70 - 110 mg/dL    BUN 97 (H) 8 - 23 mg/dL    Creatinine 10.2 (H) 0.5 - 1.4 mg/dL    Calcium 7.9 (L) 8.7 - 10.5 mg/dL    Total Protein 6.0 6.0 - 8.4 g/dL    Albumin 2.3 (L) 3.5 - 5.2 g/dL    Total Bilirubin 0.3 0.1 - 1.0 mg/dL    Alkaline Phosphatase 54 (L) 55 - 135 U/L    AST 34 10 - 40 U/L    ALT 18 10 - 44 U/L    Anion Gap 12 8 - 16 mmol/L    eGFR if African American 5 (A) >60 mL/min/1.73 m^2    eGFR if non  4 (A) >60  mL/min/1.73 m^2         Imaging Results:  Imaging Results          CT Renal Stone Study ABD Pelvis WO (Final result)  Result time 10/30/20 19:11:09    Final result by Jyoti Bhatia MD (10/30/20 19:11:09)                 Impression:      Mild moderate right lateral abdominal wall subcutaneous fluid increased since the prior exam.  New trace left  subcutaneous fluid density along the inferior left lateral flank.    Small pleural effusions with associated basilar atelectasis/consolidation similar to prior exam    Otherwise stable findings      Electronically signed by: Sriram Montes  Date:    10/30/2020  Time:    19:11             Narrative:    EXAMINATION:  CT RENAL STONE STUDY ABD PELVIS WO    CLINICAL HISTORY:  abdominal distention s/p adrenalectomy;    TECHNIQUE:  Low dose axial images, sagittal and coronal reformations were obtained from the lung bases to the pubic symphysis.  Contrast was not administered.    COMPARISON:  Prior    FINDINGS:  Mild left greater than right pleural effusion with associated basilar atelectasis.  Spleen, stomach gallbladder are grossly unremarkable.  Right adrenal gland is unremarkable.  Right renal cyst measures up to 29 mm.  12 mm left renal cyst is identified.  Moderate right lateral soft tissue swelling and edema along the right lateral jennifer superficially.  No hydronephrosis.  Moderate atherosclerotic changes of the aorta.  Small fat containing umbilical hernia.  Noe catheter in place.  Minimal left posterolateral subcutaneous swelling.  Left adrenal gland is not identified.  16 mm low-density lesion hepatic lobe again noted.  Coronary artery calcification                                      The Emergency Provider reviewed the vital signs and test results, which are outlined above.     ED Discussion       8:47 PM: Reassessed pt at this time. Discussed with pt all pertinent ED information and results. Discussed pt dx and plan of tx. Gave pt all f/u and return to the ED  instructions. All questions and concerns were addressed at this time. Pt expresses understanding of information and instructions, and is comfortable with plan to discharge. Pt is stable for discharge.    I discussed with patient and/or family/caretaker that evaluation in the ED does not suggest any emergent or life threatening medical conditions requiring immediate intervention beyond what was provided in the ED, and I believe patient is safe for discharge.  Regardless, an unremarkable evaluation in the ED does not preclude the development or presence of a serious of life threatening condition. As such, patient was instructed to return immediately for any worsening or change in current symptoms.         Medical Decision Making:   Clinical Tests:   Lab Tests: Ordered and Reviewed  Radiological Study: Ordered and Reviewed  ED Management:  75-year-old male recently had an adrenalectomy presents due to diarrhea ongoing for 5 or 6 days.  Has not tried anything at home for symptoms.  Abdomen still mildly distended with mild bruising from recent laparoscopic operation.  Patient with reassuring vital signs and no other signs of infection.  CT of the abdomen obtained due to recent procedure.  All findings could correlate with recent procedure.  Patient without any episodes of bowel movements while in department for 5 hr.  Achilles stable now for discharge and follow-up with his surgeon as previously scheduled appointment next week.  Told utilize probiotics over-the-counter as a been shown to decrease duration of symptoms with diarrhea.  Also given prescription for Lomotil to use as needed for symptomatic relief.  Patient can return to emergency department for re-evaluation should his symptoms worsen, return, or new symptoms develop.           ED Medication(s):  Medications   sodium chloride 0.9% bolus 250 mL (0 mLs Intravenous Stopped 10/30/20 1916)       Discharge Medication List as of 10/30/2020  8:44 PM      START taking  these medications    Details   diphenoxylate-atropine 2.5-0.025 mg (LOMOTIL) 2.5-0.025 mg per tablet Take 1 tablet by mouth 4 (four) times daily as needed for Diarrhea., Starting Fri 10/30/2020, Print      Lactobacillus acidoph-L.bulgar 1 million cell Chew Take 4 tablets by mouth 3 (three) times daily with meals., Starting Fri 10/30/2020, OTC             Follow-up Information     Livan Quiñones MD On 11/4/2020.    Specialties: General Surgery, Bariatrics  Why: For follow-up on today's visit.  Contact information:  [address]  4th [address]  [facility] [address] [phone]             Go to  [facility].    Specialty: Emergency Medicine  Why: As needed, If symptoms worsen  Contact information:  [address]  [facility] [address] [phone]                     Scribe Attestation:   Scribe #1: I performed the above scribed service and the documentation accurately describes the services I performed. I attest to the accuracy of the note.     Attending:   Physician Attestation Statement for Scribe #1: I, Nabil Modi MD, personally performed the services described in this documentation, as scribed by Vibha Xavier, in my presence, and it is both accurate and complete.           Clinical Impression       ICD-10-CM ICD-9-CM   1. Diarrhea, unspecified type  R19.7 787.91       Disposition:   Disposition: Discharged  Condition: Stable           Nabil Modi MD  10/31/20 6331

## 2020-10-30 NOTE — TELEPHONE ENCOUNTER
Reason for Disposition   SEVERE diarrhea (e.g., 7 or more times / day more than normal) and age > 60 years    Additional Information   Negative: Shock suspected (e.g., cold/pale/clammy skin, too weak to stand, low BP, rapid pulse)   Negative: Difficult to awaken or acting confused (e.g., disoriented, slurred speech)   Negative: Sounds like a life-threatening emergency to the triager   Negative: Vomiting also present and worse than the diarrhea   Negative: Blood in stool and without diarrhea   Negative: SEVERE abdominal pain (e.g., excruciating) and present > 1 hour   Negative: SEVERE abdominal pain and age > 60   Negative: Bloody, black, or tarry bowel movements (Exception: chronic-unchanged black-grey bowel movements and is taking iron pills or Pepto-bismol)    Protocols used: DIARRHEA-A-OH  Daughter called re pt is not able to control bowels. Pt had been on stool softener at hospital the day after surg. pt having diarrhea. Pt discharged 10/28. Denies pain , afeb. having loose stool all day and all night. Denies blood in stool. last uop - had urinary cath in. urine is draining in bad but still cont to have blood in urine. no N/V. rec ED. Daughter agrees. Call back with questions    Anemia

## 2020-10-30 NOTE — TELEPHONE ENCOUNTER
Reason for Disposition   Message left on identified voicemail    Additional Information   Negative: Caller has already spoken with the PCP (or office), and has no further questions   Negative: Caller has already spoken with another triager and has no further questions   Negative: Caller has already spoken with another triager or PCP (or office), and has further questions and triager able to answer questions.   Negative: No answer.  First attempt to contact caller.  Follow-up call scheduled within 15 minutes.   Negative: Busy signal.  First attempt to contact caller.  Follow-up call scheduled within 15 minutes.    Protocols used: NO CONTACT OR DUPLICATE CONTACT CALL-A-OH  LM x2 at 129pm at [phone]

## 2020-10-30 NOTE — TELEPHONE ENCOUNTER
Follow up call rec'd a message via in basket from [facility] on call nurse pt is having severe diarrhea ( 7 times) per Cris Kruse/daughter. Informed pt Dr Quiñones is not in clinic today will back on 11/02/2020. Advised daughter to take pt to the ED now for further eval and treat. Encouraged to contact our office with any other questions/concerns. Daughter verbalized an understandjing

## 2020-10-30 NOTE — PROGRESS NOTES
C3 nurse attempted to contact patient. No answer. The following message was left for the patient to return the call:  Good afternoon,  I am a nurse calling on behalf of [employer] from the [facility].  This is a Transitional Care Call for Colt Stacy Jr.. When you have a moment please contact us at [phone] or [phone] Monday through Friday, between the hours of 8 am to 4 pm. We look forward to speaking with you. On behalf of [facility] have a nice day.    The patient has a scheduled POST OP appointment on 11/4 @ 1020.

## 2020-11-02 ENCOUNTER — OFFICE VISIT (OUTPATIENT)
Dept: UROLOGY | Facility: CLINIC | Age: 75
End: 2020-11-02
Payer: MEDICARE

## 2020-11-02 ENCOUNTER — HOSPITAL ENCOUNTER (OUTPATIENT)
Facility: HOSPITAL | Age: 75
Discharge: HOME HEALTH | End: 2020-11-05
Attending: EMERGENCY MEDICINE | Admitting: INTERNAL MEDICINE
Payer: MEDICARE

## 2020-11-02 VITALS — SYSTOLIC BLOOD PRESSURE: 156 MMHG | TEMPERATURE: 99 F | DIASTOLIC BLOOD PRESSURE: 82 MMHG

## 2020-11-02 DIAGNOSIS — E26.09: ICD-10-CM

## 2020-11-02 DIAGNOSIS — R33.8 BENIGN PROSTATIC HYPERPLASIA WITH URINARY RETENTION: ICD-10-CM

## 2020-11-02 DIAGNOSIS — N18.9 CHRONIC KIDNEY DISEASE, UNSPECIFIED CKD STAGE: ICD-10-CM

## 2020-11-02 DIAGNOSIS — N13.8 BENIGN PROSTATIC HYPERPLASIA WITH URINARY OBSTRUCTION: ICD-10-CM

## 2020-11-02 DIAGNOSIS — N40.1 BENIGN PROSTATIC HYPERPLASIA WITH URINARY OBSTRUCTION: ICD-10-CM

## 2020-11-02 DIAGNOSIS — E26.9 HYPERALDOSTERONISM: ICD-10-CM

## 2020-11-02 DIAGNOSIS — N17.9 AKI (ACUTE KIDNEY INJURY): ICD-10-CM

## 2020-11-02 DIAGNOSIS — E16.2 HYPOGLYCEMIA: ICD-10-CM

## 2020-11-02 DIAGNOSIS — N18.4 STAGE 4 CHRONIC KIDNEY DISEASE DUE TO TYPE 2 DIABETES MELLITUS: ICD-10-CM

## 2020-11-02 DIAGNOSIS — R31.9 HEMATURIA, UNSPECIFIED TYPE: Primary | ICD-10-CM

## 2020-11-02 DIAGNOSIS — R31.9 HEMATURIA: ICD-10-CM

## 2020-11-02 DIAGNOSIS — R79.89 ELEVATED TROPONIN: ICD-10-CM

## 2020-11-02 DIAGNOSIS — R60.9 EDEMA: ICD-10-CM

## 2020-11-02 DIAGNOSIS — D35.02: ICD-10-CM

## 2020-11-02 DIAGNOSIS — R31.0 GROSS HEMATURIA: Primary | ICD-10-CM

## 2020-11-02 DIAGNOSIS — E11.22 STAGE 4 CHRONIC KIDNEY DISEASE DUE TO TYPE 2 DIABETES MELLITUS: ICD-10-CM

## 2020-11-02 DIAGNOSIS — R31.0 GROSS HEMATURIA: ICD-10-CM

## 2020-11-02 DIAGNOSIS — N40.1 BENIGN PROSTATIC HYPERPLASIA WITH URINARY RETENTION: ICD-10-CM

## 2020-11-02 DIAGNOSIS — R53.1 WEAKNESS: ICD-10-CM

## 2020-11-02 DIAGNOSIS — N18.5 CKD (CHRONIC KIDNEY DISEASE) STAGE 5, GFR LESS THAN 15 ML/MIN: ICD-10-CM

## 2020-11-02 PROBLEM — N40.0 BPH (BENIGN PROSTATIC HYPERPLASIA): Status: ACTIVE | Noted: 2020-11-02

## 2020-11-02 LAB
ALBUMIN SERPL BCP-MCNC: 2.3 G/DL (ref 3.5–5.2)
ALP SERPL-CCNC: 47 U/L (ref 55–135)
ALT SERPL W/O P-5'-P-CCNC: 13 U/L (ref 10–44)
ANION GAP SERPL CALC-SCNC: 10 MMOL/L (ref 8–16)
AST SERPL-CCNC: 23 U/L (ref 10–40)
BASOPHILS # BLD AUTO: 0.03 K/UL (ref 0–0.2)
BASOPHILS NFR BLD: 0.2 % (ref 0–1.9)
BILIRUB SERPL-MCNC: 0.2 MG/DL (ref 0.1–1)
BILIRUB UR QL STRIP: ABNORMAL
BNP SERPL-MCNC: 136 PG/ML (ref 0–99)
BUN SERPL-MCNC: 100 MG/DL (ref 8–23)
CALCIUM SERPL-MCNC: 7.8 MG/DL (ref 8.7–10.5)
CHLORIDE SERPL-SCNC: 110 MMOL/L (ref 95–110)
CK SERPL-CCNC: 143 U/L (ref 20–200)
CLARITY UR: ABNORMAL
CO2 SERPL-SCNC: 20 MMOL/L (ref 23–29)
COLOR UR: ABNORMAL
CREAT SERPL-MCNC: 9.9 MG/DL (ref 0.5–1.4)
DIFFERENTIAL METHOD: ABNORMAL
EOSINOPHIL # BLD AUTO: 0.1 K/UL (ref 0–0.5)
EOSINOPHIL NFR BLD: 0.4 % (ref 0–8)
ERYTHROCYTE [DISTWIDTH] IN BLOOD BY AUTOMATED COUNT: 12.7 % (ref 11.5–14.5)
EST. GFR  (AFRICAN AMERICAN): 5 ML/MIN/1.73 M^2
EST. GFR  (NON AFRICAN AMERICAN): 5 ML/MIN/1.73 M^2
GLUCOSE SERPL-MCNC: 62 MG/DL (ref 70–110)
GLUCOSE UR QL STRIP: ABNORMAL
HCT VFR BLD AUTO: 28.7 % (ref 40–54)
HGB BLD-MCNC: 9.3 G/DL (ref 14–18)
HGB UR QL STRIP: ABNORMAL
IMM GRANULOCYTES # BLD AUTO: 0.06 K/UL (ref 0–0.04)
IMM GRANULOCYTES NFR BLD AUTO: 0.5 % (ref 0–0.5)
KETONES UR QL STRIP: ABNORMAL
LEUKOCYTE ESTERASE UR QL STRIP: ABNORMAL
LYMPHOCYTES # BLD AUTO: 0.5 K/UL (ref 1–4.8)
LYMPHOCYTES NFR BLD: 4.2 % (ref 18–48)
MCH RBC QN AUTO: 30.6 PG (ref 27–31)
MCHC RBC AUTO-ENTMCNC: 32.4 G/DL (ref 32–36)
MCV RBC AUTO: 94 FL (ref 82–98)
MICROSCOPIC COMMENT: ABNORMAL
MONOCYTES # BLD AUTO: 0.7 K/UL (ref 0.3–1)
MONOCYTES NFR BLD: 5.8 % (ref 4–15)
NEUTROPHILS # BLD AUTO: 11.3 K/UL (ref 1.8–7.7)
NEUTROPHILS NFR BLD: 88.9 % (ref 38–73)
NITRITE UR QL STRIP: ABNORMAL
NRBC BLD-RTO: 0 /100 WBC
PH UR STRIP: ABNORMAL [PH] (ref 5–8)
PLATELET # BLD AUTO: 262 K/UL (ref 150–350)
PMV BLD AUTO: 10.5 FL (ref 9.2–12.9)
POCT GLUCOSE: 119 MG/DL (ref 70–110)
POCT GLUCOSE: 142 MG/DL (ref 70–110)
POCT GLUCOSE: 94 MG/DL (ref 70–110)
POTASSIUM SERPL-SCNC: 4.4 MMOL/L (ref 3.5–5.1)
PROT SERPL-MCNC: 5.7 G/DL (ref 6–8.4)
PROT UR QL STRIP: ABNORMAL
RBC # BLD AUTO: 3.04 M/UL (ref 4.6–6.2)
RBC #/AREA URNS HPF: >100 /HPF (ref 0–4)
SARS-COV-2 RDRP RESP QL NAA+PROBE: NEGATIVE
SODIUM SERPL-SCNC: 140 MMOL/L (ref 136–145)
SP GR UR STRIP: ABNORMAL (ref 1–1.03)
TROPONIN I SERPL DL<=0.01 NG/ML-MCNC: 0.03 NG/ML (ref 0–0.03)
TROPONIN I SERPL DL<=0.01 NG/ML-MCNC: 0.04 NG/ML (ref 0–0.03)
URN SPEC COLLECT METH UR: ABNORMAL
UROBILINOGEN UR STRIP-ACNC: ABNORMAL EU/DL
WBC # BLD AUTO: 12.66 K/UL (ref 3.9–12.7)

## 2020-11-02 PROCEDURE — G0378 HOSPITAL OBSERVATION PER HR: HCPCS

## 2020-11-02 PROCEDURE — 81000 URINALYSIS NONAUTO W/SCOPE: CPT

## 2020-11-02 PROCEDURE — 82962 GLUCOSE BLOOD TEST: CPT

## 2020-11-02 PROCEDURE — 25000003 PHARM REV CODE 250: Performed by: NURSE PRACTITIONER

## 2020-11-02 PROCEDURE — 99999 PR PBB SHADOW E&M-EST. PATIENT-LVL III: CPT | Mod: PBBFAC,,, | Performed by: UROLOGY

## 2020-11-02 PROCEDURE — 99213 PR OFFICE/OUTPT VISIT, EST, LEVL III, 20-29 MIN: ICD-10-PCS | Mod: S$PBB,25,, | Performed by: UROLOGY

## 2020-11-02 PROCEDURE — 93005 ELECTROCARDIOGRAM TRACING: CPT | Mod: 59

## 2020-11-02 PROCEDURE — 80053 COMPREHEN METABOLIC PANEL: CPT

## 2020-11-02 PROCEDURE — 99213 OFFICE O/P EST LOW 20 MIN: CPT | Mod: PBBFAC,25 | Performed by: UROLOGY

## 2020-11-02 PROCEDURE — 93010 EKG 12-LEAD: ICD-10-PCS | Mod: ,,, | Performed by: INTERNAL MEDICINE

## 2020-11-02 PROCEDURE — C9399 UNCLASSIFIED DRUGS OR BIOLOG: HCPCS | Performed by: NURSE PRACTITIONER

## 2020-11-02 PROCEDURE — 99213 OFFICE O/P EST LOW 20 MIN: CPT | Mod: S$PBB,25,, | Performed by: UROLOGY

## 2020-11-02 PROCEDURE — 84484 ASSAY OF TROPONIN QUANT: CPT

## 2020-11-02 PROCEDURE — 36415 COLL VENOUS BLD VENIPUNCTURE: CPT

## 2020-11-02 PROCEDURE — 52000 CYSTOURETHROSCOPY: CPT | Mod: PBBFAC | Performed by: UROLOGY

## 2020-11-02 PROCEDURE — 52000 CYSTOURETHROSCOPY: CPT | Mod: S$PBB,,, | Performed by: UROLOGY

## 2020-11-02 PROCEDURE — 83880 ASSAY OF NATRIURETIC PEPTIDE: CPT

## 2020-11-02 PROCEDURE — 96361 HYDRATE IV INFUSION ADD-ON: CPT | Mod: 59

## 2020-11-02 PROCEDURE — 82550 ASSAY OF CK (CPK): CPT

## 2020-11-02 PROCEDURE — 96372 THER/PROPH/DIAG INJ SC/IM: CPT | Mod: 59

## 2020-11-02 PROCEDURE — 84484 ASSAY OF TROPONIN QUANT: CPT | Mod: 91

## 2020-11-02 PROCEDURE — 85025 COMPLETE CBC W/AUTO DIFF WBC: CPT

## 2020-11-02 PROCEDURE — 93010 ELECTROCARDIOGRAM REPORT: CPT | Mod: ,,, | Performed by: INTERNAL MEDICINE

## 2020-11-02 PROCEDURE — 52000 PR CYSTOURETHROSCOPY: ICD-10-PCS | Mod: S$PBB,,, | Performed by: UROLOGY

## 2020-11-02 PROCEDURE — 99285 EMERGENCY DEPT VISIT HI MDM: CPT | Mod: 25,27

## 2020-11-02 PROCEDURE — U0002 COVID-19 LAB TEST NON-CDC: HCPCS

## 2020-11-02 PROCEDURE — 99999 PR PBB SHADOW E&M-EST. PATIENT-LVL III: ICD-10-PCS | Mod: PBBFAC,,, | Performed by: UROLOGY

## 2020-11-02 RX ORDER — SERTRALINE HYDROCHLORIDE 50 MG/1
100 TABLET, FILM COATED ORAL DAILY
Status: DISCONTINUED | OUTPATIENT
Start: 2020-11-03 | End: 2020-11-05 | Stop reason: HOSPADM

## 2020-11-02 RX ORDER — NIFEDIPINE 30 MG/1
60 TABLET, EXTENDED RELEASE ORAL DAILY
Status: DISCONTINUED | OUTPATIENT
Start: 2020-11-03 | End: 2020-11-05 | Stop reason: HOSPADM

## 2020-11-02 RX ORDER — GLUCAGON 1 MG
1 KIT INJECTION
Status: DISCONTINUED | OUTPATIENT
Start: 2020-11-02 | End: 2020-11-05 | Stop reason: HOSPADM

## 2020-11-02 RX ORDER — ARIPIPRAZOLE 5 MG/1
30 TABLET ORAL NIGHTLY
Status: DISCONTINUED | OUTPATIENT
Start: 2020-11-02 | End: 2020-11-05 | Stop reason: HOSPADM

## 2020-11-02 RX ORDER — SODIUM CHLORIDE 9 MG/ML
INJECTION, SOLUTION INTRAVENOUS CONTINUOUS
Status: DISCONTINUED | OUTPATIENT
Start: 2020-11-02 | End: 2020-11-05 | Stop reason: HOSPADM

## 2020-11-02 RX ORDER — METOPROLOL TARTRATE 25 MG/1
12.5 TABLET ORAL 2 TIMES DAILY
Status: DISCONTINUED | OUTPATIENT
Start: 2020-11-02 | End: 2020-11-05 | Stop reason: HOSPADM

## 2020-11-02 RX ORDER — ATORVASTATIN CALCIUM 40 MG/1
80 TABLET, FILM COATED ORAL DAILY
Status: DISCONTINUED | OUTPATIENT
Start: 2020-11-03 | End: 2020-11-05 | Stop reason: HOSPADM

## 2020-11-02 RX ORDER — TAMSULOSIN HYDROCHLORIDE 0.4 MG/1
0.4 CAPSULE ORAL DAILY
Status: DISCONTINUED | OUTPATIENT
Start: 2020-11-03 | End: 2020-11-05 | Stop reason: HOSPADM

## 2020-11-02 RX ORDER — SEVELAMER CARBONATE 800 MG/1
800 TABLET, FILM COATED ORAL
Status: DISCONTINUED | OUTPATIENT
Start: 2020-11-03 | End: 2020-11-05 | Stop reason: HOSPADM

## 2020-11-02 RX ORDER — INSULIN ASPART 100 [IU]/ML
1-10 INJECTION, SOLUTION INTRAVENOUS; SUBCUTANEOUS EVERY 6 HOURS PRN
Status: DISCONTINUED | OUTPATIENT
Start: 2020-11-02 | End: 2020-11-05 | Stop reason: HOSPADM

## 2020-11-02 RX ORDER — METOPROLOL TARTRATE 1 MG/ML
5 INJECTION, SOLUTION INTRAVENOUS EVERY 6 HOURS PRN
Status: DISCONTINUED | OUTPATIENT
Start: 2020-11-02 | End: 2020-11-05 | Stop reason: HOSPADM

## 2020-11-02 RX ADMIN — ARIPIPRAZOLE 30 MG: 5 TABLET ORAL at 10:11

## 2020-11-02 RX ADMIN — SODIUM CHLORIDE: 0.9 INJECTION, SOLUTION INTRAVENOUS at 09:11

## 2020-11-02 RX ADMIN — INSULIN DETEMIR 15 UNITS: 100 INJECTION, SOLUTION SUBCUTANEOUS at 09:11

## 2020-11-02 RX ADMIN — METOPROLOL TARTRATE 12.5 MG: 25 TABLET, FILM COATED ORAL at 09:11

## 2020-11-02 RX ADMIN — HYDRALAZINE HYDROCHLORIDE 75 MG: 50 TABLET, FILM COATED ORAL at 09:11

## 2020-11-02 NOTE — PROGRESS NOTES
Chief Complaint:   Encounter Diagnoses   Name Primary?    Gross hematuria Yes    Benign prostatic hyperplasia with urinary obstruction      HPI:   11/2/20- patient is here for voiding trial, unfortunately he has a significant amount of blood in the greenfield and coming from around the catheter with minimal amounts of urine in the bag.  Will remove his greenfield and replace.  10/23/20- 75-year-old gentleman status post robotically assisted laparoscopic adrenalectomy, postoperative day 1.  Unfortunately over the evening, the patient was not voiding a Greenfield catheter was placed.  All the patient did have significant output of almost 900 mL, he also had significant gross hematuria.  A 24 [place] 3 way Greenfield catheter was attempted to be placed by the nursing staff, and then by myself with no passage beyond the prosthetic urethra.  We also attempted coude catheters, could not get beyond and into the bladder neck.  Patient states that he has never had gross hematuria before, he has never been a smoker.  States that he does have significant BPH, but on treated.  Patient states that he gets up about 8-10 times during the evening, with increased frequency and urgency during the daytime.  Patient has chronic renal insufficiency, with acute renal failure today.  No other urological history, no family history of urological cancers or stones.    Allergies:  Benadryl [diphenhydramine hcl] and Ace inhibitors    Medications:  has a current medication list which includes the following prescription(s): aripiprazole, aspirin, atorvastatin, diphenoxylate-atropine 2.5-0.025 mg, hydralazine, insulin aspart u-100, insulin detemir u-100, lactobacillus acidoph-l.bulgar, metoprolol tartrate, multivitamin, nifedipine, sertraline, sevelamer carbonate, tamsulosin, and hydrocodone-acetaminophen, and the following Facility-Administered Medications: diphenhydramine and metoclopramide hcl.    Review of Systems:  General: No fever, chills, fatigability, or  weight loss.  Skin: No rashes, itching, or changes in color or texture of skin.  Chest: Denies CABRERA, cyanosis, wheezing, cough, and sputum production.  Abdomen: Appetite fine. No weight loss. Denies diarrhea, abdominal pain, hematemesis, or blood in stool.  Musculoskeletal: No joint stiffness or swelling. Denies back pain.  : As above.  All other review of systems negative.    PMH:   has a past medical history of Diabetes mellitus, Hyperlipidemia, Hypertension, Retinitis, and Stroke.    PSH:   has a past surgical history that includes Fluoroscopy (Bilateral, 7/20/2020) and Robot-assisted surgical removal of adrenal gland using da Min Xi (Left, 10/20/2020).    FamHx: family history is not on file.    SocHx:  reports that he has never smoked. He has never used smokeless tobacco. He reports that he does not drink alcohol or use drugs.      Physical Exam:  Vitals:    11/02/20 1110   BP: (!) 156/82   Temp: 98.6 °F (37 °C)     General: A&Ox3, no apparent distress, no deformities  Neck: No masses, normal ROM  Lungs: normal inspiration, no use of accessory muscles  Heart: normal pulse, no arrhythmias  Abdomen: Soft, NT, ND, no masses, no hernias, no hepatosplenomegaly  Skin: The skin is warm and dry. No jaundice.  Ext: No c/c/e.  : 11/20- significant hematuria around greenfield, with severe edema of the penis causing phimosis.  Blood at the meatus, greenfield unable to be passed.    Procedure: Diagnostic Cystoscopy    Procedure in Detail: After proper consents were obtained, the patient was prepped and draped in normal sterile fashion for diagnostic cystoscopy. 5 ml of lidocaine jelly was instilled in the urethra. The flexible cystoscope was then introduced into the urethra, and advanced into the bladder under direct vision. The urethral mucosa appeared normal, and no strictures were noted.  Blood encountered within the prostatic urethra, probably at a false passage with clots in the bladder.  Difficult to assess due to bleeding  though, in regards to the mucosa.  Wire was inserted and a greenfield was placed over the wire, 22 F  tip.  600 ml pvr with hematuria and small clots.  EMS here to deliver to the ER.    Findings: false passage and clots, catheter inserted      Labs/Studies:   Cysto catheter placement due to false passage 10/23/20    Impression/Plan:     1. Gross hematuria- to the ER for evaluation, keep greenfield in place and hand irrigate prn with normal saline.    2.  BPH- tamsulosin.

## 2020-11-02 NOTE — ED PROVIDER NOTES
SCRIBE #1 NOTE: I, Kandy Cox, am scribing for, and in the presence of, Gen Urena MD. I have scribed the HPI, ROS, and PEx.     SCRIBE #2 NOTE: I, Virgilio Van, am scribing for, and in the presence of,  Jose R Gandhi MD. I have scribed the remaining portions of the note not scribed by Scribe #1.      History     Chief Complaint   Patient presents with    Hematuria     from urologist     Review of patient's allergies indicates:   Allergen Reactions    Benadryl [diphenhydramine hcl] Other (See Comments)     Pt states benadryl makes him feel numb    Ace inhibitors      YIN         History of Present Illness     HPI    11/2/2020, 2:19 PM  History obtained from the patient      History of Present Illness: Colt Stacy Jr. is a 75 y.o. male patient with a h/o chronic renal insufficiency, DM, HLD, HTN, retinitis, stroke, who presents to the Emergency Department for evaluation of hematuria which onset 1.5 weeks ago. Pt was sent to the ER by Dr. Lala (Urology) for further evaluation and tx of gross hematuria. He had a robot-assisted surgical removal of left adrenal gland using da armaan XI done on 10/20/20 and discharged on 10/28. Pt states that he started to notice blood in his urine every now and then since 10/21/20. Pt was seen in the ED on 10/30 for diarrhea and discharged with diphenoxylate HCl/atropine and L. Acidophilus/L. bulgaricus 1 million cell tablets. Symptoms are episodic and moderate in severity. No mitigating or exacerbating factors reported. Associated sxs include urinary urgency and increased urinary frequency. Patient denies any fever, chills, SOB, CP, n/v/d, abd pain, blood in stool, HA, light-headedness, dizziness, and all other sxs at this time. No prior Tx reported. No further complaints or concerns at this time.       Arrival mode: AASI    PCP: Primary Doctor No      Past Medical History:  Past Medical History:   Diagnosis Date    Diabetes mellitus     Hyperlipidemia      "Hypertension     Retinitis     Stroke     Pt states residual sx is "I walk funny".       Past Surgical History:  Past Surgical History:   Procedure Laterality Date    FLUOROSCOPY Bilateral 7/20/2020    Procedure: Angiogram- Bilateral Adrenal;  Surgeon: Jose R Laws MD;  Location: [facility] CATH LAB;  Service: General;  Laterality: Bilateral;    ROBOT-ASSISTED SURGICAL REMOVAL OF ADRENAL GLAND USING DA DEMETRI XI Left 10/20/2020    Procedure: XI ROBOTIC ADRENALECTOMY;  Surgeon: Livan Quiñones MD;  Location: [facility] OR;  Service: General;  Laterality: Left;         Family History:  History reviewed. No pertinent family history.      Social History:   Social History     Tobacco Use    Smoking status: Never Smoker    Smokeless tobacco: Never Used   Substance and Sexual Activity    Alcohol use: No    Drug use: No    Sexual activity: Unknown        Review of Systems     Review of Systems   Constitutional: Negative for chills and fever.   HENT: Negative for congestion and sore throat.    Respiratory: Negative for cough and shortness of breath.    Cardiovascular: Negative for chest pain.   Gastrointestinal: Negative for abdominal pain, blood in stool, diarrhea, nausea and vomiting.   Genitourinary: Positive for frequency (increase), hematuria and urgency. Negative for dysuria.   Musculoskeletal: Negative for back pain.   Skin: Negative for rash.   Neurological: Negative for dizziness, weakness, light-headedness and headaches.   Hematological: Does not bruise/bleed easily.   All other systems reviewed and are negative.       Physical Exam     Initial Vitals [11/02/20 1418]   BP Pulse Resp Temp SpO2   (!) 134/56 92 18 98.5 °F (36.9 °C) 100 %      MAP       --          Physical Exam  Nursing Notes and Vital Signs Reviewed.  Constitutional: Well-developed and well-nourished.   Head: Atraumatic. Normocephalic.  Eyes: EOM intact. No scleral icterus.  ENT: Mucous membranes are moist. Oropharynx is clear and symmetric.    Neck: " Supple. Full ROM. No lymphadenopathy.  Cardiovascular: Regular rate. Regular rhythm. No murmurs, rubs, or gallops. Distal pulses are 2+ and symmetric.  Pulmonary/Chest: No respiratory distress. Clear to auscultation bilaterally. No wheezing or rales.  Abdominal: Soft and non-distended.  There is no tenderness.  No rebound, guarding, or rigidity.  Genitourinary: Noe in place with bright red blood noted in bag.   Musculoskeletal: Moves all extremities. No obvious deformities. 3+ BLE pitting edema. No calf tenderness.  Skin: Warm and dry.  Neurological:  Alert, awake, and appropriate.  Normal speech.  No acute focal neurological deficits are appreciated.  Psychiatric: Normal affect. Good eye contact. Appropriate in content.     ED Course   Procedures  ED Vital Signs:  Vitals:    11/02/20 1418 11/02/20 1602 11/02/20 1617 11/02/20 1631   BP: (!) 134/56 (!) 144/63  (!) 155/70   Pulse: 92 61 63 65   Resp: 18   20   Temp: 98.5 °F (36.9 °C)   98 °F (36.7 °C)   TempSrc: Oral   Oral   SpO2: 100% 100%  99%   Weight: 81.6 kg (180 lb)       11/02/20 1700 11/02/20 1852   BP: (!) 143/64    Pulse: 62 63   Resp:  18   Temp:     TempSrc:     SpO2: 98% 96%   Weight:         Abnormal Lab Results:  Labs Reviewed   CBC W/ AUTO DIFFERENTIAL - Abnormal; Notable for the following components:       Result Value    RBC 3.04 (*)     Hemoglobin 9.3 (*)     Hematocrit 28.7 (*)     Gran # (ANC) 11.3 (*)     Immature Grans (Abs) 0.06 (*)     Lymph # 0.5 (*)     Gran % 88.9 (*)     Lymph % 4.2 (*)     All other components within normal limits   COMPREHENSIVE METABOLIC PANEL - Abnormal; Notable for the following components:    CO2 20 (*)     Glucose 62 (*)      (*)     Creatinine 9.9 (*)     Calcium 7.8 (*)     Total Protein 5.7 (*)     Albumin 2.3 (*)     Alkaline Phosphatase 47 (*)     eGFR if  5 (*)     eGFR if non  5 (*)     All other components within normal limits   URINALYSIS, REFLEX TO URINE CULTURE -  Abnormal; Notable for the following components:    Color, UA Red (*)     Appearance, UA Cloudy (*)     All other components within normal limits    Narrative:     Specimen Source->Urine   URINALYSIS MICROSCOPIC - Abnormal; Notable for the following components:    RBC, UA >100 (*)     All other components within normal limits    Narrative:     Specimen Source->Urine   TROPONIN I - Abnormal; Notable for the following components:    Troponin I 0.028 (*)     All other components within normal limits   B-TYPE NATRIURETIC PEPTIDE - Abnormal; Notable for the following components:     (*)     All other components within normal limits   TROPONIN I - Abnormal; Notable for the following components:    Troponin I 0.044 (*)     All other components within normal limits   B-TYPE NATRIURETIC PEPTIDE   CK   TROPONIN I   CK        All Lab Results:  Results for orders placed or performed during the hospital encounter of 11/02/20   CBC Auto Differential   Result Value Ref Range    WBC 12.66 3.90 - 12.70 K/uL    RBC 3.04 (L) 4.60 - 6.20 M/uL    Hemoglobin 9.3 (L) 14.0 - 18.0 g/dL    Hematocrit 28.7 (L) 40.0 - 54.0 %    MCV 94 82 - 98 fL    MCH 30.6 27.0 - 31.0 pg    MCHC 32.4 32.0 - 36.0 g/dL    RDW 12.7 11.5 - 14.5 %    Platelets 262 150 - 350 K/uL    MPV 10.5 9.2 - 12.9 fL    Immature Granulocytes 0.5 0.0 - 0.5 %    Gran # (ANC) 11.3 (H) 1.8 - 7.7 K/uL    Immature Grans (Abs) 0.06 (H) 0.00 - 0.04 K/uL    Lymph # 0.5 (L) 1.0 - 4.8 K/uL    Mono # 0.7 0.3 - 1.0 K/uL    Eos # 0.1 0.0 - 0.5 K/uL    Baso # 0.03 0.00 - 0.20 K/uL    nRBC 0 0 /100 WBC    Gran % 88.9 (H) 38.0 - 73.0 %    Lymph % 4.2 (L) 18.0 - 48.0 %    Mono % 5.8 4.0 - 15.0 %    Eosinophil % 0.4 0.0 - 8.0 %    Basophil % 0.2 0.0 - 1.9 %    Differential Method Automated    Comprehensive Metabolic Panel   Result Value Ref Range    Sodium 140 136 - 145 mmol/L    Potassium 4.4 3.5 - 5.1 mmol/L    Chloride 110 95 - 110 mmol/L    CO2 20 (L) 23 - 29 mmol/L    Glucose 62 (L)  70 - 110 mg/dL     (H) 8 - 23 mg/dL    Creatinine 9.9 (H) 0.5 - 1.4 mg/dL    Calcium 7.8 (L) 8.7 - 10.5 mg/dL    Total Protein 5.7 (L) 6.0 - 8.4 g/dL    Albumin 2.3 (L) 3.5 - 5.2 g/dL    Total Bilirubin 0.2 0.1 - 1.0 mg/dL    Alkaline Phosphatase 47 (L) 55 - 135 U/L    AST 23 10 - 40 U/L    ALT 13 10 - 44 U/L    Anion Gap 10 8 - 16 mmol/L    eGFR if African American 5 (A) >60 mL/min/1.73 m^2    eGFR if non African American 5 (A) >60 mL/min/1.73 m^2   Urinalysis, Reflex to Urine Culture Urine, Clean Catch    Specimen: Urine   Result Value Ref Range    Specimen UA Urine, Catheterized     Color, UA Red (A) Yellow, Straw, Zahra    Appearance, UA Cloudy (A) Clear    pH, UA SEE COMMENT 5.0 - 8.0    Specific Gravity, UA SEE COMMENT 1.005 - 1.030    Protein, UA SEE COMMENT Negative    Glucose, UA SEE COMMENT Negative    Ketones, UA SEE COMMENT Negative    Bilirubin (UA) SEE COMMENT Negative    Occult Blood UA SEE COMMENT Negative    Nitrite, UA SEE COMMENT Negative    Urobilinogen, UA SEE COMMENT <2.0 EU/dL    Leukocytes, UA SEE COMMENT Negative   Urinalysis Microscopic   Result Value Ref Range    RBC, UA >100 (H) 0 - 4 /hpf    Microscopic Comment SEE COMMENT    Troponin I   Result Value Ref Range    Troponin I 0.028 (H) 0.000 - 0.026 ng/mL   BNP   Result Value Ref Range     (H) 0 - 99 pg/mL   CK   Result Value Ref Range     20 - 200 U/L   Troponin I   Result Value Ref Range    Troponin I 0.044 (H) 0.000 - 0.026 ng/mL         Imaging Results          X-Ray Chest AP Portable (Final result)  Result time 11/02/20 16:56:01    Final result by SHELLEY Matute Sr., MD (11/02/20 16:56:01)                 Impression:      1. There is a mild amount of haziness in the bases of both lungs.  This is characteristic of atelectasis and/or pneumonia.  2. There is persistent opacification of the base of the left hemithorax.  There is blunting of the right costophrenic angle.  This is characteristic of pleural  effusions.  3. The size of the heart is prominent.  This may be secondary to magnification.  .      Electronically signed by: Gerardo Matute MD  Date:    11/02/2020  Time:    16:56             Narrative:    EXAMINATION:  XR CHEST AP PORTABLE    CLINICAL HISTORY:  weakness;    COMPARISON:  10/24/2020    FINDINGS:  The size of the heart is prominent.  There is a mild amount of haziness in the bases of both lungs.  There is persistent opacification of the base of the left hemithorax.  There is blunting of the right costophrenic angle.  There is no pneumothorax.                               The EKG was ordered, reviewed, and independently interpreted by the ED provider.  Interpretation time: 16:17  Rate: 59 BPM  Rhythm: Sinus bradycardia with sinus arrhythmia.  Interpretation: Nonspecific ST and T wave abnormality. No STEMI.      The Emergency Provider reviewed the vital signs and test results, which are outlined above.     ED Discussion     3:12 PM: Discussed pt's case with Dr. Lala (Urology) who recommends irrigating the greenfield as far as the greenfield can hand irrigate.     4:08 PM: Dr. Urena transfers care of patient to Dr. Gandhi pending lab  results.    7:20 PM: Discussed case with Jeri Melendez NP (Hospital Medicine). Dr. Patten agrees with current care and management of pt and accepts admission.   Admitting Service: Hospital Medicine  Admitting Physician: Dr. Patten  Admit to: Obs Med Surg    7:21 PM: Re-evaluated pt. I have discussed test results, shared treatment plan, and the need for admission with patient and family at bedside. Nurses have been hand irrigating greenfiled, but urine still significant amount of hematuria. Pt and family express understanding at this time and agree with all information. All questions answered. Pt and family have no further questions or concerns at this time. Pt is ready for admit.    7:22 PM: Notified general surgery Dr. Lord of post-op patient being admitted        MDM         Medical Decision Making:   Clinical Tests:   Lab Tests: Ordered and Reviewed  Radiological Study: Ordered and Reviewed  Medical Tests: Ordered and Reviewed           ED Medication(s):  Medications - No data to display    New Prescriptions    No medications on file               Scribe Attestation:   Scribe #1: I performed the above scribed service and the documentation accurately describes the services I performed. I attest to the accuracy of the note.     Attending:   Physician Attestation Statement for Scribe #1: I, Gen Urena MD, personally performed the services described in this documentation, as scribed by Kandy Cox, in my presence, and it is both accurate and complete.       Scribe Attestation:   Scribe #2: I performed the above scribed service and the documentation accurately describes the services I performed. I attest to the accuracy of the note.    Attending Attestation:           Physician Attestation for Scribe:    Physician Attestation Statement for Scribe #2: I, Jose R Gandhi MD, reviewed documentation, as scribed by Virgilio Van in my presence, and it is both accurate and complete. I also acknowledge and confirm the content of the note done by Scribe #1.           Clinical Impression       ICD-10-CM ICD-9-CM   1. Hematuria, unspecified type  R31.9 599.70   2. Weakness  R53.1 780.79   3. Hypoglycemia  E16.2 251.2   4. Elevated troponin  R77.8 790.6   5. Chronic kidney disease, unspecified CKD stage  N18.9 585.9       Disposition:   Disposition: Placed in Observation  Condition: Fair         Jose R Gandhi MD  11/02/20 6399

## 2020-11-02 NOTE — ED NOTES
Patient identifiers verified and correct for Colt Regis Du.    LOC: The patient is awake, alert and aware of environment with an appropriate affect, the patient is oriented x 3 and speaking appropriately.  APPEARANCE: Patient resting comfortably and in no acute distress, patient is clean and well groomed, patient's clothing is properly fastened.  SKIN: The skin is warm and dry, color consistent with ethnicity, patient has normal skin turgor and moist mucus membranes, skin intact, no breakdown or bruising noted.  MUSCULOSKELETAL: Patient moving all extremities spontaneously.  RESPIRATORY: Airway is open and patent, respirations are spontaneous.  CARDIAC: Patient has a normal rate, no periphreal edema noted, capillary refill < 3 seconds.  ABDOMEN: Soft and non tender to palpation.    Hematuria since surgery on 10/20. Noe replaced at urologist & sent to ED for evaluation. No complaints. Exam otherwise normal.

## 2020-11-02 NOTE — ED NOTES
Noe irrigated with 1,120 cc's. Small clots noted to urine upon return. Urine is clear, blood tinged at this time.

## 2020-11-02 NOTE — ED NOTES
Patient identifiers verified and correct for Colt Stacy .    LOC: The patient is awake, alert and aware of environment with an appropriate affect, the patient is oriented x 3 and speaking appropriately.  APPEARANCE: Patient resting comfortably and in no acute distress, patient is clean and well groomed, patient's clothing is properly fastened.  SKIN: The skin is warm and dry, color consistent with ethnicity, patient has normal skin turgor and moist mucus membranes, skin intact, no breakdown or bruising noted.  MUSCULOSKELETAL: Patient moving all extremities spontaneously.  RESPIRATORY: Airway is open and patent, respirations are spontaneous.  CARDIAC: Patient has a normal rate, no periphreal edema noted, capillary refill < 3 seconds.  ABDOMEN: Soft and non tender to palpation.  : WNL except greenfield in place, draining red, blood-tinged urine into collection bag.

## 2020-11-03 PROBLEM — N18.4 STAGE 4 CHRONIC KIDNEY DISEASE: Status: ACTIVE | Noted: 2020-11-03

## 2020-11-03 PROBLEM — E11.22 STAGE 4 CHRONIC KIDNEY DISEASE DUE TO TYPE 2 DIABETES MELLITUS: Status: ACTIVE | Noted: 2020-11-03

## 2020-11-03 LAB
ANION GAP SERPL CALC-SCNC: 8 MMOL/L (ref 8–16)
AORTIC ROOT ANNULUS: 3.16 CM
ASCENDING AORTA: 2.8 CM
AV INDEX (PROSTH): 0.96
AV MEAN GRADIENT: 11 MMHG
AV PEAK GRADIENT: 23 MMHG
AV VALVE AREA: 2.97 CM2
AV VELOCITY RATIO: 0.75
BASOPHILS # BLD AUTO: 0.03 K/UL (ref 0–0.2)
BASOPHILS NFR BLD: 0.3 % (ref 0–1.9)
BSA FOR ECHO PROCEDURE: 1.97 M2
BUN SERPL-MCNC: 98 MG/DL (ref 8–23)
CALCIUM SERPL-MCNC: 8 MG/DL (ref 8.7–10.5)
CHLORIDE SERPL-SCNC: 110 MMOL/L (ref 95–110)
CO2 SERPL-SCNC: 23 MMOL/L (ref 23–29)
CREAT SERPL-MCNC: 10 MG/DL (ref 0.5–1.4)
CV ECHO LV RWT: 0.52 CM
DIFFERENTIAL METHOD: ABNORMAL
DOP CALC AO PEAK VEL: 2.39 M/S
DOP CALC AO VTI: 42.04 CM
DOP CALC LVOT AREA: 3.1 CM2
DOP CALC LVOT DIAMETER: 1.98 CM
DOP CALC LVOT PEAK VEL: 1.79 M/S
DOP CALC LVOT STROKE VOLUME: 124.82 CM3
DOP CALC RVOT PEAK VEL: 1.38 M/S
DOP CALC RVOT VTI: 29.86 CM
DOP CALCLVOT PEAK VEL VTI: 40.56 CM
E WAVE DECELERATION TIME: 236.41 MSEC
E/A RATIO: 0.85
E/E' RATIO: 8.16 M/S
ECHO LV POSTERIOR WALL: 1.31 CM (ref 0.6–1.1)
EOSINOPHIL # BLD AUTO: 0.1 K/UL (ref 0–0.5)
EOSINOPHIL NFR BLD: 1.1 % (ref 0–8)
ERYTHROCYTE [DISTWIDTH] IN BLOOD BY AUTOMATED COUNT: 13 % (ref 11.5–14.5)
EST. GFR  (AFRICAN AMERICAN): 5 ML/MIN/1.73 M^2
EST. GFR  (NON AFRICAN AMERICAN): 5 ML/MIN/1.73 M^2
FRACTIONAL SHORTENING: 39 % (ref 28–44)
GLUCOSE SERPL-MCNC: 79 MG/DL (ref 70–110)
HCT VFR BLD AUTO: 25.3 % (ref 40–54)
HCT VFR BLD AUTO: 26 % (ref 40–54)
HGB BLD-MCNC: 8.1 G/DL (ref 14–18)
HGB BLD-MCNC: 8.2 G/DL (ref 14–18)
IMM GRANULOCYTES # BLD AUTO: 0.03 K/UL (ref 0–0.04)
IMM GRANULOCYTES NFR BLD AUTO: 0.3 % (ref 0–0.5)
INTERVENTRICULAR SEPTUM: 1.43 CM (ref 0.6–1.1)
IVRT: 94.2 MSEC
LA MAJOR: 6.18 CM
LA MINOR: 4.07 CM
LEFT ATRIUM SIZE: 4.04 CM
LEFT INTERNAL DIMENSION IN SYSTOLE: 3.1 CM (ref 2.1–4)
LEFT VENTRICLE DIASTOLIC VOLUME INDEX: 64.83 ML/M2
LEFT VENTRICLE DIASTOLIC VOLUME: 122.6 ML
LEFT VENTRICLE MASS INDEX: 153 G/M2
LEFT VENTRICLE SYSTOLIC VOLUME INDEX: 20.1 ML/M2
LEFT VENTRICLE SYSTOLIC VOLUME: 37.94 ML
LEFT VENTRICULAR INTERNAL DIMENSION IN DIASTOLE: 5.08 CM (ref 3.5–6)
LEFT VENTRICULAR MASS: 289.4 G
LV LATERAL E/E' RATIO: 7.29 M/S
LV SEPTAL E/E' RATIO: 9.27 M/S
LYMPHOCYTES # BLD AUTO: 1 K/UL (ref 1–4.8)
LYMPHOCYTES NFR BLD: 10.8 % (ref 18–48)
MAGNESIUM SERPL-MCNC: 2.2 MG/DL (ref 1.6–2.6)
MAGNESIUM SERPL-MCNC: 2.3 MG/DL (ref 1.6–2.6)
MCH RBC QN AUTO: 30.5 PG (ref 27–31)
MCHC RBC AUTO-ENTMCNC: 31.5 G/DL (ref 32–36)
MCV RBC AUTO: 97 FL (ref 82–98)
MONOCYTES # BLD AUTO: 0.7 K/UL (ref 0.3–1)
MONOCYTES NFR BLD: 7.6 % (ref 4–15)
MV PEAK A VEL: 1.2 M/S
MV PEAK E VEL: 1.02 M/S
MV STENOSIS PRESSURE HALF TIME: 68.56 MS
MV VALVE AREA P 1/2 METHOD: 3.21 CM2
NEUTROPHILS # BLD AUTO: 7.3 K/UL (ref 1.8–7.7)
NEUTROPHILS NFR BLD: 79.9 % (ref 38–73)
NRBC BLD-RTO: 0 /100 WBC
PHOSPHATE SERPL-MCNC: 5.9 MG/DL (ref 2.7–4.5)
PISA MRMAX VEL: 0.04 M/S
PISA TR MAX VEL: 2.85 M/S
PLATELET # BLD AUTO: 261 K/UL (ref 150–350)
PMV BLD AUTO: 10.7 FL (ref 9.2–12.9)
POCT GLUCOSE: 124 MG/DL (ref 70–110)
POCT GLUCOSE: 128 MG/DL (ref 70–110)
POCT GLUCOSE: 169 MG/DL (ref 70–110)
POCT GLUCOSE: 77 MG/DL (ref 70–110)
POTASSIUM SERPL-SCNC: 4.6 MMOL/L (ref 3.5–5.1)
PV MEAN GRADIENT: 4.74 MMHG
RA MAJOR: 4.53 CM
RA PRESSURE: 3 MMHG
RBC # BLD AUTO: 2.69 M/UL (ref 4.6–6.2)
SINUS: 3.06 CM
SODIUM SERPL-SCNC: 141 MMOL/L (ref 136–145)
STJ: 2.95 CM
TDI LATERAL: 0.14 M/S
TDI SEPTAL: 0.11 M/S
TDI: 0.13 M/S
TR MAX PG: 32 MMHG
TRICUSPID ANNULAR PLANE SYSTOLIC EXCURSION: 2.16 CM
TROPONIN I SERPL DL<=0.01 NG/ML-MCNC: 0.03 NG/ML (ref 0–0.03)
TROPONIN I SERPL DL<=0.01 NG/ML-MCNC: 0.04 NG/ML (ref 0–0.03)
TV REST PULMONARY ARTERY PRESSURE: 35 MMHG
WBC # BLD AUTO: 9.11 K/UL (ref 3.9–12.7)

## 2020-11-03 PROCEDURE — 85018 HEMOGLOBIN: CPT | Mod: 59

## 2020-11-03 PROCEDURE — 96372 THER/PROPH/DIAG INJ SC/IM: CPT

## 2020-11-03 PROCEDURE — 97166 OT EVAL MOD COMPLEX 45 MIN: CPT

## 2020-11-03 PROCEDURE — 84484 ASSAY OF TROPONIN QUANT: CPT | Mod: 91

## 2020-11-03 PROCEDURE — 99900035 HC TECH TIME PER 15 MIN (STAT)

## 2020-11-03 PROCEDURE — 99215 PR OFFICE/OUTPT VISIT, EST, LEVL V, 40-54 MIN: ICD-10-PCS | Mod: ,,, | Performed by: INTERNAL MEDICINE

## 2020-11-03 PROCEDURE — 97530 THERAPEUTIC ACTIVITIES: CPT

## 2020-11-03 PROCEDURE — 99214 OFFICE O/P EST MOD 30 MIN: CPT | Mod: ,,, | Performed by: UROLOGY

## 2020-11-03 PROCEDURE — 96361 HYDRATE IV INFUSION ADD-ON: CPT

## 2020-11-03 PROCEDURE — 36415 COLL VENOUS BLD VENIPUNCTURE: CPT

## 2020-11-03 PROCEDURE — 99215 OFFICE O/P EST HI 40 MIN: CPT | Mod: ,,, | Performed by: INTERNAL MEDICINE

## 2020-11-03 PROCEDURE — 63600175 PHARM REV CODE 636 W HCPCS: Performed by: NURSE PRACTITIONER

## 2020-11-03 PROCEDURE — G0378 HOSPITAL OBSERVATION PER HR: HCPCS

## 2020-11-03 PROCEDURE — 97161 PT EVAL LOW COMPLEX 20 MIN: CPT

## 2020-11-03 PROCEDURE — 85025 COMPLETE CBC W/AUTO DIFF WBC: CPT

## 2020-11-03 PROCEDURE — 94761 N-INVAS EAR/PLS OXIMETRY MLT: CPT

## 2020-11-03 PROCEDURE — 97116 GAIT TRAINING THERAPY: CPT | Mod: 59

## 2020-11-03 PROCEDURE — 84484 ASSAY OF TROPONIN QUANT: CPT

## 2020-11-03 PROCEDURE — 83735 ASSAY OF MAGNESIUM: CPT | Mod: 91

## 2020-11-03 PROCEDURE — 27000221 HC OXYGEN, UP TO 24 HOURS

## 2020-11-03 PROCEDURE — 84100 ASSAY OF PHOSPHORUS: CPT

## 2020-11-03 PROCEDURE — 83735 ASSAY OF MAGNESIUM: CPT

## 2020-11-03 PROCEDURE — 25000003 PHARM REV CODE 250: Performed by: NURSE PRACTITIONER

## 2020-11-03 PROCEDURE — 99214 PR OFFICE/OUTPT VISIT, EST, LEVL IV, 30-39 MIN: ICD-10-PCS | Mod: ,,, | Performed by: UROLOGY

## 2020-11-03 PROCEDURE — 80048 BASIC METABOLIC PNL TOTAL CA: CPT

## 2020-11-03 PROCEDURE — 85014 HEMATOCRIT: CPT | Mod: 59

## 2020-11-03 RX ADMIN — SERTRALINE HYDROCHLORIDE 100 MG: 50 TABLET ORAL at 08:11

## 2020-11-03 RX ADMIN — SODIUM CHLORIDE: 0.9 INJECTION, SOLUTION INTRAVENOUS at 02:11

## 2020-11-03 RX ADMIN — ARIPIPRAZOLE 30 MG: 5 TABLET ORAL at 08:11

## 2020-11-03 RX ADMIN — HYDRALAZINE HYDROCHLORIDE 75 MG: 50 TABLET, FILM COATED ORAL at 10:11

## 2020-11-03 RX ADMIN — SEVELAMER CARBONATE 800 MG: 800 TABLET, FILM COATED ORAL at 11:11

## 2020-11-03 RX ADMIN — HYDRALAZINE HYDROCHLORIDE 75 MG: 50 TABLET, FILM COATED ORAL at 02:11

## 2020-11-03 RX ADMIN — SEVELAMER CARBONATE 800 MG: 800 TABLET, FILM COATED ORAL at 08:11

## 2020-11-03 RX ADMIN — TAMSULOSIN HYDROCHLORIDE 0.4 MG: 0.4 CAPSULE ORAL at 08:11

## 2020-11-03 RX ADMIN — ATORVASTATIN CALCIUM 80 MG: 40 TABLET, FILM COATED ORAL at 08:11

## 2020-11-03 RX ADMIN — INSULIN ASPART 2 UNITS: 100 INJECTION, SOLUTION INTRAVENOUS; SUBCUTANEOUS at 11:11

## 2020-11-03 RX ADMIN — INSULIN DETEMIR 15 UNITS: 100 INJECTION, SOLUTION SUBCUTANEOUS at 08:11

## 2020-11-03 RX ADMIN — SEVELAMER CARBONATE 800 MG: 800 TABLET, FILM COATED ORAL at 05:11

## 2020-11-03 RX ADMIN — NIFEDIPINE 60 MG: 30 TABLET, FILM COATED, EXTENDED RELEASE ORAL at 08:11

## 2020-11-03 RX ADMIN — METOPROLOL TARTRATE 12.5 MG: 25 TABLET, FILM COATED ORAL at 08:11

## 2020-11-03 RX ADMIN — HYDRALAZINE HYDROCHLORIDE 75 MG: 50 TABLET, FILM COATED ORAL at 05:11

## 2020-11-03 RX ADMIN — THERA TABS 1 TABLET: TAB at 08:11

## 2020-11-03 NOTE — ASSESSMENT & PLAN NOTE
Currently well controlled  Continue home medications  Lopressor IV prn elevated BP  Monitor closely

## 2020-11-03 NOTE — PLAN OF CARE
SW met with pt at bedside to complete discharge assessment. Pt lives at home alone. He will stay with his daughter for a few days after he is discharged. His daughter is his help at home. Pt daughter will pick him up when he is discharged. Pt uses a RW. Prior to hospital stay, pt was ambulatory and ADLs. Pt's [facility] Pt asked about possible HH. No other needs identified at this time. SW provided a transitional care folder, information on advanced directives, information on pharmacy bedside delivery, and discharge planning begins on admission with contact information for any needs/questions. Pt board updated with SW name and number.     Payor: MEDICARE / Plan: MEDICARE PART A & B / Product Type: Government /   PCP: Primary Doctor No  Pharmacy:   [facility] - [place], [place] - [address]  [address] [facility] [address]  Phone: [phone] Fax: [phone]    [facility] - [facility] [address]  [address] [facility] [address]  Phone: [phone] Fax: [phone]  Bedside Delivery: No  MyChart: Link sent       11/03/20 1029   Discharge Assessment   Assessment Type Discharge Planning Assessment   Confirmed/corrected address and phone number on facesheet? Yes   Assessment information obtained from? Patient;Other  (Joanna Pop (sister))   Expected Length of Stay (days)   (TBD)   Communicated expected length of stay with patient/caregiver yes   Prior to hospitilization cognitive status: Alert/Oriented   Prior to hospitalization functional status: Independent;Assistive Equipment   Current cognitive status: Alert/Oriented   Current Functional Status: Independent;Assistive Equipment   Facility Arrived From: home   Lives With alone   Able to Return to Prior Arrangements yes   Is patient able to care for self after discharge? Yes   Who are your caregiver(s) and their phone number(s)? Cris Kruse (daughter)  [phone]   Patient's perception of discharge disposition home or selfcare   Readmission Within the Last 30 Days no previous admission in last 30 days   Patient currently being followed by outpatient case management? No   Patient currently receives any other outside agency services? No   Equipment Currently Used at Home walker, rolling   Part D Coverage n/a   Do you have any problems affording any of your prescribed medications? No   Is the patient taking medications as prescribed? yes   Does the patient have transportation home? Yes   Transportation Anticipated family or friend will provide   Dialysis Name and Scheduled days n/a   Does the patient receive services at the [facility]mad[facility] Clinic? No   Discharge Plan A Home with family   Discharge Plan B Home   DME Needed Upon Discharge  none   Patient/Family in Agreement with Plan yes     Vito Chavarria LMSW 11/3/2020 10:54 AM

## 2020-11-03 NOTE — PLAN OF CARE
Discussed poc with pt, pt verbalized understanding    modified visitor policy per CDC guidelines  allowing one visitor from 8a-6pm    Purposeful rounding every 2hours    VS wnl  2L o2 NC  Cardiac monitoring in use, pt is NSR, tele monitor # [id number]  Blood glucose monitoring   Fall precautions in place, remains injury free  Pt denies c/o Pain and nausea     Noe cath care done and irrigation as well as bladder scanning to ensure no retention    IVFs  Accurate I&Os  Abx given as prescribed  Bed locked at lowest position  Call light within reach    Chart check complete  Will cont to monitor

## 2020-11-03 NOTE — PT/OT/SLP EVAL
Physical Therapy Evaluation and Discharge Note    Patient Name:  Colt Stacy Jr.   MRN:  [id number]    Recommendations:     Discharge Recommendations:  home   Discharge Equipment Recommendations: none   Barriers to discharge: NONE    Assessment:     Colt Stacy Jr. is a 75 y.o. male admitted with a medical diagnosis of Hematuria. .  At this time, patient is functioning at their prior level of function and does not require further acute PT services.     Recent Surgery: * No surgery found *      Plan:     During this hospitalization, patient does not require further acute PT services.  Please re-consult if situation changes.      Subjective     Chief Complaint: NONE  Patient/Family Comments/goals: NONE STATED   Pain/Comfort:  · Pain Rating 1: 0/10  · Pain Rating Post-Intervention 1: 0/10    Patients cultural, spiritual, Adventism conflicts given the current situation:      Living Environment:   PT LIVES AT HOME WITH DAUGHTER SINCE LAST HOSPITAL STAY  Prior to admission, patients level of function was MOD I WITH RW WITH 3 STEPS AND NO RAILING..  Equipment used at home: walker, rolling.  DME owned (not currently used): none.  Upon discharge, patient will have assistance from DAUGHTER .    Objective:     Communicated with NURSE STEPHEN AND Epic CHART REVIEW  prior to session.  Patient found supine with peripheral IV, greenfield catheter, oxygen upon PT entry to room.    General Precautions: Standard, fall   Orthopedic Precautions:N/A   Braces: N/A     Exams:  · RLE ROM: WFL  · RLE Strength: WFL  · LLE ROM: WFL  · LLE Strength: WFL    Functional Mobility:  PT MET IN RM SUP>SIT EOB IND. PT SCOOTED TO EOB AND STOOD WITH RW MOD I. PT GT TRAINED X 200' WITH RW AND SLOW PACE MOD I. PT RETURNED TO RM T/F TO CHAIR WITH RW MOD A. PT LEFT SEATED IN CHAIR WITH ALL NEEDS MET AND CALL BELL IN REACH.     AM-PAC 6 CLICK MOBILITY  Total Score:21     Patient left up in chair with call button in reach and chair alarm on.    GOALS:  "  Multidisciplinary Problems     Physical Therapy Goals     Not on file                History:     Past Medical History:   Diagnosis Date    Adrenal adenoma, left     BPH (benign prostatic hyperplasia)     CAD (coronary artery disease)     CKD (chronic kidney disease) stage 5, GFR less than 15 ml/min     Depression     Diabetes mellitus     Hyperlipidemia     Hypertension     Retinitis     Stroke     Pt states residual sx is "I walk funny".    Vitiligo        Past Surgical History:   Procedure Laterality Date    FLUOROSCOPY Bilateral 7/20/2020    Procedure: Angiogram- Bilateral Adrenal;  Surgeon: Jose R Laws MD;  Location: [facility] CATH LAB;  Service: General;  Laterality: Bilateral;    ROBOT-ASSISTED ADRENALECTOMY Left     ROBOT-ASSISTED SURGICAL REMOVAL OF ADRENAL GLAND USING DA DEMETRI XI Left 10/20/2020    Procedure: XI ROBOTIC ADRENALECTOMY;  Surgeon: Livan Quiñones MD;  Location: [facility] OR;  Service: General;  Laterality: Left;       Time Tracking:     PT Received On: 11/03/20  PT Start Time: 1240     PT Stop Time: 1305  PT Total Time (min): 25 min     Billable Minutes: Evaluation 15 and Gait Training 10      Alta Lawson, PT  11/03/2020  "

## 2020-11-03 NOTE — HPI
"74 y/o AA M with hx of depression, CAD, HLD, HTN, DM2, stroke, retinintis, robotic L adrenalectomy, L adrenal adenoma, BPH, CKD5, vitiligo sent to ED per urology (Dr. Lala) for gross hematuria since 10/23/2020. On 10/22/2020 pt had a L adrenalectomy per Dr. Quiñones and following this had urinary retention - a greenfield catheter was placed which resulted in gross hematuria - pt was sent home with a greenfield to f/u with urology. Seen in clinic today with a view to remove the catheter but the hematuria has persisted - underwent cystoscopy in clinic and per chart review was found to have a "false passage" - greenfield was replaced and pt sent to ED. Symptoms have been persistent and without known exacerbating or mitigating factors. Denies chest pain, edema, palpitations, SOB, wheezing, orthopnea, abdominal pain, N/V/D, constipation, dysuria, flank pain, HA, dizziness, weakness, fever, cough, chills, falls/trauma, blurred vision, focal deficits. Found in ED to have elevated creatinine (9.9 - chronic), GFR of 5, BG 62, , troponin 0.044 - labs otherwise unremarkable, urine red, cxray with bilateral small pleural effusions (chronic); VSS. In ED, pt's greenfield catheter was irrigated x2 but hematuria persisted. Hospital medicine was called and pt was placed in observation on med surg. Pt is a full code - surrogate decision maker is his daughter, Cris Kruse.   "

## 2020-11-03 NOTE — SUBJECTIVE & OBJECTIVE
Current Facility-Administered Medications on File Prior to Encounter   Medication    diphenhydrAMINE injection 25 mg    metoclopramide HCl injection 10 mg     Current Outpatient Medications on File Prior to Encounter   Medication Sig    ARIPiprazole (ABILIFY) 30 MG Tab Take 30 mg by mouth every evening.     aspirin (ASPIR-81 ORAL) Take 81 mg by mouth every evening.     atorvastatin (LIPITOR) 80 MG tablet Take 1 tablet (80 mg total) by mouth once daily.    diphenoxylate-atropine 2.5-0.025 mg (LOMOTIL) 2.5-0.025 mg per tablet Take 1 tablet by mouth 4 (four) times daily as needed for Diarrhea.    hydrALAZINE (APRESOLINE) 25 MG tablet Take 3 tablets (75 mg total) by mouth every 8 (eight) hours.    HYDROcodone-acetaminophen (NORCO) 5-325 mg per tablet Take 1 tablet by mouth every 8 (eight) hours as needed. (Patient not taking: Reported on 11/2/2020)    insulin aspart U-100 (NOVOLOG) 100 unit/mL injection Inject into the skin 2 (two) times a day. 4 units q a.m, 7 units at dinner    insulin detemir U-100 (LEVEMIR) 100 unit/mL injection Inject 31 Units into the skin every evening.     Lactobacillus acidoph-L.bulgar 1 million cell Chew Take 4 tablets by mouth 3 (three) times daily with meals.    metoprolol tartrate (LOPRESSOR) 25 MG tablet Take 0.5 tablets (12.5 mg total) by mouth 2 (two) times daily.    multivitamin capsule Take 1 capsule by mouth once daily.    NIFEdipine (PROCARDIA-XL) 60 MG (OSM) 24 hr tablet Take 1 tablet (60 mg total) by mouth once daily.    sertraline (ZOLOFT) 100 MG tablet Take 100 mg by mouth once daily.     sevelamer carbonate (RENVELA) 800 mg Tab Take 1 tablet (800 mg total) by mouth 3 (three) times daily with meals.    tamsulosin (FLOMAX) 0.4 mg Cap Take 1 capsule (0.4 mg total) by mouth once daily.       Review of patient's allergies indicates:   Allergen Reactions    Benadryl [diphenhydramine hcl] Other (See Comments)     Pt states benadryl makes him feel numb    Ace  "inhibitors      YIN       Past Medical History:   Diagnosis Date    Adrenal adenoma, left     BPH (benign prostatic hyperplasia)     CAD (coronary artery disease)     CKD (chronic kidney disease) stage 5, GFR less than 15 ml/min     Depression     Diabetes mellitus     Hyperlipidemia     Hypertension     Retinitis     Stroke     Pt states residual sx is "I walk funny".    Vitiligo      Past Surgical History:   Procedure Laterality Date    FLUOROSCOPY Bilateral 7/20/2020    Procedure: Angiogram- Bilateral Adrenal;  Surgeon: Jose R Laws MD;  Location: [facility], [place] CATH LAB;  Service: General;  Laterality: Bilateral;    ROBOT-ASSISTED ADRENALECTOMY Left     ROBOT-ASSISTED SURGICAL REMOVAL OF ADRENAL GLAND USING DA DEMETRI XI Left 10/20/2020    Procedure: XI ROBOTIC ADRENALECTOMY;  Surgeon: Livan Quiñones MD;  Location: [facility], [place] OR;  Service: General;  Laterality: Left;     Family History     None        Tobacco Use    Smoking status: Never Smoker    Smokeless tobacco: Never Used   Substance and Sexual Activity    Alcohol use: No    Drug use: No    Sexual activity: Not Currently     Review of Systems   Constitutional: Negative for chills, fever and unexpected weight change.   HENT: Negative for congestion.    Eyes: Negative for visual disturbance.   Respiratory: Negative for shortness of breath.    Cardiovascular: Negative for chest pain.   Gastrointestinal: Negative for abdominal distention, abdominal pain, constipation, nausea, rectal pain and vomiting.   Genitourinary: Positive for hematuria. Negative for dysuria.   Musculoskeletal: Negative for arthralgias.   Skin: Negative for rash.   Neurological: Negative for light-headedness.   Hematological: Negative for adenopathy.     Objective:     Vital Signs (Most Recent):  Temp: 98.8 °F (37.1 °C) (11/03/20 0748)  Pulse: 64 (11/03/20 0748)  Resp: 15 (11/03/20 0748)  BP: (!) 155/69 (11/03/20 0748)  SpO2: 100 % (11/03/20 0748) Vital Signs (24h Range):  Temp:  [96.8 " °F (36 °C)-98.8 °F (37.1 °C)] 98.8 °F (37.1 °C)  Pulse:  [61-92] 64  Resp:  [15-20] 15  SpO2:  [95 %-100 %] 100 %  BP: (134-158)/(56-82) 155/69     Weight: 88.6 kg (195 lb 5.2 oz)  Body mass index is 35.73 kg/m².    Physical Exam  Vitals signs reviewed.   Constitutional:       Appearance: He is well-developed. He is obese.   HENT:      Head: Normocephalic and atraumatic.   Neck:      Musculoskeletal: Normal range of motion and neck supple.   Cardiovascular:      Rate and Rhythm: Normal rate and regular rhythm.   Pulmonary:      Effort: Pulmonary effort is normal.   Abdominal:      General: Bowel sounds are normal. There is no distension.      Palpations: Abdomen is soft.      Tenderness: There is no abdominal tenderness.      Comments: Incisions clean dry and intact   Skin:     General: Skin is warm and dry.   Neurological:      Mental Status: He is alert and oriented to person, place, and time.         Significant Labs:  CBC:   Recent Labs   Lab 11/03/20  0649   WBC 9.11   RBC 2.69*   HGB 8.2*   HCT 26.0*      MCV 97   MCH 30.5   MCHC 31.5*     BMP:   Recent Labs   Lab 11/03/20  0649 11/03/20  0650   GLU 79  --      --    K 4.6  --      --    CO2 23  --    BUN 98*  --    CREATININE 10.0*  --    CALCIUM 8.0*  --    MG 2.3 2.2

## 2020-11-03 NOTE — PROGRESS NOTES
"[facility] Medicine  Progress Note    Patient Name: Colt Stacy Jr.  MRN: [id number]  Patient Class: OP- Observation   Admission Date: 11/2/2020  Length of Stay: 0 days  Attending Physician: Mo Borges MD  Primary Care Provider: Primary Doctor No        Subjective:     Principal Problem:Hematuria        HPI:  76 y/o AA M with hx of depression, CAD, HLD, HTN, DM2, stroke, retinintis, robotic L adrenalectomy, L adrenal adenoma, BPH, CKD5, vitiligo sent to ED per urology (Dr. Lala) for gross hematuria since 10/23/2020. On 10/22/2020 pt had a L adrenalectomy per Dr. Quiñones and following this had urinary retention - a greenfield catheter was placed which resulted in gross hematuria - pt was sent home with a greenfield to f/u with urology. Seen in clinic today with a view to remove the catheter but the hematuria has persisted - underwent cystoscopy in clinic and per chart review was found to have a "false passage" - greenfield was replaced and pt sent to ED. Symptoms have been persistent and without known exacerbating or mitigating factors. Denies chest pain, edema, palpitations, SOB, wheezing, orthopnea, abdominal pain, N/V/D, constipation, dysuria, flank pain, HA, dizziness, weakness, fever, cough, chills, falls/trauma, blurred vision, focal deficits. Found in ED to have elevated creatinine (9.9 - chronic), GFR of 5, BG 62, , troponin 0.044 - labs otherwise unremarkable, urine red, cxray with bilateral small pleural effusions (chronic); VSS. In ED, pt's greenfield catheter was irrigated x2 but hematuria persisted. Hospital medicine was called and pt was placed in observation on med surg. Pt is a full code - surrogate decision maker is his daughter, Cris Kruse.     Overview/Hospital Course:  Patient kept on OBS for hematuria under the care of hospital medicine. Urology, general surgery, and nephrology were consulted.    Interval History: urology saw patient and recommended watchful waiting " with intermittent greenfield irrigation and monitor H/H. General surgery recommends continued use of greenfield catheter with Nephrology to follow CKD. Discussed with Dr. Babin, who strongly recommends continue gentle IVF's and strict I&O - no diuretics at this time. Will continue to monitor    Review of Systems   Constitutional: Negative for activity change, chills, diaphoresis, fatigue and fever.   HENT: Negative for congestion, trouble swallowing and voice change.    Eyes: Negative for photophobia and discharge.   Respiratory: Negative for cough, chest tightness, shortness of breath and wheezing.    Cardiovascular: Positive for leg swelling (BLE, chronic, unchanged per pt). Negative for chest pain and palpitations.   Gastrointestinal: Negative for abdominal pain, blood in stool, constipation, diarrhea, nausea and vomiting.   Endocrine: Negative for cold intolerance, heat intolerance, polydipsia, polyphagia and polyuria.   Genitourinary: Positive for hematuria. Negative for difficulty urinating, dysuria, flank pain, frequency and urgency.   Musculoskeletal: Negative for back pain, joint swelling and myalgias.   Skin: Positive for color change (widespread vitiligo, chronic) and wound (L abdominal lap sites). Negative for rash.   Neurological: Negative for dizziness, seizures, syncope, facial asymmetry, weakness, light-headedness, numbness and headaches.   Psychiatric/Behavioral: Negative for confusion and hallucinations.     Objective:     Vital Signs (Most Recent):  Temp: 98.8 °F (37.1 °C) (11/03/20 0748)  Pulse: 64 (11/03/20 0748)  Resp: 15 (11/03/20 0748)  BP: (!) 155/69 (11/03/20 0748)  SpO2: 100 % (11/03/20 0748) Vital Signs (24h Range):  Temp:  [96.8 °F (36 °C)-98.8 °F (37.1 °C)] 98.8 °F (37.1 °C)  Pulse:  [61-92] 64  Resp:  [15-20] 15  SpO2:  [95 %-100 %] 100 %  BP: (134-158)/(56-82) 155/69     Weight: 88.6 kg (195 lb 5.2 oz)  Body mass index is 35.73 kg/m².    Intake/Output Summary (Last 24 hours) at 11/3/2020  0909  Last data filed at 11/3/2020 0800  Gross per 24 hour   Intake 781 ml   Output 3350 ml   Net -2569 ml      Physical Exam  Vitals signs reviewed.   Constitutional:       General: He is not in acute distress.     Appearance: Normal appearance. He is obese. He is not toxic-appearing or diaphoretic.   HENT:      Head: Normocephalic and atraumatic.      Nose: Nose normal. No congestion.      Mouth/Throat:      Mouth: Mucous membranes are moist.   Eyes:      General: No scleral icterus.     Pupils: Pupils are equal, round, and reactive to light.   Neck:      Musculoskeletal: Normal range of motion and neck supple. No muscular tenderness.   Cardiovascular:      Rate and Rhythm: Normal rate and regular rhythm.      Pulses: Normal pulses.      Heart sounds: Normal heart sounds.   Pulmonary:      Effort: Pulmonary effort is normal. No respiratory distress.      Breath sounds: Normal breath sounds. No wheezing or rhonchi.   Abdominal:      General: Abdomen is flat. Bowel sounds are normal. There is no distension.      Palpations: Abdomen is soft.      Tenderness: There is no abdominal tenderness. There is no guarding or rebound.   Genitourinary:     Comments: Noe catheter in place - gross hematuria  Musculoskeletal: Normal range of motion.      Right lower le+ Edema (chronic per pt) present.      Left lower le+ Edema (chronic per pt) present.   Skin:     General: Skin is warm and dry.      Capillary Refill: Capillary refill takes less than 2 seconds.      Coloration: Skin is not jaundiced.      Findings: No bruising.             Comments: Widespread vitiligo   Neurological:      General: No focal deficit present.      Mental Status: He is alert and oriented to person, place, and time.      Sensory: No sensory deficit.      Motor: No weakness.   Psychiatric:         Mood and Affect: Mood normal.         Behavior: Behavior normal.         Thought Content: Thought content normal.         Judgment: Judgment normal.          Significant Labs:   Recent Lab Results       11/03/20  0650   11/03/20  0649   11/03/20  0544   11/03/20  0004   11/02/20  2117        Albumin               Alkaline Phosphatase               ALT               Anion Gap   8           Appearance, UA               AST               Baso #   0.03           Basophil %   0.3           Bilirubin (UA)               BILIRUBIN TOTAL               BNP               BUN   98           Calcium   8.0           Chloride   110           CO2   23           Color, UA               CPK               Creatinine   10.0           Differential Method   Automated           eGFR if    5           eGFR if non    5  Comment:  Calculation used to obtain the estimated glomerular filtration  rate (eGFR) is the CKD-EPI equation.              Eos #   0.1           Eosinophil %   1.1           Glucose   79           Glucose, UA               Gran # (ANC)   7.3           Gran %   79.9           Hematocrit   26.0           Hemoglobin   8.2           Immature Grans (Abs)   0.03  Comment:  Mild elevation in immature granulocytes is non specific and   can be seen in a variety of conditions including stress response,   acute inflammation, trauma and pregnancy. Correlation with other   laboratory and clinical findings is essential.             Immature Granulocytes   0.3           Ketones, UA               Leukocytes, UA               Lymph #   1.0           Lymph %   10.8           Magnesium 2.2 2.3           MCH   30.5           MCHC   31.5           MCV   97           Microscopic Comment               Mono #   0.7           Mono %   7.6           MPV   10.7           NITRITE UA               nRBC   0           Occult Blood UA               pH, UA               Phosphorus 5.9             Platelets   261           POCT Glucose     77   94     Potassium   4.6           PROTEIN TOTAL               Protein, UA               RBC   2.69           RBC, UA                RDW   13.0           SARS-CoV-2 RNA, Amplification, Qual               Sodium   141           Specific [place], UA               Specimen UA               Troponin I   0.032  Comment:  The reference interval for Troponin I represents the 99th percentile   cutoff   for our facility and is consistent with 3rd generation assay   performance.     0.037  Comment:  The reference interval for Troponin I represents the 99th percentile   cutoff   for our facility and is consistent with 3rd generation assay   performance.         UROBILINOGEN UA               WBC   9.11                            11/02/20  2026   11/02/20  1951   11/02/20  1902   11/02/20  1808   11/02/20  1510        Albumin               Alkaline Phosphatase               ALT               Anion Gap               Appearance, UA         Cloudy     AST               Baso #               Basophil %               Bilirubin (UA)         SEE COMMENT  Comment:  Color may interfere with results     BILIRUBIN TOTAL               BNP               BUN               Calcium               Chloride               CO2               Color, UA         Red     CPK               Creatinine               Differential Method               eGFR if                eGFR if non                Eos #               Eosinophil %               Glucose               Glucose, UA         SEE COMMENT  Comment:  Color may interfere with results     Gran # (ANC)               Gran %               Hematocrit               Hemoglobin               Immature Grans (Abs)               Immature Granulocytes               Ketones, UA         SEE COMMENT  Comment:  Color may interfere with results     Leukocytes, UA         SEE COMMENT  Comment:  Color may interfere with results     Lymph #               Lymph %               Magnesium               MCH               MCHC               MCV               Microscopic Comment         SEE COMMENT  Comment:  Other formed  elements not mentioned in the report are not   present in the microscopic examination.        Mono #               Mono %               MPV               NITRITE UA         SEE COMMENT  Comment:  Color may interfere with results     nRBC               Occult Blood UA         SEE COMMENT  Comment:  Color may interfere with results     pH, UA         SEE COMMENT  Comment:  Color may interfere with results     Phosphorus               Platelets               POCT Glucose 119   142         Potassium               PROTEIN TOTAL               Protein, UA         SEE COMMENT  Comment:  Recommend a 24 hour urine protein or a urine   protein/creatinine ratio if globulin induced proteinuria is  clinically suspected.  Color may interfere with results       RBC               RBC, UA         >100     RDW               SARS-CoV-2 RNA, Amplification, Qual   Negative  Comment:  This test utilizes isothermal nucleic acid amplification   technology to detect the SARS-CoV-2 RdRp nucleic acid segment.   The analytical sensitivity (limit of detection) is 125 genome   equivalents/mL.   A POSITIVE result implies infection with the SARS-CoV-2 virus;  the patient is presumed to be contagious.    A NEGATIVE result means that SARS-CoV-2 nucleic acids are not  present above the limit of detection. A NEGATIVE result should be   treated as presumptive. It does not rule out the possibility of   COVID-19 and should not be the sole basis for treatment decisions.   If COVID-19 is strongly suspected based on clinical and exposure   history, re-testing using an alternate molecular assay should be   considered.   This test is only for use under the Food and Drug   Administration s Emergency Use Authorization (EUA).   Commercial kits are provided by [employer].   Performance characteristics of the EUA have been independently  verified by [facility] Department of  Pathology and Laboratory Medicine.    _________________________________________________________________  The ID NOW COVID-19 Letter of Authorization, along with the   authorized Fact Sheet for Healthcare Providers, the authorized Fact  Sheet for Patients, and authorized labeling are available on the FDA   website:  www.fda.gov/MedicalDevices/Safety/EmergencySituations/ome157237.htm             Sodium               Specific [place], UA         SEE COMMENT  Comment:  Color may interfere with results     Specimen UA         Urine, Catheterized     Troponin I       0.044  Comment:  The reference interval for Troponin I represents the 99th percentile   cutoff   for our facility and is consistent with 3rd generation assay   performance.         UROBILINOGEN UA         SEE COMMENT  Comment:  Color may interfere with results     WBC                                11/02/20  1432        Albumin 2.3     Alkaline Phosphatase 47     ALT 13     Anion Gap 10     Appearance, UA       AST 23     Baso # 0.03     Basophil % 0.2     Bilirubin (UA)       BILIRUBIN TOTAL 0.2  Comment:  For infants and newborns, interpretation of results should be based  on gestational age, weight and in agreement with clinical  observations.  Premature Infant recommended reference ranges:  Up to 24 hours.............<8.0 mg/dL  Up to 48 hours............<12.0 mg/dL  3-5 days..................<15.0 mg/dL  6-29 days.................<15.0 mg/dL         Comment:  Values of less than 100 pg/ml are consistent with non-CHF populations.          Calcium 7.8     Chloride 110     CO2 20     Color, UA            Creatinine 9.9     Differential Method Automated     eGFR if  5     eGFR if non  5  Comment:  Calculation used to obtain the estimated glomerular filtration  rate (eGFR) is the CKD-EPI equation.        Eos # 0.1     Eosinophil % 0.4     Glucose 62     Glucose, UA       Gran # (ANC) 11.3     Gran % 88.9     Hematocrit 28.7     Hemoglobin 9.3  "    Immature Grans (Abs) 0.06  Comment:  Mild elevation in immature granulocytes is non specific and   can be seen in a variety of conditions including stress response,   acute inflammation, trauma and pregnancy. Correlation with other   laboratory and clinical findings is essential.       Immature Granulocytes 0.5     Ketones, UA       Leukocytes, UA       Lymph # 0.5     Lymph % 4.2     Magnesium       MCH 30.6     MCHC 32.4     MCV 94     Microscopic Comment       Mono # 0.7     Mono % 5.8     MPV 10.5     NITRITE UA       nRBC 0     Occult Blood UA       pH, UA       Phosphorus       Platelets 262     POCT Glucose       Potassium 4.4     PROTEIN TOTAL 5.7     Protein, UA       RBC 3.04     RBC, UA       RDW 12.7     SARS-CoV-2 RNA, Amplification, Qual       Sodium 140     Specific Gravity, UA       Specimen UA       Troponin I 0.028  Comment:  The reference interval for Troponin I represents the 99th percentile   cutoff   for our facility and is consistent with 3rd generation assay   performance.       UROBILINOGEN UA       WBC 12.66         All pertinent labs within the past 24 hours have been reviewed.    Significant Imaging: I have reviewed all pertinent imaging results/findings within the past 24 hours.      Assessment/Plan:      * Hematuria  Greenfield placed per urology (Dr. Lala) on 11/02/2020  "False passage" present per chart review  Urology following  General surgery following  Trend H&H - transfuse if indicated  Hand irrigate greenfield catheter prn  Strict I&Os  Continue flomax per urology recs  Holding home ASA for now  Gentle IV hydration - monitor for fluid overload    Elevated troponin  Likely leak d/t poor renal function - Denies chest pain, EKG unrevealing  Troponin at 0.044 in ED  Trend troponin  ADA, cardiac diet        BPH (benign prostatic hyperplasia)  Urology consulted  Continue flomax      CKD (chronic kidney disease) stage 5, GFR less than 15 ml/min  Currently around baseline  Nephrology " following  Monitor renal function  Avoid nephrotoxins  strict I&Os  Gentle IV hydration - monitor for fluid overload    Type 2 diabetes mellitus  A1c at 6.0 o n 10/20/2020 / BG 62 in ED  Accuchecks with SSI pr  Levemir at 1/2 home dose  ADA cardiac diet for now      HTN (hypertension)  Currently well controlled  Continue home medications  Lopressor IV prn elevated BP  Monitor closely        VTE Risk Mitigation (From admission, onward)         Ordered     Place sequential compression device  Until discontinued      11/02/20 2007                Discharge Planning   RICH:      Code Status: Full Code   Is the patient medically ready for discharge?:     Reason for patient still in hospital (select all that apply): Patient trending condition and Consult recommendations  Discharge Plan A: Home with family                  HECTOR Jose  Department of Hospital Medicine   [facility]

## 2020-11-03 NOTE — CONSULTS
"[facility] -   Nephrology  Consult Note    Patient Name: Colt Stacy Jr.  MRN: [id number]  Admission Date: 11/2/2020  Hospital Length of Stay: 0 days  Attending Provider: Mo Borges MD   Primary Care Physician: Primary Doctor No  Principal Problem:Hematuria    Consults  Subjective:     HPI: Pt was seen and examined. H/o and chart reviewed. Pt is a 76 y/o male with YIN on CKD stage 4. 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 Pt experienced, difficult greenfield catheter placement at the time with "false passage", per urology. YIN has persisted since then. Pt also had diarrhea around 10/30/20. Pt was re-admitted for persistent gross hematuria and weakness.     Past Medical History:   Diagnosis Date    Adrenal adenoma, left     BPH (benign prostatic hyperplasia)     CAD (coronary artery disease)     CKD (chronic kidney disease) stage 5, GFR less than 15 ml/min     Depression     Diabetes mellitus     Hyperlipidemia     Hypertension     Retinitis     Stroke     Pt states residual sx is "I walk funny".    Vitiligo        Past Surgical History:   Procedure Laterality Date    FLUOROSCOPY Bilateral 7/20/2020    Procedure: Angiogram- Bilateral Adrenal;  Surgeon: Jose R Laws MD;  Location: [facility] CATH LAB;  Service: General;  Laterality: Bilateral;    ROBOT-ASSISTED ADRENALECTOMY Left     ROBOT-ASSISTED SURGICAL REMOVAL OF ADRENAL GLAND USING DA DEMETRI XI Left 10/20/2020    Procedure: XI ROBOTIC ADRENALECTOMY;  Surgeon: Livan Quiñones MD;  Location: [facility] OR;  Service: General;  Laterality: Left;       Review of patient's allergies indicates:   Allergen Reactions    Benadryl [diphenhydramine hcl] Other (See Comments)     Pt states benadryl makes him feel numb    Ace inhibitors      YIN     Current " Facility-Administered Medications   Medication Frequency    0.9%  NaCl infusion Continuous    ARIPiprazole tablet 30 mg QHS    atorvastatin tablet 80 mg Daily    dextrose 50% injection 12.5 g PRN    glucagon (human recombinant) injection 1 mg PRN    hydrALAZINE tablet 75 mg Q8H    insulin aspart U-100 pen 1-10 Units Q6H PRN    insulin detemir U-100 pen 15 Units QHS    metoprolol injection 5 mg Q6H PRN    metoprolol tartrate (LOPRESSOR) split tablet 12.5 mg BID    multivitamin tablet Daily    NIFEdipine 24 hr tablet 60 mg Daily    sertraline tablet 100 mg Daily    sevelamer carbonate tablet 800 mg TID WM    tamsulosin 24 hr capsule 0.4 mg Daily     Facility-Administered Medications Ordered in Other Encounters   Medication Frequency    diphenhydrAMINE injection 25 mg Q6H PRN    metoclopramide HCl injection 10 mg Q10 Min PRN     Family History     None        Tobacco Use    Smoking status: Never Smoker    Smokeless tobacco: Never Used   Substance and Sexual Activity    Alcohol use: No    Drug use: No    Sexual activity: Not Currently     Review of Systems   Constitutional: Negative.    HENT: Negative.    Respiratory: Negative.    Cardiovascular: Negative.    Gastrointestinal: Negative.    Genitourinary: Positive for hematuria.   Musculoskeletal: Negative.    Neurological: Negative.    Psychiatric/Behavioral: Negative.      Objective:     Vital Signs (Most Recent):  Temp: 98.6 °F (37 °C) (11/03/20 1610)  Pulse: 60 (11/03/20 1610)  Resp: 18 (11/03/20 1610)  BP: (!) 143/66 (11/03/20 1610)  SpO2: (!) 92 % (11/03/20 1610)  O2 Device (Oxygen Therapy): nasal cannula (11/03/20 0738) Vital Signs (24h Range):  Temp:  [96.8 °F (36 °C)-98.8 °F (37.1 °C)] 98.6 °F (37 °C)  Pulse:  [60-73] 60  Resp:  [15-18] 18  SpO2:  [92 %-100 %] 92 %  BP: (135-158)/(61-70) 143/66     Weight: 88.5 kg (195 lb) (11/03/20 1610)  Body mass index is 35.67 kg/m².  Body surface area is 1.97 meters squared.    I/O last 3 completed  shifts:  In: 236 [P.O.:236]  Out: 3350 [Urine:1500; Other:1850]    Physical Exam  Vitals signs and nursing note reviewed.   Constitutional:       Appearance: Normal appearance.   HENT:      Head: Normocephalic and atraumatic.   Neck:      Musculoskeletal: Normal range of motion and neck supple.   Cardiovascular:      Rate and Rhythm: Normal rate and regular rhythm.      Pulses: Normal pulses.      Heart sounds: Normal heart sounds.   Pulmonary:      Effort: Pulmonary effort is normal.      Breath sounds: Normal breath sounds.   Abdominal:      General: Abdomen is flat.      Palpations: Abdomen is soft.   Musculoskeletal:      Right lower leg: No edema.      Left lower leg: No edema.   Neurological:      General: No focal deficit present.      Mental Status: He is alert and oriented to person, place, and time.   Psychiatric:         Mood and Affect: Mood normal.         Behavior: Behavior normal.         Significant Labs: reviewed  BMP  Lab Results   Component Value Date     11/03/2020    K 4.6 11/03/2020     11/03/2020    CO2 23 11/03/2020    BUN 98 (H) 11/03/2020    CREATININE 10.0 (H) 11/03/2020    CALCIUM 8.0 (L) 11/03/2020    ANIONGAP 8 11/03/2020    ESTGFRAFRICA 5 (A) 11/03/2020    EGFRNONAA 5 (A) 11/03/2020     Lab Results   Component Value Date    WBC 9.11 11/03/2020    HGB 8.2 (L) 11/03/2020    HCT 26.0 (L) 11/03/2020    MCV 97 11/03/2020     11/03/2020         Significant Imaging: reveiwed    Assessment/Plan:     74 y/o male with YIN on CKD stage 4 has persistent gross hematuria:    Acute renal failure  Pt developed YIN immediately post surgery post L adrenelectomy  YIN not related to hyperaldosteronism or the adrenelectomy  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  Due to DM, HTN, unopposed hyperaldosteronism       Plans and recommendations:  As discussed above  Opportunity for questions provided, discussed with pt's sister.  Total time spent 70 minutes including time needed to review the records, the   patient evaluation, documentation, face-to-face discussion with the patient,   more than 50% of the time was spent on coordination of care and counseling.    Level V visit.    Regina Babin MD   Nephrology  [facility] [place]

## 2020-11-03 NOTE — SUBJECTIVE & OBJECTIVE
"Past Medical History:   Diagnosis Date    Adrenal adenoma, left     BPH (benign prostatic hyperplasia)     CAD (coronary artery disease)     CKD (chronic kidney disease) stage 5, GFR less than 15 ml/min     Depression     Diabetes mellitus     Hyperlipidemia     Hypertension     Retinitis     Stroke     Pt states residual sx is "I walk funny".    Vitiligo        Past Surgical History:   Procedure Laterality Date    FLUOROSCOPY Bilateral 7/20/2020    Procedure: Angiogram- Bilateral Adrenal;  Surgeon: Jose R Laws MD;  Location: [facility] CATH LAB;  Service: General;  Laterality: Bilateral;    ROBOT-ASSISTED ADRENALECTOMY Left     ROBOT-ASSISTED SURGICAL REMOVAL OF ADRENAL GLAND USING DA DEMETRI XI Left 10/20/2020    Procedure: XI ROBOTIC ADRENALECTOMY;  Surgeon: Livan Quiñones MD;  Location: [facility] OR;  Service: General;  Laterality: Left;       Review of patient's allergies indicates:   Allergen Reactions    Benadryl [diphenhydramine hcl] Other (See Comments)     Pt states benadryl makes him feel numb    Ace inhibitors      YIN       Current Facility-Administered Medications on File Prior to Encounter   Medication    diphenhydrAMINE injection 25 mg    metoclopramide HCl injection 10 mg     Current Outpatient Medications on File Prior to Encounter   Medication Sig    ARIPiprazole (ABILIFY) 30 MG Tab Take 30 mg by mouth every evening.     aspirin (ASPIR-81 ORAL) Take 81 mg by mouth every evening.     atorvastatin (LIPITOR) 80 MG tablet Take 1 tablet (80 mg total) by mouth once daily.    diphenoxylate-atropine 2.5-0.025 mg (LOMOTIL) 2.5-0.025 mg per tablet Take 1 tablet by mouth 4 (four) times daily as needed for Diarrhea.    hydrALAZINE (APRESOLINE) 25 MG tablet Take 3 tablets (75 mg total) by mouth every 8 (eight) hours.    HYDROcodone-acetaminophen (NORCO) 5-325 mg per tablet Take 1 tablet by mouth every 8 (eight) hours as needed. (Patient not taking: Reported on 11/2/2020)    insulin aspart U-100 " (NOVOLOG) 100 unit/mL injection Inject into the skin 2 (two) times a day. 4 units q a.m, 7 units at dinner    insulin detemir U-100 (LEVEMIR) 100 unit/mL injection Inject 31 Units into the skin every evening.     Lactobacillus acidoph-L.cecigar 1 million cell Chew Take 4 tablets by mouth 3 (three) times daily with meals.    metoprolol tartrate (LOPRESSOR) 25 MG tablet Take 0.5 tablets (12.5 mg total) by mouth 2 (two) times daily.    multivitamin capsule Take 1 capsule by mouth once daily.    NIFEdipine (PROCARDIA-XL) 60 MG (OSM) 24 hr tablet Take 1 tablet (60 mg total) by mouth once daily.    sertraline (ZOLOFT) 100 MG tablet Take 100 mg by mouth once daily.     sevelamer carbonate (RENVELA) 800 mg Tab Take 1 tablet (800 mg total) by mouth 3 (three) times daily with meals.    tamsulosin (FLOMAX) 0.4 mg Cap Take 1 capsule (0.4 mg total) by mouth once daily.     Family History     None        Tobacco Use    Smoking status: Never Smoker    Smokeless tobacco: Never Used   Substance and Sexual Activity    Alcohol use: No    Drug use: No    Sexual activity: Not Currently     Review of Systems   Constitutional: Negative for activity change, chills, diaphoresis, fatigue and fever.   HENT: Negative for congestion, trouble swallowing and voice change.    Eyes: Negative for photophobia and discharge.   Respiratory: Negative for cough, chest tightness, shortness of breath and wheezing.    Cardiovascular: Positive for leg swelling (BLE, chronic, unchanged per pt). Negative for chest pain and palpitations.   Gastrointestinal: Negative for abdominal pain, blood in stool, constipation, diarrhea, nausea and vomiting.   Endocrine: Negative for cold intolerance, heat intolerance, polydipsia, polyphagia and polyuria.   Genitourinary: Positive for hematuria. Negative for difficulty urinating, dysuria, flank pain, frequency and urgency.   Musculoskeletal: Negative for back pain, joint swelling and myalgias.   Skin: Positive  for color change (widespread vitiligo, chronic) and wound (L abdominal lap sites). Negative for rash.   Neurological: Negative for dizziness, seizures, syncope, facial asymmetry, weakness, light-headedness, numbness and headaches.   Psychiatric/Behavioral: Negative for confusion and hallucinations.     Objective:     Vital Signs (Most Recent):  Temp: 98 °F (36.7 °C) (20 1631)  Pulse: 63 (20 1852)  Resp: 18 (20 185)  BP: (!) 143/64 (20 1700)  SpO2: 96 % (20) Vital Signs (24h Range):  Temp:  [98 °F (36.7 °C)-98.6 °F (37 °C)] 98 °F (36.7 °C)  Pulse:  [61-92] 63  Resp:  [18-20] 18  SpO2:  [96 %-100 %] 96 %  BP: (134-156)/(56-82) 143/64     Weight: 81.6 kg (180 lb)  Body mass index is 32.92 kg/m².    Physical Exam  Vitals signs reviewed.   Constitutional:       General: He is not in acute distress.     Appearance: Normal appearance. He is obese. He is not toxic-appearing or diaphoretic.   HENT:      Head: Normocephalic and atraumatic.      Nose: Nose normal. No congestion.      Mouth/Throat:      Mouth: Mucous membranes are moist.   Eyes:      General: No scleral icterus.     Pupils: Pupils are equal, round, and reactive to light.   Neck:      Musculoskeletal: Normal range of motion and neck supple. No muscular tenderness.   Cardiovascular:      Rate and Rhythm: Normal rate and regular rhythm.      Pulses: Normal pulses.      Heart sounds: Normal heart sounds.   Pulmonary:      Effort: Pulmonary effort is normal. No respiratory distress.      Breath sounds: Normal breath sounds. No wheezing or rhonchi.   Abdominal:      General: Abdomen is flat. Bowel sounds are normal. There is no distension.      Palpations: Abdomen is soft.      Tenderness: There is no abdominal tenderness. There is no guarding or rebound.   Genitourinary:     Comments: Noe catheter in place - gross hematuria  Musculoskeletal: Normal range of motion.      Right lower le+ Edema (chronic per pt) present.       Left lower le+ Edema (chronic per pt) present.   Skin:     General: Skin is warm and dry.      Capillary Refill: Capillary refill takes less than 2 seconds.      Coloration: Skin is not jaundiced.      Findings: No bruising.             Comments: Widespread vitiligo   Neurological:      General: No focal deficit present.      Mental Status: He is alert and oriented to person, place, and time.      Sensory: No sensory deficit.      Motor: No weakness.   Psychiatric:         Mood and Affect: Mood normal.         Behavior: Behavior normal.         Thought Content: Thought content normal.         Judgment: Judgment normal.           CRANIAL NERVES     CN III, IV, VI   Pupils are equal, round, and reactive to light.       Significant Labs:   Results for orders placed or performed during the hospital encounter of 20   CBC Auto Differential   Result Value Ref Range    WBC 12.66 3.90 - 12.70 K/uL    RBC 3.04 (L) 4.60 - 6.20 M/uL    Hemoglobin 9.3 (L) 14.0 - 18.0 g/dL    Hematocrit 28.7 (L) 40.0 - 54.0 %    MCV 94 82 - 98 fL    MCH 30.6 27.0 - 31.0 pg    MCHC 32.4 32.0 - 36.0 g/dL    RDW 12.7 11.5 - 14.5 %    Platelets 262 150 - 350 K/uL    MPV 10.5 9.2 - 12.9 fL    Immature Granulocytes 0.5 0.0 - 0.5 %    Gran # (ANC) 11.3 (H) 1.8 - 7.7 K/uL    Immature Grans (Abs) 0.06 (H) 0.00 - 0.04 K/uL    Lymph # 0.5 (L) 1.0 - 4.8 K/uL    Mono # 0.7 0.3 - 1.0 K/uL    Eos # 0.1 0.0 - 0.5 K/uL    Baso # 0.03 0.00 - 0.20 K/uL    nRBC 0 0 /100 WBC    Gran % 88.9 (H) 38.0 - 73.0 %    Lymph % 4.2 (L) 18.0 - 48.0 %    Mono % 5.8 4.0 - 15.0 %    Eosinophil % 0.4 0.0 - 8.0 %    Basophil % 0.2 0.0 - 1.9 %    Differential Method Automated    Comprehensive Metabolic Panel   Result Value Ref Range    Sodium 140 136 - 145 mmol/L    Potassium 4.4 3.5 - 5.1 mmol/L    Chloride 110 95 - 110 mmol/L    CO2 20 (L) 23 - 29 mmol/L    Glucose 62 (L) 70 - 110 mg/dL     (H) 8 - 23 mg/dL    Creatinine 9.9 (H) 0.5 - 1.4 mg/dL    Calcium 7.8 (L)  8.7 - 10.5 mg/dL    Total Protein 5.7 (L) 6.0 - 8.4 g/dL    Albumin 2.3 (L) 3.5 - 5.2 g/dL    Total Bilirubin 0.2 0.1 - 1.0 mg/dL    Alkaline Phosphatase 47 (L) 55 - 135 U/L    AST 23 10 - 40 U/L    ALT 13 10 - 44 U/L    Anion Gap 10 8 - 16 mmol/L    eGFR if African American 5 (A) >60 mL/min/1.73 m^2    eGFR if non African American 5 (A) >60 mL/min/1.73 m^2   Urinalysis, Reflex to Urine Culture Urine, Clean Catch    Specimen: Urine   Result Value Ref Range    Specimen UA Urine, Catheterized     Color, UA Red (A) Yellow, Straw, Zahra    Appearance, UA Cloudy (A) Clear    pH, UA SEE COMMENT 5.0 - 8.0    Specific Gravity, UA SEE COMMENT 1.005 - 1.030    Protein, UA SEE COMMENT Negative    Glucose, UA SEE COMMENT Negative    Ketones, UA SEE COMMENT Negative    Bilirubin (UA) SEE COMMENT Negative    Occult Blood UA SEE COMMENT Negative    Nitrite, UA SEE COMMENT Negative    Urobilinogen, UA SEE COMMENT <2.0 EU/dL    Leukocytes, UA SEE COMMENT Negative   Urinalysis Microscopic   Result Value Ref Range    RBC, UA >100 (H) 0 - 4 /hpf    Microscopic Comment SEE COMMENT    Troponin I   Result Value Ref Range    Troponin I 0.028 (H) 0.000 - 0.026 ng/mL   BNP   Result Value Ref Range     (H) 0 - 99 pg/mL   CK   Result Value Ref Range     20 - 200 U/L   Troponin I   Result Value Ref Range    Troponin I 0.044 (H) 0.000 - 0.026 ng/mL   COVID-19 Rapid Screening   Result Value Ref Range    SARS-CoV-2 RNA, Amplification, Qual Negative Negative         Significant Imaging:   Imaging Results          X-Ray Chest AP Portable (Final result)  Result time 11/02/20 16:56:01    Final result by SHELLEY Matute Sr., MD (11/02/20 16:56:01)                 Impression:      1. There is a mild amount of haziness in the bases of both lungs.  This is characteristic of atelectasis and/or pneumonia.  2. There is persistent opacification of the base of the left hemithorax.  There is blunting of the right costophrenic angle.  This is  characteristic of pleural effusions.  3. The size of the heart is prominent.  This may be secondary to magnification.  .      Electronically signed by: Gerardo Matute MD  Date:    11/02/2020  Time:    16:56             Narrative:    EXAMINATION:  XR CHEST AP PORTABLE    CLINICAL HISTORY:  weakness;    COMPARISON:  10/24/2020    FINDINGS:  The size of the heart is prominent.  There is a mild amount of haziness in the bases of both lungs.  There is persistent opacification of the base of the left hemithorax.  There is blunting of the right costophrenic angle.  There is no pneumothorax.

## 2020-11-03 NOTE — PT/OT/SLP EVAL
Occupational Therapy   Evaluation and Discharge Note    Name: Colt Stacy Jr.  MRN: [id number]  Admitting Diagnosis:  Hematuria      Recommendations:     Discharge Recommendations: home with home health  Discharge Equipment Recommendations:  none  Barriers to discharge:  None    Assessment:     Colt Stacy Jr. is a 75 y.o. male with a medical diagnosis of Hematuria. At this time, patient is functioning at their prior level of function and does not require further acute OT services.     Plan:     During this hospitalization, patient does not require further acute OT services.  Please re-consult if situation changes.    · Plan of Care Reviewed with: patient    Subjective     Chief Complaint: NO COMPLAINT AT THIS TIME  Patient/Family Comments/goals: TO RETURN HOME    Occupational Profile:  Living Environment: PT LIVES IN A SINGLE STORY HOME WITH 3 TAMIE, NO RAIL  Previous level of function: PT WAS MOD I WITH ADLS USING RW  Roles and Routines: PT LIVES ALONE AND DOES NOT DRIVE  Equipment Used at home:  walker, rolling  Assistance upon Discharge: WILL DISCHARGE HOME WITH HIS DAUGHTER     Pain/Comfort:  · Pain Rating 1: 0/10    Patients cultural, spiritual, Hindu conflicts given the current situation: no    Objective:     Communicated with: NURSE AND COMPLETED Epic CHART REVIEW prior to session.  Patient found supine with peripheral IV, oxygen, greenfield catheter upon OT entry to room.    General Precautions: Standard,     Orthopedic Precautions:N/A   Braces: N/A     Occupational Performance:    Bed Mobility:    · Patient completed Rolling/Turning to Left with  modified independence  · Patient completed Supine to Sit with modified independence  · Patient completed Sit to Supine with modified independence    Functional Mobility/Transfers:  · Patient completed Sit <> Stand Transfer with supervision  with  rolling walker   · Patient completed Bed <> Chair Transfer using Step Transfer technique with supervision with  "rolling walker  · Functional Mobility: PT COMPLETED FUNCTIONAL MOBILITY AS NEEDED FOR ADLS WITH SUPERVISION    Activities of Daily Living:  · Upper Body Dressing: modified independence    · Lower Body Dressing: supervision      Cognitive/Visual Perceptual:  Cognitive/Psychosocial Skills:     -       Oriented to: Person, Place, Time and Situation   -       Follows Commands/attention:Follows multistep  commands  -       Safety awareness/insight to disability: intact     Physical Exam:  Upper Extremity Range of Motion:     -       Right Upper Extremity: WFL  -       Left Upper Extremity: WFL  Upper Extremity Strength:    -       Right Upper Extremity: WFL  -       Left Upper Extremity: WFL   Strength:    -       Right Upper Extremity: WFL  -       Left Upper Extremity: WFL    AMPAC 6 Click ADL:  AMPAC Total Score: 23    Treatment & Education:  PT SUPINE IN BED UPON OT ARRIVAL. PT EDUCATED ON ROLE OF OT. PT MOD I/ SUPERVISION WITH ADLS. PT DOES NOT NEED SKILLED OT SERVICES AT THIS TIME BUT WILL BENEFIT FROM PEOPLE 'S PROGRAM FOR UB EXERCISES.   Education:    Patient left up in chair with all lines intact, call button in reach, chair alarm on and NURSE notified    GOALS:   Multidisciplinary Problems     Occupational Therapy Goals     Not on file                History:     Past Medical History:   Diagnosis Date    Adrenal adenoma, left     BPH (benign prostatic hyperplasia)     CAD (coronary artery disease)     CKD (chronic kidney disease) stage 5, GFR less than 15 ml/min     Depression     Diabetes mellitus     Hyperlipidemia     Hypertension     Retinitis     Stroke     Pt states residual sx is "I walk funny".    Vitiligo        Past Surgical History:   Procedure Laterality Date    FLUOROSCOPY Bilateral 7/20/2020    Procedure: Angiogram- Bilateral Adrenal;  Surgeon: Jose R Laws MD;  Location: [facility] CATH LAB;  Service: General;  Laterality: Bilateral;    ROBOT-ASSISTED ADRENALECTOMY Left     " ROBOT-ASSISTED SURGICAL REMOVAL OF ADRENAL GLAND USING DA DEMETRI XI Left 10/20/2020    Procedure: XI ROBOTIC ADRENALECTOMY;  Surgeon: Livan Quiñones MD;  Location: [facility];  Service: General;  Laterality: Left;       Time Tracking:     OT Date of Treatment: 11/03/20  OT Start Time: 1205  OT Stop Time: 1230  OT Total Time (min): 25 min    Billable Minutes:Evaluation 15  Therapeutic Activity 10    Roselia Santizo OT  11/3/2020

## 2020-11-03 NOTE — ASSESSMENT & PLAN NOTE
Secondary HTN/resistent HTN  Prior w/u showed left adrenal gland adenoma  S/p left adrenelectomy  BP has improved

## 2020-11-03 NOTE — ASSESSMENT & PLAN NOTE
"Greenfield placed per urology (Dr. Lala) on 11/02/2020  "False passage" present per chart review  Urology consulted  General surgery consulted  Trend H&H - transfuse if indicated  Hand irrigate greenfield catheter prn  Strict I&Os  Continue flomax per urology recs  NPO after MN in case of AM procedure  Holding home ASA for now  Gentle IV hydration - monitor for fluid overload  "

## 2020-11-03 NOTE — H&P
"[facility] Hospital Medicine  History & Physical    Patient Name: Colt Stacy Jr.  MRN: [id number]  Admission Date: 11/2/2020  Attending Physician: Power Patten MD  Primary Care Provider: Primary Doctor No         Patient information was obtained from patient, past medical records and ER records.     Subjective:     Principal Problem:Hematuria    Chief Complaint:   Chief Complaint   Patient presents with    Hematuria     from urologist        HPI: 74 y/o AA M with hx of depression, CAD, HLD, HTN, DM2, stroke, retinintis, robotic L adrenalectomy, L adrenal adenoma, BPH, CKD5, vitiligo sent to ED per urology (Dr. Lala) for gross hematuria since 10/23/2020. On 10/22/2020 pt had a L adrenalectomy per Dr. Quiñones and following this had urinary retention - a greenfield catheter was placed which resulted in gross hematuria - pt was sent home with a greenfield to f/u with urology. Seen in clinic today with a view to remove the catheter but the hematuria has persisted - underwent cystoscopy in clinic and per chart review was found to have a "false passage" - greenfield was replaced and pt sent to ED. Symptoms have been persistent and without known exacerbating or mitigating factors. Denies chest pain, edema, palpitations, SOB, wheezing, orthopnea, abdominal pain, N/V/D, constipation, dysuria, flank pain, HA, dizziness, weakness, fever, cough, chills, falls/trauma, blurred vision, focal deficits. Found in ED to have elevated creatinine (9.9 - chronic), GFR of 5, BG 62, , troponin 0.044 - labs otherwise unremarkable, urine red, cxray with bilateral small pleural effusions (chronic); VSS. In ED, pt's greenfield catheter was irrigated x2 but hematuria persisted. Hospital medicine was called and pt was placed in observation on med surg. Pt is a full code - surrogate decision maker is his daughter, Cris Kruse.     Past Medical History:   Diagnosis Date    Adrenal adenoma, left     BPH (benign prostatic hyperplasia)  " "   CAD (coronary artery disease)     CKD (chronic kidney disease) stage 5, GFR less than 15 ml/min     Depression     Diabetes mellitus     Hyperlipidemia     Hypertension     Retinitis     Stroke     Pt states residual sx is "I walk funny".    Vitiligo        Past Surgical History:   Procedure Laterality Date    FLUOROSCOPY Bilateral 7/20/2020    Procedure: Angiogram- Bilateral Adrenal;  Surgeon: Jose R Laws MD;  Location: [facility] CATH LAB;  Service: General;  Laterality: Bilateral;    ROBOT-ASSISTED ADRENALECTOMY Left     ROBOT-ASSISTED SURGICAL REMOVAL OF ADRENAL GLAND USING DA DEMETRI XI Left 10/20/2020    Procedure: XI ROBOTIC ADRENALECTOMY;  Surgeon: Livan Quiñones MD;  Location: [facility] OR;  Service: General;  Laterality: Left;       Review of patient's allergies indicates:   Allergen Reactions    Benadryl [diphenhydramine hcl] Other (See Comments)     Pt states benadryl makes him feel numb    Ace inhibitors      YIN       Current Facility-Administered Medications on File Prior to Encounter   Medication    diphenhydrAMINE injection 25 mg    metoclopramide HCl injection 10 mg     Current Outpatient Medications on File Prior to Encounter   Medication Sig    ARIPiprazole (ABILIFY) 30 MG Tab Take 30 mg by mouth every evening.     aspirin (ASPIR-81 ORAL) Take 81 mg by mouth every evening.     atorvastatin (LIPITOR) 80 MG tablet Take 1 tablet (80 mg total) by mouth once daily.    diphenoxylate-atropine 2.5-0.025 mg (LOMOTIL) 2.5-0.025 mg per tablet Take 1 tablet by mouth 4 (four) times daily as needed for Diarrhea.    hydrALAZINE (APRESOLINE) 25 MG tablet Take 3 tablets (75 mg total) by mouth every 8 (eight) hours.    HYDROcodone-acetaminophen (NORCO) 5-325 mg per tablet Take 1 tablet by mouth every 8 (eight) hours as needed. (Patient not taking: Reported on 11/2/2020)    insulin aspart U-100 (NOVOLOG) 100 unit/mL injection Inject into the skin 2 (two) times a day. 4 units q a.m, 7 units at " dinner    insulin detemir U-100 (LEVEMIR) 100 unit/mL injection Inject 31 Units into the skin every evening.     Lactobacillus acidoph-L.bulgar 1 million cell Chew Take 4 tablets by mouth 3 (three) times daily with meals.    metoprolol tartrate (LOPRESSOR) 25 MG tablet Take 0.5 tablets (12.5 mg total) by mouth 2 (two) times daily.    multivitamin capsule Take 1 capsule by mouth once daily.    NIFEdipine (PROCARDIA-XL) 60 MG (OSM) 24 hr tablet Take 1 tablet (60 mg total) by mouth once daily.    sertraline (ZOLOFT) 100 MG tablet Take 100 mg by mouth once daily.     sevelamer carbonate (RENVELA) 800 mg Tab Take 1 tablet (800 mg total) by mouth 3 (three) times daily with meals.    tamsulosin (FLOMAX) 0.4 mg Cap Take 1 capsule (0.4 mg total) by mouth once daily.     Family History     Reviewed and not pertinent.         Tobacco Use    Smoking status: Never Smoker    Smokeless tobacco: Never Used   Substance and Sexual Activity    Alcohol use: No    Drug use: No    Sexual activity: Not Currently     Review of Systems   Constitutional: Negative for activity change, chills, diaphoresis, fatigue and fever.   HENT: Negative for congestion, trouble swallowing and voice change.    Eyes: Negative for photophobia and discharge.   Respiratory: Negative for cough, chest tightness, shortness of breath and wheezing.    Cardiovascular: Positive for leg swelling (BLE, chronic, unchanged per pt). Negative for chest pain and palpitations.   Gastrointestinal: Negative for abdominal pain, blood in stool, constipation, diarrhea, nausea and vomiting.   Endocrine: Negative for cold intolerance, heat intolerance, polydipsia, polyphagia and polyuria.   Genitourinary: Positive for hematuria. Negative for difficulty urinating, dysuria, flank pain, frequency and urgency.   Musculoskeletal: Negative for back pain, joint swelling and myalgias.   Skin: Positive for color change (widespread vitiligo, chronic) and wound (L abdominal lap  sites). Negative for rash.   Neurological: Negative for dizziness, seizures, syncope, facial asymmetry, weakness, light-headedness, numbness and headaches.   Psychiatric/Behavioral: Negative for confusion and hallucinations.     Objective:     Vital Signs (Most Recent):  Temp: 98 °F (36.7 °C) (20 1631)  Pulse: 63 (20 1852)  Resp: 18 (20)  BP: (!) 143/64 (20 1700)  SpO2: 96 % (20) Vital Signs (24h Range):  Temp:  [98 °F (36.7 °C)-98.6 °F (37 °C)] 98 °F (36.7 °C)  Pulse:  [61-92] 63  Resp:  [18-20] 18  SpO2:  [96 %-100 %] 96 %  BP: (134-156)/(56-82) 143/64     Weight: 81.6 kg (180 lb)  Body mass index is 32.92 kg/m².    Physical Exam  Vitals signs reviewed.   Constitutional:       General: He is not in acute distress.     Appearance: Normal appearance. He is obese. He is not toxic-appearing or diaphoretic.   HENT:      Head: Normocephalic and atraumatic.      Nose: Nose normal. No congestion.      Mouth/Throat:      Mouth: Mucous membranes are moist.   Eyes:      General: No scleral icterus.     Pupils: Pupils are equal, round, and reactive to light.   Neck:      Musculoskeletal: Normal range of motion and neck supple. No muscular tenderness.   Cardiovascular:      Rate and Rhythm: Normal rate and regular rhythm.      Pulses: Normal pulses.      Heart sounds: Normal heart sounds.   Pulmonary:      Effort: Pulmonary effort is normal. No respiratory distress.      Breath sounds: Normal breath sounds. No wheezing or rhonchi.   Abdominal:      General: Abdomen is flat. Bowel sounds are normal. There is no distension.      Palpations: Abdomen is soft.      Tenderness: There is no abdominal tenderness. There is no guarding or rebound.   Genitourinary:     Comments: Noe catheter in place - gross hematuria  Musculoskeletal: Normal range of motion.      Right lower le+ Edema (chronic per pt) present.      Left lower le+ Edema (chronic per pt) present.   Skin:     General: Skin  is warm and dry.      Capillary Refill: Capillary refill takes less than 2 seconds.      Coloration: Skin is not jaundiced.      Findings: No bruising.             Comments: Widespread vitiligo   Neurological:      General: No focal deficit present.      Mental Status: He is alert and oriented to person, place, and time.      Sensory: No sensory deficit.      Motor: No weakness.   Psychiatric:         Mood and Affect: Mood normal.         Behavior: Behavior normal.         Thought Content: Thought content normal.         Judgment: Judgment normal.           CRANIAL NERVES     CN III, IV, VI   Pupils are equal, round, and reactive to light.       Significant Labs:   Results for orders placed or performed during the hospital encounter of 11/02/20   CBC Auto Differential   Result Value Ref Range    WBC 12.66 3.90 - 12.70 K/uL    RBC 3.04 (L) 4.60 - 6.20 M/uL    Hemoglobin 9.3 (L) 14.0 - 18.0 g/dL    Hematocrit 28.7 (L) 40.0 - 54.0 %    MCV 94 82 - 98 fL    MCH 30.6 27.0 - 31.0 pg    MCHC 32.4 32.0 - 36.0 g/dL    RDW 12.7 11.5 - 14.5 %    Platelets 262 150 - 350 K/uL    MPV 10.5 9.2 - 12.9 fL    Immature Granulocytes 0.5 0.0 - 0.5 %    Gran # (ANC) 11.3 (H) 1.8 - 7.7 K/uL    Immature Grans (Abs) 0.06 (H) 0.00 - 0.04 K/uL    Lymph # 0.5 (L) 1.0 - 4.8 K/uL    Mono # 0.7 0.3 - 1.0 K/uL    Eos # 0.1 0.0 - 0.5 K/uL    Baso # 0.03 0.00 - 0.20 K/uL    nRBC 0 0 /100 WBC    Gran % 88.9 (H) 38.0 - 73.0 %    Lymph % 4.2 (L) 18.0 - 48.0 %    Mono % 5.8 4.0 - 15.0 %    Eosinophil % 0.4 0.0 - 8.0 %    Basophil % 0.2 0.0 - 1.9 %    Differential Method Automated    Comprehensive Metabolic Panel   Result Value Ref Range    Sodium 140 136 - 145 mmol/L    Potassium 4.4 3.5 - 5.1 mmol/L    Chloride 110 95 - 110 mmol/L    CO2 20 (L) 23 - 29 mmol/L    Glucose 62 (L) 70 - 110 mg/dL     (H) 8 - 23 mg/dL    Creatinine 9.9 (H) 0.5 - 1.4 mg/dL    Calcium 7.8 (L) 8.7 - 10.5 mg/dL    Total Protein 5.7 (L) 6.0 - 8.4 g/dL    Albumin 2.3 (L)  3.5 - 5.2 g/dL    Total Bilirubin 0.2 0.1 - 1.0 mg/dL    Alkaline Phosphatase 47 (L) 55 - 135 U/L    AST 23 10 - 40 U/L    ALT 13 10 - 44 U/L    Anion Gap 10 8 - 16 mmol/L    eGFR if African American 5 (A) >60 mL/min/1.73 m^2    eGFR if non African American 5 (A) >60 mL/min/1.73 m^2   Urinalysis, Reflex to Urine Culture Urine, Clean Catch    Specimen: Urine   Result Value Ref Range    Specimen UA Urine, Catheterized     Color, UA Red (A) Yellow, Straw, Zahra    Appearance, UA Cloudy (A) Clear    pH, UA SEE COMMENT 5.0 - 8.0    Specific Gravity, UA SEE COMMENT 1.005 - 1.030    Protein, UA SEE COMMENT Negative    Glucose, UA SEE COMMENT Negative    Ketones, UA SEE COMMENT Negative    Bilirubin (UA) SEE COMMENT Negative    Occult Blood UA SEE COMMENT Negative    Nitrite, UA SEE COMMENT Negative    Urobilinogen, UA SEE COMMENT <2.0 EU/dL    Leukocytes, UA SEE COMMENT Negative   Urinalysis Microscopic   Result Value Ref Range    RBC, UA >100 (H) 0 - 4 /hpf    Microscopic Comment SEE COMMENT    Troponin I   Result Value Ref Range    Troponin I 0.028 (H) 0.000 - 0.026 ng/mL   BNP   Result Value Ref Range     (H) 0 - 99 pg/mL   CK   Result Value Ref Range     20 - 200 U/L   Troponin I   Result Value Ref Range    Troponin I 0.044 (H) 0.000 - 0.026 ng/mL   COVID-19 Rapid Screening   Result Value Ref Range    SARS-CoV-2 RNA, Amplification, Qual Negative Negative         Significant Imaging:   Imaging Results          X-Ray Chest AP Portable (Final result)  Result time 11/02/20 16:56:01    Final result by SHELLEY Matute Sr., MD (11/02/20 16:56:01)                 Impression:      1. There is a mild amount of haziness in the bases of both lungs.  This is characteristic of atelectasis and/or pneumonia.  2. There is persistent opacification of the base of the left hemithorax.  There is blunting of the right costophrenic angle.  This is characteristic of pleural effusions.  3. The size of the heart is prominent.   "This may be secondary to magnification.  .      Electronically signed by: Gerardo Matute MD  Date:    11/02/2020  Time:    16:56             Narrative:    EXAMINATION:  XR CHEST AP PORTABLE    CLINICAL HISTORY:  weakness;    COMPARISON:  10/24/2020    FINDINGS:  The size of the heart is prominent.  There is a mild amount of haziness in the bases of both lungs.  There is persistent opacification of the base of the left hemithorax.  There is blunting of the right costophrenic angle.  There is no pneumothorax.                                    Assessment/Plan:     * Hematuria  Greenfield placed per urology (Dr. Lala) on 11/02/2020  "False passage" present per chart review  Urology consulted  General surgery consulted  Trend H&H - transfuse if indicated  Hand irrigate greenfield catheter prn  Strict I&Os  Continue flomax per urology recs  NPO after MN in case of AM procedure  Holding home ASA for now  Gentle IV hydration - monitor for fluid overload    Elevated troponin  Likely leak d/t poor renal function - Denies chest pain, EKG unrevealing  Troponin at 0.044 in ED  Trend troponin  ADA, cardiac diet        CKD (chronic kidney disease) stage 5, GFR less than 15 ml/min  Currently around baseline  Nephrology consulted  Monitor renal function  Avoid nephrotoxins  Monitor I&Os  Gentle IV hydration - monitor for fluid overload    Type 2 diabetes mellitus  A1c at 6.0 o n 10/20/2020 / BG 62 in ED  Accuchecks with SSI pr  Levemir at 1/2 home dose  ADA cardiac diet for now      HTN (hypertension)  Currently well controlled  Continue home medications  Lopressor IV prn elevated BP  Monitor closely      BPH (benign prostatic hyperplasia)  Urology consulted  Continue flomax        VTE Risk Mitigation (From admission, onward)         Ordered     Place sequential compression device  Until discontinued      11/02/20 2007                   Jeri Melendez NP  Department of Hospital Medicine   [facility]  "

## 2020-11-03 NOTE — ASSESSMENT & PLAN NOTE
A1c at 6.0 o n 10/20/2020 / BG 62 in ED  Accuchecks with SSI pr  Levemir at 1/2 home dose  ADA cardiac diet for now

## 2020-11-03 NOTE — PLAN OF CARE
Plan of care reviewed with pt, pt verbalized understanding. IV site CDI. Noe cath draining red urine, pt irrigated twice this shift per PRN orders, some clots noted. Pt penis swollen and purple, WOC consult placed. Pt remains free of fall/trauma. VSS. Held NPO after midnight. Cardiac and blood glucose monitoring. Purposeful q2h rounding done, safety maintained, call light in reach, bed locked and in lowest position, side rails up x2. Will continue to monitor, observe and report any changes.

## 2020-11-03 NOTE — ED NOTES
Irrigated pt bladder with 980ml of sterile water, pt tolerated well. The color of urine after irrigation is noted to be pink tinged

## 2020-11-03 NOTE — ASSESSMENT & PLAN NOTE
P.o. 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

## 2020-11-03 NOTE — HOSPITAL COURSE
Patient kept on OBS for hematuria under the care of hospital medicine. Urology, general surgery, and nephrology were consulted. 11/3: urology saw patient and recommended watchful waiting with intermittent greenfield irrigation and monitor H/H. General surgery recommends continued use of greenfield catheter with Nephrology to follow CKD. Discussed with Dr. Babin, who strongly recommends continue gentle IVF's and strict I&O - no diuretics at this time. Will continue to monitor. 11/4: urine has cleared up - discussed with urology who plans on pt dc home with cath and f/u OP with Dr. Lala in 1-2 weeks. Nephrology following - recommending dialysis since YIN is not seeming to improve (origional insult on 10/20). Voice mail left for daughter. 11/5: Had family meeting with patient, sister, and nephrology who all agree for pt to dc home with home health, greenfield cath in place, he will increase PO fluid intake and HH nurse will check renal function panel weekly and report to Dr. Babin. Pt will f/u OP with Dr. Babin in 1 month. He will continue with indwelling greenfield and f/u with Dr. Lala in 2 weeks. Patient was able to verbalize understanding of all. Patient was seen and examined today and deemed stable to dc home.

## 2020-11-03 NOTE — HPI
"Pt was seen and examined. H/o and chart reviewed. Pt is a 76 y/o male with YIN on CKD stage 4. 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 Pt experienced, difficult greenfield catheter placement at the time with "false passage", per urology. YIN has persisted since then. Pt also had diarrhea around 10/30/20. Pt was re-admitted for persistent gross hematuria and weakness.   "

## 2020-11-03 NOTE — CONSULTS
[facility] -   General Surgery  Consult Note    Patient Name: Colt Stacy Jr.  MRN: [id number]  Code Status: Full Code  Admission Date: 11/2/2020  Hospital Length of Stay: 0 days  Attending Physician: Mo Borges MD  Primary Care Provider: Primary Doctor No    Patient information was obtained from patient.     Inpatient consult to General Surgery  Consult performed by: Livan Quiñones MD  Consult ordered by: Mickey Major IV, MD        Subjective:     Principal Problem: Hematuria    History of Present Illness: 75-year-old male status post left adrenalectomy 10/20/20 with complex postop course complicated by YIN on CKD and complex Noe placement after malpositioned initially was admitted yesterday for deconditioning and gross hematuria and Noe catheter.  He was noted to have minimally elevated troponins but significantly deconditioned.    Current Facility-Administered Medications on File Prior to Encounter   Medication    diphenhydrAMINE injection 25 mg    metoclopramide HCl injection 10 mg     Current Outpatient Medications on File Prior to Encounter   Medication Sig    ARIPiprazole (ABILIFY) 30 MG Tab Take 30 mg by mouth every evening.     aspirin (ASPIR-81 ORAL) Take 81 mg by mouth every evening.     atorvastatin (LIPITOR) 80 MG tablet Take 1 tablet (80 mg total) by mouth once daily.    diphenoxylate-atropine 2.5-0.025 mg (LOMOTIL) 2.5-0.025 mg per tablet Take 1 tablet by mouth 4 (four) times daily as needed for Diarrhea.    hydrALAZINE (APRESOLINE) 25 MG tablet Take 3 tablets (75 mg total) by mouth every 8 (eight) hours.    HYDROcodone-acetaminophen (NORCO) 5-325 mg per tablet Take 1 tablet by mouth every 8 (eight) hours as needed. (Patient not taking: Reported on 11/2/2020)    insulin aspart U-100 (NOVOLOG) 100 unit/mL injection Inject into the skin 2 (two) times a day. 4 units q a.m, 7 units at dinner    insulin detemir U-100 (LEVEMIR) 100 unit/mL injection Inject 31 Units  "into the skin every evening.     Lactobacillus acidoph-L.bulgar 1 million cell Chew Take 4 tablets by mouth 3 (three) times daily with meals.    metoprolol tartrate (LOPRESSOR) 25 MG tablet Take 0.5 tablets (12.5 mg total) by mouth 2 (two) times daily.    multivitamin capsule Take 1 capsule by mouth once daily.    NIFEdipine (PROCARDIA-XL) 60 MG (OSM) 24 hr tablet Take 1 tablet (60 mg total) by mouth once daily.    sertraline (ZOLOFT) 100 MG tablet Take 100 mg by mouth once daily.     sevelamer carbonate (RENVELA) 800 mg Tab Take 1 tablet (800 mg total) by mouth 3 (three) times daily with meals.    tamsulosin (FLOMAX) 0.4 mg Cap Take 1 capsule (0.4 mg total) by mouth once daily.       Review of patient's allergies indicates:   Allergen Reactions    Benadryl [diphenhydramine hcl] Other (See Comments)     Pt states benadryl makes him feel numb    Ace inhibitors      YIN       Past Medical History:   Diagnosis Date    Adrenal adenoma, left     BPH (benign prostatic hyperplasia)     CAD (coronary artery disease)     CKD (chronic kidney disease) stage 5, GFR less than 15 ml/min     Depression     Diabetes mellitus     Hyperlipidemia     Hypertension     Retinitis     Stroke     Pt states residual sx is "I walk funny".    Vitiligo      Past Surgical History:   Procedure Laterality Date    FLUOROSCOPY Bilateral 7/20/2020    Procedure: Angiogram- Bilateral Adrenal;  Surgeon: Jose R Laws MD;  Location: [facility] CATH LAB;  Service: General;  Laterality: Bilateral;    ROBOT-ASSISTED ADRENALECTOMY Left     ROBOT-ASSISTED SURGICAL REMOVAL OF ADRENAL GLAND USING DA DEMETRI XI Left 10/20/2020    Procedure: XI ROBOTIC ADRENALECTOMY;  Surgeon: Livan Quiñones MD;  Location: [facility] OR;  Service: General;  Laterality: Left;     Family History     None        Tobacco Use    Smoking status: Never Smoker    Smokeless tobacco: Never Used   Substance and Sexual Activity    Alcohol use: No    Drug use: No    Sexual " activity: Not Currently     Review of Systems   Constitutional: Negative for chills, fever and unexpected weight change.   HENT: Negative for congestion.    Eyes: Negative for visual disturbance.   Respiratory: Negative for shortness of breath.    Cardiovascular: Negative for chest pain.   Gastrointestinal: Negative for abdominal distention, abdominal pain, constipation, nausea, rectal pain and vomiting.   Genitourinary: Positive for hematuria. Negative for dysuria.   Musculoskeletal: Negative for arthralgias.   Skin: Negative for rash.   Neurological: Negative for light-headedness.   Hematological: Negative for adenopathy.     Objective:     Vital Signs (Most Recent):  Temp: 98.8 °F (37.1 °C) (11/03/20 0748)  Pulse: 64 (11/03/20 0748)  Resp: 15 (11/03/20 0748)  BP: (!) 155/69 (11/03/20 0748)  SpO2: 100 % (11/03/20 0748) Vital Signs (24h Range):  Temp:  [96.8 °F (36 °C)-98.8 °F (37.1 °C)] 98.8 °F (37.1 °C)  Pulse:  [61-92] 64  Resp:  [15-20] 15  SpO2:  [95 %-100 %] 100 %  BP: (134-158)/(56-82) 155/69     Weight: 88.6 kg (195 lb 5.2 oz)  Body mass index is 35.73 kg/m².    Physical Exam  Vitals signs reviewed.   Constitutional:       Appearance: He is well-developed. He is obese.   HENT:      Head: Normocephalic and atraumatic.   Neck:      Musculoskeletal: Normal range of motion and neck supple.   Cardiovascular:      Rate and Rhythm: Normal rate and regular rhythm.   Pulmonary:      Effort: Pulmonary effort is normal.   Abdominal:      General: Bowel sounds are normal. There is no distension.      Palpations: Abdomen is soft.      Tenderness: There is no abdominal tenderness.      Comments: Incisions clean dry and intact   Skin:     General: Skin is warm and dry.   Neurological:      Mental Status: He is alert and oriented to person, place, and time.         Significant Labs:  CBC:   Recent Labs   Lab 11/03/20  0649   WBC 9.11   RBC 2.69*   HGB 8.2*   HCT 26.0*      MCV 97   MCH 30.5   MCHC 31.5*     BMP:    Recent Labs   Lab 11/03/20  0649 11/03/20  0650   GLU 79  --      --    K 4.6  --      --    CO2 23  --    BUN 98*  --    CREATININE 10.0*  --    CALCIUM 8.0*  --    MG 2.3 2.2           Assessment/Plan:     * Hematuria  Noe catheter intact  Management per Urology    CKD (chronic kidney disease) stage 5, GFR less than 15 ml/min  Management per Nephrology   Monitor urine output, BUN/creatinine    HTN (hypertension)  P.o. 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 I will follow-up with patient. Please contact us if you have any additional questions.    Livan Quiñones MD  General Surgery  [facility]

## 2020-11-03 NOTE — HPI
75-year-old male status post left adrenalectomy 10/20/20 with complex postop course complicated by YIN on CKD and complex Noe placement after malpositioned initially was admitted yesterday for deconditioning and gross hematuria and Noe catheter.  He was noted to have minimally elevated troponins but significantly deconditioned.

## 2020-11-03 NOTE — SUBJECTIVE & OBJECTIVE
"Past Medical History:   Diagnosis Date    Adrenal adenoma, left     BPH (benign prostatic hyperplasia)     CAD (coronary artery disease)     CKD (chronic kidney disease) stage 5, GFR less than 15 ml/min     Depression     Diabetes mellitus     Hyperlipidemia     Hypertension     Retinitis     Stroke     Pt states residual sx is "I walk funny".    Vitiligo        Past Surgical History:   Procedure Laterality Date    FLUOROSCOPY Bilateral 7/20/2020    Procedure: Angiogram- Bilateral Adrenal;  Surgeon: Jose R Laws MD;  Location: [facility] CATH LAB;  Service: General;  Laterality: Bilateral;    ROBOT-ASSISTED ADRENALECTOMY Left     ROBOT-ASSISTED SURGICAL REMOVAL OF ADRENAL GLAND USING DA DEMETRI XI Left 10/20/2020    Procedure: XI ROBOTIC ADRENALECTOMY;  Surgeon: Livan Quiñones MD;  Location: [facility] OR;  Service: General;  Laterality: Left;       Review of patient's allergies indicates:   Allergen Reactions    Benadryl [diphenhydramine hcl] Other (See Comments)     Pt states benadryl makes him feel numb    Ace inhibitors      YIN     Current Facility-Administered Medications   Medication Frequency    0.9%  NaCl infusion Continuous    ARIPiprazole tablet 30 mg QHS    atorvastatin tablet 80 mg Daily    dextrose 50% injection 12.5 g PRN    glucagon (human recombinant) injection 1 mg PRN    hydrALAZINE tablet 75 mg Q8H    insulin aspart U-100 pen 1-10 Units Q6H PRN    insulin detemir U-100 pen 15 Units QHS    metoprolol injection 5 mg Q6H PRN    metoprolol tartrate (LOPRESSOR) split tablet 12.5 mg BID    multivitamin tablet Daily    NIFEdipine 24 hr tablet 60 mg Daily    sertraline tablet 100 mg Daily    sevelamer carbonate tablet 800 mg TID WM    tamsulosin 24 hr capsule 0.4 mg Daily     Facility-Administered Medications Ordered in Other Encounters   Medication Frequency    diphenhydrAMINE injection 25 mg Q6H PRN    metoclopramide HCl injection 10 mg Q10 Min PRN     Family History     None    "     Tobacco Use    Smoking status: Never Smoker    Smokeless tobacco: Never Used   Substance and Sexual Activity    Alcohol use: No    Drug use: No    Sexual activity: Not Currently     Review of Systems   Constitutional: Negative.    HENT: Negative.    Respiratory: Negative.    Cardiovascular: Negative.    Gastrointestinal: Negative.    Genitourinary: Positive for hematuria.   Musculoskeletal: Negative.    Neurological: Negative.    Psychiatric/Behavioral: Negative.      Objective:     Vital Signs (Most Recent):  Temp: 98.6 °F (37 °C) (11/03/20 1610)  Pulse: 60 (11/03/20 1610)  Resp: 18 (11/03/20 1610)  BP: (!) 143/66 (11/03/20 1610)  SpO2: (!) 92 % (11/03/20 1610)  O2 Device (Oxygen Therapy): nasal cannula (11/03/20 0738) Vital Signs (24h Range):  Temp:  [96.8 °F (36 °C)-98.8 °F (37.1 °C)] 98.6 °F (37 °C)  Pulse:  [60-73] 60  Resp:  [15-18] 18  SpO2:  [92 %-100 %] 92 %  BP: (135-158)/(61-70) 143/66     Weight: 88.5 kg (195 lb) (11/03/20 1610)  Body mass index is 35.67 kg/m².  Body surface area is 1.97 meters squared.    I/O last 3 completed shifts:  In: 236 [P.O.:236]  Out: 3350 [Urine:1500; Other:1850]    Physical Exam  Vitals signs and nursing note reviewed.   Constitutional:       Appearance: Normal appearance.   HENT:      Head: Normocephalic and atraumatic.   Neck:      Musculoskeletal: Normal range of motion and neck supple.   Cardiovascular:      Rate and Rhythm: Normal rate and regular rhythm.      Pulses: Normal pulses.      Heart sounds: Normal heart sounds.   Pulmonary:      Effort: Pulmonary effort is normal.      Breath sounds: Normal breath sounds.   Abdominal:      General: Abdomen is flat.      Palpations: Abdomen is soft.   Musculoskeletal:      Right lower leg: No edema.      Left lower leg: No edema.   Neurological:      General: No focal deficit present.      Mental Status: He is alert and oriented to person, place, and time.   Psychiatric:         Mood and Affect: Mood normal.          Behavior: Behavior normal.         Significant Labs: reviewed  BMP  Lab Results   Component Value Date     11/03/2020    K 4.6 11/03/2020     11/03/2020    CO2 23 11/03/2020    BUN 98 (H) 11/03/2020    CREATININE 10.0 (H) 11/03/2020    CALCIUM 8.0 (L) 11/03/2020    ANIONGAP 8 11/03/2020    ESTGFRAFRICA 5 (A) 11/03/2020    EGFRNONAA 5 (A) 11/03/2020     Lab Results   Component Value Date    WBC 9.11 11/03/2020    HGB 8.2 (L) 11/03/2020    HCT 26.0 (L) 11/03/2020    MCV 97 11/03/2020     11/03/2020         Significant Imaging: reveiwed

## 2020-11-03 NOTE — ASSESSMENT & PLAN NOTE
"Greenfield placed per urology (Dr. Lala) on 11/02/2020  "False passage" present per chart review  Urology following  General surgery following  Trend H&H - transfuse if indicated  Hand irrigate greenfield catheter prn  Strict I&Os  Continue flomax per urology recs  Holding home ASA for now  Gentle IV hydration - monitor for fluid overload  "

## 2020-11-03 NOTE — NURSING
Irrigated pt's bladder to check for clots, 40ml in and 40ml out, no evidence of clots in syringe, will cont to monitor, performed bs procedure with HM at bs to observe

## 2020-11-03 NOTE — CONSULTS
Consulted on this 76 y/o M patient due to present on admission skin breakdown to penis, abd, and buttocks. He is awake and alert, admitted with hematuria from urologist office and has PMH significant for recent adrenalectomy due to adrenal adenoma, BPH, CAD, CKD, DM, HTN, CVA, Vitiligo.  Skin assessment completed. Bilateral heels intact with no redness or breakdown noted. Abdomen assessed with no breakdown. Penis and scrotum assessed. Severely edematous penis with Noe catheter in place. Has already been evaluated by Urology. Mild erythema noted to scrotum and penis. Elevated with towel roll for edema. Turned and sacrum, coccyx, bilateral buttock assessed. Mild MASD to gluteal cleft is noted, no other breakdown. Recommend manage mositure with moisture barrier paste, elevate scrotum/penis with towel roll. Encourage patient to reposition frequently. Will follow prn.

## 2020-11-03 NOTE — CONSULTS
Chief Complaint: Urinary retention/hematuria    HPI:   11/3/20: 76 yo man 5d ago had left lap adrenalectomy and subsequently had urinary retention and hematuria with clot.  Has had difficult greenfield placements in past with false passage.  Was assessed in clinic yesterday and 22Fr greenfield placed.  Admitted to hospital with Cr 10 and evidence of difficult recovery from surgery.  Abdomen bloated. Penis swollen.  No abd/pelvic pain and no exac/rel factors.  No hematuria.  No urolithiasis.      Allergies:  Benadryl [diphenhydramine hcl] and Ace inhibitors    Medications:  See chart    Review of Systems:  General: No fever, chills, fatigability, or weight loss.  Skin: No rashes, itching, or changes in color or texture of skin.  Chest: Denies CABRERA, cyanosis, wheezing, cough, and sputum production.  Abdomen: Appetite fine. No weight loss. Denies diarrhea, abdominal pain, hematemesis, or blood in stool.  Musculoskeletal: No joint stiffness or swelling. Denies back pain.  : As above.  All other review of systems negative.    PMH:   has a past medical history of Adrenal adenoma, left, BPH (benign prostatic hyperplasia), CAD (coronary artery disease), CKD (chronic kidney disease) stage 5, GFR less than 15 ml/min, Depression, Diabetes mellitus, Hyperlipidemia, Hypertension, Retinitis, Stroke, and Vitiligo.    PSH:   has a past surgical history that includes Fluoroscopy (Bilateral, 7/20/2020); Robot-assisted surgical removal of adrenal gland using da Min Xi (Left, 10/20/2020); and Robot-assisted adrenalectomy (Left).    FamHx: family history is not on file.    SocHx:  reports that he has never smoked. He has never used smokeless tobacco. He reports that he does not drink alcohol or use drugs.      Physical Exam:  Vitals:    11/03/20 0510   BP: (!) 152/70   Pulse: 66   Resp: 18   Temp: 96.8 °F (36 °C)     General: A&Ox3, no apparent distress, no deformities  Neck: No masses, normal thyroid  Lungs: normal inspiration, no use of  accessory muscles  Heart: normal pulse, no arrhythmias  Abdomen: Soft, NT, mild distention/bloating, wounds approp  Lymphatic: Neck and groin nodes negative  Skin: The skin is warm and dry. No jaundice.  Ext: No c/c/e.  : Test desc hitesh, no abnormalities of epididymus. Penis edematous but uninjured, with normal penile and scrotal skin otherwise. Meatus normal. Normal rectal tone, no hemorrhoids.     Labs/Studies: see chart, Cr 10    Impression/Plan:   1. Renal insufficiency chronic, acutely worse due to retention.  UOP appropriate currently. Anticipate improvement, but renal consult appropriate.  Pt may also have an adrenal insufficiency given recent adrenalectomy.  2. Penis edematous but not injured.  Generalized edema likely associated with renal dysfunction.  Irrigate greenfield as needed will not take to OR unless jennifer retention recurs.  3. No evidence of bowel injury or immediate surgical need.  4. Consulting general surgery as surgery very recent  5. Will follow.

## 2020-11-03 NOTE — ASSESSMENT & PLAN NOTE
Currently around baseline  Nephrology following  Monitor renal function  Avoid nephrotoxins  strict I&Os  Gentle IV hydration - monitor for fluid overload

## 2020-11-03 NOTE — ASSESSMENT & PLAN NOTE
Likely leak d/t poor renal function - Denies chest pain, EKG unrevealing  Troponin at 0.044 in ED  Trend troponin  ADA, cardiac diet

## 2020-11-03 NOTE — SUBJECTIVE & OBJECTIVE
Interval History: urology saw patient and recommended watchful waiting with intermittent greenfield irrigation and monitor H/H. General surgery recommends continued use of greenfield catheter with Nephrology to follow CKD. Discussed with Dr. Babin, who strongly recommends continue gentle IVF's and strict I&O - no diuretics at this time. Will continue to monitor    Review of Systems   Constitutional: Negative for activity change, chills, diaphoresis, fatigue and fever.   HENT: Negative for congestion, trouble swallowing and voice change.    Eyes: Negative for photophobia and discharge.   Respiratory: Negative for cough, chest tightness, shortness of breath and wheezing.    Cardiovascular: Positive for leg swelling (BLE, chronic, unchanged per pt). Negative for chest pain and palpitations.   Gastrointestinal: Negative for abdominal pain, blood in stool, constipation, diarrhea, nausea and vomiting.   Endocrine: Negative for cold intolerance, heat intolerance, polydipsia, polyphagia and polyuria.   Genitourinary: Positive for hematuria. Negative for difficulty urinating, dysuria, flank pain, frequency and urgency.   Musculoskeletal: Negative for back pain, joint swelling and myalgias.   Skin: Positive for color change (widespread vitiligo, chronic) and wound (L abdominal lap sites). Negative for rash.   Neurological: Negative for dizziness, seizures, syncope, facial asymmetry, weakness, light-headedness, numbness and headaches.   Psychiatric/Behavioral: Negative for confusion and hallucinations.     Objective:     Vital Signs (Most Recent):  Temp: 98.8 °F (37.1 °C) (11/03/20 0748)  Pulse: 64 (11/03/20 0748)  Resp: 15 (11/03/20 0748)  BP: (!) 155/69 (11/03/20 0748)  SpO2: 100 % (11/03/20 0748) Vital Signs (24h Range):  Temp:  [96.8 °F (36 °C)-98.8 °F (37.1 °C)] 98.8 °F (37.1 °C)  Pulse:  [61-92] 64  Resp:  [15-20] 15  SpO2:  [95 %-100 %] 100 %  BP: (134-158)/(56-82) 155/69     Weight: 88.6 kg (195 lb 5.2 oz)  Body mass index is  35.73 kg/m².    Intake/Output Summary (Last 24 hours) at 11/3/2020 0909  Last data filed at 11/3/2020 0800  Gross per 24 hour   Intake 781 ml   Output 3350 ml   Net -2569 ml      Physical Exam  Vitals signs reviewed.   Constitutional:       General: He is not in acute distress.     Appearance: Normal appearance. He is obese. He is not toxic-appearing or diaphoretic.   HENT:      Head: Normocephalic and atraumatic.      Nose: Nose normal. No congestion.      Mouth/Throat:      Mouth: Mucous membranes are moist.   Eyes:      General: No scleral icterus.     Pupils: Pupils are equal, round, and reactive to light.   Neck:      Musculoskeletal: Normal range of motion and neck supple. No muscular tenderness.   Cardiovascular:      Rate and Rhythm: Normal rate and regular rhythm.      Pulses: Normal pulses.      Heart sounds: Normal heart sounds.   Pulmonary:      Effort: Pulmonary effort is normal. No respiratory distress.      Breath sounds: Normal breath sounds. No wheezing or rhonchi.   Abdominal:      General: Abdomen is flat. Bowel sounds are normal. There is no distension.      Palpations: Abdomen is soft.      Tenderness: There is no abdominal tenderness. There is no guarding or rebound.   Genitourinary:     Comments: Noe catheter in place - gross hematuria  Musculoskeletal: Normal range of motion.      Right lower le+ Edema (chronic per pt) present.      Left lower le+ Edema (chronic per pt) present.   Skin:     General: Skin is warm and dry.      Capillary Refill: Capillary refill takes less than 2 seconds.      Coloration: Skin is not jaundiced.      Findings: No bruising.             Comments: Widespread vitiligo   Neurological:      General: No focal deficit present.      Mental Status: He is alert and oriented to person, place, and time.      Sensory: No sensory deficit.      Motor: No weakness.   Psychiatric:         Mood and Affect: Mood normal.         Behavior: Behavior normal.         Thought  Content: Thought content normal.         Judgment: Judgment normal.         Significant Labs:   Recent Lab Results       11/03/20  0650   11/03/20  0649   11/03/20  0544   11/03/20  0004   11/02/20  2117        Albumin               Alkaline Phosphatase               ALT               Anion Gap   8           Appearance, UA               AST               Baso #   0.03           Basophil %   0.3           Bilirubin (UA)               BILIRUBIN TOTAL               BNP               BUN   98           Calcium   8.0           Chloride   110           CO2   23           Color, UA               CPK               Creatinine   10.0           Differential Method   Automated           eGFR if    5           eGFR if non    5  Comment:  Calculation used to obtain the estimated glomerular filtration  rate (eGFR) is the CKD-EPI equation.              Eos #   0.1           Eosinophil %   1.1           Glucose   79           Glucose, UA               Gran # (ANC)   7.3           Gran %   79.9           Hematocrit   26.0           Hemoglobin   8.2           Immature Grans (Abs)   0.03  Comment:  Mild elevation in immature granulocytes is non specific and   can be seen in a variety of conditions including stress response,   acute inflammation, trauma and pregnancy. Correlation with other   laboratory and clinical findings is essential.             Immature Granulocytes   0.3           Ketones, UA               Leukocytes, UA               Lymph #   1.0           Lymph %   10.8           Magnesium 2.2 2.3           MCH   30.5           MCHC   31.5           MCV   97           Microscopic Comment               Mono #   0.7           Mono %   7.6           MPV   10.7           NITRITE UA               nRBC   0           Occult Blood UA               pH, UA               Phosphorus 5.9             Platelets   261           POCT Glucose     77   94     Potassium   4.6           PROTEIN TOTAL                Protein, UA               RBC   2.69           RBC, UA               RDW   13.0           SARS-CoV-2 RNA, Amplification, Qual               Sodium   141           Specific [place], UA               Specimen UA               Troponin I   0.032  Comment:  The reference interval for Troponin I represents the 99th percentile   cutoff   for our facility and is consistent with 3rd generation assay   performance.     0.037  Comment:  The reference interval for Troponin I represents the 99th percentile   cutoff   for our facility and is consistent with 3rd generation assay   performance.         UROBILINOGEN UA               WBC   9.11                            11/02/20  2026   11/02/20  1951   11/02/20  1902   11/02/20  1808   11/02/20  1510        Albumin               Alkaline Phosphatase               ALT               Anion Gap               Appearance, UA         Cloudy     AST               Baso #               Basophil %               Bilirubin (UA)         SEE COMMENT  Comment:  Color may interfere with results     BILIRUBIN TOTAL               BNP               BUN               Calcium               Chloride               CO2               Color, UA         Red     CPK               Creatinine               Differential Method               eGFR if                eGFR if non                Eos #               Eosinophil %               Glucose               Glucose, UA         SEE COMMENT  Comment:  Color may interfere with results     Gran # (ANC)               Gran %               Hematocrit               Hemoglobin               Immature Grans (Abs)               Immature Granulocytes               Ketones, UA         SEE COMMENT  Comment:  Color may interfere with results     Leukocytes, UA         SEE COMMENT  Comment:  Color may interfere with results     Lymph #               Lymph %               Magnesium               MCH               MCHC               MCV                Microscopic Comment         SEE COMMENT  Comment:  Other formed elements not mentioned in the report are not   present in the microscopic examination.        Mono #               Mono %               MPV               NITRITE UA         SEE COMMENT  Comment:  Color may interfere with results     nRBC               Occult Blood UA         SEE COMMENT  Comment:  Color may interfere with results     pH, UA         SEE COMMENT  Comment:  Color may interfere with results     Phosphorus               Platelets               POCT Glucose 119   142         Potassium               PROTEIN TOTAL               Protein, UA         SEE COMMENT  Comment:  Recommend a 24 hour urine protein or a urine   protein/creatinine ratio if globulin induced proteinuria is  clinically suspected.  Color may interfere with results       RBC               RBC, UA         >100     RDW               SARS-CoV-2 RNA, Amplification, Qual   Negative  Comment:  This test utilizes isothermal nucleic acid amplification   technology to detect the SARS-CoV-2 RdRp nucleic acid segment.   The analytical sensitivity (limit of detection) is 125 genome   equivalents/mL.   A POSITIVE result implies infection with the SARS-CoV-2 virus;  the patient is presumed to be contagious.    A NEGATIVE result means that SARS-CoV-2 nucleic acids are not  present above the limit of detection. A NEGATIVE result should be   treated as presumptive. It does not rule out the possibility of   COVID-19 and should not be the sole basis for treatment decisions.   If COVID-19 is strongly suspected based on clinical and exposure   history, re-testing using an alternate molecular assay should be   considered.   This test is only for use under the Food and Drug   Administration s Emergency Use Authorization (EUA).   Commercial kits are provided by [employer].   Performance characteristics of the EUA have been independently  verified by [facility] Department  of  Pathology and Laboratory Medicine.   _________________________________________________________________  The ID NOW COVID-19 Letter of Authorization, along with the   authorized Fact Sheet for Healthcare Providers, the authorized Fact  Sheet for Patients, and authorized labeling are available on the FDA   website:  www.fda.gov/MedicalDevices/Safety/EmergencySituations/ick612826.htm             Sodium               Specific [place], UA         SEE COMMENT  Comment:  Color may interfere with results     Specimen UA         Urine, Catheterized     Troponin I       0.044  Comment:  The reference interval for Troponin I represents the 99th percentile   cutoff   for our facility and is consistent with 3rd generation assay   performance.         UROBILINOGEN UA         SEE COMMENT  Comment:  Color may interfere with results     WBC                                11/02/20  1432        Albumin 2.3     Alkaline Phosphatase 47     ALT 13     Anion Gap 10     Appearance, UA       AST 23     Baso # 0.03     Basophil % 0.2     Bilirubin (UA)       BILIRUBIN TOTAL 0.2  Comment:  For infants and newborns, interpretation of results should be based  on gestational age, weight and in agreement with clinical  observations.  Premature Infant recommended reference ranges:  Up to 24 hours.............<8.0 mg/dL  Up to 48 hours............<12.0 mg/dL  3-5 days..................<15.0 mg/dL  6-29 days.................<15.0 mg/dL         Comment:  Values of less than 100 pg/ml are consistent with non-CHF populations.          Calcium 7.8     Chloride 110     CO2 20     Color, UA            Creatinine 9.9     Differential Method Automated     eGFR if  5     eGFR if non  5  Comment:  Calculation used to obtain the estimated glomerular filtration  rate (eGFR) is the CKD-EPI equation.        Eos # 0.1     Eosinophil % 0.4     Glucose 62     Glucose, UA       Gran # (ANC) 11.3     Gran % 88.9      Hematocrit 28.7     Hemoglobin 9.3     Immature Grans (Abs) 0.06  Comment:  Mild elevation in immature granulocytes is non specific and   can be seen in a variety of conditions including stress response,   acute inflammation, trauma and pregnancy. Correlation with other   laboratory and clinical findings is essential.       Immature Granulocytes 0.5     Ketones, UA       Leukocytes, UA       Lymph # 0.5     Lymph % 4.2     Magnesium       MCH 30.6     MCHC 32.4     MCV 94     Microscopic Comment       Mono # 0.7     Mono % 5.8     MPV 10.5     NITRITE UA       nRBC 0     Occult Blood UA       pH, UA       Phosphorus       Platelets 262     POCT Glucose       Potassium 4.4     PROTEIN TOTAL 5.7     Protein, UA       RBC 3.04     RBC, UA       RDW 12.7     SARS-CoV-2 RNA, Amplification, Qual       Sodium 140     Specific Gravity, UA       Specimen UA       Troponin I 0.028  Comment:  The reference interval for Troponin I represents the 99th percentile   cutoff   for our facility and is consistent with 3rd generation assay   performance.       UROBILINOGEN UA       WBC 12.66         All pertinent labs within the past 24 hours have been reviewed.    Significant Imaging: I have reviewed all pertinent imaging results/findings within the past 24 hours.

## 2020-11-03 NOTE — ASSESSMENT & PLAN NOTE
Currently around baseline  Nephrology consulted  Monitor renal function  Avoid nephrotoxins  Monitor I&Os  Gentle IV hydration - monitor for fluid overload

## 2020-11-03 NOTE — ASSESSMENT & PLAN NOTE
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

## 2020-11-04 LAB
ANION GAP SERPL CALC-SCNC: 12 MMOL/L (ref 8–16)
BASOPHILS # BLD AUTO: 0.04 K/UL (ref 0–0.2)
BASOPHILS NFR BLD: 0.5 % (ref 0–1.9)
BUN SERPL-MCNC: 102 MG/DL (ref 8–23)
CALCIUM SERPL-MCNC: 7.9 MG/DL (ref 8.7–10.5)
CHLORIDE SERPL-SCNC: 111 MMOL/L (ref 95–110)
CO2 SERPL-SCNC: 18 MMOL/L (ref 23–29)
CREAT SERPL-MCNC: 9.4 MG/DL (ref 0.5–1.4)
CREAT UR-MCNC: 73.4 MG/DL (ref 23–375)
DIFFERENTIAL METHOD: ABNORMAL
EOSINOPHIL # BLD AUTO: 0.2 K/UL (ref 0–0.5)
EOSINOPHIL NFR BLD: 2.8 % (ref 0–8)
ERYTHROCYTE [DISTWIDTH] IN BLOOD BY AUTOMATED COUNT: 13 % (ref 11.5–14.5)
EST. GFR  (AFRICAN AMERICAN): 6 ML/MIN/1.73 M^2
EST. GFR  (NON AFRICAN AMERICAN): 5 ML/MIN/1.73 M^2
GLUCOSE SERPL-MCNC: 100 MG/DL (ref 70–110)
HCT VFR BLD AUTO: 26.3 % (ref 40–54)
HGB BLD-MCNC: 8.3 G/DL (ref 14–18)
IMM GRANULOCYTES # BLD AUTO: 0.11 K/UL (ref 0–0.04)
IMM GRANULOCYTES NFR BLD AUTO: 1.3 % (ref 0–0.5)
LYMPHOCYTES # BLD AUTO: 1.3 K/UL (ref 1–4.8)
LYMPHOCYTES NFR BLD: 15.4 % (ref 18–48)
MAGNESIUM SERPL-MCNC: 2.2 MG/DL (ref 1.6–2.6)
MCH RBC QN AUTO: 30.3 PG (ref 27–31)
MCHC RBC AUTO-ENTMCNC: 31.6 G/DL (ref 32–36)
MCV RBC AUTO: 96 FL (ref 82–98)
MONOCYTES # BLD AUTO: 0.6 K/UL (ref 0.3–1)
MONOCYTES NFR BLD: 7.2 % (ref 4–15)
NEUTROPHILS # BLD AUTO: 6 K/UL (ref 1.8–7.7)
NEUTROPHILS NFR BLD: 72.8 % (ref 38–73)
NRBC BLD-RTO: 0 /100 WBC
PLATELET # BLD AUTO: 262 K/UL (ref 150–350)
PMV BLD AUTO: 10.8 FL (ref 9.2–12.9)
POCT GLUCOSE: 107 MG/DL (ref 70–110)
POCT GLUCOSE: 211 MG/DL (ref 70–110)
POCT GLUCOSE: 97 MG/DL (ref 70–110)
POTASSIUM SERPL-SCNC: 4.7 MMOL/L (ref 3.5–5.1)
RBC # BLD AUTO: 2.74 M/UL (ref 4.6–6.2)
SODIUM SERPL-SCNC: 141 MMOL/L (ref 136–145)
SODIUM UR-SCNC: 65 MMOL/L (ref 20–250)
WBC # BLD AUTO: 8.29 K/UL (ref 3.9–12.7)

## 2020-11-04 PROCEDURE — 99215 PR OFFICE/OUTPT VISIT, EST, LEVL V, 40-54 MIN: ICD-10-PCS | Mod: ,,, | Performed by: INTERNAL MEDICINE

## 2020-11-04 PROCEDURE — 96361 HYDRATE IV INFUSION ADD-ON: CPT

## 2020-11-04 PROCEDURE — 84300 ASSAY OF URINE SODIUM: CPT

## 2020-11-04 PROCEDURE — 99213 PR OFFICE/OUTPT VISIT, EST, LEVL III, 20-29 MIN: ICD-10-PCS | Mod: ,,, | Performed by: UROLOGY

## 2020-11-04 PROCEDURE — 99900035 HC TECH TIME PER 15 MIN (STAT)

## 2020-11-04 PROCEDURE — 99215 OFFICE O/P EST HI 40 MIN: CPT | Mod: ,,, | Performed by: INTERNAL MEDICINE

## 2020-11-04 PROCEDURE — 80048 BASIC METABOLIC PNL TOTAL CA: CPT

## 2020-11-04 PROCEDURE — 99213 OFFICE O/P EST LOW 20 MIN: CPT | Mod: ,,, | Performed by: UROLOGY

## 2020-11-04 PROCEDURE — 96372 THER/PROPH/DIAG INJ SC/IM: CPT

## 2020-11-04 PROCEDURE — 83735 ASSAY OF MAGNESIUM: CPT

## 2020-11-04 PROCEDURE — 27000221 HC OXYGEN, UP TO 24 HOURS

## 2020-11-04 PROCEDURE — G0378 HOSPITAL OBSERVATION PER HR: HCPCS

## 2020-11-04 PROCEDURE — 85025 COMPLETE CBC W/AUTO DIFF WBC: CPT

## 2020-11-04 PROCEDURE — 36415 COLL VENOUS BLD VENIPUNCTURE: CPT

## 2020-11-04 PROCEDURE — 63600175 PHARM REV CODE 636 W HCPCS: Performed by: NURSE PRACTITIONER

## 2020-11-04 PROCEDURE — 94761 N-INVAS EAR/PLS OXIMETRY MLT: CPT

## 2020-11-04 PROCEDURE — 82570 ASSAY OF URINE CREATININE: CPT

## 2020-11-04 PROCEDURE — 25000003 PHARM REV CODE 250: Performed by: INTERNAL MEDICINE

## 2020-11-04 PROCEDURE — 25000003 PHARM REV CODE 250: Performed by: NURSE PRACTITIONER

## 2020-11-04 RX ORDER — HYDRALAZINE HYDROCHLORIDE 50 MG/1
100 TABLET, FILM COATED ORAL EVERY 8 HOURS
Status: DISCONTINUED | OUTPATIENT
Start: 2020-11-04 | End: 2020-11-05 | Stop reason: HOSPADM

## 2020-11-04 RX ADMIN — NIFEDIPINE 60 MG: 30 TABLET, FILM COATED, EXTENDED RELEASE ORAL at 10:11

## 2020-11-04 RX ADMIN — HYDRALAZINE HYDROCHLORIDE 75 MG: 50 TABLET, FILM COATED ORAL at 03:11

## 2020-11-04 RX ADMIN — ARIPIPRAZOLE 30 MG: 5 TABLET ORAL at 09:11

## 2020-11-04 RX ADMIN — SODIUM CHLORIDE: 0.9 INJECTION, SOLUTION INTRAVENOUS at 05:11

## 2020-11-04 RX ADMIN — TAMSULOSIN HYDROCHLORIDE 0.4 MG: 0.4 CAPSULE ORAL at 10:11

## 2020-11-04 RX ADMIN — HYDRALAZINE HYDROCHLORIDE 75 MG: 50 TABLET, FILM COATED ORAL at 05:11

## 2020-11-04 RX ADMIN — METOPROLOL TARTRATE 12.5 MG: 25 TABLET, FILM COATED ORAL at 09:11

## 2020-11-04 RX ADMIN — ATORVASTATIN CALCIUM 80 MG: 40 TABLET, FILM COATED ORAL at 09:11

## 2020-11-04 RX ADMIN — SERTRALINE HYDROCHLORIDE 100 MG: 50 TABLET ORAL at 10:11

## 2020-11-04 RX ADMIN — INSULIN ASPART 4 UNITS: 100 INJECTION, SOLUTION INTRAVENOUS; SUBCUTANEOUS at 11:11

## 2020-11-04 RX ADMIN — SEVELAMER CARBONATE 800 MG: 800 TABLET, FILM COATED ORAL at 07:11

## 2020-11-04 RX ADMIN — INSULIN DETEMIR 15 UNITS: 100 INJECTION, SOLUTION SUBCUTANEOUS at 09:11

## 2020-11-04 RX ADMIN — SEVELAMER CARBONATE 800 MG: 800 TABLET, FILM COATED ORAL at 05:11

## 2020-11-04 RX ADMIN — HYDRALAZINE HYDROCHLORIDE 100 MG: 50 TABLET, FILM COATED ORAL at 09:11

## 2020-11-04 RX ADMIN — THERA TABS 1 TABLET: TAB at 09:11

## 2020-11-04 RX ADMIN — SEVELAMER CARBONATE 800 MG: 800 TABLET, FILM COATED ORAL at 11:11

## 2020-11-04 NOTE — SUBJECTIVE & OBJECTIVE
Interval History: urine has cleared up - discussed with urology who plans on pt dc home with cath and f/u OP with Dr. Lala in 1-2 weeks. Nephrology following - recommending dialysis since YIN is not seeming to improve (origional insult on 10/20). Voice mail left for daughter.    Review of Systems   Constitutional: Negative for activity change, chills, diaphoresis, fatigue and fever.   HENT: Negative for congestion, trouble swallowing and voice change.    Eyes: Negative for photophobia and discharge.   Respiratory: Negative for cough, chest tightness, shortness of breath and wheezing.    Cardiovascular: Positive for leg swelling (BLE, chronic, unchanged per pt). Negative for chest pain and palpitations.   Gastrointestinal: Negative for abdominal pain, blood in stool, constipation, diarrhea, nausea and vomiting.   Endocrine: Negative for cold intolerance, heat intolerance, polydipsia, polyphagia and polyuria.   Genitourinary: Positive for hematuria. Negative for difficulty urinating, dysuria, flank pain, frequency and urgency.   Musculoskeletal: Negative for back pain, joint swelling and myalgias.   Skin: Positive for color change (widespread vitiligo, chronic) and wound (L abdominal lap sites). Negative for rash.   Neurological: Negative for dizziness, seizures, syncope, facial asymmetry, weakness, light-headedness, numbness and headaches.   Psychiatric/Behavioral: Negative for confusion and hallucinations.     Objective:     Vital Signs (Most Recent):  Temp: 99 °F (37.2 °C) (11/04/20 1109)  Pulse: 61 (11/04/20 1109)  Resp: 14 (11/04/20 1109)  BP: 129/61 (11/04/20 1109)  SpO2: 100 % (11/04/20 1109) Vital Signs (24h Range):  Temp:  [98.3 °F (36.8 °C)-99 °F (37.2 °C)] 99 °F (37.2 °C)  Pulse:  [57-74] 61  Resp:  [12-18] 14  SpO2:  [92 %-100 %] 100 %  BP: (126-151)/(60-66) 129/61     Weight: 89.9 kg (198 lb 3.1 oz)  Body mass index is 36.25 kg/m².    Intake/Output Summary (Last 24 hours) at 11/4/2020 8724  Last data  filed at 2020 1000  Gross per 24 hour   Intake 1635.84 ml   Output 1675 ml   Net -39.16 ml      Physical Exam  Vitals signs reviewed.   Constitutional:       General: He is not in acute distress.     Appearance: Normal appearance. He is obese. He is not toxic-appearing or diaphoretic.   HENT:      Head: Normocephalic and atraumatic.      Nose: Nose normal. No congestion.      Mouth/Throat:      Mouth: Mucous membranes are moist.   Eyes:      General: No scleral icterus.     Pupils: Pupils are equal, round, and reactive to light.   Neck:      Musculoskeletal: Normal range of motion and neck supple. No muscular tenderness.   Cardiovascular:      Rate and Rhythm: Normal rate and regular rhythm.      Pulses: Normal pulses.      Heart sounds: Normal heart sounds.   Pulmonary:      Effort: Pulmonary effort is normal. No respiratory distress.      Breath sounds: Normal breath sounds. No wheezing or rhonchi.   Abdominal:      General: Abdomen is flat. Bowel sounds are normal. There is no distension.      Palpations: Abdomen is soft.      Tenderness: There is no abdominal tenderness. There is no guarding or rebound.   Genitourinary:     Comments: Noe catheter in place - gross hematuria  Musculoskeletal: Normal range of motion.      Right lower le+ Edema (chronic per pt) present.      Left lower le+ Edema (chronic per pt) present.   Skin:     General: Skin is warm and dry.      Capillary Refill: Capillary refill takes less than 2 seconds.      Coloration: Skin is not jaundiced.      Findings: No bruising.             Comments: Widespread vitiligo   Neurological:      General: No focal deficit present.      Mental Status: He is alert and oriented to person, place, and time.      Sensory: No sensory deficit.      Motor: No weakness.   Psychiatric:         Mood and Affect: Mood normal.         Behavior: Behavior normal.         Thought Content: Thought content normal.         Judgment: Judgment normal.          Significant Labs:   Recent Lab Results       11/04/20  1127   11/04/20  0706   11/04/20  0518   11/03/20  2258   11/03/20  1715        Anion Gap   12           Ao root annulus               Ascending aorta               Ao peak shena               Ao VTI               AV valve area               AV mean gradient               AV index (prosthetic)               AV peak gradient               AV Velocity Ratio               Baso #   0.04           Basophil %   0.5           BSA               BUN   102           Calcium   7.9           Chloride   111           CO2   18           Creatinine   9.4           Left Ventricle Relative Wall Thickness               Differential Method   Automated           E/A ratio               E/E' ratio               eGFR if    6           eGFR if non    5  Comment:  Calculation used to obtain the estimated glomerular filtration  rate (eGFR) is the CKD-EPI equation.              Eos #   0.2           Eosinophil %   2.8           E wave decelartion time               FS               Glucose   100           Gran # (ANC)   6.0           Gran %   72.8           Hematocrit   26.3           Hemoglobin   8.3           Immature Grans (Abs)   0.11  Comment:  Mild elevation in immature granulocytes is non specific and   can be seen in a variety of conditions including stress response,   acute inflammation, trauma and pregnancy. Correlation with other   laboratory and clinical findings is essential.             Immature Granulocytes   1.3           IVRT               IVS               Left Atrium Major Axis               Left Atrium Minor Axis               LA size               LVOT area               LV LATERAL E/E' RATIO               LV SEPTAL E/E' RATIO               LV Diastolic Volume               LV Diastolic Volume Index               LVIDd               LVIDs               LV mass               LV Mass Index               LVOT diameter                LVOT peak emir               LVOT stroke volume               LVOT peak VTI               LV Systolic Volume               LV Systolic Volume Index               Lymph #   1.3           Lymph %   15.4           Magnesium   2.2           MCH   30.3           MCHC   31.6           MCV   96           Mean e'               Mono #   0.6           Mono %   7.2           MPV   10.8           Mr max emir               MV valve area p 1/2 method               MV Peak A Emir               MV Peak E Emir               MV stenosis pressure 1/2 time               nRBC   0           Platelets   262           POCT Glucose 211   107 128 124     Potassium   4.7           PV mean gradient               Posterior Wall               Right Atrial Pressure (from IVC)               RA Major Axis               RBC   2.74           RDW   13.0           RVOT peak emir               RVOT peak VTI               Sinus               Sodium   141           STJ               TAPSE               TDI SEPTAL               TDI LATERAL               Triscuspid Valve Regurgitation Peak Gradient               TR Max Emir               TV rest pulmonary artery pressure               WBC   8.29                            11/03/20  1643   11/03/20  1642        Anion Gap         Ao root annulus   3.16     Ascending aorta   2.80     Ao peak emir   2.39     Ao VTI   42.04     AV valve area   2.97     AV mean gradient   11     AV index (prosthetic)   0.96     AV peak gradient   23     AV Velocity Ratio   0.75     Baso #         Basophil %         BSA   1.97     BUN         Calcium         Chloride         CO2         Creatinine         Left Ventricle Relative Wall Thickness   0.52     Differential Method         E/A ratio   0.85     E/E' ratio   8.16     eGFR if          eGFR if non          Eos #         Eosinophil %         E wave decelartion time   236.41     FS   39     Glucose         Gran # (ANC)         Gran %         Hematocrit  25.3       Hemoglobin 8.1       Immature Grans (Abs)         Immature Granulocytes         IVRT   94.20     IVS   1.43     Left Atrium Major Axis   6.18     Left Atrium Minor Axis   4.07     LA size   4.04     LVOT area   3.1     LV LATERAL E/E' RATIO   7.29     LV SEPTAL E/E' RATIO   9.27     LV Diastolic Volume   122.60     LV Diastolic Volume Index   64.83     LVIDd   5.08     LVIDs   3.10     LV mass   289.40     LV Mass Index   153     LVOT diameter   1.98     LVOT peak emir   1.79     LVOT stroke volume   124.82     LVOT peak VTI   40.56     LV Systolic Volume   37.94     LV Systolic Volume Index   20.1     Lymph #         Lymph %         Magnesium         MCH         MCHC         MCV         Mean e'   0.13     Mono #         Mono %         MPV         Mr max emir   0.04     MV valve area p 1/2 method   3.21     MV Peak A Emir   1.20     MV Peak E Emir   1.02     MV stenosis pressure 1/2 time   68.56     nRBC         Platelets         POCT Glucose         Potassium         PV mean gradient   4.74     Posterior Wall   1.31     Right Atrial Pressure (from IVC)   3     RA Major Axis   4.53     RBC         RDW         RVOT peak emir   1.38     RVOT peak VTI   29.86     Sinus   3.06     Sodium         STJ   2.95     TAPSE   2.16     TDI SEPTAL   0.11     TDI LATERAL   0.14     Triscuspid Valve Regurgitation Peak Gradient   32     TR Max Emir   2.85     TV rest pulmonary artery pressure   35     WBC             All pertinent labs within the past 24 hours have been reviewed.    Significant Imaging: I have reviewed all pertinent imaging results/findings within the past 24 hours.

## 2020-11-04 NOTE — ASSESSMENT & PLAN NOTE
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  Due to DM, HTN, unopposed hyperaldosteronism         Plans and recommendations:  As discussed above  Opportunity for questions provided, discussed with pt's sister.  Total time spent 70 minutes including time needed to review the records, the   patient evaluation, documentation, face-to-face discussion with the patient,   more than 50% of the time was spent on coordination of care and counseling.    Level V visit.

## 2020-11-04 NOTE — SUBJECTIVE & OBJECTIVE
Interval History: less hematuria today, tolerating diet, no complaints    Medications:  Continuous Infusions:   sodium chloride 0.9% 100 mL/hr at 11/04/20 1634     Scheduled Meds:   ARIPiprazole  30 mg Oral QHS    atorvastatin  80 mg Oral Daily    hydrALAZINE  100 mg Oral Q8H    insulin detemir U-100  15 Units Subcutaneous QHS    metoprolol tartrate  12.5 mg Oral BID    multivitamin  1 tablet Oral Daily    NIFEdipine  60 mg Oral Daily    sertraline  100 mg Oral Daily    sevelamer carbonate  800 mg Oral TID WM    tamsulosin  0.4 mg Oral Daily     PRN Meds:dextrose 50%, glucagon (human recombinant), insulin aspart U-100, metoprolol     Review of patient's allergies indicates:   Allergen Reactions    Benadryl [diphenhydramine hcl] Other (See Comments)     Pt states benadryl makes him feel numb    Ace inhibitors      YIN     Objective:     Vital Signs (Most Recent):  Temp: 98 °F (36.7 °C) (11/04/20 1702)  Pulse: 69 (11/04/20 1702)  Resp: 18 (11/04/20 1702)  BP: (!) 149/66 (11/04/20 1702)  SpO2: 98 % (11/04/20 1702) Vital Signs (24h Range):  Temp:  [98 °F (36.7 °C)-99 °F (37.2 °C)] 98 °F (36.7 °C)  Pulse:  [57-74] 69  Resp:  [12-18] 18  SpO2:  [94 %-100 %] 98 %  BP: (126-165)/(60-72) 149/66     Weight: 89.9 kg (198 lb 3.1 oz)  Body mass index is 36.25 kg/m².    Intake/Output - Last 3 Shifts       11/02 0700 - 11/03 0659 11/03 0700 - 11/04 0659 11/04 0700 - 11/05 0659    P.O. 236 1120 130    I.V. (mL/kg)  1638.3 (18.2)     Total Intake(mL/kg) 236 (2.7) 2758.3 (30.7) 130 (1.4)    Urine (mL/kg/hr) 1500 1675 (0.8)     Other 1850      Total Output 3350 1675     Net -3114 +1083.3 +130                 Physical Exam  Vitals signs reviewed.   Constitutional:       Appearance: He is obese.   Cardiovascular:      Rate and Rhythm: Normal rate.   Pulmonary:      Effort: Pulmonary effort is normal.   Abdominal:      General: There is no distension.      Tenderness: There is no abdominal tenderness.      Comments:  Incisions healing well   Genitourinary:     Comments: Noe urine slight hematuria however clearing  Neurological:      Mental Status: He is alert.         Significant Labs:  CBC:   Recent Labs   Lab 11/04/20  0706   WBC 8.29   RBC 2.74*   HGB 8.3*   HCT 26.3*      MCV 96   MCH 30.3   MCHC 31.6*     BMP:   Recent Labs   Lab 11/04/20  0706         K 4.7   *   CO2 18*   *   CREATININE 9.4*   CALCIUM 7.9*   MG 2.2

## 2020-11-04 NOTE — PROGRESS NOTES
[facility] -   General Surgery  Progress Note    Subjective:     History of Present Illness:  75-year-old male status post left adrenalectomy 10/20/20 with complex postop course complicated by YIN on CKD and complex Noe placement after malpositioned initially was admitted yesterday for deconditioning and gross hematuria and Noe catheter.  He was noted to have minimally elevated troponins but significantly deconditioned.    Post-Op Info:  * No surgery found *         Interval History: less hematuria today, tolerating diet, no complaints    Medications:  Continuous Infusions:   sodium chloride 0.9% 100 mL/hr at 11/04/20 1634     Scheduled Meds:   ARIPiprazole  30 mg Oral QHS    atorvastatin  80 mg Oral Daily    hydrALAZINE  100 mg Oral Q8H    insulin detemir U-100  15 Units Subcutaneous QHS    metoprolol tartrate  12.5 mg Oral BID    multivitamin  1 tablet Oral Daily    NIFEdipine  60 mg Oral Daily    sertraline  100 mg Oral Daily    sevelamer carbonate  800 mg Oral TID WM    tamsulosin  0.4 mg Oral Daily     PRN Meds:dextrose 50%, glucagon (human recombinant), insulin aspart U-100, metoprolol     Review of patient's allergies indicates:   Allergen Reactions    Benadryl [diphenhydramine hcl] Other (See Comments)     Pt states benadryl makes him feel numb    Ace inhibitors      YIN     Objective:     Vital Signs (Most Recent):  Temp: 98 °F (36.7 °C) (11/04/20 1702)  Pulse: 69 (11/04/20 1702)  Resp: 18 (11/04/20 1702)  BP: (!) 149/66 (11/04/20 1702)  SpO2: 98 % (11/04/20 1702) Vital Signs (24h Range):  Temp:  [98 °F (36.7 °C)-99 °F (37.2 °C)] 98 °F (36.7 °C)  Pulse:  [57-74] 69  Resp:  [12-18] 18  SpO2:  [94 %-100 %] 98 %  BP: (126-165)/(60-72) 149/66     Weight: 89.9 kg (198 lb 3.1 oz)  Body mass index is 36.25 kg/m².    Intake/Output - Last 3 Shifts       11/02 0700 - 11/03 0659 11/03 0700 - 11/04 0659 11/04 0700 - 11/05 0659    P.O. 236 1120 130    I.V. (mL/kg)  1638.3 (18.2)     Total  Intake(mL/kg) 236 (2.7) 2758.3 (30.7) 130 (1.4)    Urine (mL/kg/hr) 1500 1675 (0.8)     Other 1850      Total Output 3350 1675     Net -3114 +1083.3 +130                 Physical Exam  Vitals signs reviewed.   Constitutional:       Appearance: He is obese.   Cardiovascular:      Rate and Rhythm: Normal rate.   Pulmonary:      Effort: Pulmonary effort is normal.   Abdominal:      General: There is no distension.      Tenderness: There is no abdominal tenderness.      Comments: Incisions healing well   Genitourinary:     Comments: Noe urine slight hematuria however clearing  Neurological:      Mental Status: He is alert.         Significant Labs:  CBC:   Recent Labs   Lab 11/04/20  0706   WBC 8.29   RBC 2.74*   HGB 8.3*   HCT 26.3*      MCV 96   MCH 30.3   MCHC 31.6*     BMP:   Recent Labs   Lab 11/04/20  0706         K 4.7   *   CO2 18*   *   CREATININE 9.4*   CALCIUM 7.9*   MG 2.2     Assessment/Plan:     * Hematuria  Noe catheter intact  Management per Urology    CKD (chronic kidney disease) stage 5, GFR less than 15 ml/min  Management per Nephrology   Monitor urine output, BUN/creatinine    HTN (hypertension)  P.o. antihypertensives  Regimen adjusted on last admitted after left adrenalectomy for suspected aldosteronoma        Livan Quiñones MD  General Surgery  [facility] -

## 2020-11-04 NOTE — PROGRESS NOTES
Chief Complaint: Urinary retention/hematuria    HPI:   11/4/20: Urine almost normal this morning, doing well, feeling well.  11/3/20: 76 yo man 5d ago had left lap adrenalectomy and subsequently had urinary retention and hematuria with clot.  Has had difficult greenfield placements in past with false passage.  Was assessed in clinic yesterday and 22Fr greenfield placed.  Admitted to hospital with Cr 10 and evidence of difficult recovery from surgery.  Abdomen bloated. Penis swollen.  No abd/pelvic pain and no exac/rel factors.  No hematuria.  No urolithiasis.      Allergies:  Benadryl [diphenhydramine hcl] and Ace inhibitors    Medications:  See chart    Review of Systems:  General: No fever, chills, fatigability, or weight loss.  Skin: No rashes, itching, or changes in color or texture of skin.  Chest: Denies CABRERA, cyanosis, wheezing, cough, and sputum production.  Abdomen: Appetite fine. No weight loss. Denies diarrhea, abdominal pain, hematemesis, or blood in stool.  Musculoskeletal: No joint stiffness or swelling. Denies back pain.  : As above.  All other review of systems negative.    PMH:   has a past medical history of Adrenal adenoma, left, BPH (benign prostatic hyperplasia), CAD (coronary artery disease), CKD (chronic kidney disease) stage 5, GFR less than 15 ml/min, Depression, Diabetes mellitus, Hyperlipidemia, Hypertension, Retinitis, Stroke, and Vitiligo.    PSH:   has a past surgical history that includes Fluoroscopy (Bilateral, 7/20/2020); Robot-assisted surgical removal of adrenal gland using da Min Xi (Left, 10/20/2020); and Robot-assisted adrenalectomy (Left).    FamHx: family history is not on file.    SocHx:  reports that he has never smoked. He has never used smokeless tobacco. He reports that he does not drink alcohol or use drugs.      Physical Exam:  Vitals:    11/04/20 0527   BP:    Pulse: 63   Resp:    Temp:      General: A&Ox3, no apparent distress, no deformities  Neck: No masses, normal  thyroid  Lungs: normal inspiration, no use of accessory muscles  Heart: normal pulse, no arrhythmias  Abdomen: Soft, NT  Skin: The skin is warm and dry. No jaundice.  Ext: No c/c/e.  :   11/4/20: Noe in good position, mainly clear  11/3/20: Test desc hitesh, no abnormalities of epididymus. Penis edematous but uninjured, with normal penile and scrotal skin otherwise. Meatus normal. Normal rectal tone, no hemorrhoids.     Labs/Studies: see chart, Cr 10    Impression/Plan:   1. Renal insufficiency chronic, acutely worse due to retention.  UOP appropriate currently. Hematuria largely resolved.

## 2020-11-04 NOTE — PROGRESS NOTES
"[facility]  Nephrology  Progress Note    Patient Name: Colt Stacy Jr.  MRN: [id number]  Admission Date: 11/2/2020  Hospital Length of Stay: 0 days  Attending Provider: Mo Borges MD   Primary Care Physician: Primary Doctor No  Principal Problem:Hematuria    Subjective:     HPI: Pt was seen and examined. H/o and chart reviewed. Pt is a 74 y/o male with YIN on CKD stage 4. 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 Pt experienced, difficult greenfield catheter placement at the time with "false passage", per urology. YIN has persisted since then. Pt also had diarrhea around 10/30/20. Pt was re-admitted for persistent gross hematuria and weakness.     Interval History: Pt was seen and examined this am. Case reviewed in details. Discussed with hospitalist team. Pt has no new c/o's, no SOB. No family members around to speak to. Pt denies SOB.    Review of patient's allergies indicates:   Allergen Reactions    Benadryl [diphenhydramine hcl] Other (See Comments)     Pt states benadryl makes him feel numb    Ace inhibitors      YIN     Current Facility-Administered Medications   Medication Frequency    0.9%  NaCl infusion Continuous    ARIPiprazole tablet 30 mg QHS    atorvastatin tablet 80 mg Daily    dextrose 50% injection 12.5 g PRN    glucagon (human recombinant) injection 1 mg PRN    hydrALAZINE tablet 100 mg Q8H    insulin aspart U-100 pen 1-10 Units Q6H PRN    insulin detemir U-100 pen 15 Units QHS    metoprolol injection 5 mg Q6H PRN    metoprolol tartrate (LOPRESSOR) split tablet 12.5 mg BID    multivitamin tablet Daily    NIFEdipine 24 hr tablet 60 mg Daily    sertraline tablet 100 mg Daily    sevelamer carbonate tablet 800 mg TID WM    tamsulosin 24 hr capsule 0.4 mg Daily "     Facility-Administered Medications Ordered in Other Encounters   Medication Frequency    diphenhydrAMINE injection 25 mg Q6H PRN    metoclopramide HCl injection 10 mg Q10 Min PRN       Objective:     Vital Signs (Most Recent):  Temp: 99 °F (37.2 °C) (11/04/20 1109)  Pulse: 61 (11/04/20 1109)  Resp: 14 (11/04/20 1109)  BP: (!) 165/72 (11/04/20 1513)  SpO2: 100 % (11/04/20 1109)  O2 Device (Oxygen Therapy): nasal cannula (11/04/20 0736) Vital Signs (24h Range):  Temp:  [98.3 °F (36.8 °C)-99 °F (37.2 °C)] 99 °F (37.2 °C)  Pulse:  [57-74] 61  Resp:  [12-18] 14  SpO2:  [94 %-100 %] 100 %  BP: (126-165)/(60-72) 165/72     Weight: 89.9 kg (198 lb 3.1 oz) (11/04/20 0516)  Body mass index is 36.25 kg/m².  Body surface area is 1.98 meters squared.    I/O last 3 completed shifts:  In: 2758.3 [P.O.:1120; I.V.:1638.3]  Out: 2300 [Urine:2300]    Physical Exam  Vitals signs and nursing note reviewed.   Constitutional:       Appearance: Normal appearance.   HENT:      Head: Normocephalic and atraumatic.   Neck:      Musculoskeletal: Normal range of motion and neck supple.   Cardiovascular:      Rate and Rhythm: Normal rate and regular rhythm.      Pulses: Normal pulses.      Heart sounds: Normal heart sounds.   Pulmonary:      Effort: Pulmonary effort is normal.      Breath sounds: Normal breath sounds.   Abdominal:      General: Abdomen is flat.      Palpations: Abdomen is soft.   Musculoskeletal:      Right lower leg: No edema.      Left lower leg: No edema.   Neurological:      General: No focal deficit present.      Mental Status: He is alert and oriented to person, place, and time.   Psychiatric:         Mood and Affect: Mood normal.         Behavior: Behavior normal.         Significant Labs: reviewed  BMP  Lab Results   Component Value Date     11/04/2020    K 4.7 11/04/2020     (H) 11/04/2020    CO2 18 (L) 11/04/2020     (H) 11/04/2020    CREATININE 9.4 (H) 11/04/2020    CALCIUM 7.9 (L) 11/04/2020     ANIONGAP 12 11/04/2020    ESTGFRAFRICA 6 (A) 11/04/2020    EGFRNONAA 5 (A) 11/04/2020     Lab Results   Component Value Date    WBC 8.29 11/04/2020    HGB 8.3 (L) 11/04/2020    HCT 26.3 (L) 11/04/2020    MCV 96 11/04/2020     11/04/2020         Significant Imaging: reviewed    Assessment/Plan:     76 y/o male with YIN on CKD stage 4 has persistent gross hematuria:     Acute renal failure  s Cr stable, very slightly lower  YIN occured immediately post surgery post L adrenelectomy on 10/20/20  YIN not related to hyperaldosteronism or the adrenelectomy  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 Pt's mentation not adequate  Discussed with PCP     HTN (hypertension)  Secondary HTN/resistent HTN  Prior w/u showed left adrenal gland adenoma  S/p left adrenelectomy  BP has improved    BP not ideally controlled at present  Meds reviewed  Will increase hydralazine from 75 to 100 mg po tid        Stage 4 chronic kidney disease  Stage 4 CKD at baseline.  Due to DM, HTN, unopposed hyperaldosteronism         Plans and recommendations:  As discussed above  Opportunity for questions provided, discussed with PCP        Regina Babin MD  Nephrology  [facility]

## 2020-11-04 NOTE — PLAN OF CARE
Fall prevention precautions maintained, pt remained free of falls throughout shift, call bell and personal items within reach, greenfield in place with bloody urine and small clots present, pt refusing to turn in bed. 24 hour chart check completed. Will continue to monitor

## 2020-11-04 NOTE — SUBJECTIVE & OBJECTIVE
Interval History: Pt was seen and examined this am. Case reviewed in details. Discussed with hospitalist team. Pt has no new c/o's, no SOB. No family members around to speak to. Pt denies SOB.    Review of patient's allergies indicates:   Allergen Reactions    Benadryl [diphenhydramine hcl] Other (See Comments)     Pt states benadryl makes him feel numb    Ace inhibitors      YIN     Current Facility-Administered Medications   Medication Frequency    0.9%  NaCl infusion Continuous    ARIPiprazole tablet 30 mg QHS    atorvastatin tablet 80 mg Daily    dextrose 50% injection 12.5 g PRN    glucagon (human recombinant) injection 1 mg PRN    hydrALAZINE tablet 100 mg Q8H    insulin aspart U-100 pen 1-10 Units Q6H PRN    insulin detemir U-100 pen 15 Units QHS    metoprolol injection 5 mg Q6H PRN    metoprolol tartrate (LOPRESSOR) split tablet 12.5 mg BID    multivitamin tablet Daily    NIFEdipine 24 hr tablet 60 mg Daily    sertraline tablet 100 mg Daily    sevelamer carbonate tablet 800 mg TID WM    tamsulosin 24 hr capsule 0.4 mg Daily     Facility-Administered Medications Ordered in Other Encounters   Medication Frequency    diphenhydrAMINE injection 25 mg Q6H PRN    metoclopramide HCl injection 10 mg Q10 Min PRN       Objective:     Vital Signs (Most Recent):  Temp: 99 °F (37.2 °C) (11/04/20 1109)  Pulse: 61 (11/04/20 1109)  Resp: 14 (11/04/20 1109)  BP: (!) 165/72 (11/04/20 1513)  SpO2: 100 % (11/04/20 1109)  O2 Device (Oxygen Therapy): nasal cannula (11/04/20 0736) Vital Signs (24h Range):  Temp:  [98.3 °F (36.8 °C)-99 °F (37.2 °C)] 99 °F (37.2 °C)  Pulse:  [57-74] 61  Resp:  [12-18] 14  SpO2:  [94 %-100 %] 100 %  BP: (126-165)/(60-72) 165/72     Weight: 89.9 kg (198 lb 3.1 oz) (11/04/20 0516)  Body mass index is 36.25 kg/m².  Body surface area is 1.98 meters squared.    I/O last 3 completed shifts:  In: 2758.3 [P.O.:1120; I.V.:1638.3]  Out: 2300 [Urine:2300]    Physical Exam  Vitals signs and  nursing note reviewed.   Constitutional:       Appearance: Normal appearance.   HENT:      Head: Normocephalic and atraumatic.   Neck:      Musculoskeletal: Normal range of motion and neck supple.   Cardiovascular:      Rate and Rhythm: Normal rate and regular rhythm.      Pulses: Normal pulses.      Heart sounds: Normal heart sounds.   Pulmonary:      Effort: Pulmonary effort is normal.      Breath sounds: Normal breath sounds.   Abdominal:      General: Abdomen is flat.      Palpations: Abdomen is soft.   Musculoskeletal:      Right lower leg: No edema.      Left lower leg: No edema.   Neurological:      General: No focal deficit present.      Mental Status: He is alert and oriented to person, place, and time.   Psychiatric:         Mood and Affect: Mood normal.         Behavior: Behavior normal.         Significant Labs: reviewed  BMP  Lab Results   Component Value Date     11/04/2020    K 4.7 11/04/2020     (H) 11/04/2020    CO2 18 (L) 11/04/2020     (H) 11/04/2020    CREATININE 9.4 (H) 11/04/2020    CALCIUM 7.9 (L) 11/04/2020    ANIONGAP 12 11/04/2020    ESTGFRAFRICA 6 (A) 11/04/2020    EGFRNONAA 5 (A) 11/04/2020     Lab Results   Component Value Date    WBC 8.29 11/04/2020    HGB 8.3 (L) 11/04/2020    HCT 26.3 (L) 11/04/2020    MCV 96 11/04/2020     11/04/2020         Significant Imaging: reviewed

## 2020-11-04 NOTE — ASSESSMENT & PLAN NOTE
"Greenfield placed per urology (Dr. Lala) on 11/02/2020  "False passage" present per chart review  Urology following  General surgery following  Trend H&H - transfuse if indicated  Hand irrigate greenfield catheter prn  Strict I&Os  Continue flomax per urology recs  Holding home ASA for now  Gentle IV hydration - monitor for fluid overload  11/4  --Greenfield draining clear, yellow urine to gravity  --urology following  --will dc with cath and f/u OP with urologoy  "

## 2020-11-04 NOTE — ASSESSMENT & PLAN NOTE
Likely leak d/t poor renal function - Denies chest pain, EKG unrevealing  Troponin at 0.044 in ED  Trend troponin  ADA, cardiac diet  11/4:  --0.044<<0.037<<0.032  --elevated but stable  --likely demand ischemia 2/2 fluid overload  --denies chest pain

## 2020-11-04 NOTE — ASSESSMENT & PLAN NOTE
Currently around baseline  Nephrology following  Monitor renal function  Avoid nephrotoxins  strict I&Os  Gentle IV hydration - monitor for fluid overload  11/4:  --nephrology recs dialysis  --voice mail left for daughter  --continue renal diet and gentle IVF's

## 2020-11-04 NOTE — PROGRESS NOTES
"[facility] Medicine  Progress Note    Patient Name: Colt Stacy Jr.  MRN: [id number]  Patient Class: OP- Observation   Admission Date: 11/2/2020  Length of Stay: 0 days  Attending Physician: Mo Borges MD  Primary Care Provider: Primary Doctor No        Subjective:     Principal Problem:Hematuria        HPI:  74 y/o AA M with hx of depression, CAD, HLD, HTN, DM2, stroke, retinintis, robotic L adrenalectomy, L adrenal adenoma, BPH, CKD5, vitiligo sent to ED per urology (Dr. Lala) for gross hematuria since 10/23/2020. On 10/22/2020 pt had a L adrenalectomy per Dr. Quiñones and following this had urinary retention - a greenfield catheter was placed which resulted in gross hematuria - pt was sent home with a greenfield to f/u with urology. Seen in clinic today with a view to remove the catheter but the hematuria has persisted - underwent cystoscopy in clinic and per chart review was found to have a "false passage" - greenfield was replaced and pt sent to ED. Symptoms have been persistent and without known exacerbating or mitigating factors. Denies chest pain, edema, palpitations, SOB, wheezing, orthopnea, abdominal pain, N/V/D, constipation, dysuria, flank pain, HA, dizziness, weakness, fever, cough, chills, falls/trauma, blurred vision, focal deficits. Found in ED to have elevated creatinine (9.9 - chronic), GFR of 5, BG 62, , troponin 0.044 - labs otherwise unremarkable, urine red, cxray with bilateral small pleural effusions (chronic); VSS. In ED, pt's greenfield catheter was irrigated x2 but hematuria persisted. Hospital medicine was called and pt was placed in observation on med surg. Pt is a full code - surrogate decision maker is his daughter, Cris Kruse.     Overview/Hospital Course:  Patient kept on OBS for hematuria under the care of hospital medicine. Urology, general surgery, and nephrology were consulted. 11/3: urology saw patient and recommended watchful waiting with intermittent " greenfield irrigation and monitor H/H. General surgery recommends continued use of greenfield catheter with Nephrology to follow CKD. Discussed with Dr. Babin, who strongly recommends continue gentle IVF's and strict I&O - no diuretics at this time. Will continue to monitor    Interval History: urine has cleared up - discussed with urology who plans on pt dc home with cath and f/u OP with Dr. Lala in 1-2 weeks. Nephrology following - recommending dialysis since YIN is not seeming to improve (origional insult on 10/20). Voice mail left for daughter.    Review of Systems   Constitutional: Negative for activity change, chills, diaphoresis, fatigue and fever.   HENT: Negative for congestion, trouble swallowing and voice change.    Eyes: Negative for photophobia and discharge.   Respiratory: Negative for cough, chest tightness, shortness of breath and wheezing.    Cardiovascular: Positive for leg swelling (BLE, chronic, unchanged per pt). Negative for chest pain and palpitations.   Gastrointestinal: Negative for abdominal pain, blood in stool, constipation, diarrhea, nausea and vomiting.   Endocrine: Negative for cold intolerance, heat intolerance, polydipsia, polyphagia and polyuria.   Genitourinary: Positive for hematuria. Negative for difficulty urinating, dysuria, flank pain, frequency and urgency.   Musculoskeletal: Negative for back pain, joint swelling and myalgias.   Skin: Positive for color change (widespread vitiligo, chronic) and wound (L abdominal lap sites). Negative for rash.   Neurological: Negative for dizziness, seizures, syncope, facial asymmetry, weakness, light-headedness, numbness and headaches.   Psychiatric/Behavioral: Negative for confusion and hallucinations.     Objective:     Vital Signs (Most Recent):  Temp: 99 °F (37.2 °C) (11/04/20 1109)  Pulse: 61 (11/04/20 1109)  Resp: 14 (11/04/20 1109)  BP: 129/61 (11/04/20 1109)  SpO2: 100 % (11/04/20 1109) Vital Signs (24h Range):  Temp:  [98.3 °F (36.8  °C)-99 °F (37.2 °C)] 99 °F (37.2 °C)  Pulse:  [57-74] 61  Resp:  [12-18] 14  SpO2:  [92 %-100 %] 100 %  BP: (126-151)/(60-66) 129/61     Weight: 89.9 kg (198 lb 3.1 oz)  Body mass index is 36.25 kg/m².    Intake/Output Summary (Last 24 hours) at 2020 1454  Last data filed at 2020 1000  Gross per 24 hour   Intake 1635.84 ml   Output 1675 ml   Net -39.16 ml      Physical Exam  Vitals signs reviewed.   Constitutional:       General: He is not in acute distress.     Appearance: Normal appearance. He is obese. He is not toxic-appearing or diaphoretic.   HENT:      Head: Normocephalic and atraumatic.      Nose: Nose normal. No congestion.      Mouth/Throat:      Mouth: Mucous membranes are moist.   Eyes:      General: No scleral icterus.     Pupils: Pupils are equal, round, and reactive to light.   Neck:      Musculoskeletal: Normal range of motion and neck supple. No muscular tenderness.   Cardiovascular:      Rate and Rhythm: Normal rate and regular rhythm.      Pulses: Normal pulses.      Heart sounds: Normal heart sounds.   Pulmonary:      Effort: Pulmonary effort is normal. No respiratory distress.      Breath sounds: Normal breath sounds. No wheezing or rhonchi.   Abdominal:      General: Abdomen is flat. Bowel sounds are normal. There is no distension.      Palpations: Abdomen is soft.      Tenderness: There is no abdominal tenderness. There is no guarding or rebound.   Genitourinary:     Comments: Noe catheter in place - gross hematuria  Musculoskeletal: Normal range of motion.      Right lower le+ Edema (chronic per pt) present.      Left lower le+ Edema (chronic per pt) present.   Skin:     General: Skin is warm and dry.      Capillary Refill: Capillary refill takes less than 2 seconds.      Coloration: Skin is not jaundiced.      Findings: No bruising.             Comments: Widespread vitiligo   Neurological:      General: No focal deficit present.      Mental Status: He is alert and  oriented to person, place, and time.      Sensory: No sensory deficit.      Motor: No weakness.   Psychiatric:         Mood and Affect: Mood normal.         Behavior: Behavior normal.         Thought Content: Thought content normal.         Judgment: Judgment normal.         Significant Labs:   Recent Lab Results       11/04/20  1127   11/04/20  0706   11/04/20  0518   11/03/20  2258   11/03/20  1715        Anion Gap   12           Ao root annulus               Ascending aorta               Ao peak shena               Ao VTI               AV valve area               AV mean gradient               AV index (prosthetic)               AV peak gradient               AV Velocity Ratio               Baso #   0.04           Basophil %   0.5           BSA               BUN   102           Calcium   7.9           Chloride   111           CO2   18           Creatinine   9.4           Left Ventricle Relative Wall Thickness               Differential Method   Automated           E/A ratio               E/E' ratio               eGFR if    6           eGFR if non    5  Comment:  Calculation used to obtain the estimated glomerular filtration  rate (eGFR) is the CKD-EPI equation.              Eos #   0.2           Eosinophil %   2.8           E wave decelartion time               FS               Glucose   100           Gran # (ANC)   6.0           Gran %   72.8           Hematocrit   26.3           Hemoglobin   8.3           Immature Grans (Abs)   0.11  Comment:  Mild elevation in immature granulocytes is non specific and   can be seen in a variety of conditions including stress response,   acute inflammation, trauma and pregnancy. Correlation with other   laboratory and clinical findings is essential.             Immature Granulocytes   1.3           IVRT               IVS               Left Atrium Major Axis               Left Atrium Minor Axis               LA size               LVOT area                LV LATERAL E/E' RATIO               LV SEPTAL E/E' RATIO               LV Diastolic Volume               LV Diastolic Volume Index               LVIDd               LVIDs               LV mass               LV Mass Index               LVOT diameter               LVOT peak emir               LVOT stroke volume               LVOT peak VTI               LV Systolic Volume               LV Systolic Volume Index               Lymph #   1.3           Lymph %   15.4           Magnesium   2.2           MCH   30.3           MCHC   31.6           MCV   96           Mean e'               Mono #   0.6           Mono %   7.2           MPV   10.8           Mr max emir               MV valve area p 1/2 method               MV Peak A Emir               MV Peak E Emir               MV stenosis pressure 1/2 time               nRBC   0           Platelets   262           POCT Glucose 211   107 128 124     Potassium   4.7           PV mean gradient               Posterior Wall               Right Atrial Pressure (from IVC)               RA Major Axis               RBC   2.74           RDW   13.0           RVOT peak emir               RVOT peak VTI               Sinus               Sodium   141           STJ               TAPSE               TDI SEPTAL               TDI LATERAL               Triscuspid Valve Regurgitation Peak Gradient               TR Max Emir               TV rest pulmonary artery pressure               WBC   8.29                            11/03/20  1643   11/03/20  1642        Anion Gap         Ao root annulus   3.16     Ascending aorta   2.80     Ao peak emir   2.39     Ao VTI   42.04     AV valve area   2.97     AV mean gradient   11     AV index (prosthetic)   0.96     AV peak gradient   23     AV Velocity Ratio   0.75     Baso #         Basophil %         BSA   1.97     BUN         Calcium         Chloride         CO2         Creatinine         Left Ventricle Relative Wall Thickness   0.52     Differential Method          E/A ratio   0.85     E/E' ratio   8.16     eGFR if          eGFR if non          Eos #         Eosinophil %         E wave decelartion time   236.41     FS   39     Glucose         Gran # (ANC)         Gran %         Hematocrit 25.3       Hemoglobin 8.1       Immature Grans (Abs)         Immature Granulocytes         IVRT   94.20     IVS   1.43     Left Atrium Major Axis   6.18     Left Atrium Minor Axis   4.07     LA size   4.04     LVOT area   3.1     LV LATERAL E/E' RATIO   7.29     LV SEPTAL E/E' RATIO   9.27     LV Diastolic Volume   122.60     LV Diastolic Volume Index   64.83     LVIDd   5.08     LVIDs   3.10     LV mass   289.40     LV Mass Index   153     LVOT diameter   1.98     LVOT peak emir   1.79     LVOT stroke volume   124.82     LVOT peak VTI   40.56     LV Systolic Volume   37.94     LV Systolic Volume Index   20.1     Lymph #         Lymph %         Magnesium         MCH         MCHC         MCV         Mean e'   0.13     Mono #         Mono %         MPV         Mr max emir   0.04     MV valve area p 1/2 method   3.21     MV Peak A Emir   1.20     MV Peak E Emir   1.02     MV stenosis pressure 1/2 time   68.56     nRBC         Platelets         POCT Glucose         Potassium         PV mean gradient   4.74     Posterior Wall   1.31     Right Atrial Pressure (from IVC)   3     RA Major Axis   4.53     RBC         RDW         RVOT peak emir   1.38     RVOT peak VTI   29.86     Sinus   3.06     Sodium         STJ   2.95     TAPSE   2.16     TDI SEPTAL   0.11     TDI LATERAL   0.14     Triscuspid Valve Regurgitation Peak Gradient   32     TR Max Emir   2.85     TV rest pulmonary artery pressure   35     WBC             All pertinent labs within the past 24 hours have been reviewed.    Significant Imaging: I have reviewed all pertinent imaging results/findings within the past 24 hours.      Assessment/Plan:      * Hematuria  Noe placed per urology (Dr. Lala) on  "11/02/2020  "False passage" present per chart review  Urology following  General surgery following  Trend H&H - transfuse if indicated  Hand irrigate greenfield catheter prn  Strict I&Os  Continue flomax per urology recs  Holding home ASA for now  Gentle IV hydration - monitor for fluid overload  11/4  --Greenfield draining clear, yellow urine to gravity  --urology following  --will dc with cath and f/u OP with urologoy    Elevated troponin  Likely leak d/t poor renal function - Denies chest pain, EKG unrevealing  Troponin at 0.044 in ED  Trend troponin  ADA, cardiac diet  11/4:  --0.044<<0.037<<0.032  --elevated but stable  --likely demand ischemia 2/2 fluid overload  --denies chest pain          BPH (benign prostatic hyperplasia)  --urology following  --Continue flomax  --leave indwelling catheter with OP urology f/u    CKD (chronic kidney disease) stage 5, GFR less than 15 ml/min  Currently around baseline  Nephrology following  Monitor renal function  Avoid nephrotoxins  strict I&Os  Gentle IV hydration - monitor for fluid overload  11/4:  --nephrology recs dialysis  --voice mail left for daughter  --continue renal diet and gentle IVF's    Type 2 diabetes mellitus  A1c at 6.0 o n 10/20/2020 / BG 62 in ED  Accuchecks with SSI pr  Levemir at 1/2 home dose  ADA cardiac diet for now      HTN (hypertension)  Currently well controlled  Continue home medications  Lopressor IV prn elevated BP  Monitor closely      VTE Risk Mitigation (From admission, onward)         Ordered     Place sequential compression device  Until discontinued      11/02/20 2007                Discharge Planning   RICH:      Code Status: Full Code   Is the patient medically ready for discharge?:     Reason for patient still in hospital (select all that apply): Consult recommendations  Discharge Plan A: Home with family                  HECTOR Jose  Department of Hospital Medicine   [facility]  "

## 2020-11-04 NOTE — PLAN OF CARE
Patient remains free from falls and injuries. Iv fluids provided. Blood sugar being monitored. Waffle mattress applied. Patient turned every 2 hours using wedge. Noe in place. Supplemental oxygen provided. Will continue to monitor.

## 2020-11-05 ENCOUNTER — TELEPHONE (OUTPATIENT)
Dept: UROLOGY | Facility: CLINIC | Age: 75
End: 2020-11-05

## 2020-11-05 DIAGNOSIS — N17.9 AKI (ACUTE KIDNEY INJURY): Primary | ICD-10-CM

## 2020-11-05 PROBLEM — R79.89 ELEVATED TROPONIN: Status: RESOLVED | Noted: 2020-11-02 | Resolved: 2020-11-05

## 2020-11-05 PROBLEM — R31.9 HEMATURIA: Status: RESOLVED | Noted: 2020-11-02 | Resolved: 2020-11-05

## 2020-11-05 LAB
ANION GAP SERPL CALC-SCNC: 13 MMOL/L (ref 8–16)
BASOPHILS # BLD AUTO: 0.04 K/UL (ref 0–0.2)
BASOPHILS NFR BLD: 0.5 % (ref 0–1.9)
BUN SERPL-MCNC: 92 MG/DL (ref 8–23)
CALCIUM SERPL-MCNC: 7.8 MG/DL (ref 8.7–10.5)
CHLORIDE SERPL-SCNC: 114 MMOL/L (ref 95–110)
CO2 SERPL-SCNC: 16 MMOL/L (ref 23–29)
CREAT SERPL-MCNC: 8.9 MG/DL (ref 0.5–1.4)
DIFFERENTIAL METHOD: ABNORMAL
EOSINOPHIL # BLD AUTO: 0.2 K/UL (ref 0–0.5)
EOSINOPHIL NFR BLD: 2.2 % (ref 0–8)
ERYTHROCYTE [DISTWIDTH] IN BLOOD BY AUTOMATED COUNT: 13 % (ref 11.5–14.5)
EST. GFR  (AFRICAN AMERICAN): 6 ML/MIN/1.73 M^2
EST. GFR  (NON AFRICAN AMERICAN): 5 ML/MIN/1.73 M^2
GLUCOSE SERPL-MCNC: 124 MG/DL (ref 70–110)
HCT VFR BLD AUTO: 27.2 % (ref 40–54)
HGB BLD-MCNC: 8.3 G/DL (ref 14–18)
IMM GRANULOCYTES # BLD AUTO: 0.13 K/UL (ref 0–0.04)
IMM GRANULOCYTES NFR BLD AUTO: 1.5 % (ref 0–0.5)
LYMPHOCYTES # BLD AUTO: 1.1 K/UL (ref 1–4.8)
LYMPHOCYTES NFR BLD: 12.6 % (ref 18–48)
MAGNESIUM SERPL-MCNC: 2.2 MG/DL (ref 1.6–2.6)
MCH RBC QN AUTO: 30.4 PG (ref 27–31)
MCHC RBC AUTO-ENTMCNC: 30.5 G/DL (ref 32–36)
MCV RBC AUTO: 100 FL (ref 82–98)
MONOCYTES # BLD AUTO: 0.6 K/UL (ref 0.3–1)
MONOCYTES NFR BLD: 6.3 % (ref 4–15)
NEUTROPHILS # BLD AUTO: 6.7 K/UL (ref 1.8–7.7)
NEUTROPHILS NFR BLD: 76.9 % (ref 38–73)
NRBC BLD-RTO: 0 /100 WBC
PLATELET # BLD AUTO: 292 K/UL (ref 150–350)
PMV BLD AUTO: 10.9 FL (ref 9.2–12.9)
POCT GLUCOSE: 100 MG/DL (ref 70–110)
POCT GLUCOSE: 112 MG/DL (ref 70–110)
POCT GLUCOSE: 119 MG/DL (ref 70–110)
POCT GLUCOSE: 317 MG/DL (ref 70–110)
POCT GLUCOSE: 59 MG/DL (ref 70–110)
POTASSIUM SERPL-SCNC: 4.7 MMOL/L (ref 3.5–5.1)
RBC # BLD AUTO: 2.73 M/UL (ref 4.6–6.2)
SODIUM SERPL-SCNC: 143 MMOL/L (ref 136–145)
WBC # BLD AUTO: 8.75 K/UL (ref 3.9–12.7)

## 2020-11-05 PROCEDURE — 85025 COMPLETE CBC W/AUTO DIFF WBC: CPT

## 2020-11-05 PROCEDURE — 36415 COLL VENOUS BLD VENIPUNCTURE: CPT

## 2020-11-05 PROCEDURE — 99214 OFFICE O/P EST MOD 30 MIN: CPT | Mod: ,,, | Performed by: INTERNAL MEDICINE

## 2020-11-05 PROCEDURE — G0378 HOSPITAL OBSERVATION PER HR: HCPCS

## 2020-11-05 PROCEDURE — 96375 TX/PRO/DX INJ NEW DRUG ADDON: CPT

## 2020-11-05 PROCEDURE — 80048 BASIC METABOLIC PNL TOTAL CA: CPT

## 2020-11-05 PROCEDURE — 99214 PR OFFICE/OUTPT VISIT, EST, LEVL IV, 30-39 MIN: ICD-10-PCS | Mod: ,,, | Performed by: INTERNAL MEDICINE

## 2020-11-05 PROCEDURE — 96374 THER/PROPH/DIAG INJ IV PUSH: CPT

## 2020-11-05 PROCEDURE — 27000221 HC OXYGEN, UP TO 24 HOURS

## 2020-11-05 PROCEDURE — 25000003 PHARM REV CODE 250: Performed by: NURSE PRACTITIONER

## 2020-11-05 PROCEDURE — 25000003 PHARM REV CODE 250: Performed by: INTERNAL MEDICINE

## 2020-11-05 PROCEDURE — 94761 N-INVAS EAR/PLS OXIMETRY MLT: CPT

## 2020-11-05 PROCEDURE — 96372 THER/PROPH/DIAG INJ SC/IM: CPT | Mod: 59

## 2020-11-05 PROCEDURE — 83735 ASSAY OF MAGNESIUM: CPT

## 2020-11-05 PROCEDURE — 99900035 HC TECH TIME PER 15 MIN (STAT)

## 2020-11-05 RX ADMIN — INSULIN ASPART 8 UNITS: 100 INJECTION, SOLUTION INTRAVENOUS; SUBCUTANEOUS at 11:11

## 2020-11-05 RX ADMIN — DEXTROSE MONOHYDRATE 12.5 G: 25 INJECTION, SOLUTION INTRAVENOUS at 04:11

## 2020-11-05 RX ADMIN — TAMSULOSIN HYDROCHLORIDE 0.4 MG: 0.4 CAPSULE ORAL at 08:11

## 2020-11-05 RX ADMIN — NIFEDIPINE 60 MG: 30 TABLET, FILM COATED, EXTENDED RELEASE ORAL at 08:11

## 2020-11-05 RX ADMIN — HYDRALAZINE HYDROCHLORIDE 100 MG: 50 TABLET, FILM COATED ORAL at 06:11

## 2020-11-05 RX ADMIN — ATORVASTATIN CALCIUM 80 MG: 40 TABLET, FILM COATED ORAL at 08:11

## 2020-11-05 RX ADMIN — METOPROLOL TARTRATE 12.5 MG: 25 TABLET, FILM COATED ORAL at 08:11

## 2020-11-05 RX ADMIN — SERTRALINE HYDROCHLORIDE 100 MG: 50 TABLET ORAL at 08:11

## 2020-11-05 RX ADMIN — THERA TABS 1 TABLET: TAB at 08:11

## 2020-11-05 RX ADMIN — SEVELAMER CARBONATE 800 MG: 800 TABLET, FILM COATED ORAL at 04:11

## 2020-11-05 RX ADMIN — SEVELAMER CARBONATE 800 MG: 800 TABLET, FILM COATED ORAL at 08:11

## 2020-11-05 RX ADMIN — SEVELAMER CARBONATE 800 MG: 800 TABLET, FILM COATED ORAL at 11:11

## 2020-11-05 NOTE — ASSESSMENT & PLAN NOTE
74 y/o male with YIN on CKD stage 4 has persistent gross hematuria:     Acute renal failure  s Cr stable, very slightly lower  YIN occured immediately post surgery post L adrenelectomy on 10/20/20  YIN not related to hyperaldosteronism or the adrenelectomy  YIN due to post obstructive nephropathy from urinary retention  Discussed with urology  Had difficult greenfield placement with false passage     Also, had diarrhea a few days ago, also likely contributed to slower recovery  Diarrhea has resolved  Will continue IVF's at gentle rate  ml/hr     Will f/u renal function closely     Pt has no acute indications for HD, but if renal function is not improving with maintenance of a functional greenfield catheter (which pt has now), IVF's, and time, then pt will need dialysis  Attempted to discuss with pt, but will need to have his family members available. Pt's mentation not adequate  Discussed with PCP     HTN (hypertension)  Secondary HTN/resistent HTN  Prior w/u showed left adrenal gland adenoma  S/p left adrenelectomy  BP has improved     BP not ideally controlled at present  Meds reviewed  Will increase hydralazine from 75 to 100 mg po tid        Stage 4 chronic kidney disease  Stage 4 CKD at baseline.  Due to DM, HTN, unopposed hyperaldosteronism         Plans and recommendations:  As discussed above  Opportunity for questions provided, discussed with PCP

## 2020-11-05 NOTE — PLAN OF CARE
LETY emailed Discharge works[facility] to [facility].    Vito Chavarria LMSW 11/5/2020 2:58 PM

## 2020-11-05 NOTE — PLAN OF CARE
Pt declined for [facility]. Referral sent to [facility].     Viot Chavarria LMSW 11/5/2020 12:29 PM

## 2020-11-05 NOTE — NURSING
Patient stable for discharge per MD. IV and heart monitor removed. Patient ex wife is transporting him home. She did not bring oxygen but patients states he is ok to leave without and only uses it when needed at home. Patient and ex wife received discharge summary and verbalize understanding.

## 2020-11-05 NOTE — TELEPHONE ENCOUNTER
Done  ----- Message from Corin De Los Santos RN sent at 11/5/2020  1:40 PM CST -----  Patient will need a hospital follow up appt within 2 weeks.

## 2020-11-05 NOTE — PLAN OF CARE
Patient remains free from injury/fall.  IVFs infusing as ordered, waffle mattress in place, greenfield drainage pink tinged urine, telemetry intact and BG being monitored. Patient resting quietly this shift with no complaints voiced.  Will continue to monitor, administer meds as ordered, provide comfort/safety measures and notify MD of any changes in condition.

## 2020-11-05 NOTE — PLAN OF CARE
11/05/20 1552   Final Note   Assessment Type Final Discharge Note   Anticipated Discharge Disposition Home   Right Care Referral Info   Post Acute Recommendation No Care   Post-Acute Status   Home Health Status Referrals Sent       Vito Chavarria LMSW 11/5/2020 3:53 PM

## 2020-11-05 NOTE — PLAN OF CARE
11/05/20 1057   Post-Acute Status   Post-Acute Authorization Home Health   Home Health Status Referrals Sent   Discharge Plan   Discharge Plan A Home with family;Home Health   Discharge Plan B Home with family;Home Health       Vito Chavarria LMSW 11/5/2020 10:57 AM

## 2020-11-05 NOTE — SUBJECTIVE & OBJECTIVE
Interval History: Pt was seen and examined. Labs and meds reviewed. Discussed with other providers.  Met with pt's sister and also spoke with another sister on the phone. Has no new c/o's, no SB, no further diarrhea (pt told me yesterday he had had large diarrhea 4 times a day x 4 days, which stopped 2 days prior to admit)    Review of patient's allergies indicates:   Allergen Reactions    Benadryl [diphenhydramine hcl] Other (See Comments)     Pt states benadryl makes him feel numb    Ace inhibitors      YIN     Current Facility-Administered Medications   Medication Frequency    0.9%  NaCl infusion Continuous    ARIPiprazole tablet 30 mg QHS    atorvastatin tablet 80 mg Daily    dextrose 50% injection 12.5 g PRN    glucagon (human recombinant) injection 1 mg PRN    hydrALAZINE tablet 100 mg Q8H    insulin aspart U-100 pen 1-10 Units Q6H PRN    insulin detemir U-100 pen 15 Units QHS    metoprolol injection 5 mg Q6H PRN    metoprolol tartrate (LOPRESSOR) split tablet 12.5 mg BID    multivitamin tablet Daily    NIFEdipine 24 hr tablet 60 mg Daily    sertraline tablet 100 mg Daily    sevelamer carbonate tablet 800 mg TID WM    tamsulosin 24 hr capsule 0.4 mg Daily     Facility-Administered Medications Ordered in Other Encounters   Medication Frequency    diphenhydrAMINE injection 25 mg Q6H PRN    metoclopramide HCl injection 10 mg Q10 Min PRN       Objective:     Vital Signs (Most Recent):  Temp: 98.5 °F (36.9 °C) (11/05/20 0706)  Pulse: 70 (11/05/20 0706)  Resp: 16 (11/05/20 0706)  BP: (!) 151/67 (11/05/20 0706)  SpO2: 96 % (11/05/20 0706)  O2 Device (Oxygen Therapy): nasal cannula (11/05/20 0706) Vital Signs (24h Range):  Temp:  [97.6 °F (36.4 °C)-98.6 °F (37 °C)] 98.5 °F (36.9 °C)  Pulse:  [62-70] 70  Resp:  [16-18] 16  SpO2:  [92 %-99 %] 96 %  BP: (138-165)/(66-78) 151/67     Weight: 94.1 kg (207 lb 7.3 oz) (11/05/20 0600)  Body mass index is 37.94 kg/m².  Body surface area is 2.03 meters  squared.    I/O last 3 completed shifts:  In: 1713.3 [P.O.:1070; I.V.:643.3]  Out: 2225 [Urine:2225]    Physical Exam  Vitals signs and nursing note reviewed.   Constitutional:       Appearance: Normal appearance.   HENT:      Head: Normocephalic and atraumatic.   Neck:      Musculoskeletal: Normal range of motion and neck supple.   Cardiovascular:      Rate and Rhythm: Normal rate and regular rhythm.      Pulses: Normal pulses.      Heart sounds: Normal heart sounds.   Pulmonary:      Effort: Pulmonary effort is normal.      Breath sounds: Normal breath sounds.   Abdominal:      General: Abdomen is flat.      Palpations: Abdomen is soft.   Musculoskeletal:      Right lower leg: No edema.      Left lower leg: No edema.   Neurological:      General: No focal deficit present.      Mental Status: He is alert and oriented to person, place, and time.   Psychiatric:         Mood and Affect: Mood normal.         Behavior: Behavior normal.         Significant Labs:  Reviewed  BMP  Lab Results   Component Value Date     11/05/2020    K 4.7 11/05/2020     (H) 11/05/2020    CO2 16 (L) 11/05/2020    BUN 92 (H) 11/05/2020    CREATININE 8.9 (H) 11/05/2020    CALCIUM 7.8 (L) 11/05/2020    ANIONGAP 13 11/05/2020    ESTGFRAFRICA 6 (A) 11/05/2020    EGFRNONAA 5 (A) 11/05/2020     Lab Results   Component Value Date    WBC 8.75 11/05/2020    HGB 8.3 (L) 11/05/2020    HCT 27.2 (L) 11/05/2020     (H) 11/05/2020     11/05/2020         Significant Imaging: reviewed

## 2020-11-05 NOTE — PLAN OF CARE
[facility] denied HH. Stated there is not provider to go to the area. Referral sent to [facility] and [facility].     Vito Chavarria LMSW 11/5/2020 12:58 PM

## 2020-11-05 NOTE — PROGRESS NOTES
"[facility]  Nephrology  Progress Note    Patient Name: Colt Stacy Jr.  MRN: [id number]  Admission Date: 11/2/2020  Hospital Length of Stay: 0 days  Attending Provider: Mo Borges MD   Primary Care Physician: Primary Doctor No  Principal Problem:Hematuria    Subjective:     HPI: Pt was seen and examined. H/o and chart reviewed. Pt is a 76 y/o male with YIN on CKD stage 4. 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 Pt experienced, difficult greenfield catheter placement at the time with "false passage", per urology. YIN has persisted since then. Pt also had diarrhea around 10/30/20. Pt was re-admitted for persistent gross hematuria and weakness.     Interval History: Pt was seen and examined. Labs and meds reviewed. Discussed with other providers.  Met with pt's sister and also spoke with another sister on the phone. Has no new c/o's, no SB, no further diarrhea (pt told me yesterday he had had large diarrhea 4 times a day x 4 days, which stopped 2 days prior to admit)    Review of patient's allergies indicates:   Allergen Reactions    Benadryl [diphenhydramine hcl] Other (See Comments)     Pt states benadryl makes him feel numb    Ace inhibitors      YIN     Current Facility-Administered Medications   Medication Frequency    0.9%  NaCl infusion Continuous    ARIPiprazole tablet 30 mg QHS    atorvastatin tablet 80 mg Daily    dextrose 50% injection 12.5 g PRN    glucagon (human recombinant) injection 1 mg PRN    hydrALAZINE tablet 100 mg Q8H    insulin aspart U-100 pen 1-10 Units Q6H PRN    insulin detemir U-100 pen 15 Units QHS    metoprolol injection 5 mg Q6H PRN    metoprolol tartrate (LOPRESSOR) split tablet 12.5 mg BID    multivitamin tablet Daily    NIFEdipine 24 hr tablet 60 mg Daily    " sertraline tablet 100 mg Daily    sevelamer carbonate tablet 800 mg TID WM    tamsulosin 24 hr capsule 0.4 mg Daily     Facility-Administered Medications Ordered in Other Encounters   Medication Frequency    diphenhydrAMINE injection 25 mg Q6H PRN    metoclopramide HCl injection 10 mg Q10 Min PRN       Objective:     Vital Signs (Most Recent):  Temp: 98.5 °F (36.9 °C) (11/05/20 0706)  Pulse: 70 (11/05/20 0706)  Resp: 16 (11/05/20 0706)  BP: (!) 151/67 (11/05/20 0706)  SpO2: 96 % (11/05/20 0706)  O2 Device (Oxygen Therapy): nasal cannula (11/05/20 0706) Vital Signs (24h Range):  Temp:  [97.6 °F (36.4 °C)-98.6 °F (37 °C)] 98.5 °F (36.9 °C)  Pulse:  [62-70] 70  Resp:  [16-18] 16  SpO2:  [92 %-99 %] 96 %  BP: (138-165)/(66-78) 151/67     Weight: 94.1 kg (207 lb 7.3 oz) (11/05/20 0600)  Body mass index is 37.94 kg/m².  Body surface area is 2.03 meters squared.    I/O last 3 completed shifts:  In: 1713.3 [P.O.:1070; I.V.:643.3]  Out: 2225 [Urine:2225]    Physical Exam  Vitals signs and nursing note reviewed.   Constitutional:       Appearance: Normal appearance.   HENT:      Head: Normocephalic and atraumatic.   Neck:      Musculoskeletal: Normal range of motion and neck supple.   Cardiovascular:      Rate and Rhythm: Normal rate and regular rhythm.      Pulses: Normal pulses.      Heart sounds: Normal heart sounds.   Pulmonary:      Effort: Pulmonary effort is normal.      Breath sounds: Normal breath sounds.   Abdominal:      General: Abdomen is flat.      Palpations: Abdomen is soft.   Musculoskeletal:      Right lower leg: No edema.      Left lower leg: No edema.   Neurological:      General: No focal deficit present.      Mental Status: He is alert and oriented to person, place, and time.   Psychiatric:         Mood and Affect: Mood normal.         Behavior: Behavior normal.         Significant Labs:  Reviewed  BMP  Lab Results   Component Value Date     11/05/2020    K 4.7 11/05/2020     (H)  11/05/2020    CO2 16 (L) 11/05/2020    BUN 92 (H) 11/05/2020    CREATININE 8.9 (H) 11/05/2020    CALCIUM 7.8 (L) 11/05/2020    ANIONGAP 13 11/05/2020    ESTGFRAFRICA 6 (A) 11/05/2020    EGFRNONAA 5 (A) 11/05/2020     Lab Results   Component Value Date    WBC 8.75 11/05/2020    HGB 8.3 (L) 11/05/2020    HCT 27.2 (L) 11/05/2020     (H) 11/05/2020     11/05/2020         Significant Imaging: reviewed    Assessment/Plan:     74 y/o male with YIN on CKD stage 4 has persistent gross hematuria:     Acute renal failure on CKD stage 4  s Cr stable, slowly improving  The possibly causes of YIN on CKD further considered  YIN occured immediately post surgery post L adrenelectomy on 10/20/20  YIN not related to hyperaldosteronism or the adrenelectomy, unless removing the left adrenal imposed hypoxia on the left kidney (share the same vascular drainage), and the contralateral kidney is the non-dominant kidney  Post obstructive nephropathy from retention usually improves robustly after resolution of the urinary retention, pt has well functioning greenfield catheter now   DDX diarrhea: had large diarrhea x 4 days immediately post admit     Has responded to IVF's, with daily improvement in s Cr     Pt has no acute indications for HD  Will need outpt f/u  OK to d/c home if pt can maintain PO intake and keep the foely catheter is  Will need urology f/u, arreola been arranged  Will need renal clinic f/u in late Nov or Dec, will arrange     HTN (hypertension)  Secondary HTN/resistent HTN  Prior w/u showed left adrenal gland adenoma  S/p left adrenelectomy  BP has improved     BP better controlled   Meds reviewed  Increased hydralazine from 75 to 100 mg po tid yesterday        Stage 4 chronic kidney disease  Stage 4 CKD at baseline.  Due to DM, HTN, unopposed hyperaldosteronism         Plans and recommendations:  As discussed above  Opportunity for questions provided, discussed with PCP        Regina Babin MD  Nephrology  [facility]

## 2020-11-05 NOTE — DISCHARGE SUMMARY
"[facility] Medicine  Discharge Summary      Patient Name: Colt Stacy Jr.  MRN: [id number]  Admission Date: 11/2/2020  Hospital Length of Stay: 0 days  Discharge Date and Time: No discharge date for patient encounter.  Attending Physician: Mo Borges MD   Discharging Provider: HETCOR Jose  Primary Care Provider: Primary Doctor No      HPI:   76 y/o AA M with hx of depression, CAD, HLD, HTN, DM2, stroke, retinintis, robotic L adrenalectomy, L adrenal adenoma, BPH, CKD5, vitiligo sent to ED per urology (Dr. Lala) for gross hematuria since 10/23/2020. On 10/22/2020 pt had a L adrenalectomy per Dr. Quiñones and following this had urinary retention - a greenfield catheter was placed which resulted in gross hematuria - pt was sent home with a greenfield to f/u with urology. Seen in clinic today with a view to remove the catheter but the hematuria has persisted - underwent cystoscopy in clinic and per chart review was found to have a "false passage" - greenfield was replaced and pt sent to ED. Symptoms have been persistent and without known exacerbating or mitigating factors. Denies chest pain, edema, palpitations, SOB, wheezing, orthopnea, abdominal pain, N/V/D, constipation, dysuria, flank pain, HA, dizziness, weakness, fever, cough, chills, falls/trauma, blurred vision, focal deficits. Found in ED to have elevated creatinine (9.9 - chronic), GFR of 5, BG 62, , troponin 0.044 - labs otherwise unremarkable, urine red, cxray with bilateral small pleural effusions (chronic); VSS. In ED, pt's greenfield catheter was irrigated x2 but hematuria persisted. Hospital medicine was called and pt was placed in observation on med surg. Pt is a full code - surrogate decision maker is his daughter, Cris Kruse.     * No surgery found *      Hospital Course:   Patient kept on OBS for hematuria under the care of hospital medicine. Urology, general surgery, and nephrology were consulted. 11/3: " urology saw patient and recommended watchful waiting with intermittent greenfield irrigation and monitor H/H. General surgery recommends continued use of greenfield catheter with Nephrology to follow CKD. Discussed with Dr. Babin, who strongly recommends continue gentle IVF's and strict I&O - no diuretics at this time. Will continue to monitor. 11/4: urine has cleared up - discussed with urology who plans on pt dc home with cath and f/u OP with Dr. Lala in 1-2 weeks. Nephrology following - recommending dialysis since YIN is not seeming to improve (origional insult on 10/20). Voice mail left for daughter. 11/5: Had family meeting with patient, sister, and nephrology who all agree for pt to dc home with home health, greenfield cath in place, he will increase PO fluid intake and HH nurse will check renal function panel weekly and report to Dr. Babin. Pt will f/u OP with Dr. Babin in 1 month. He will continue with indwelling greenfield and f/u with Dr. Lala in 2 weeks. Patient was able to verbalize understanding of all. Patient was seen and examined today and deemed stable to dc home.     Consults:   Consults (From admission, onward)        Status Ordering Provider     Inpatient consult to General Surgery  Once     Provider:  (Not yet assigned)    Completed ALEJANDRA MIX IV     Inpatient consult to Nephrology  Once     Provider:  (Not yet assigned)    Acknowledged DICK GROVER     Inpatient consult to Nephrology  Once     Provider:  (Not yet assigned)    Acknowledged ALEJANDRA MIX IV     Inpatient consult to Urology  Once     Provider:  Juni Lala MD    Completed DICK GROVER     IP consult to case management  Once     Provider:  (Not yet assigned)    Completed KI RAMON new Assessment & Plan notes have been filed under this hospital service since the last note was generated.  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 [D35.02, E26.09]  Yes    BPH (benign prostatic hyperplasia) [N40.0] 11/02/2020 Yes    Acute renal failure [N17.9] 10/25/2020 Yes    CKD (chronic kidney disease) stage 5, GFR less than 15 ml/min [N18.5] 12/18/2019 Yes    Type 2 diabetes mellitus [E11.9] 12/11/2019 Yes    HTN (hypertension) [I10] 12/11/2019 Yes      Problems Resolved During this Admission:    Diagnosis Date Noted Date Resolved POA    PRINCIPAL PROBLEM:  Hematuria [R31.9] 11/02/2020 11/05/2020 Yes    Elevated troponin [R77.8] 11/02/2020 11/05/2020 Yes       Discharged Condition: stable    Disposition: Home-Health Care [facility]    Follow Up:  Follow-up Information     Regina Babin MD In 1 month.    Specialty: Nephrology  Why: hospital follow up  Contact information:  [id number]  [place] [address]  [phone]             Juni Lala MD In 2 weeks.    Specialty: Urology  Why: hospital follow up  Contact information:  [address]  [place] [id number] [phone]                 Patient Instructions:      Ambulatory referral/consult to Home Health   Standing Status: Future   Referral Priority: Routine Referral Type: Home Health   Referral Reason: Specialty Services Required   Requested Specialty: Home Health Services   Number of Visits Requested: 1       Significant Diagnostic Studies: Labs:   BMP:   Recent Labs   Lab 11/04/20  0706 11/05/20  0717    124*    143   K 4.7 4.7   * 114*   CO2 18* 16*   * 92*   CREATININE 9.4* 8.9*   CALCIUM 7.9* 7.8*   MG 2.2 2.2   , CMP   Recent Labs   Lab 11/04/20  0706 11/05/20  0717    143   K 4.7 4.7   * 114*   CO2 18* 16*    124*   * 92*   CREATININE 9.4* 8.9*   CALCIUM 7.9* 7.8*   ANIONGAP 12 13   ESTGFRAFRICA 6* 6*   EGFRNONAA 5* 5*   , CBC   Recent  Labs   Lab 11/03/20  1643 11/04/20  0706 11/05/20  0717   WBC  --  8.29 8.75   HGB 8.1* 8.3* 8.3*   HCT 25.3* 26.3* 27.2*   PLT  --  262 292    and All labs within the past 24 hours have been reviewed    Pending Diagnostic Studies:     None         Medications:  Reconciled Home Medications:      Medication List      CONTINUE taking these medications    ARIPiprazole 30 MG Tab  Commonly known as: ABILIFY  Take 30 mg by mouth every evening.     ASPIR-81 ORAL  Take 81 mg by mouth every evening.     atorvastatin 80 MG tablet  Commonly known as: LIPITOR  Take 1 tablet (80 mg total) by mouth once daily.     diphenoxylate-atropine 2.5-0.025 mg 2.5-0.025 mg per tablet  Commonly known as: LOMOTIL  Take 1 tablet by mouth 4 (four) times daily as needed for Diarrhea.     hydrALAZINE 25 MG tablet  Commonly known as: APRESOLINE  Take 3 tablets (75 mg total) by mouth every 8 (eight) hours.     HYDROcodone-acetaminophen 5-325 mg per tablet  Commonly known as: NORCO  Take 1 tablet by mouth every 8 (eight) hours as needed.     insulin aspart U-100 100 unit/mL injection  Commonly known as: NOVOLOG  Inject into the skin 2 (two) times a day. 4 units q a.m, 7 units at dinner     insulin detemir U-100 100 unit/mL injection  Commonly known as: LEVEMIR  Inject 31 Units into the skin every evening.     Lactobacillus acidoph-L.bulgar 1 million cell Chew  Take 4 tablets by mouth 3 (three) times daily with meals.     metoprolol tartrate 25 MG tablet  Commonly known as: LOPRESSOR  Take 0.5 tablets (12.5 mg total) by mouth 2 (two) times daily.     multivitamin capsule  Take 1 capsule by mouth once daily.     NIFEdipine 60 MG (OSM) 24 hr tablet  Commonly known as: PROCARDIA-XL  Take 1 tablet (60 mg total) by mouth once daily.     sertraline 100 MG tablet  Commonly known as: ZOLOFT  Take 100 mg by mouth once daily.     sevelamer carbonate 800 mg Tab  Commonly known as: RENVELA  Take 1 tablet (800 mg total) by mouth 3 (three) times daily with  meals.     tamsulosin 0.4 mg Cap  Commonly known as: FLOMAX  Take 1 capsule (0.4 mg total) by mouth once daily.            Indwelling Lines/Drains at time of discharge:   Lines/Drains/Airways     Drain                 Urethral Catheter 11/02/20 Double-lumen 22 Fr. 3 days                Time spent on the discharge of patient: 90 minutes  Patient was seen and examined on the date of discharge and determined to be suitable for discharge.         NEHA Jose-STEPHANIE  Department of Hospital Medicine  [facility]

## 2020-11-07 ENCOUNTER — HOSPITAL ENCOUNTER (INPATIENT)
Facility: HOSPITAL | Age: 75
LOS: 4 days | Discharge: HOME | DRG: 189 | End: 2020-11-13
Attending: EMERGENCY MEDICINE | Admitting: INTERNAL MEDICINE
Payer: MEDICARE

## 2020-11-07 DIAGNOSIS — I1A.0 RESISTANT HYPERTENSION: ICD-10-CM

## 2020-11-07 DIAGNOSIS — E26.9 HYPERALDOSTERONISM: ICD-10-CM

## 2020-11-07 DIAGNOSIS — Z99.2 CKD (CHRONIC KIDNEY DISEASE) REQUIRING CHRONIC DIALYSIS: ICD-10-CM

## 2020-11-07 DIAGNOSIS — R60.1 ANASARCA: ICD-10-CM

## 2020-11-07 DIAGNOSIS — N18.6 ESRD (END STAGE RENAL DISEASE): ICD-10-CM

## 2020-11-07 DIAGNOSIS — R06.02 SHORTNESS OF BREATH: ICD-10-CM

## 2020-11-07 DIAGNOSIS — R33.9 URINARY RETENTION: ICD-10-CM

## 2020-11-07 DIAGNOSIS — J81.0 ACUTE PULMONARY EDEMA: Primary | ICD-10-CM

## 2020-11-07 DIAGNOSIS — D64.9 ACUTE ON CHRONIC ANEMIA: ICD-10-CM

## 2020-11-07 DIAGNOSIS — N18.6 CKD (CHRONIC KIDNEY DISEASE) REQUIRING CHRONIC DIALYSIS: ICD-10-CM

## 2020-11-07 DIAGNOSIS — N17.9 AKI (ACUTE KIDNEY INJURY): ICD-10-CM

## 2020-11-07 DIAGNOSIS — N18.5 CKD (CHRONIC KIDNEY DISEASE), SYMPTOM MANAGEMENT ONLY, STAGE 5: ICD-10-CM

## 2020-11-07 DIAGNOSIS — Z97.8 CHRONIC INDWELLING FOLEY CATHETER: ICD-10-CM

## 2020-11-07 DIAGNOSIS — R31.0 GROSS HEMATURIA: ICD-10-CM

## 2020-11-07 DIAGNOSIS — Z86.39 HISTORY OF HYPERALDOSTERONISM: ICD-10-CM

## 2020-11-07 LAB
ALBUMIN SERPL BCP-MCNC: 2.4 G/DL (ref 3.5–5.2)
ALP SERPL-CCNC: 44 U/L (ref 55–135)
ALT SERPL W/O P-5'-P-CCNC: 12 U/L (ref 10–44)
ANION GAP SERPL CALC-SCNC: 10 MMOL/L (ref 8–16)
AST SERPL-CCNC: 19 U/L (ref 10–40)
BASOPHILS # BLD AUTO: 0.06 K/UL (ref 0–0.2)
BASOPHILS NFR BLD: 0.6 % (ref 0–1.9)
BILIRUB SERPL-MCNC: 0.3 MG/DL (ref 0.1–1)
BNP SERPL-MCNC: 595 PG/ML (ref 0–99)
BUN SERPL-MCNC: 83 MG/DL (ref 8–23)
CALCIUM SERPL-MCNC: 8.1 MG/DL (ref 8.7–10.5)
CHLORIDE SERPL-SCNC: 114 MMOL/L (ref 95–110)
CO2 SERPL-SCNC: 20 MMOL/L (ref 23–29)
CREAT SERPL-MCNC: 9.3 MG/DL (ref 0.5–1.4)
DIFFERENTIAL METHOD: ABNORMAL
EOSINOPHIL # BLD AUTO: 0.1 K/UL (ref 0–0.5)
EOSINOPHIL NFR BLD: 1.4 % (ref 0–8)
ERYTHROCYTE [DISTWIDTH] IN BLOOD BY AUTOMATED COUNT: 12.9 % (ref 11.5–14.5)
EST. GFR  (AFRICAN AMERICAN): 6 ML/MIN/1.73 M^2
EST. GFR  (NON AFRICAN AMERICAN): 5 ML/MIN/1.73 M^2
GLUCOSE SERPL-MCNC: 101 MG/DL (ref 70–110)
HCT VFR BLD AUTO: 23.4 % (ref 40–54)
HGB BLD-MCNC: 7.3 G/DL (ref 14–18)
IMM GRANULOCYTES # BLD AUTO: 0.11 K/UL (ref 0–0.04)
IMM GRANULOCYTES NFR BLD AUTO: 1.1 % (ref 0–0.5)
LYMPHOCYTES # BLD AUTO: 0.9 K/UL (ref 1–4.8)
LYMPHOCYTES NFR BLD: 8.9 % (ref 18–48)
MCH RBC QN AUTO: 30.5 PG (ref 27–31)
MCHC RBC AUTO-ENTMCNC: 31.2 G/DL (ref 32–36)
MCV RBC AUTO: 98 FL (ref 82–98)
MONOCYTES # BLD AUTO: 0.9 K/UL (ref 0.3–1)
MONOCYTES NFR BLD: 9 % (ref 4–15)
NEUTROPHILS # BLD AUTO: 8 K/UL (ref 1.8–7.7)
NEUTROPHILS NFR BLD: 79 % (ref 38–73)
NRBC BLD-RTO: 0 /100 WBC
PLATELET # BLD AUTO: 280 K/UL (ref 150–350)
PMV BLD AUTO: 10.9 FL (ref 9.2–12.9)
POTASSIUM SERPL-SCNC: 4.7 MMOL/L (ref 3.5–5.1)
PROT SERPL-MCNC: 6.1 G/DL (ref 6–8.4)
RBC # BLD AUTO: 2.39 M/UL (ref 4.6–6.2)
SARS-COV-2 RDRP RESP QL NAA+PROBE: NEGATIVE
SODIUM SERPL-SCNC: 144 MMOL/L (ref 136–145)
TROPONIN I SERPL DL<=0.01 NG/ML-MCNC: 0.09 NG/ML (ref 0–0.03)
WBC # BLD AUTO: 10.12 K/UL (ref 3.9–12.7)

## 2020-11-07 PROCEDURE — U0002 COVID-19 LAB TEST NON-CDC: HCPCS

## 2020-11-07 PROCEDURE — 63600175 PHARM REV CODE 636 W HCPCS: Performed by: EMERGENCY MEDICINE

## 2020-11-07 PROCEDURE — 86901 BLOOD TYPING SEROLOGIC RH(D): CPT

## 2020-11-07 PROCEDURE — 85025 COMPLETE CBC W/AUTO DIFF WBC: CPT

## 2020-11-07 PROCEDURE — 83880 ASSAY OF NATRIURETIC PEPTIDE: CPT

## 2020-11-07 PROCEDURE — 99285 EMERGENCY DEPT VISIT HI MDM: CPT | Mod: 25

## 2020-11-07 PROCEDURE — 93010 EKG 12-LEAD: ICD-10-PCS | Mod: ,,, | Performed by: INTERNAL MEDICINE

## 2020-11-07 PROCEDURE — 96374 THER/PROPH/DIAG INJ IV PUSH: CPT

## 2020-11-07 PROCEDURE — 93005 ELECTROCARDIOGRAM TRACING: CPT

## 2020-11-07 PROCEDURE — 84484 ASSAY OF TROPONIN QUANT: CPT

## 2020-11-07 PROCEDURE — 86920 COMPATIBILITY TEST SPIN: CPT

## 2020-11-07 PROCEDURE — 80053 COMPREHEN METABOLIC PANEL: CPT

## 2020-11-07 PROCEDURE — 93010 ELECTROCARDIOGRAM REPORT: CPT | Mod: ,,, | Performed by: INTERNAL MEDICINE

## 2020-11-07 RX ORDER — FUROSEMIDE 10 MG/ML
100 INJECTION INTRAMUSCULAR; INTRAVENOUS
Status: COMPLETED | OUTPATIENT
Start: 2020-11-07 | End: 2020-11-07

## 2020-11-07 RX ADMIN — FUROSEMIDE 100 MG: 10 INJECTION, SOLUTION INTRAMUSCULAR; INTRAVENOUS at 11:11

## 2020-11-07 NOTE — Clinical Note
Access obtained to R IJ with perc needle. Kit wire inserted down to IVC. Kit dilator used. Sheath inserted. Tunneling performed. Vas cath inserted. Sheath removed. Fluoroscopy and ultrasond performed for placement verification. Saline followed by Heparin 2000 units per port.

## 2020-11-08 VITALS
OXYGEN SATURATION: 100 % | HEART RATE: 59 BPM | SYSTOLIC BLOOD PRESSURE: 149 MMHG | WEIGHT: 207.44 LBS | BODY MASS INDEX: 38.17 KG/M2 | DIASTOLIC BLOOD PRESSURE: 74 MMHG | HEIGHT: 62 IN | TEMPERATURE: 100 F | RESPIRATION RATE: 16 BRPM

## 2020-11-08 PROBLEM — J81.0 ACUTE PULMONARY EDEMA: Status: ACTIVE | Noted: 2020-11-08

## 2020-11-08 PROBLEM — E11.9 TYPE 2 DIABETES MELLITUS: Chronic | Status: ACTIVE | Noted: 2019-12-11

## 2020-11-08 PROBLEM — E87.8 DISORDER OF ELECTROLYTES: Status: ACTIVE | Noted: 2020-11-08

## 2020-11-08 PROBLEM — Z79.4 TYPE 2 DIABETES MELLITUS, WITH LONG-TERM CURRENT USE OF INSULIN: Chronic | Status: ACTIVE | Noted: 2019-12-11

## 2020-11-08 PROBLEM — D63.1 ANEMIA DUE TO CHRONIC KIDNEY DISEASE: Chronic | Status: ACTIVE | Noted: 2020-11-08

## 2020-11-08 PROBLEM — I10 ESSENTIAL HYPERTENSION: Chronic | Status: ACTIVE | Noted: 2019-12-11

## 2020-11-08 PROBLEM — N18.9 ANEMIA DUE TO CHRONIC KIDNEY DISEASE: Chronic | Status: ACTIVE | Noted: 2020-11-08

## 2020-11-08 LAB
ABO + RH BLD: NORMAL
ALBUMIN SERPL BCP-MCNC: 2.2 G/DL (ref 3.5–5.2)
ANION GAP SERPL CALC-SCNC: 13 MMOL/L (ref 8–16)
BASOPHILS # BLD AUTO: 0.05 K/UL (ref 0–0.2)
BASOPHILS NFR BLD: 0.6 % (ref 0–1.9)
BLD GP AB SCN CELLS X3 SERPL QL: NORMAL
BLD PROD TYP BPU: NORMAL
BLOOD UNIT EXPIRATION DATE: NORMAL
BLOOD UNIT TYPE CODE: 5100
BLOOD UNIT TYPE: NORMAL
BUN SERPL-MCNC: 87 MG/DL (ref 8–23)
CALCIUM SERPL-MCNC: 8.3 MG/DL (ref 8.7–10.5)
CHLORIDE SERPL-SCNC: 112 MMOL/L (ref 95–110)
CO2 SERPL-SCNC: 19 MMOL/L (ref 23–29)
CODING SYSTEM: NORMAL
CREAT SERPL-MCNC: 9 MG/DL (ref 0.5–1.4)
DIFFERENTIAL METHOD: ABNORMAL
DISPENSE STATUS: NORMAL
EOSINOPHIL # BLD AUTO: 0.1 K/UL (ref 0–0.5)
EOSINOPHIL NFR BLD: 1 % (ref 0–8)
ERYTHROCYTE [DISTWIDTH] IN BLOOD BY AUTOMATED COUNT: 13.1 % (ref 11.5–14.5)
EST. GFR  (AFRICAN AMERICAN): 6 ML/MIN/1.73 M^2
EST. GFR  (NON AFRICAN AMERICAN): 5 ML/MIN/1.73 M^2
FERRITIN SERPL-MCNC: 242 NG/ML (ref 20–300)
GLUCOSE SERPL-MCNC: 112 MG/DL (ref 70–110)
HCT VFR BLD AUTO: 22.9 % (ref 40–54)
HGB BLD-MCNC: 6.9 G/DL (ref 14–18)
IMM GRANULOCYTES # BLD AUTO: 0.11 K/UL (ref 0–0.04)
IMM GRANULOCYTES NFR BLD AUTO: 1.4 % (ref 0–0.5)
IRON SERPL-MCNC: 21 UG/DL (ref 45–160)
LYMPHOCYTES # BLD AUTO: 0.6 K/UL (ref 1–4.8)
LYMPHOCYTES NFR BLD: 8.1 % (ref 18–48)
MAGNESIUM SERPL-MCNC: 2.1 MG/DL (ref 1.6–2.6)
MCH RBC QN AUTO: 29.9 PG (ref 27–31)
MCHC RBC AUTO-ENTMCNC: 30.1 G/DL (ref 32–36)
MCV RBC AUTO: 99 FL (ref 82–98)
MONOCYTES # BLD AUTO: 0.6 K/UL (ref 0.3–1)
MONOCYTES NFR BLD: 7.6 % (ref 4–15)
NEUTROPHILS # BLD AUTO: 6.3 K/UL (ref 1.8–7.7)
NEUTROPHILS NFR BLD: 81.3 % (ref 38–73)
NRBC BLD-RTO: 0 /100 WBC
NUM UNITS TRANS PACKED RBC: NORMAL
PHOSPHATE SERPL-MCNC: 5.1 MG/DL (ref 2.7–4.5)
PHOSPHATE SERPL-MCNC: 5.1 MG/DL (ref 2.7–4.5)
PLATELET # BLD AUTO: 254 K/UL (ref 150–350)
PMV BLD AUTO: 10.8 FL (ref 9.2–12.9)
POCT GLUCOSE: 111 MG/DL (ref 70–110)
POCT GLUCOSE: 113 MG/DL (ref 70–110)
POCT GLUCOSE: 136 MG/DL (ref 70–110)
POCT GLUCOSE: 93 MG/DL (ref 70–110)
POCT GLUCOSE: 95 MG/DL (ref 70–110)
POTASSIUM SERPL-SCNC: 4.3 MMOL/L (ref 3.5–5.1)
RBC # BLD AUTO: 2.31 M/UL (ref 4.6–6.2)
SATURATED IRON: 10 % (ref 20–50)
SODIUM SERPL-SCNC: 144 MMOL/L (ref 136–145)
TOTAL IRON BINDING CAPACITY: 212 UG/DL (ref 250–450)
TRANSFERRIN SERPL-MCNC: 143 MG/DL (ref 200–375)
WBC # BLD AUTO: 7.75 K/UL (ref 3.9–12.7)

## 2020-11-08 PROCEDURE — C1750 CATH, HEMODIALYSIS,LONG-TERM: HCPCS | Performed by: SURGERY

## 2020-11-08 PROCEDURE — 63600175 PHARM REV CODE 636 W HCPCS: Performed by: NURSE PRACTITIONER

## 2020-11-08 PROCEDURE — 94761 N-INVAS EAR/PLS OXIMETRY MLT: CPT

## 2020-11-08 PROCEDURE — 36430 TRANSFUSION BLD/BLD COMPNT: CPT

## 2020-11-08 PROCEDURE — 25000003 PHARM REV CODE 250: Performed by: NURSE PRACTITIONER

## 2020-11-08 PROCEDURE — 99215 OFFICE O/P EST HI 40 MIN: CPT | Mod: ,,, | Performed by: INTERNAL MEDICINE

## 2020-11-08 PROCEDURE — 85025 COMPLETE CBC W/AUTO DIFF WBC: CPT

## 2020-11-08 PROCEDURE — 25000003 PHARM REV CODE 250: Performed by: INTERNAL MEDICINE

## 2020-11-08 PROCEDURE — 82728 ASSAY OF FERRITIN: CPT

## 2020-11-08 PROCEDURE — 83540 ASSAY OF IRON: CPT

## 2020-11-08 PROCEDURE — 25000003 PHARM REV CODE 250: Performed by: SURGERY

## 2020-11-08 PROCEDURE — 27000221 HC OXYGEN, UP TO 24 HOURS

## 2020-11-08 PROCEDURE — 96376 TX/PRO/DX INJ SAME DRUG ADON: CPT

## 2020-11-08 PROCEDURE — 83735 ASSAY OF MAGNESIUM: CPT

## 2020-11-08 PROCEDURE — 86704 HEP B CORE ANTIBODY TOTAL: CPT

## 2020-11-08 PROCEDURE — 90935 HEMODIALYSIS ONE EVALUATION: CPT

## 2020-11-08 PROCEDURE — 36415 COLL VENOUS BLD VENIPUNCTURE: CPT

## 2020-11-08 PROCEDURE — G0378 HOSPITAL OBSERVATION PER HR: HCPCS

## 2020-11-08 PROCEDURE — 80069 RENAL FUNCTION PANEL: CPT

## 2020-11-08 PROCEDURE — 80074 ACUTE HEPATITIS PANEL: CPT

## 2020-11-08 PROCEDURE — 96372 THER/PROPH/DIAG INJ SC/IM: CPT

## 2020-11-08 PROCEDURE — P9016 RBC LEUKOCYTES REDUCED: HCPCS

## 2020-11-08 PROCEDURE — 36561 INSERT TUNNELED CV CATH: CPT | Performed by: SURGERY

## 2020-11-08 PROCEDURE — 99215 PR OFFICE/OUTPT VISIT, EST, LEVL V, 40-54 MIN: ICD-10-PCS | Mod: ,,, | Performed by: INTERNAL MEDICINE

## 2020-11-08 PROCEDURE — C9399 UNCLASSIFIED DRUGS OR BIOLOG: HCPCS | Performed by: NURSE PRACTITIONER

## 2020-11-08 PROCEDURE — 99900035 HC TECH TIME PER 15 MIN (STAT)

## 2020-11-08 DEVICE — GLIDEPATH HEMODIALYSIS CATH, ST, DL 14.5 FR. 19CM
Type: IMPLANTABLE DEVICE | Site: NECK | Status: FUNCTIONAL
Brand: GLIDEPATH LONG-TERM HEMODIALYSIS CATHETER WITH PRELOADED STYLET

## 2020-11-08 RX ORDER — FUROSEMIDE 10 MG/ML
60 INJECTION INTRAMUSCULAR; INTRAVENOUS
Status: DISCONTINUED | OUTPATIENT
Start: 2020-11-08 | End: 2020-11-09

## 2020-11-08 RX ORDER — ACETAMINOPHEN 325 MG/1
650 TABLET ORAL EVERY 6 HOURS PRN
Status: DISCONTINUED | OUTPATIENT
Start: 2020-11-08 | End: 2020-11-13 | Stop reason: HOSPADM

## 2020-11-08 RX ORDER — HYDROCODONE BITARTRATE AND ACETAMINOPHEN 500; 5 MG/1; MG/1
TABLET ORAL
Status: DISCONTINUED | OUTPATIENT
Start: 2020-11-08 | End: 2020-11-13 | Stop reason: HOSPADM

## 2020-11-08 RX ORDER — ASPIRIN 81 MG/1
81 TABLET ORAL NIGHTLY
Status: DISCONTINUED | OUTPATIENT
Start: 2020-11-08 | End: 2020-11-13 | Stop reason: HOSPADM

## 2020-11-08 RX ORDER — ONDANSETRON 2 MG/ML
4 INJECTION INTRAMUSCULAR; INTRAVENOUS EVERY 8 HOURS PRN
Status: DISCONTINUED | OUTPATIENT
Start: 2020-11-08 | End: 2020-11-13 | Stop reason: HOSPADM

## 2020-11-08 RX ORDER — SODIUM CHLORIDE 9 MG/ML
INJECTION, SOLUTION INTRAVENOUS
Status: DISCONTINUED | OUTPATIENT
Start: 2020-11-08 | End: 2020-11-13 | Stop reason: HOSPADM

## 2020-11-08 RX ORDER — CLONIDINE HYDROCHLORIDE 0.2 MG/1
0.2 TABLET ORAL EVERY 6 HOURS PRN
Status: DISCONTINUED | OUTPATIENT
Start: 2020-11-08 | End: 2020-11-13 | Stop reason: HOSPADM

## 2020-11-08 RX ORDER — LIDOCAINE HYDROCHLORIDE 20 MG/ML
INJECTION, SOLUTION EPIDURAL; INFILTRATION; INTRACAUDAL; PERINEURAL
Status: DISCONTINUED | OUTPATIENT
Start: 2020-11-08 | End: 2020-11-08 | Stop reason: HOSPADM

## 2020-11-08 RX ORDER — TAMSULOSIN HYDROCHLORIDE 0.4 MG/1
0.4 CAPSULE ORAL DAILY
Status: DISCONTINUED | OUTPATIENT
Start: 2020-11-08 | End: 2020-11-13 | Stop reason: HOSPADM

## 2020-11-08 RX ORDER — ARIPIPRAZOLE 5 MG/1
30 TABLET ORAL NIGHTLY
Status: DISCONTINUED | OUTPATIENT
Start: 2020-11-08 | End: 2020-11-13 | Stop reason: HOSPADM

## 2020-11-08 RX ORDER — ATORVASTATIN CALCIUM 40 MG/1
80 TABLET, FILM COATED ORAL DAILY
Status: DISCONTINUED | OUTPATIENT
Start: 2020-11-08 | End: 2020-11-13 | Stop reason: HOSPADM

## 2020-11-08 RX ORDER — MUPIROCIN 20 MG/G
OINTMENT TOPICAL 2 TIMES DAILY
Status: DISPENSED | OUTPATIENT
Start: 2020-11-08 | End: 2020-11-13

## 2020-11-08 RX ORDER — IBUPROFEN 200 MG
24 TABLET ORAL
Status: DISCONTINUED | OUTPATIENT
Start: 2020-11-08 | End: 2020-11-13 | Stop reason: HOSPADM

## 2020-11-08 RX ORDER — NIFEDIPINE 30 MG/1
60 TABLET, EXTENDED RELEASE ORAL DAILY
Status: DISCONTINUED | OUTPATIENT
Start: 2020-11-08 | End: 2020-11-13 | Stop reason: HOSPADM

## 2020-11-08 RX ORDER — INSULIN ASPART 100 [IU]/ML
0-5 INJECTION, SOLUTION INTRAVENOUS; SUBCUTANEOUS
Status: DISCONTINUED | OUTPATIENT
Start: 2020-11-08 | End: 2020-11-13 | Stop reason: HOSPADM

## 2020-11-08 RX ORDER — SODIUM CHLORIDE 0.9 % (FLUSH) 0.9 %
10 SYRINGE (ML) INJECTION
Status: DISCONTINUED | OUTPATIENT
Start: 2020-11-08 | End: 2020-11-13 | Stop reason: HOSPADM

## 2020-11-08 RX ORDER — SEVELAMER CARBONATE 800 MG/1
800 TABLET, FILM COATED ORAL
Status: DISCONTINUED | OUTPATIENT
Start: 2020-11-08 | End: 2020-11-13 | Stop reason: HOSPADM

## 2020-11-08 RX ORDER — METOPROLOL TARTRATE 25 MG/1
12.5 TABLET ORAL 2 TIMES DAILY
Status: DISCONTINUED | OUTPATIENT
Start: 2020-11-08 | End: 2020-11-08

## 2020-11-08 RX ORDER — SERTRALINE HYDROCHLORIDE 50 MG/1
100 TABLET, FILM COATED ORAL DAILY
Status: DISCONTINUED | OUTPATIENT
Start: 2020-11-08 | End: 2020-11-13 | Stop reason: HOSPADM

## 2020-11-08 RX ORDER — METOPROLOL TARTRATE 25 MG/1
12.5 TABLET ORAL 2 TIMES DAILY
Status: DISCONTINUED | OUTPATIENT
Start: 2020-11-08 | End: 2020-11-13 | Stop reason: HOSPADM

## 2020-11-08 RX ORDER — IBUPROFEN 200 MG
16 TABLET ORAL
Status: DISCONTINUED | OUTPATIENT
Start: 2020-11-08 | End: 2020-11-13 | Stop reason: HOSPADM

## 2020-11-08 RX ORDER — SODIUM CHLORIDE 9 MG/ML
INJECTION, SOLUTION INTRAVENOUS ONCE
Status: DISCONTINUED | OUTPATIENT
Start: 2020-11-08 | End: 2020-11-13 | Stop reason: HOSPADM

## 2020-11-08 RX ADMIN — FUROSEMIDE 60 MG: 10 INJECTION, SOLUTION INTRAMUSCULAR; INTRAVENOUS at 07:11

## 2020-11-08 RX ADMIN — ASPIRIN 81 MG: 81 TABLET, COATED ORAL at 02:11

## 2020-11-08 RX ADMIN — INSULIN DETEMIR 15 UNITS: 100 INJECTION, SOLUTION SUBCUTANEOUS at 08:11

## 2020-11-08 RX ADMIN — ATORVASTATIN CALCIUM 80 MG: 40 TABLET, FILM COATED ORAL at 07:11

## 2020-11-08 RX ADMIN — METOPROLOL TARTRATE 12.5 MG: 25 TABLET, FILM COATED ORAL at 09:11

## 2020-11-08 RX ADMIN — SEVELAMER CARBONATE 800 MG: 800 TABLET, FILM COATED ORAL at 11:11

## 2020-11-08 RX ADMIN — NIFEDIPINE 60 MG: 30 TABLET, FILM COATED, EXTENDED RELEASE ORAL at 07:11

## 2020-11-08 RX ADMIN — MUPIROCIN: 20 OINTMENT TOPICAL at 08:11

## 2020-11-08 RX ADMIN — TAMSULOSIN HYDROCHLORIDE 0.4 MG: 0.4 CAPSULE ORAL at 07:11

## 2020-11-08 RX ADMIN — ARIPIPRAZOLE 30 MG: 5 TABLET ORAL at 09:11

## 2020-11-08 RX ADMIN — METOPROLOL TARTRATE 12.5 MG: 25 TABLET, FILM COATED ORAL at 07:11

## 2020-11-08 RX ADMIN — ASPIRIN 81 MG: 81 TABLET, COATED ORAL at 09:11

## 2020-11-08 RX ADMIN — HYDRALAZINE HYDROCHLORIDE 75 MG: 25 TABLET, FILM COATED ORAL at 09:11

## 2020-11-08 RX ADMIN — SERTRALINE HYDROCHLORIDE 100 MG: 50 TABLET ORAL at 07:11

## 2020-11-08 RX ADMIN — HYDRALAZINE HYDROCHLORIDE 75 MG: 25 TABLET, FILM COATED ORAL at 02:11

## 2020-11-08 RX ADMIN — LACTOBACILLUS TAB 4 TABLET: TAB at 05:11

## 2020-11-08 RX ADMIN — LACTOBACILLUS TAB 4 TABLET: TAB at 07:11

## 2020-11-08 RX ADMIN — LACTOBACILLUS TAB 4 TABLET: TAB at 11:11

## 2020-11-08 RX ADMIN — SEVELAMER CARBONATE 800 MG: 800 TABLET, FILM COATED ORAL at 07:11

## 2020-11-08 RX ADMIN — SEVELAMER CARBONATE 800 MG: 800 TABLET, FILM COATED ORAL at 05:11

## 2020-11-08 RX ADMIN — FUROSEMIDE 60 MG: 10 INJECTION, SOLUTION INTRAMUSCULAR; INTRAVENOUS at 08:11

## 2020-11-08 NOTE — PROGRESS NOTES
Notified Dr. Vásquez regarding BP of 210/91 and asympotmatic, stated OK to give scheduled morning meds early. SEE MAR, will cont to monitor.

## 2020-11-08 NOTE — HPI
Colt Stacy Jr. is a 75 y.o. male patient with a PMHx of CKD stage V not on dialysis, CAD, HLD, HTN, DM II, h/o CVA, left adrenal adenoma s/p adrenalectomy on 10/20, BPH, chronic hematuria with chronic indwelling greenfield catheter, and vitiligo who presented to the Emergency Department with c/o SOB that has progressively worsened over the past few hours.  No aggravating or alleviating factors.  Associated edema to bilateral lower extremities, bilateral upper extremities, and abdomen.  Denies any chest pain, palpitations, cough, wheezing, congestion, orthopnea, PND, weight gain, abdominal pain, nausea, vomit in, diarrhea, worsening hematuria, decreased UOP, back pain, lightheadedness or dizziness, syncope, weakness, fever or chills. Patient is on 2L O2 at home.  in the ED, patient's O2 sat remained stable on 2 L nasal cannula with some mild respiratory distress noted with RR 22-25.  Initial workup showed:  Normal WBC, H&H 7.3/23.4, creatinine 9.3, BUN 83, potassium 4.7, CO2 20, , troponin 0.092, COVID negative.  EKG unremarkable. CXR showed mild pulmonary edema.  100 mg IV Lasix given in the ED with 800 mL diuresed.  Case discussed with Nephrology who agrees with diuresis and will see patient in consult.  Hospital Medicine was contacted for admission and patient placed in observation for acute pulmonary edema and worsening CKD stage V.  Patient is a full code.  His daughter is SDM.

## 2020-11-08 NOTE — PROGRESS NOTES
Zaki with [facility] informed primary nurse that they will receive pt from [facility] after vas cath is placed. Informed zaki that pt was sent to [facility] with a unit of blood transfusing, he states that its fine he is receiving unit of blood

## 2020-11-08 NOTE — HPI
75 y.o. male patient with a PMHx of CKD stage V not on dialysis, CAD, HLD, HTN, DM II, h/o CVA, left adrenal adenoma s/p adrenalectomy on 10/20, BPH, chronic hematuria with chronic indwelling greenfield catheter, and vitiligo who presented to the Emergency Department with c/o SOB that has progressively worsened and LE edema.     Nephrology was consulted to help with patient's renal care while [place] [facility] saw and examined patient in his hospital room. Patient confirms SOB and LE edema.     Chart review revealed that patient's renal function has leveled off with creatinine at 9-11 over the past 2 weeks. Patient with indwelling Greenfield for urinary obstruction.

## 2020-11-08 NOTE — SUBJECTIVE & OBJECTIVE
"Past Medical History:   Diagnosis Date    Adrenal adenoma, left     BPH (benign prostatic hyperplasia)     CAD (coronary artery disease)     CKD (chronic kidney disease) stage 5, GFR less than 15 ml/min     Depression     Diabetes mellitus     Hyperlipidemia     Hypertension     Retinitis     Stroke     Pt states residual sx is "I walk funny".    Vitiligo        Past Surgical History:   Procedure Laterality Date    FLUOROSCOPY Bilateral 7/20/2020    Procedure: Angiogram- Bilateral Adrenal;  Surgeon: Jose R Laws MD;  Location: [facility] CATH LAB;  Service: General;  Laterality: Bilateral;    ROBOT-ASSISTED ADRENALECTOMY Left     ROBOT-ASSISTED SURGICAL REMOVAL OF ADRENAL GLAND USING DA DEMETRI XI Left 10/20/2020    Procedure: XI ROBOTIC ADRENALECTOMY;  Surgeon: Livan Quiñones MD;  Location: [facility] OR;  Service: General;  Laterality: Left;       Review of patient's allergies indicates:   Allergen Reactions    Benadryl [diphenhydramine hcl] Other (See Comments)     Pt states benadryl makes him feel numb    Ace inhibitors      YIN     Current Facility-Administered Medications   Medication Frequency    0.9%  NaCl infusion (for blood administration) Q24H PRN    acetaminophen tablet 650 mg Q6H PRN    ARIPiprazole tablet 30 mg QHS    aspirin EC tablet 81 mg QHS    atorvastatin tablet 80 mg Daily    cloNIDine tablet 0.2 mg Q6H PRN    dextrose 50% injection 12.5 g PRN    dextrose 50% injection 25 g PRN    furosemide injection 60 mg Q12H    glucose chewable tablet 16 g PRN    glucose chewable tablet 24 g PRN    hydrALAZINE tablet 75 mg Q8H    insulin aspart U-100 pen 0-5 Units QID (AC + HS) PRN    insulin detemir U-100 pen 15 Units QHS    Lactobacillus acidoph-L.bulgar 1 million cell tablet 4 tablet TID WM    metoprolol tartrate (LOPRESSOR) split tablet 12.5 mg BID    NIFEdipine 24 hr tablet 60 mg Daily    ondansetron injection 4 mg Q8H PRN    sertraline tablet 100 mg Daily    sevelamer carbonate " tablet 800 mg TID WM    sodium chloride 0.9% flush 10 mL PRN    tamsulosin 24 hr capsule 0.4 mg Daily     Facility-Administered Medications Ordered in Other Encounters   Medication Frequency    diphenhydrAMINE injection 25 mg Q6H PRN    metoclopramide HCl injection 10 mg Q10 Min PRN     Family History     None        Tobacco Use    Smoking status: Never Smoker    Smokeless tobacco: Never Used   Substance and Sexual Activity    Alcohol use: No    Drug use: No    Sexual activity: Not Currently     Review of Systems   Constitutional: Negative for fever.   HENT: Negative for congestion.    Eyes: Negative for visual disturbance.   Respiratory: Positive for shortness of breath. Negative for cough and wheezing.    Cardiovascular: Positive for leg swelling. Negative for chest pain and palpitations.   Gastrointestinal: Negative for abdominal pain, diarrhea, nausea and vomiting.   Genitourinary: Negative for difficulty urinating and dysuria.   Musculoskeletal: Negative for joint swelling.   Skin: Negative for rash.   Neurological: Negative for weakness and headaches.     Objective:     Vital Signs (Most Recent):  Temp: 98.8 °F (37.1 °C) (11/08/20 0720)  Pulse: 62 (11/08/20 0928)  Resp: 18 (11/08/20 0858)  BP: 126/60 (11/08/20 0928)  SpO2: (!) 92 % (11/08/20 0858)  O2 Device (Oxygen Therapy): nasal cannula (11/08/20 0858) Vital Signs (24h Range):  Temp:  [98.7 °F (37.1 °C)-100.1 °F (37.8 °C)] 98.8 °F (37.1 °C)  Pulse:  [57-79] 62  Resp:  [17-25] 18  SpO2:  [92 %-100 %] 92 %  BP: (126-210)/(60-91) 126/60     Weight: 91.3 kg (201 lb 4.5 oz) (11/08/20 0558)  Body mass index is 36.81 kg/m².  Body surface area is 2 meters squared.    I/O last 3 completed shifts:  In: -   Out: 800 [Urine:800]    Physical Exam  Constitutional:       Appearance: He is well-developed.   HENT:      Head: Normocephalic.      Nose: No rhinorrhea.      Mouth/Throat:      Pharynx: No oropharyngeal exudate.   Eyes:      Pupils: Pupils are equal,  round, and reactive to light.   Neck:      Thyroid: No thyroid mass.   Cardiovascular:      Rate and Rhythm: Normal rate and regular rhythm.      Heart sounds: S1 normal and S2 normal.   Pulmonary:      Effort: Pulmonary effort is normal. No respiratory distress.      Breath sounds: No wheezing.   Abdominal:      General: Bowel sounds are normal. There is no distension.      Palpations: Abdomen is soft.      Tenderness: There is no abdominal tenderness.      Hernia: No hernia is present.   Musculoskeletal:      Right lower leg: Edema present.      Left lower leg: Edema present.   Lymphadenopathy:      Cervical: No cervical adenopathy.   Skin:     General: Skin is warm and dry.   Neurological:      Mental Status: He is alert.   Psychiatric:         Behavior: Behavior is cooperative.         Significant Labs:  Lab Results   Component Value Date    CREATININE 9.0 (H) 11/08/2020    BUN 87 (H) 11/08/2020     11/08/2020    K 4.3 11/08/2020     (H) 11/08/2020    CO2 19 (L) 11/08/2020     Lab Results   Component Value Date    .0 (H) 09/04/2020    CALCIUM 8.3 (L) 11/08/2020    PHOS 5.1 (H) 11/08/2020    PHOS 5.1 (H) 11/08/2020     Lab Results   Component Value Date    ALBUMIN 2.2 (L) 11/08/2020     Lab Results   Component Value Date    WBC 7.75 11/08/2020    HGB 6.9 (L) 11/08/2020    HCT 22.9 (L) 11/08/2020    MCV 99 (H) 11/08/2020     11/08/2020       Recent Labs   Lab 11/08/20  0831   MG 2.1         Significant Imaging:  Imaging Results          X-Ray Chest AP Portable (Final result)  Result time 11/07/20 22:46:04    Final result by Lul Watkins MD (11/07/20 22:46:04)                 Impression:      Probable pulmonary edema.      Electronically signed by: Lul Watkins  Date:    11/07/2020  Time:    22:46             Narrative:    EXAMINATION:  XR CHEST AP PORTABLE    CLINICAL HISTORY:  shortness of breath;    TECHNIQUE:  Single frontal view of the chest was  performed.    COMPARISON:  11/02/2020.    FINDINGS:  Bilateral pleural fluid collections and increased interstitial attenuation lung bases concerning for early pulmonary edema.    The cardiac silhouette is borderline enlarged. The hilar and mediastinal contours are unremarkable.    Degenerative change of the shoulder.

## 2020-11-08 NOTE — OP NOTE
Date of service 11/08/2020  Procedure:  Insertion  Of Vas-Cath    Attending surgeon:  Dr. Velasquez Castillo  Anesthesia:  Local  Complications:  None    Specimens:  None  EBL:  Minimal    Indication:  Acute renal failure    Procedure:  After discussion the patient regarding risks benefits potential complications he agreed and signed informed consent he was brought to the specials Procedure Room placed in supine position and after adequate monitoring prepped and draped the patient's right neck and upper chest in the standard surgical fashion at this point I instilled 1% lidocaine and then under ultrasound guidance isolated the risk right internal jugular vein cannulated using a micropuncture needle placing a metal guidewire was confirmed in the vein I serially dilated the tract I tunneled the Vas-Cath in a alternate stab incision and then advanced a peel-away introducer sheath over the wire in position its tip in the SVC atrial junction I advanced the tip of the Vas-Cath through the peel-away sheath and positioned is stiff in SVC atrial junction the peel-away sheath was removed the Vas-Cath ports were flushed and locked with heparinized saline it was sutured to the skin sterile dressings were applied patient tolerated the procedure and transferred to recovery room with stable vital signs

## 2020-11-08 NOTE — ASSESSMENT & PLAN NOTE
- Initial creatinine of 9.3.  Patient will likely need RRT in near future.  - Nephrology consult.  - Avoid nephrotoxic agents.  - Follow labs.

## 2020-11-08 NOTE — H&P
[facility] Hospital Medicine  History & Physical    Patient Name: Colt Stacy Jr.  MRN: [id number]  Admission Date: 11/7/2020  Attending Physician: Dago Vásquez MD   Primary Care Provider: Primary Doctor No         Patient information was obtained from patient, past medical records and ER records.     Subjective:     Principal Problem:Acute pulmonary edema    Chief Complaint:   Chief Complaint   Patient presents with    Shortness of Breath     88 RA per [facility]        HPI: Colt Stacy Jr. is a 75 y.o. male patient with a PMHx of CKD stage V not on dialysis, CAD, HLD, HTN, DM II, h/o CVA, left adrenal adenoma s/p adrenalectomy on 10/20, BPH, chronic hematuria with chronic indwelling greenfield catheter, and vitiligo who presented to the Emergency Department with c/o SOB that has progressively worsened over the past few hours.  No aggravating or alleviating factors.  Associated edema to bilateral lower extremities, bilateral upper extremities, and abdomen.  Denies any chest pain, palpitations, cough, wheezing, congestion, orthopnea, PND, weight gain, abdominal pain, nausea, vomit in, diarrhea, worsening hematuria, decreased UOP, back pain, lightheadedness or dizziness, syncope, weakness, fever or chills. Patient is on 2L O2 at home.  in the ED, patient's O2 sat remained stable on 2 L nasal cannula with some mild respiratory distress noted with RR 22-25.  Initial workup showed:  Normal WBC, H&H 7.3/23.4, creatinine 9.3, BUN 83, potassium 4.7, CO2 20, , troponin 0.092, COVID negative.  EKG unremarkable. CXR showed mild pulmonary edema.  100 mg IV Lasix given in the ED with 800 mL diuresed.  Case discussed with Nephrology who agrees with diuresis and will see patient in consult.  Hospital Medicine was contacted for admission and patient placed in observation for acute pulmonary edema and worsening CKD stage V.  Patient is a full code.  His daughter is SDM.      Past Medical History:   Diagnosis Date     "Adrenal adenoma, left     BPH (benign prostatic hyperplasia)     CAD (coronary artery disease)     CKD (chronic kidney disease) stage 5, GFR less than 15 ml/min     Depression     Diabetes mellitus     Hyperlipidemia     Hypertension     Retinitis     Stroke     Pt states residual sx is "I walk funny".    Vitiligo        Past Surgical History:   Procedure Laterality Date    FLUOROSCOPY Bilateral 7/20/2020    Procedure: Angiogram- Bilateral Adrenal;  Surgeon: Jose R Laws MD;  Location: [facility] CATH LAB;  Service: General;  Laterality: Bilateral;    ROBOT-ASSISTED ADRENALECTOMY Left     ROBOT-ASSISTED SURGICAL REMOVAL OF ADRENAL GLAND USING DA DEMETRI XI Left 10/20/2020    Procedure: XI ROBOTIC ADRENALECTOMY;  Surgeon: Livan Quiñones MD;  Location: [facility] OR;  Service: General;  Laterality: Left;       Review of patient's allergies indicates:   Allergen Reactions    Benadryl [diphenhydramine hcl] Other (See Comments)     Pt states benadryl makes him feel numb    Ace inhibitors      YIN       Current Facility-Administered Medications on File Prior to Encounter   Medication    diphenhydrAMINE injection 25 mg    metoclopramide HCl injection 10 mg     Current Outpatient Medications on File Prior to Encounter   Medication Sig    atorvastatin (LIPITOR) 80 MG tablet Take 1 tablet (80 mg total) by mouth once daily.    hydrALAZINE (APRESOLINE) 25 MG tablet Take 3 tablets (75 mg total) by mouth every 8 (eight) hours.    insulin aspart U-100 (NOVOLOG) 100 unit/mL injection Inject into the skin 2 (two) times a day. 4 units q a.m, 7 units at dinner    insulin detemir U-100 (LEVEMIR) 100 unit/mL injection Inject 31 Units into the skin every evening.     metoprolol tartrate (LOPRESSOR) 25 MG tablet Take 0.5 tablets (12.5 mg total) by mouth 2 (two) times daily.    multivitamin capsule Take 1 capsule by mouth once daily.    NIFEdipine (PROCARDIA-XL) 60 MG (OSM) 24 hr tablet Take 1 tablet (60 mg total) by mouth " once daily.    sertraline (ZOLOFT) 100 MG tablet Take 100 mg by mouth once daily.     sevelamer carbonate (RENVELA) 800 mg Tab Take 1 tablet (800 mg total) by mouth 3 (three) times daily with meals.    ARIPiprazole (ABILIFY) 30 MG Tab Take 30 mg by mouth every evening.     aspirin (ASPIR-81 ORAL) Take 81 mg by mouth every evening.     diphenoxylate-atropine 2.5-0.025 mg (LOMOTIL) 2.5-0.025 mg per tablet Take 1 tablet by mouth 4 (four) times daily as needed for Diarrhea.    HYDROcodone-acetaminophen (NORCO) 5-325 mg per tablet Take 1 tablet by mouth every 8 (eight) hours as needed. (Patient not taking: Reported on 11/2/2020)    Lactobacillus acidoph-L.bulgar 1 million cell Chew Take 4 tablets by mouth 3 (three) times daily with meals.    tamsulosin (FLOMAX) 0.4 mg Cap Take 1 capsule (0.4 mg total) by mouth once daily.     Family History     Reviewed and not pertinent.         Tobacco Use    Smoking status: Never Smoker    Smokeless tobacco: Never Used   Substance and Sexual Activity    Alcohol use: No    Drug use: No    Sexual activity: Not Currently     Review of Systems   Constitutional: Negative for chills, diaphoresis, fatigue and fever.   HENT: Negative for congestion, postnasal drip, rhinorrhea, sinus pressure and sore throat.    Respiratory: Positive for shortness of breath. Negative for cough and wheezing.    Cardiovascular: Positive for leg swelling. Negative for chest pain and palpitations.        (+) Bilateral upper extremity edema.   Gastrointestinal: Negative for abdominal pain, diarrhea, nausea and vomiting.        (+) Abdominal wall edema.   Genitourinary: Positive for hematuria. Negative for decreased urine volume and dysuria.   Musculoskeletal: Negative for arthralgias, back pain and myalgias.   Skin: Negative for pallor and rash.   Neurological: Negative for dizziness, syncope, weakness, light-headedness, numbness and headaches.   Psychiatric/Behavioral: Negative for confusion. The  patient is not nervous/anxious.    All other systems reviewed and are negative.    Objective:     Vital Signs (Most Recent):  Temp: 98.7 °F (37.1 °C) (11/08/20 0235)  Pulse: 61 (11/08/20 0249)  Resp: (!) 24 (11/08/20 0235)  BP: (!) 161/72 (11/08/20 0249)  SpO2: 100 % (11/08/20 0235) Vital Signs (24h Range):  Temp:  [98.7 °F (37.1 °C)-100.1 °F (37.8 °C)] 98.7 °F (37.1 °C)  Pulse:  [57-68] 61  Resp:  [17-25] 24  SpO2:  [98 %-100 %] 100 %  BP: (158-187)/(69-74) 161/72     Weight: 93.9 kg (207 lb)  Body mass index is 37.86 kg/m².    Physical Exam  Vitals signs and nursing note reviewed.   Constitutional:       General: He is awake. He is not in acute distress.     Appearance: Normal appearance. He is well-developed. He is not diaphoretic.   HENT:      Head: Normocephalic and atraumatic.   Eyes:      Conjunctiva/sclera: Conjunctivae normal.      Comments: PERRL; EOM intact.   Neck:      Musculoskeletal: Normal range of motion and neck supple.      Vascular: JVD present.   Cardiovascular:      Rate and Rhythm: Normal rate and regular rhythm.  No extrasystoles are present.     Heart sounds: S1 normal and S2 normal. No murmur.      Comments: Anasarca.  Pulmonary:      Effort: Pulmonary effort is normal. Tachypnea present. No accessory muscle usage or respiratory distress.      Breath sounds: Normal breath sounds and air entry. No wheezing, rhonchi or rales.   Abdominal:      General: Bowel sounds are normal. There is no distension.      Palpations: Abdomen is soft.      Tenderness: There is no abdominal tenderness. There is no guarding or rebound.      Comments: Anasarca.   Musculoskeletal: Normal range of motion.         General: No tenderness or deformity.      Right lower leg: 3+ Pitting Edema present.      Left lower leg: 3+ Pitting Edema present.   Skin:     General: Skin is warm and dry.      Capillary Refill: Capillary refill takes less than 2 seconds.      Findings: No erythema or rash.   Neurological:      General:  No focal deficit present.      Mental Status: He is alert and oriented to person, place, and time.   Psychiatric:         Mood and Affect: Mood and affect normal.         Behavior: Behavior normal. Behavior is cooperative.             Significant Labs:  Results for orders placed or performed during the hospital encounter of 11/07/20   CBC auto differential   Result Value Ref Range    WBC 10.12 3.90 - 12.70 K/uL    RBC 2.39 (L) 4.60 - 6.20 M/uL    Hemoglobin 7.3 (L) 14.0 - 18.0 g/dL    Hematocrit 23.4 (L) 40.0 - 54.0 %    MCV 98 82 - 98 fL    MCH 30.5 27.0 - 31.0 pg    MCHC 31.2 (L) 32.0 - 36.0 g/dL    RDW 12.9 11.5 - 14.5 %    Platelets 280 150 - 350 K/uL    MPV 10.9 9.2 - 12.9 fL    Immature Granulocytes 1.1 (H) 0.0 - 0.5 %    Gran # (ANC) 8.0 (H) 1.8 - 7.7 K/uL    Immature Grans (Abs) 0.11 (H) 0.00 - 0.04 K/uL    Lymph # 0.9 (L) 1.0 - 4.8 K/uL    Mono # 0.9 0.3 - 1.0 K/uL    Eos # 0.1 0.0 - 0.5 K/uL    Baso # 0.06 0.00 - 0.20 K/uL    nRBC 0 0 /100 WBC    Gran % 79.0 (H) 38.0 - 73.0 %    Lymph % 8.9 (L) 18.0 - 48.0 %    Mono % 9.0 4.0 - 15.0 %    Eosinophil % 1.4 0.0 - 8.0 %    Basophil % 0.6 0.0 - 1.9 %    Differential Method Automated    Comprehensive metabolic panel   Result Value Ref Range    Sodium 144 136 - 145 mmol/L    Potassium 4.7 3.5 - 5.1 mmol/L    Chloride 114 (H) 95 - 110 mmol/L    CO2 20 (L) 23 - 29 mmol/L    Glucose 101 70 - 110 mg/dL    BUN 83 (H) 8 - 23 mg/dL    Creatinine 9.3 (H) 0.5 - 1.4 mg/dL    Calcium 8.1 (L) 8.7 - 10.5 mg/dL    Total Protein 6.1 6.0 - 8.4 g/dL    Albumin 2.4 (L) 3.5 - 5.2 g/dL    Total Bilirubin 0.3 0.1 - 1.0 mg/dL    Alkaline Phosphatase 44 (L) 55 - 135 U/L    AST 19 10 - 40 U/L    ALT 12 10 - 44 U/L    Anion Gap 10 8 - 16 mmol/L    eGFR if African American 6 (A) >60 mL/min/1.73 m^2    eGFR if non African American 5 (A) >60 mL/min/1.73 m^2   Troponin I #1   Result Value Ref Range    Troponin I 0.092 (H) 0.000 - 0.026 ng/mL   BNP   Result Value Ref Range     (H) 0 -  99 pg/mL   COVID-19 Rapid Screening   Result Value Ref Range    SARS-CoV-2 RNA, Amplification, Qual Negative Negative   Type & Screen   Result Value Ref Range    Group & Rh O POS     Indirect [place] NEG       All pertinent labs within the past 24 hours have been reviewed.    Significant Imaging:  Imaging Results          X-Ray Chest AP Portable (Final result)  Result time 11/07/20 22:46:04    Final result by Lul Watkins MD (11/07/20 22:46:04)                 Impression:      Probable pulmonary edema.      Electronically signed by: Lul Watkins  Date:    11/07/2020  Time:    22:46             Narrative:    EXAMINATION:  XR CHEST AP PORTABLE    CLINICAL HISTORY:  shortness of breath;    TECHNIQUE:  Single frontal view of the chest was performed.    COMPARISON:  11/02/2020.    FINDINGS:  Bilateral pleural fluid collections and increased interstitial attenuation lung bases concerning for early pulmonary edema.    The cardiac silhouette is borderline enlarged. The hilar and mediastinal contours are unremarkable.    Degenerative change of the shoulder.                               I have reviewed all pertinent imaging results/findings within the past 24 hours.     EKG: (personally reviewed)  Sinus rhythm with marked sinus arrhythmia, nonspecific T-wave abnormality.  No significant change from previous tracings.            Assessment/Plan:     * Acute pulmonary edema and anasarca  - Secondary to worsening CKD.  Patient progressing towards dialysis.  Nephrology consult.  - 100 mg IV Lasix given in the ED with 800 mL diuresed.  Continue diuresis with 60 mg IV Lasix Q 12.  - Blood pressure control.  - Strict I&Os, daily weights, Na/fluid restriction.  - Recent 2D echo report reviewed.    YIN on CKD, stage V not on dialysis  - Initial creatinine of 9.3.  Patient will likely need RRT in near future.  - Nephrology consult.  - Avoid nephrotoxic agents.  - Follow labs.    Anemia due to chronic kidney disease  - H&H below  baseline at 7.3/23.4.  Follow CBC and transfuse as needed.    Gross hematuria  - Patient with chronic hematuria and chronic indwelling Noe catheter.    - Avoid antiplatelets/anticoagulation.  - Follow-up with urology outpatient.    Type 2 diabetes mellitus, with long-term current use of insulin  - Continue home basal insulin at decreased dose of 15 units nightly.  - Accu-Cheks with low-dose SSI.  - Diabetic diet.  - Recent HbA1c of 6.    Essential hypertension  - Blood pressure above goal, but stable on admission.  - Continue home nifedipine, Lopressor, and hydralazine.  Follow BP trends and optimize as needed.  - Diuretics as above.  - Clonidine as needed.      VTE Risk Mitigation (From admission, onward)         Ordered     IP VTE HIGH RISK PATIENT  Once      11/08/20 0226     Place sequential compression device  Until discontinued      11/08/20 0226     Reason for No Pharmacological VTE Prophylaxis  Once     Question:  Reasons:  Answer:  Active Bleeding    11/08/20 0226                   Meli Simpson NP  Department of Hospital Medicine   [facility]

## 2020-11-08 NOTE — ED PROVIDER NOTES
"SCRIBE #1 NOTE: I, Adriane Araujo, am scribing for, and in the presence of, Lucia Call MD. I have scribed the entire note.       History     Chief Complaint   Patient presents with    Shortness of Breath     89 RA per [facility]     Review of patient's allergies indicates:   Allergen Reactions    Benadryl [diphenhydramine hcl] Other (See Comments)     Pt states benadryl makes him feel numb    Ace inhibitors      YIN         History of Present Illness     HPI    11/7/2020, 10:37 PM  History obtained from the patient      History of Present Illness: Colt Stacy Jr. is a 75 y.o. male patient with a PMHx of vitiligo, stroke, retinitis, HTN, HLD, DM, depression, CKD, CAD, BPH, and left adrenal adenoma who presents to the Emergency Department for evaluation of worsening SOB which onset just PTA. Symptoms are moderate in severity. No mitigating or exacerbating factors reported. Associated sxs include swelling to bilat legs, abd wall and bilat arms. It is noted patient has chronic hematuria. Patient denies any fever, chills, numbness, weakness, n/v/d, CP, abd pain, and all other sxs at this time. Pt is in 2L O2 at home. No further complaints or concerns at this time.       Arrival mode: [facility]    PCP: Primary Doctor No        Past Medical History:  Past Medical History:   Diagnosis Date    Adrenal adenoma, left     BPH (benign prostatic hyperplasia)     CAD (coronary artery disease)     CKD (chronic kidney disease) stage 5, GFR less than 15 ml/min     Depression     Diabetes mellitus     Hyperlipidemia     Hypertension     Retinitis     Stroke     Pt states residual sx is "I walk funny".    Vitiligo        Past Surgical History:  Past Surgical History:   Procedure Laterality Date    FLUOROSCOPY Bilateral 7/20/2020    Procedure: Angiogram- Bilateral Adrenal;  Surgeon: Jose R Laws MD;  Location: [facility] CATH LAB;  Service: General;  Laterality: Bilateral;    ROBOT-ASSISTED ADRENALECTOMY Left     " ROBOT-ASSISTED SURGICAL REMOVAL OF ADRENAL GLAND USING DA DEMETRI XI Left 10/20/2020    Procedure: XI ROBOTIC ADRENALECTOMY;  Surgeon: Livan Quiñones MD;  Location: [facility];  Service: General;  Laterality: Left;         Family History:  Family history reviewed. No pertinent family history.     Social History:  Social History     Tobacco Use    Smoking status: Never Smoker    Smokeless tobacco: Never Used   Substance and Sexual Activity    Alcohol use: No    Drug use: No    Sexual activity: Not Currently        Review of Systems     Review of Systems   Constitutional: Negative for chills and fever.   HENT: Negative for sore throat.    Respiratory: Positive for shortness of breath.    Cardiovascular: Positive for leg swelling. Negative for chest pain.   Gastrointestinal: Negative for abdominal pain, diarrhea, nausea and vomiting.        (+) abd swelling   Genitourinary: Positive for hematuria. Negative for dysuria.   Musculoskeletal: Negative for back pain.        (+) swelling to bilat arms   Skin: Negative for rash.   Neurological: Negative for weakness and numbness.   Hematological: Does not bruise/bleed easily.   All other systems reviewed and are negative.     Physical Exam     Initial Vitals [11/07/20 2222]   BP Pulse Resp Temp SpO2   (!) 158/72 60 (!) 22 100.1 °F (37.8 °C) 99 %      MAP       --          Physical Exam  Nursing Notes and Vital Signs Reviewed.  Constitutional: Patient is in no acute distress. Well-developed and well-nourished.  Head: Atraumatic. Normocephalic.  Eyes: PERRL. EOM intact. Conjunctivae are not pale. No scleral icterus.  ENT: Mucous membranes are moist. Oropharynx is clear and symmetric.    Neck: Supple. Full ROM. No lymphadenopathy.  Cardiovascular: Regular rate. Regular rhythm. No murmurs, rubs, or gallops. Distal pulses are 2+ and symmetric. Extensive edema to bilat legs, abdominal wall and bilat arms.   Pulmonary/Chest: No respiratory distress. Clear to auscultation bilaterally.  No wheezing or rales.  Abdominal: Soft and non-distended.  There is no tenderness.  No rebound, guarding, or rigidity. Good bowel sounds. Multiple well-healed trochar sites to abdominal wall.   Genitourinary: No CVA tenderness. Noe in place with chronic gross hematuria noted.   Musculoskeletal: Moves all extremities. No obvious deformities. No calf tenderness.  Skin: Warm and dry. Vitiligo.   Neurological:  Alert, awake, and appropriate.  Normal speech.  No acute focal neurological deficits are appreciated.  Psychiatric: Normal affect. Good eye contact. Appropriate in content.     ED Course   Critical Care    Date/Time: 11/7/2020 10:17 AM  Performed by: Lucia Call MD  Authorized by: Farhad Valderrama MD   Direct patient critical care time: 15 minutes  Additional history critical care time: 5 minutes  Ordering / reviewing critical care time: 5 minutes  Documentation critical care time: 8 minutes  Consulting other physicians critical care time: 5 minutes  Total critical care time (exclusive of procedural time) : 38 minutes  Critical care was necessary to treat or prevent imminent or life-threatening deterioration of the following conditions: cardiac failure, renal failure and circulatory failure.  Critical care was time spent personally by me on the following activities: blood draw for specimens, development of treatment plan with patient or surrogate, discussions with consultants, interpretation of cardiac output measurements, evaluation of patient's response to treatment, obtaining history from patient or surrogate, examination of patient, ordering and performing treatments and interventions, ordering and review of radiographic studies, ordering and review of laboratory studies, pulse oximetry, re-evaluation of patient's condition and review of old charts.        ED Vital Signs:  Vitals:    11/07/20 2222 11/07/20 2300 11/07/20 2303 11/07/20 2338   BP: (!) 158/72 (!) 169/72  (!) 163/69   Pulse: 60 65 61  "68   Resp: (!) 22 (!) 25  (!) 23   Temp: 100.1 °F (37.8 °C)   98.7 °F (37.1 °C)   TempSrc: Oral   Oral   SpO2: 99% 98%  98%   Weight: 93.9 kg (207 lb)      Height: 5' 2" (1.575 m)       11/08/20 0005   BP: (!) 162/70   Pulse: (!) 59   Resp: (!) 24   Temp:    TempSrc:    SpO2: 100%   Weight:    Height:        Abnormal Lab Results:  Labs Reviewed   CBC W/ AUTO DIFFERENTIAL - Abnormal; Notable for the following components:       Result Value    RBC 2.39 (*)     Hemoglobin 7.3 (*)     Hematocrit 23.4 (*)     MCHC 31.2 (*)     Immature Granulocytes 1.1 (*)     Gran # (ANC) 8.0 (*)     Immature Grans (Abs) 0.11 (*)     Lymph # 0.9 (*)     Gran % 79.0 (*)     Lymph % 8.9 (*)     All other components within normal limits   COMPREHENSIVE METABOLIC PANEL - Abnormal; Notable for the following components:    Chloride 114 (*)     CO2 20 (*)     BUN 83 (*)     Creatinine 9.3 (*)     Calcium 8.1 (*)     Albumin 2.4 (*)     Alkaline Phosphatase 44 (*)     eGFR if  6 (*)     eGFR if non  5 (*)     All other components within normal limits   TROPONIN I - Abnormal; Notable for the following components:    Troponin I 0.092 (*)     All other components within normal limits   B-TYPE NATRIURETIC PEPTIDE - Abnormal; Notable for the following components:     (*)     All other components within normal limits   SARS-COV-2 RNA AMPLIFICATION, QUAL    Narrative:     Admit planning   TYPE & SCREEN        All Lab Results:  Results for orders placed or performed during the hospital encounter of 11/07/20   CBC auto differential   Result Value Ref Range    WBC 10.12 3.90 - 12.70 K/uL    RBC 2.39 (L) 4.60 - 6.20 M/uL    Hemoglobin 7.3 (L) 14.0 - 18.0 g/dL    Hematocrit 23.4 (L) 40.0 - 54.0 %    MCV 98 82 - 98 fL    MCH 30.5 27.0 - 31.0 pg    MCHC 31.2 (L) 32.0 - 36.0 g/dL    RDW 12.9 11.5 - 14.5 %    Platelets 280 150 - 350 K/uL    MPV 10.9 9.2 - 12.9 fL    Immature Granulocytes 1.1 (H) 0.0 - 0.5 %    Gran # " (ANC) 8.0 (H) 1.8 - 7.7 K/uL    Immature Grans (Abs) 0.11 (H) 0.00 - 0.04 K/uL    Lymph # 0.9 (L) 1.0 - 4.8 K/uL    Mono # 0.9 0.3 - 1.0 K/uL    Eos # 0.1 0.0 - 0.5 K/uL    Baso # 0.06 0.00 - 0.20 K/uL    nRBC 0 0 /100 WBC    Gran % 79.0 (H) 38.0 - 73.0 %    Lymph % 8.9 (L) 18.0 - 48.0 %    Mono % 9.0 4.0 - 15.0 %    Eosinophil % 1.4 0.0 - 8.0 %    Basophil % 0.6 0.0 - 1.9 %    Differential Method Automated    Comprehensive metabolic panel   Result Value Ref Range    Sodium 144 136 - 145 mmol/L    Potassium 4.7 3.5 - 5.1 mmol/L    Chloride 114 (H) 95 - 110 mmol/L    CO2 20 (L) 23 - 29 mmol/L    Glucose 101 70 - 110 mg/dL    BUN 83 (H) 8 - 23 mg/dL    Creatinine 9.3 (H) 0.5 - 1.4 mg/dL    Calcium 8.1 (L) 8.7 - 10.5 mg/dL    Total Protein 6.1 6.0 - 8.4 g/dL    Albumin 2.4 (L) 3.5 - 5.2 g/dL    Total Bilirubin 0.3 0.1 - 1.0 mg/dL    Alkaline Phosphatase 44 (L) 55 - 135 U/L    AST 19 10 - 40 U/L    ALT 12 10 - 44 U/L    Anion Gap 10 8 - 16 mmol/L    eGFR if African American 6 (A) >60 mL/min/1.73 m^2    eGFR if non African American 5 (A) >60 mL/min/1.73 m^2   Troponin I #1   Result Value Ref Range    Troponin I 0.092 (H) 0.000 - 0.026 ng/mL   BNP   Result Value Ref Range     (H) 0 - 99 pg/mL   COVID-19 Rapid Screening   Result Value Ref Range    SARS-CoV-2 RNA, Amplification, Qual Negative Negative       Imaging Results:  Imaging Results          X-Ray Chest AP Portable (Final result)  Result time 11/07/20 22:46:04    Final result by uLl Watkins MD (11/07/20 22:46:04)                 Impression:      Probable pulmonary edema.      Electronically signed by: Lul Watkins  Date:    11/07/2020  Time:    22:46             Narrative:    EXAMINATION:  XR CHEST AP PORTABLE    CLINICAL HISTORY:  shortness of breath;    TECHNIQUE:  Single frontal view of the chest was performed.    COMPARISON:  11/02/2020.    FINDINGS:  Bilateral pleural fluid collections and increased interstitial attenuation lung bases concerning  for early pulmonary edema.    The cardiac silhouette is borderline enlarged. The hilar and mediastinal contours are unremarkable.    Degenerative change of the shoulder.                                 The EKG was ordered, reviewed, and independently interpreted by the ED provider.  Interpretation time: 2243  Rate: 62 BPM  Rhythm: Sinus rhythm with marked sinus arrhythmia with short PA  Interpretation: Nonspecific T wave abnormality. No STEMI.           The Emergency Provider reviewed the vital signs and test results, which are outlined above.     ED Discussion     12:02 AM: Re-evaluated pt. I have discussed test results, shared treatment plan, and the need for admission with patient and family at bedside. Pt and family express understanding at this time and agree with all information. All questions answered. Pt and family have no further questions or concerns at this time. Pt is ready for admit.    12:02 AM: Discussed case with Power Patten MD (Hospital Medicine). Dr. Patten agrees with current care and management of pt and accepts admission.   Admitting Service: Hospital Medicine  Admitting Physician: Dr. Patten  Admit to: obs tele     12:06 AM: Discussed pt's case with Soren Contreras MD (nephrology) who agrees with current plan of tx.        Medical Decision Making:   Clinical Tests:   Lab Tests: Reviewed and Ordered  Radiological Study: Reviewed and Ordered  Medical Tests: Reviewed and Ordered           ED Medication(s):  Medications   furosemide injection 100 mg (100 mg Intravenous Given 11/7/20 2303)       New Prescriptions    No medications on file               Scribe Attestation:   Scribe #1: I performed the above scribed service and the documentation accurately describes the services I performed. I attest to the accuracy of the note.     Attending:   Physician Attestation Statement for Scribe #1: I, Lucia Call MD, personally performed the services described in this documentation, as scribed by  Adriane Araujo, in my presence, and it is both accurate and complete.           Clinical Impression       ICD-10-CM ICD-9-CM   1. Acute pulmonary edema  J81.0 518.4   2. Shortness of breath  R06.02 786.05   3. Anasarca  R60.1 782.3   4. CKD (chronic kidney disease), symptom management only, stage 5  N18.5 585.5   5. Acute on chronic anemia  D64.9 285.9   6. History of hyperaldosteronism  Z86.39 V12.29   7. Chronic indwelling Noe catheter  Z97.8 V45.89   8. Gross hematuria  R31.0 599.71       Disposition:   Disposition: Placed in Observation  Condition: Jose Call MD  11/08/20 0015       Lucia Call MD  12/04/20 1018

## 2020-11-08 NOTE — PLAN OF CARE
Admitted from ED.   Cardiac monitor applied & verified w/ monitor room.   Educated on using call light; purpose of white board; importance of monitoring I/Os; pharmacy bedside delivery.   AOx4. POC reviewed; interventions implemented as appropriate.   Able to verbalize needs. Calm & cooperative at this time.  VSS. On 2L O2 via NC, tolerating well.  Receiving IV diuresis.   Lap sites x6 to L-side abdomen. Denies pain.  Progressive mobility level - standing w/ assist. Ambulates w/ walker for short distances.  Noe cath in place prior to admit; gross hematuria noted.  Able to reposition self in bed. Frequent position changes encouraged. Educated on s/sx of  DTI.  NADN. Resting quietly in bed.   Hourly rounding complete.   All safety measures remain in place. Bed alarm on & audible. Free of falls. SR up x2; bed low & locked. Call light w/in reach.   Will continue to monitor throughout shift.

## 2020-11-08 NOTE — ASSESSMENT & PLAN NOTE
- Blood pressure above goal, but stable on admission.  - Continue home nifedipine, Lopressor, and hydralazine.  Follow BP trends and optimize as needed.  - Diuretics as above.  - Clonidine as needed.

## 2020-11-08 NOTE — NURSING
Patient returned to Tele, vital signs stable, blood transfusion complete prior to patient return to tele.

## 2020-11-08 NOTE — ASSESSMENT & PLAN NOTE
Patient's renal function has not improved over past 2 weeks. Patient likely ESRD now.    Will initiate dialysis today.    Access: Vascular surgery was consulted for access placement.

## 2020-11-08 NOTE — SUBJECTIVE & OBJECTIVE
"Past Medical History:   Diagnosis Date    Adrenal adenoma, left     BPH (benign prostatic hyperplasia)     CAD (coronary artery disease)     CKD (chronic kidney disease) stage 5, GFR less than 15 ml/min     Depression     Diabetes mellitus     Hyperlipidemia     Hypertension     Retinitis     Stroke     Pt states residual sx is "I walk funny".    Vitiligo        Past Surgical History:   Procedure Laterality Date    FLUOROSCOPY Bilateral 7/20/2020    Procedure: Angiogram- Bilateral Adrenal;  Surgeon: Jose R Laws MD;  Location: [facility] CATH LAB;  Service: General;  Laterality: Bilateral;    ROBOT-ASSISTED ADRENALECTOMY Left     ROBOT-ASSISTED SURGICAL REMOVAL OF ADRENAL GLAND USING DA DEMETRI XI Left 10/20/2020    Procedure: XI ROBOTIC ADRENALECTOMY;  Surgeon: Livan Quiñones MD;  Location: [facility] OR;  Service: General;  Laterality: Left;       Review of patient's allergies indicates:   Allergen Reactions    Benadryl [diphenhydramine hcl] Other (See Comments)     Pt states benadryl makes him feel numb    Ace inhibitors      YIN       Current Facility-Administered Medications on File Prior to Encounter   Medication    diphenhydrAMINE injection 25 mg    metoclopramide HCl injection 10 mg     Current Outpatient Medications on File Prior to Encounter   Medication Sig    atorvastatin (LIPITOR) 80 MG tablet Take 1 tablet (80 mg total) by mouth once daily.    hydrALAZINE (APRESOLINE) 25 MG tablet Take 3 tablets (75 mg total) by mouth every 8 (eight) hours.    insulin aspart U-100 (NOVOLOG) 100 unit/mL injection Inject into the skin 2 (two) times a day. 4 units q a.m, 7 units at dinner    insulin detemir U-100 (LEVEMIR) 100 unit/mL injection Inject 31 Units into the skin every evening.     metoprolol tartrate (LOPRESSOR) 25 MG tablet Take 0.5 tablets (12.5 mg total) by mouth 2 (two) times daily.    multivitamin capsule Take 1 capsule by mouth once daily.    NIFEdipine (PROCARDIA-XL) 60 MG (OSM) 24 hr " tablet Take 1 tablet (60 mg total) by mouth once daily.    sertraline (ZOLOFT) 100 MG tablet Take 100 mg by mouth once daily.     sevelamer carbonate (RENVELA) 800 mg Tab Take 1 tablet (800 mg total) by mouth 3 (three) times daily with meals.    ARIPiprazole (ABILIFY) 30 MG Tab Take 30 mg by mouth every evening.     aspirin (ASPIR-81 ORAL) Take 81 mg by mouth every evening.     diphenoxylate-atropine 2.5-0.025 mg (LOMOTIL) 2.5-0.025 mg per tablet Take 1 tablet by mouth 4 (four) times daily as needed for Diarrhea.    HYDROcodone-acetaminophen (NORCO) 5-325 mg per tablet Take 1 tablet by mouth every 8 (eight) hours as needed. (Patient not taking: Reported on 11/2/2020)    Lactobacillus acidoph-L.bulgar 1 million cell Chew Take 4 tablets by mouth 3 (three) times daily with meals.    tamsulosin (FLOMAX) 0.4 mg Cap Take 1 capsule (0.4 mg total) by mouth once daily.     Family History     Reviewed and not pertinent.         Tobacco Use    Smoking status: Never Smoker    Smokeless tobacco: Never Used   Substance and Sexual Activity    Alcohol use: No    Drug use: No    Sexual activity: Not Currently     Review of Systems   Constitutional: Negative for chills, diaphoresis, fatigue and fever.   HENT: Negative for congestion, postnasal drip, rhinorrhea, sinus pressure and sore throat.    Respiratory: Positive for shortness of breath. Negative for cough and wheezing.    Cardiovascular: Positive for leg swelling. Negative for chest pain and palpitations.        (+) Bilateral upper extremity edema.   Gastrointestinal: Negative for abdominal pain, diarrhea, nausea and vomiting.        (+) Abdominal wall edema.   Genitourinary: Positive for hematuria. Negative for decreased urine volume and dysuria.   Musculoskeletal: Negative for arthralgias, back pain and myalgias.   Skin: Negative for pallor and rash.   Neurological: Negative for dizziness, syncope, weakness, light-headedness, numbness and headaches.    Psychiatric/Behavioral: Negative for confusion. The patient is not nervous/anxious.    All other systems reviewed and are negative.    Objective:     Vital Signs (Most Recent):  Temp: 98.7 °F (37.1 °C) (11/08/20 0235)  Pulse: 61 (11/08/20 0249)  Resp: (!) 24 (11/08/20 0235)  BP: (!) 161/72 (11/08/20 0249)  SpO2: 100 % (11/08/20 0235) Vital Signs (24h Range):  Temp:  [98.7 °F (37.1 °C)-100.1 °F (37.8 °C)] 98.7 °F (37.1 °C)  Pulse:  [57-68] 61  Resp:  [17-25] 24  SpO2:  [98 %-100 %] 100 %  BP: (158-187)/(69-74) 161/72     Weight: 93.9 kg (207 lb)  Body mass index is 37.86 kg/m².    Physical Exam  Vitals signs and nursing note reviewed.   Constitutional:       General: He is awake. He is not in acute distress.     Appearance: Normal appearance. He is well-developed. He is not diaphoretic.   HENT:      Head: Normocephalic and atraumatic.   Eyes:      Conjunctiva/sclera: Conjunctivae normal.      Comments: PERRL; EOM intact.   Neck:      Musculoskeletal: Normal range of motion and neck supple.      Vascular: JVD present.   Cardiovascular:      Rate and Rhythm: Normal rate and regular rhythm.  No extrasystoles are present.     Heart sounds: S1 normal and S2 normal. No murmur.      Comments: Anasarca.  Pulmonary:      Effort: Pulmonary effort is normal. Tachypnea present. No accessory muscle usage or respiratory distress.      Breath sounds: Normal breath sounds and air entry. No wheezing, rhonchi or rales.   Abdominal:      General: Bowel sounds are normal. There is no distension.      Palpations: Abdomen is soft.      Tenderness: There is no abdominal tenderness. There is no guarding or rebound.      Comments: Anasarca.   Musculoskeletal: Normal range of motion.         General: No tenderness or deformity.      Right lower leg: 3+ Pitting Edema present.      Left lower leg: 3+ Pitting Edema present.   Skin:     General: Skin is warm and dry.      Capillary Refill: Capillary refill takes less than 2 seconds.       Findings: No erythema or rash.   Neurological:      General: No focal deficit present.      Mental Status: He is alert and oriented to person, place, and time.   Psychiatric:         Mood and Affect: Mood and affect normal.         Behavior: Behavior normal. Behavior is cooperative.             Significant Labs:  Results for orders placed or performed during the hospital encounter of 11/07/20   CBC auto differential   Result Value Ref Range    WBC 10.12 3.90 - 12.70 K/uL    RBC 2.39 (L) 4.60 - 6.20 M/uL    Hemoglobin 7.3 (L) 14.0 - 18.0 g/dL    Hematocrit 23.4 (L) 40.0 - 54.0 %    MCV 98 82 - 98 fL    MCH 30.5 27.0 - 31.0 pg    MCHC 31.2 (L) 32.0 - 36.0 g/dL    RDW 12.9 11.5 - 14.5 %    Platelets 280 150 - 350 K/uL    MPV 10.9 9.2 - 12.9 fL    Immature Granulocytes 1.1 (H) 0.0 - 0.5 %    Gran # (ANC) 8.0 (H) 1.8 - 7.7 K/uL    Immature Grans (Abs) 0.11 (H) 0.00 - 0.04 K/uL    Lymph # 0.9 (L) 1.0 - 4.8 K/uL    Mono # 0.9 0.3 - 1.0 K/uL    Eos # 0.1 0.0 - 0.5 K/uL    Baso # 0.06 0.00 - 0.20 K/uL    nRBC 0 0 /100 WBC    Gran % 79.0 (H) 38.0 - 73.0 %    Lymph % 8.9 (L) 18.0 - 48.0 %    Mono % 9.0 4.0 - 15.0 %    Eosinophil % 1.4 0.0 - 8.0 %    Basophil % 0.6 0.0 - 1.9 %    Differential Method Automated    Comprehensive metabolic panel   Result Value Ref Range    Sodium 144 136 - 145 mmol/L    Potassium 4.7 3.5 - 5.1 mmol/L    Chloride 114 (H) 95 - 110 mmol/L    CO2 20 (L) 23 - 29 mmol/L    Glucose 101 70 - 110 mg/dL    BUN 83 (H) 8 - 23 mg/dL    Creatinine 9.3 (H) 0.5 - 1.4 mg/dL    Calcium 8.1 (L) 8.7 - 10.5 mg/dL    Total Protein 6.1 6.0 - 8.4 g/dL    Albumin 2.4 (L) 3.5 - 5.2 g/dL    Total Bilirubin 0.3 0.1 - 1.0 mg/dL    Alkaline Phosphatase 44 (L) 55 - 135 U/L    AST 19 10 - 40 U/L    ALT 12 10 - 44 U/L    Anion Gap 10 8 - 16 mmol/L    eGFR if African American 6 (A) >60 mL/min/1.73 m^2    eGFR if non African American 5 (A) >60 mL/min/1.73 m^2   Troponin I #1   Result Value Ref Range    Troponin I 0.092 (H) 0.000 -  0.026 ng/mL   BNP   Result Value Ref Range     (H) 0 - 99 pg/mL   COVID-19 Rapid Screening   Result Value Ref Range    SARS-CoV-2 RNA, Amplification, Qual Negative Negative   Type & Screen   Result Value Ref Range    Group & Rh O POS     Indirect [place] NEG       All pertinent labs within the past 24 hours have been reviewed.    Significant Imaging:  Imaging Results          X-Ray Chest AP Portable (Final result)  Result time 11/07/20 22:46:04    Final result by Lul Watkins MD (11/07/20 22:46:04)                 Impression:      Probable pulmonary edema.      Electronically signed by: Lul Watkins  Date:    11/07/2020  Time:    22:46             Narrative:    EXAMINATION:  XR CHEST AP PORTABLE    CLINICAL HISTORY:  shortness of breath;    TECHNIQUE:  Single frontal view of the chest was performed.    COMPARISON:  11/02/2020.    FINDINGS:  Bilateral pleural fluid collections and increased interstitial attenuation lung bases concerning for early pulmonary edema.    The cardiac silhouette is borderline enlarged. The hilar and mediastinal contours are unremarkable.    Degenerative change of the shoulder.                               I have reviewed all pertinent imaging results/findings within the past 24 hours.     EKG: (personally reviewed)  Sinus rhythm with marked sinus arrhythmia, nonspecific T-wave abnormality.  No significant change from previous tracings.

## 2020-11-08 NOTE — ASSESSMENT & PLAN NOTE
- Secondary to worsening CKD.  Patient progressing towards dialysis.  Nephrology consult.  - 100 mg IV Lasix given in the ED with 800 mL diuresed.  Continue diuresis with 60 mg IV Lasix Q 12.  - Blood pressure control.  - Strict I&Os, daily weights, Na/fluid restriction.  - Recent 2D echo report reviewed.

## 2020-11-08 NOTE — ASSESSMENT & PLAN NOTE
- Patient with chronic hematuria and chronic indwelling Noe catheter.    - Avoid antiplatelets/anticoagulation.  - Follow-up with urology outpatient.

## 2020-11-08 NOTE — SIGNIFICANT EVENT
Hemoglobin this a.m. less than 7 will transfuse 1 unit packed RBCs.  Nephro consult on case.  Will await further recommendations.

## 2020-11-08 NOTE — ASSESSMENT & PLAN NOTE
- Continue home basal insulin at decreased dose of 15 units nightly.  - Accu-Cheks with low-dose SSI.  - Diabetic diet.  - Recent HbA1c of 6.

## 2020-11-08 NOTE — CONSULTS
"[facility] -   Nephrology  Consult Note          Patient Name: Colt Stacy Jr.  MRN: [id number]  Admission Date: 11/7/2020  Hospital Length of Stay: 0 days  Attending Provider: Dago Vásquez MD   Primary Care Physician: Primary Doctor No  Principal Problem:Acute pulmonary edema    Consults  Subjective:     HPI: 75 y.o. male patient with a PMHx of CKD stage V not on dialysis, CAD, HLD, HTN, DM II, h/o CVA, left adrenal adenoma s/p adrenalectomy on 10/20, BPH, chronic hematuria with chronic indwelling greenfield catheter, and vitiligo who presented to the Emergency Department with c/o SOB that has progressively worsened and LE edema.     Nephrology was consulted to help with patient's renal care while he is admitted at [facility]. I saw and examined patient in his hospital room. Patient confirms SOB and LE edema.     Chart review revealed that patient's renal function has leveled off with creatinine at 9-11 over the past 2 weeks. Patient with indwelling Greenfield for urinary obstruction.     Past Medical History:   Diagnosis Date    Adrenal adenoma, left     BPH (benign prostatic hyperplasia)     CAD (coronary artery disease)     CKD (chronic kidney disease) stage 5, GFR less than 15 ml/min     Depression     Diabetes mellitus     Hyperlipidemia     Hypertension     Retinitis     Stroke     Pt states residual sx is "I walk funny".    Vitiligo        Past Surgical History:   Procedure Laterality Date    FLUOROSCOPY Bilateral 7/20/2020    Procedure: Angiogram- Bilateral Adrenal;  Surgeon: Jose R Laws MD;  Location: [facility] CATH LAB;  Service: General;  Laterality: Bilateral;    ROBOT-ASSISTED ADRENALECTOMY Left     ROBOT-ASSISTED SURGICAL REMOVAL OF ADRENAL GLAND USING DA DEMETRI XI Left 10/20/2020    Procedure: XI ROBOTIC ADRENALECTOMY;  Surgeon: Livan Quiñones MD;  Location: [facility] OR;  Service: General;  Laterality: Left;       Review of patient's allergies indicates:   Allergen Reactions    Benadryl " [diphenhydramine hcl] Other (See Comments)     Pt states benadryl makes him feel numb    Ace inhibitors      YIN     Current Facility-Administered Medications   Medication Frequency    0.9%  NaCl infusion (for blood administration) Q24H PRN    acetaminophen tablet 650 mg Q6H PRN    ARIPiprazole tablet 30 mg QHS    aspirin EC tablet 81 mg QHS    atorvastatin tablet 80 mg Daily    cloNIDine tablet 0.2 mg Q6H PRN    dextrose 50% injection 12.5 g PRN    dextrose 50% injection 25 g PRN    furosemide injection 60 mg Q12H    glucose chewable tablet 16 g PRN    glucose chewable tablet 24 g PRN    hydrALAZINE tablet 75 mg Q8H    insulin aspart U-100 pen 0-5 Units QID (AC + HS) PRN    insulin detemir U-100 pen 15 Units QHS    Lactobacillus acidoph-L.bulgar 1 million cell tablet 4 tablet TID WM    metoprolol tartrate (LOPRESSOR) split tablet 12.5 mg BID    NIFEdipine 24 hr tablet 60 mg Daily    ondansetron injection 4 mg Q8H PRN    sertraline tablet 100 mg Daily    sevelamer carbonate tablet 800 mg TID WM    sodium chloride 0.9% flush 10 mL PRN    tamsulosin 24 hr capsule 0.4 mg Daily     Facility-Administered Medications Ordered in Other Encounters   Medication Frequency    diphenhydrAMINE injection 25 mg Q6H PRN    metoclopramide HCl injection 10 mg Q10 Min PRN     Family History     None        Tobacco Use    Smoking status: Never Smoker    Smokeless tobacco: Never Used   Substance and Sexual Activity    Alcohol use: No    Drug use: No    Sexual activity: Not Currently     Review of Systems   Constitutional: Negative for fever.   HENT: Negative for congestion.    Eyes: Negative for visual disturbance.   Respiratory: Positive for shortness of breath. Negative for cough and wheezing.    Cardiovascular: Positive for leg swelling. Negative for chest pain and palpitations.   Gastrointestinal: Negative for abdominal pain, diarrhea, nausea and vomiting.   Genitourinary: Negative for difficulty  urinating and dysuria.   Musculoskeletal: Negative for joint swelling.   Skin: Negative for rash.   Neurological: Negative for weakness and headaches.     Objective:     Vital Signs (Most Recent):  Temp: 98.8 °F (37.1 °C) (11/08/20 0720)  Pulse: 62 (11/08/20 0928)  Resp: 18 (11/08/20 0858)  BP: 126/60 (11/08/20 0928)  SpO2: (!) 92 % (11/08/20 0858)  O2 Device (Oxygen Therapy): nasal cannula (11/08/20 0858) Vital Signs (24h Range):  Temp:  [98.7 °F (37.1 °C)-100.1 °F (37.8 °C)] 98.8 °F (37.1 °C)  Pulse:  [57-79] 62  Resp:  [17-25] 18  SpO2:  [92 %-100 %] 92 %  BP: (126-210)/(60-91) 126/60     Weight: 91.3 kg (201 lb 4.5 oz) (11/08/20 0558)  Body mass index is 36.81 kg/m².  Body surface area is 2 meters squared.    I/O last 3 completed shifts:  In: -   Out: 800 [Urine:800]    Physical Exam  Constitutional:       Appearance: He is well-developed.   HENT:      Head: Normocephalic.      Nose: No rhinorrhea.      Mouth/Throat:      Pharynx: No oropharyngeal exudate.   Eyes:      Pupils: Pupils are equal, round, and reactive to light.   Neck:      Thyroid: No thyroid mass.   Cardiovascular:      Rate and Rhythm: Normal rate and regular rhythm.      Heart sounds: S1 normal and S2 normal.   Pulmonary:      Effort: Pulmonary effort is normal. No respiratory distress.      Breath sounds: No wheezing.   Abdominal:      General: Bowel sounds are normal. There is no distension.      Palpations: Abdomen is soft.      Tenderness: There is no abdominal tenderness.      Hernia: No hernia is present.   Musculoskeletal:      Right lower leg: Edema present.      Left lower leg: Edema present.   Lymphadenopathy:      Cervical: No cervical adenopathy.   Skin:     General: Skin is warm and dry.   Neurological:      Mental Status: He is alert.   Psychiatric:         Behavior: Behavior is cooperative.         Significant Labs:  Lab Results   Component Value Date    CREATININE 9.0 (H) 11/08/2020    BUN 87 (H) 11/08/2020     11/08/2020     K 4.3 11/08/2020     (H) 11/08/2020    CO2 19 (L) 11/08/2020     Lab Results   Component Value Date    .0 (H) 09/04/2020    CALCIUM 8.3 (L) 11/08/2020    PHOS 5.1 (H) 11/08/2020    PHOS 5.1 (H) 11/08/2020     Lab Results   Component Value Date    ALBUMIN 2.2 (L) 11/08/2020     Lab Results   Component Value Date    WBC 7.75 11/08/2020    HGB 6.9 (L) 11/08/2020    HCT 22.9 (L) 11/08/2020    MCV 99 (H) 11/08/2020     11/08/2020       Recent Labs   Lab 11/08/20  0831   MG 2.1         Significant Imaging:  Imaging Results          X-Ray Chest AP Portable (Final result)  Result time 11/07/20 22:46:04    Final result by Lul Watkins MD (11/07/20 22:46:04)                 Impression:      Probable pulmonary edema.      Electronically signed by: Lul Watkins  Date:    11/07/2020  Time:    22:46             Narrative:    EXAMINATION:  XR CHEST AP PORTABLE    CLINICAL HISTORY:  shortness of breath;    TECHNIQUE:  Single frontal view of the chest was performed.    COMPARISON:  11/02/2020.    FINDINGS:  Bilateral pleural fluid collections and increased interstitial attenuation lung bases concerning for early pulmonary edema.    The cardiac silhouette is borderline enlarged. The hilar and mediastinal contours are unremarkable.    Degenerative change of the shoulder.                              Assessment/Plan:     * Acute pulmonary edema and anasarca  Will initiate hemodialysis to remove excess fluid. OK to continue IV Lasix.     Disorder of electrolytes  Corrected calcium OK.    Mild hyperphosphatemia: continue sevelamer.     Anemia due to chronic kidney disease  Will give Epogen with HD. Will check iron profile.     YIN on CKD, stage V not on dialysis  Patient's renal function has not improved over past 2 weeks. Patient likely ESRD now.    Will initiate dialysis today.    Access: Vascular surgery was consulted for access placement.     Gross hematuria  Patient with chronic indwelling Noe. Urology  follow-up as outpatient.         Thank you for your consult. I will follow-up with patient. Please contact us if you have any additional questions.    Soren Contreras MD   Nephrology  [facility]

## 2020-11-09 LAB
ALBUMIN SERPL BCP-MCNC: 2.1 G/DL (ref 3.5–5.2)
ANION GAP SERPL CALC-SCNC: 9 MMOL/L (ref 8–16)
BASOPHILS # BLD AUTO: 0.04 K/UL (ref 0–0.2)
BASOPHILS NFR BLD: 0.5 % (ref 0–1.9)
BUN SERPL-MCNC: 66 MG/DL (ref 8–23)
CALCIUM SERPL-MCNC: 8.1 MG/DL (ref 8.7–10.5)
CHLORIDE SERPL-SCNC: 108 MMOL/L (ref 95–110)
CO2 SERPL-SCNC: 25 MMOL/L (ref 23–29)
CREAT SERPL-MCNC: 7.4 MG/DL (ref 0.5–1.4)
DIFFERENTIAL METHOD: ABNORMAL
EOSINOPHIL # BLD AUTO: 0.2 K/UL (ref 0–0.5)
EOSINOPHIL NFR BLD: 1.9 % (ref 0–8)
ERYTHROCYTE [DISTWIDTH] IN BLOOD BY AUTOMATED COUNT: 13.3 % (ref 11.5–14.5)
EST. GFR  (AFRICAN AMERICAN): 8 ML/MIN/1.73 M^2
EST. GFR  (NON AFRICAN AMERICAN): 7 ML/MIN/1.73 M^2
GLUCOSE SERPL-MCNC: 117 MG/DL (ref 70–110)
HAV IGM SERPL QL IA: NEGATIVE
HBV CORE AB SERPL QL IA: NEGATIVE
HBV CORE IGM SERPL QL IA: NEGATIVE
HBV SURFACE AG SERPL QL IA: NEGATIVE
HCT VFR BLD AUTO: 25.1 % (ref 40–54)
HCV AB SERPL QL IA: NEGATIVE
HGB BLD-MCNC: 7.8 G/DL (ref 14–18)
IMM GRANULOCYTES # BLD AUTO: 0.06 K/UL (ref 0–0.04)
IMM GRANULOCYTES NFR BLD AUTO: 0.8 % (ref 0–0.5)
LYMPHOCYTES # BLD AUTO: 0.8 K/UL (ref 1–4.8)
LYMPHOCYTES NFR BLD: 9.5 % (ref 18–48)
MAGNESIUM SERPL-MCNC: 1.9 MG/DL (ref 1.6–2.6)
MCH RBC QN AUTO: 30.1 PG (ref 27–31)
MCHC RBC AUTO-ENTMCNC: 31.1 G/DL (ref 32–36)
MCV RBC AUTO: 97 FL (ref 82–98)
MONOCYTES # BLD AUTO: 0.7 K/UL (ref 0.3–1)
MONOCYTES NFR BLD: 8.3 % (ref 4–15)
NEUTROPHILS # BLD AUTO: 6.2 K/UL (ref 1.8–7.7)
NEUTROPHILS NFR BLD: 79 % (ref 38–73)
NRBC BLD-RTO: 0 /100 WBC
PHOSPHATE SERPL-MCNC: 4.7 MG/DL (ref 2.7–4.5)
PHOSPHATE SERPL-MCNC: 4.7 MG/DL (ref 2.7–4.5)
PLATELET # BLD AUTO: 256 K/UL (ref 150–350)
PMV BLD AUTO: 10.8 FL (ref 9.2–12.9)
POCT GLUCOSE: 124 MG/DL (ref 70–110)
POCT GLUCOSE: 128 MG/DL (ref 70–110)
POCT GLUCOSE: 70 MG/DL (ref 70–110)
POTASSIUM SERPL-SCNC: 4 MMOL/L (ref 3.5–5.1)
RBC # BLD AUTO: 2.59 M/UL (ref 4.6–6.2)
SODIUM SERPL-SCNC: 142 MMOL/L (ref 136–145)
WBC # BLD AUTO: 7.87 K/UL (ref 3.9–12.7)

## 2020-11-09 PROCEDURE — 97162 PT EVAL MOD COMPLEX 30 MIN: CPT

## 2020-11-09 PROCEDURE — 80100014 HC HEMODIALYSIS 1:1

## 2020-11-09 PROCEDURE — 80069 RENAL FUNCTION PANEL: CPT

## 2020-11-09 PROCEDURE — 99233 PR SUBSEQUENT HOSPITAL CARE,LEVL III: ICD-10-PCS | Mod: ,,, | Performed by: INTERNAL MEDICINE

## 2020-11-09 PROCEDURE — 85025 COMPLETE CBC W/AUTO DIFF WBC: CPT

## 2020-11-09 PROCEDURE — 83735 ASSAY OF MAGNESIUM: CPT

## 2020-11-09 PROCEDURE — 63600175 PHARM REV CODE 636 W HCPCS: Performed by: NURSE PRACTITIONER

## 2020-11-09 PROCEDURE — 96376 TX/PRO/DX INJ SAME DRUG ADON: CPT

## 2020-11-09 PROCEDURE — 99900035 HC TECH TIME PER 15 MIN (STAT)

## 2020-11-09 PROCEDURE — 21400001 HC TELEMETRY ROOM

## 2020-11-09 PROCEDURE — 96372 THER/PROPH/DIAG INJ SC/IM: CPT

## 2020-11-09 PROCEDURE — 99233 SBSQ HOSP IP/OBS HIGH 50: CPT | Mod: ,,, | Performed by: INTERNAL MEDICINE

## 2020-11-09 PROCEDURE — 36415 COLL VENOUS BLD VENIPUNCTURE: CPT

## 2020-11-09 PROCEDURE — 25000003 PHARM REV CODE 250: Performed by: FAMILY MEDICINE

## 2020-11-09 PROCEDURE — 63600175 PHARM REV CODE 636 W HCPCS: Performed by: INTERNAL MEDICINE

## 2020-11-09 PROCEDURE — 94760 N-INVAS EAR/PLS OXIMETRY 1: CPT

## 2020-11-09 PROCEDURE — 97110 THERAPEUTIC EXERCISES: CPT

## 2020-11-09 PROCEDURE — 27000221 HC OXYGEN, UP TO 24 HOURS

## 2020-11-09 PROCEDURE — 97530 THERAPEUTIC ACTIVITIES: CPT

## 2020-11-09 PROCEDURE — 25000003 PHARM REV CODE 250: Performed by: NURSE PRACTITIONER

## 2020-11-09 RX ORDER — SODIUM CHLORIDE 9 MG/ML
INJECTION, SOLUTION INTRAVENOUS ONCE
Status: DISCONTINUED | OUTPATIENT
Start: 2020-11-09 | End: 2020-11-13 | Stop reason: HOSPADM

## 2020-11-09 RX ORDER — SODIUM CHLORIDE 9 MG/ML
INJECTION, SOLUTION INTRAVENOUS
Status: DISCONTINUED | OUTPATIENT
Start: 2020-11-09 | End: 2020-11-13 | Stop reason: HOSPADM

## 2020-11-09 RX ORDER — HEPARIN SODIUM 1000 [USP'U]/ML
3200 INJECTION, SOLUTION INTRAVENOUS; SUBCUTANEOUS
Status: DISCONTINUED | OUTPATIENT
Start: 2020-11-09 | End: 2020-11-13 | Stop reason: HOSPADM

## 2020-11-09 RX ADMIN — LACTOBACILLUS TAB 4 TABLET: TAB at 08:11

## 2020-11-09 RX ADMIN — ACETAMINOPHEN 650 MG: 325 TABLET ORAL at 04:11

## 2020-11-09 RX ADMIN — SEVELAMER CARBONATE 800 MG: 800 TABLET, FILM COATED ORAL at 05:11

## 2020-11-09 RX ADMIN — EPOETIN ALFA-EPBX 10000 UNITS: 10000 INJECTION, SOLUTION INTRAVENOUS; SUBCUTANEOUS at 06:11

## 2020-11-09 RX ADMIN — NIFEDIPINE 60 MG: 30 TABLET, FILM COATED, EXTENDED RELEASE ORAL at 08:11

## 2020-11-09 RX ADMIN — HYDRALAZINE HYDROCHLORIDE 75 MG: 25 TABLET, FILM COATED ORAL at 05:11

## 2020-11-09 RX ADMIN — METOPROLOL TARTRATE 12.5 MG: 25 TABLET, FILM COATED ORAL at 08:11

## 2020-11-09 RX ADMIN — FUROSEMIDE 60 MG: 40 TABLET ORAL at 05:11

## 2020-11-09 RX ADMIN — HYDRALAZINE HYDROCHLORIDE 75 MG: 25 TABLET, FILM COATED ORAL at 09:11

## 2020-11-09 RX ADMIN — MUPIROCIN: 20 OINTMENT TOPICAL at 08:11

## 2020-11-09 RX ADMIN — TAMSULOSIN HYDROCHLORIDE 0.4 MG: 0.4 CAPSULE ORAL at 08:11

## 2020-11-09 RX ADMIN — METOPROLOL TARTRATE 12.5 MG: 25 TABLET, FILM COATED ORAL at 09:11

## 2020-11-09 RX ADMIN — SERTRALINE HYDROCHLORIDE 100 MG: 50 TABLET ORAL at 08:11

## 2020-11-09 RX ADMIN — SEVELAMER CARBONATE 800 MG: 800 TABLET, FILM COATED ORAL at 08:11

## 2020-11-09 RX ADMIN — FUROSEMIDE 60 MG: 10 INJECTION, SOLUTION INTRAMUSCULAR; INTRAVENOUS at 08:11

## 2020-11-09 RX ADMIN — LACTOBACILLUS TAB 4 TABLET: TAB at 01:11

## 2020-11-09 RX ADMIN — ASPIRIN 81 MG: 81 TABLET, COATED ORAL at 09:11

## 2020-11-09 RX ADMIN — SEVELAMER CARBONATE 800 MG: 800 TABLET, FILM COATED ORAL at 01:11

## 2020-11-09 RX ADMIN — ATORVASTATIN CALCIUM 80 MG: 40 TABLET, FILM COATED ORAL at 08:11

## 2020-11-09 RX ADMIN — MUPIROCIN: 20 OINTMENT TOPICAL at 09:11

## 2020-11-09 RX ADMIN — LACTOBACILLUS TAB 4 TABLET: TAB at 05:11

## 2020-11-09 RX ADMIN — HYDRALAZINE HYDROCHLORIDE 75 MG: 25 TABLET, FILM COATED ORAL at 01:11

## 2020-11-09 RX ADMIN — ARIPIPRAZOLE 30 MG: 5 TABLET ORAL at 09:11

## 2020-11-09 RX ADMIN — HEPARIN SODIUM 3200 UNITS: 1000 INJECTION, SOLUTION INTRAVENOUS; SUBCUTANEOUS at 06:11

## 2020-11-09 NOTE — ASSESSMENT & PLAN NOTE
- Secondary to worsening CKD.  Patient progressing towards dialysis.  Nephrology consult.  - 100 mg IV Lasix given in the ED with 800 mL diuresed.  Continue diuresis with 60 mg IV Lasix Q 12.  - Blood pressure control.  - Strict I&Os, daily weights, Na/fluid restriction.  - Recent 2D echo report reviewed.    11/9:  Tolerated dialysis yesterday  Plan for dialysis again today per nephro  Case management on case  Will need outpatient dialysis established

## 2020-11-09 NOTE — ASSESSMENT & PLAN NOTE
- Initial creatinine of 9.3.  Patient will likely need RRT in near future.  - Nephrology consult.  - Avoid nephrotoxic agents.  - Follow labs.    11/9:  Dialysis per nephro

## 2020-11-09 NOTE — HOSPITAL COURSE
11/9: dialyzed yesterday by nephro. Will need outpatient dialysis established prior to dc. Iron deficiency noted. Venofer given.   11/10: greenfield removed yesterday, concerned with UTI. Rocephin started. Establishing outpatient dialysis. Concerned with tightening of foreskin however patient able to urinate. Will need outpatient f/u with urology.   11/11:  Greenfield placed for urinary retention. Will need outpatient dialysis established. F/u with urology outpatient.   11/12: awaiting outpatient dialysis to be established. Continue rocephin for uti. Urine culture will need to be added on to u/a. Urine culture ordered. Bleeding and discharge still noted around meatus. Will consult urology.   11/13 Pt was seen and examined at bedside . He was determined to be suitable for d/c   He is schedule for outpt HD  He will be d/c with the greenfield per urology recs . He will be d/c on po cipro for UTI .  The Lantuss and short acting insulin  units were decreased accordantly

## 2020-11-09 NOTE — PROGRESS NOTES
[facility] Hospital Medicine  Progress Note    Patient Name: Colt Stacy Jr.  MRN: [id number]  Patient Class: IP- Inpatient   Admission Date: 11/7/2020  Length of Stay: 0 days  Attending Physician: Dago Vásquez MD  Primary Care Provider: Primary Doctor No        Subjective:     Principal Problem:Acute pulmonary edema        HPI:  Colt Stacy Jr. is a 75 y.o. male patient with a PMHx of CKD stage V not on dialysis, CAD, HLD, HTN, DM II, h/o CVA, left adrenal adenoma s/p adrenalectomy on 10/20, BPH, chronic hematuria with chronic indwelling greenfield catheter, and vitiligo who presented to the Emergency Department with c/o SOB that has progressively worsened over the past few hours.  No aggravating or alleviating factors.  Associated edema to bilateral lower extremities, bilateral upper extremities, and abdomen.  Denies any chest pain, palpitations, cough, wheezing, congestion, orthopnea, PND, weight gain, abdominal pain, nausea, vomit in, diarrhea, worsening hematuria, decreased UOP, back pain, lightheadedness or dizziness, syncope, weakness, fever or chills. Patient is on 2L O2 at home.  in the ED, patient's O2 sat remained stable on 2 L nasal cannula with some mild respiratory distress noted with RR 22-25.  Initial workup showed:  Normal WBC, H&H 7.3/23.4, creatinine 9.3, BUN 83, potassium 4.7, CO2 20, , troponin 0.092, COVID negative.  EKG unremarkable. CXR showed mild pulmonary edema.  100 mg IV Lasix given in the ED with 800 mL diuresed.  Case discussed with Nephrology who agrees with diuresis and will see patient in consult.  Hospital Medicine was contacted for admission and patient placed in observation for acute pulmonary edema and worsening CKD stage V.  Patient is a full code.  His daughter is SDM.      Overview/Hospital Course:  11/9: dialyzed yesterday by nephro. Will need outpatient dialysis established prior to dc. Iron deficiency noted. Venofer given.     Interval History: Tolerated  dialysis yesterday. Reports improvement in symptoms.     Review of Systems   Constitutional: Negative for fatigue and fever.   HENT: Negative for sinus pressure.    Eyes: Negative for visual disturbance.   Respiratory: Positive for shortness of breath (improved).    Cardiovascular: Negative for chest pain.   Gastrointestinal: Negative for nausea and vomiting.   Genitourinary: Negative for difficulty urinating.   Musculoskeletal: Negative for back pain.   Skin: Negative for rash.   Neurological: Negative for headaches.   Psychiatric/Behavioral: Negative for confusion.     Objective:     Vital Signs (Most Recent):  Temp: 98.1 °F (36.7 °C) (11/09/20 1123)  Pulse: 64 (11/09/20 1123)  Resp: 18 (11/09/20 1123)  BP: (!) 140/63 (11/09/20 1123)  SpO2: 99 % (11/09/20 1123) Vital Signs (24h Range):  Temp:  [97.2 °F (36.2 °C)-99.5 °F (37.5 °C)] 98.1 °F (36.7 °C)  Pulse:  [55-76] 64  Resp:  [16-20] 18  SpO2:  [95 %-100 %] 99 %  BP: (132-173)/(49-91) 140/63     Weight: 89.9 kg (198 lb 3.1 oz)  Body mass index is 36.25 kg/m².    Intake/Output Summary (Last 24 hours) at 11/9/2020 1235  Last data filed at 11/9/2020 0400  Gross per 24 hour   Intake 1276 ml   Output 3950 ml   Net -2674 ml      Physical Exam  Vitals signs and nursing note reviewed.   Constitutional:       General: He is awake. He is not in acute distress.     Appearance: Normal appearance. He is well-developed. He is not diaphoretic.   HENT:      Head: Normocephalic and atraumatic.   Eyes:      Conjunctiva/sclera: Conjunctivae normal.      Comments: PERRL; EOM intact.   Neck:      Musculoskeletal: Normal range of motion and neck supple.      Vascular: JVD present.   Cardiovascular:      Rate and Rhythm: Normal rate and regular rhythm.  No extrasystoles are present.     Heart sounds: S1 normal and S2 normal. No murmur.      Comments: Anasarca.  Pulmonary:      Effort: Pulmonary effort is normal. Tachypnea present. No accessory muscle usage or respiratory distress.       Breath sounds: Normal breath sounds and air entry. No wheezing, rhonchi or rales.   Abdominal:      General: Bowel sounds are normal. There is no distension.      Palpations: Abdomen is soft.      Tenderness: There is no abdominal tenderness. There is no guarding or rebound.      Comments: Anasarca.   Musculoskeletal: Normal range of motion.         General: No tenderness or deformity.      Right lower leg: 3+ Pitting Edema present.      Left lower leg: 3+ Pitting Edema present.   Skin:     General: Skin is warm and dry.      Capillary Refill: Capillary refill takes less than 2 seconds.      Findings: No erythema or rash.   Neurological:      General: No focal deficit present.      Mental Status: He is alert and oriented to person, place, and time.   Psychiatric:         Mood and Affect: Mood and affect normal.         Behavior: Behavior normal. Behavior is cooperative.         Significant Labs:   Recent Lab Results       11/09/20  0611   11/09/20  0524   11/08/20  2058   11/08/20  1804        Albumin 2.1           Anion Gap 9           Baso # 0.04           Basophil % 0.5           BUN 66           Calcium 8.1           Chloride 108           CO2 25           Creatinine 7.4           Differential Method Automated           eGFR if  8           eGFR if non  7  Comment:  Calculation used to obtain the estimated glomerular filtration  rate (eGFR) is the CKD-EPI equation.              Eos # 0.2           Eosinophil % 1.9           Glucose 117           Gran # (ANC) 6.2           Gran % 79.0           Hematocrit 25.1           Hemoglobin 7.8           Immature Grans (Abs) 0.06  Comment:  Mild elevation in immature granulocytes is non specific and   can be seen in a variety of conditions including stress response,   acute inflammation, trauma and pregnancy. Correlation with other   laboratory and clinical findings is essential.             Immature Granulocytes 0.8           Lymph # 0.8            Lymph % 9.5           Magnesium 1.9           MCH 30.1           MCHC 31.1           MCV 97           Mono # 0.7           Mono % 8.3           MPV 10.8           nRBC 0           Phosphorus 4.7            4.7           Platelets 256           POCT Glucose   70 111 113     Potassium 4.0           RBC 2.59           RDW 13.3           Sodium 142           WBC 7.87               All pertinent labs within the past 24 hours have been reviewed.    Significant Imaging: I have reviewed all pertinent imaging results/findings within the past 24 hours.      Assessment/Plan:      * Acute pulmonary edema and anasarca  - Secondary to worsening CKD.  Patient progressing towards dialysis.  Nephrology consult.  - 100 mg IV Lasix given in the ED with 800 mL diuresed.  Continue diuresis with 60 mg IV Lasix Q 12.  - Blood pressure control.  - Strict I&Os, daily weights, Na/fluid restriction.  - Recent 2D echo report reviewed.    11/9:  Tolerated dialysis yesterday  Plan for dialysis again today per nephro  Case management on case  Will need outpatient dialysis established     Anemia due to chronic kidney disease  - H&H below baseline at 7.3/23.4.  Follow CBC and transfuse as needed.    YIN on CKD, stage V not on dialysis  - Initial creatinine of 9.3.  Patient will likely need RRT in near future.  - Nephrology consult.  - Avoid nephrotoxic agents.  - Follow labs.    11/9:  Dialysis per nephro     Gross hematuria  - Patient with chronic hematuria and chronic indwelling Noe catheter.    - Avoid antiplatelets/anticoagulation.  - Follow-up with urology outpatient.    Type 2 diabetes mellitus, with long-term current use of insulin  - Continue home basal insulin at decreased dose of 15 units nightly.  - Accu-Cheks with low-dose SSI.  - Diabetic diet.  - Recent HbA1c of 6.    11/9:  Controlled  Cont sliding scale     Essential hypertension  - Blood pressure above goal, but stable on admission.  - Continue home nifedipine, Lopressor,  and hydralazine.  Follow BP trends and optimize as needed.  - Diuretics as above.  - Clonidine as needed.      VTE Risk Mitigation (From admission, onward)         Ordered     IP VTE HIGH RISK PATIENT  Once      11/08/20 0226     Place sequential compression device  Until discontinued      11/08/20 0226     Reason for No Pharmacological VTE Prophylaxis  Once     Question:  Reasons:  Answer:  Active Bleeding    11/08/20 0226                Discharge Planning   RICH:      Code Status: Full Code   Is the patient medically ready for discharge?:     Reason for patient still in hospital (select all that apply): Patient trending condition and Other (specify) outpatient dialysis established   Discharge Plan A: Skilled Nursing Facility                  Dago Vásquez MD  Department of Hospital Medicine   [facility]

## 2020-11-09 NOTE — PLAN OF CARE
CM met with patient/sister at the bedside to assess for discharge needs.  Patient was living at home alone prior to this admission.  He uses a rolling walker and oxygen at home.  He stated that he is very weak and may require placement.  He may need outpatient HD following this admission.  [facility]  CM will continue to follow.  BRIAN provided a transitional care folder, information on advanced directives, information on pharmacy bedside delivery, and discharge planning begins on admission with contact information for any needs/questions.     D/C Plan: SNF vs HH  [facility]   [facility]  Discharge transportation: Family  [facility]: Pending  Pharmacy Bedside Delivery: yes     11/09/20 1000   Discharge Assessment   Assessment Type Discharge Planning Assessment   Confirmed/corrected address and phone number on facesheet? Yes   Assessment information obtained from? Patient;Caregiver;Medical Record   Expected Length of Stay (days)   (TBD)   Communicated expected length of stay with patient/caregiver yes   Prior to hospitilization cognitive status: Alert/Oriented   Prior to hospitalization functional status: Assistive Equipment   Current cognitive status: Alert/Oriented   Current Functional Status: Needs Assistance   Facility Arrived From: Home   Lives With alone   Able to Return to Prior Arrangements yes   Is patient able to care for self after discharge? Yes   Who are your caregiver(s) and their phone number(s)? Cris Kruse, Daughter [phone]   Patient's perception of discharge disposition skilled nursing facility   Patient currently being followed by outpatient case management? No   Patient currently receives any other outside agency services? No   Equipment Currently Used at Home walker, rolling;oxygen   Do you have any problems affording any of your prescribed medications? No   Is the patient taking medications as prescribed? yes    Does the patient have transportation home? Yes   Transportation Anticipated family or friend will provide   Dialysis Name and Scheduled days NA, HD may be needed outpatient   Does the patient receive services at the [facility]? No   Discharge Plan A Skilled Nursing Facility   Discharge Plan B Home Health   DME Needed Upon Discharge  none   Patient/Family in Agreement with Plan yes

## 2020-11-09 NOTE — PLAN OF CARE
Patient up in bed denies any pain at present. No Distress noted. VSS. Patient in bed, position changed [id number], to protect skin integrity.First day of dialysis began today, patient tolerated well. Patient has call light within reach, bed at lowest position. Will continue to monitor.

## 2020-11-09 NOTE — MEDICAL/APP STUDENT
[facility] [address]  History & Physical    Patient Name: Colt Stacy Jr.  MRN: [id number]  Admission Date: 11/09/2020  Attending Physician:       PCP:     Primary Doctor No    CC:     Chief Complaint   Patient presents with    Shortness of Breath     89 RA per [facility]       HISTORY OF PRESENT ILLNESS:     Colt Stacy Jr. is a 75 y.o. male that has a PMH of CAD, HTN, CKD, DM and stroke who presented to the ED with an acute increase in shortness of breath. He was walking back from the bathroom and could not catch his breath. He had to stop and sit down. He states he does have a history of dyspnea, but it is usually not this intense. He is also complaining of a new onset cough. At home, he is on 2L oxygen, sleeps with two pillows, and complains of orthopnea and paroxysmal nocturnal dyspnea. He denies any dizziness, n/v, chest pain, palpitations, or loss of consciousness.     He was recently in the hospital (11/5/20) for obs after c/o hematuria at a follow up urology appointment. He was discharged home with greenfield cath in place and  to check renal function weekly due to YIN in the setting of CKD.         REVIEW OF SYSTEMS:     Review of Systems   Constitutional: Negative for chills and fever.   HENT: Negative for congestion and sore throat.    Eyes: Negative for blurred vision and double vision.   Respiratory: Positive for cough and shortness of breath. Negative for hemoptysis and sputum production.    Cardiovascular: Positive for orthopnea, leg swelling and PND. Negative for chest pain and palpitations.   Gastrointestinal: Positive for heartburn. Negative for abdominal pain, nausea and vomiting.   Genitourinary: Negative for frequency and urgency.   Musculoskeletal: Negative for back pain and neck pain.   Skin: Negative for itching and rash.   Neurological: Negative for dizziness, seizures, loss of consciousness and headaches.       PAST MEDICAL / SURGICAL HISTORY:     Past Medical History:  "  Diagnosis Date    Adrenal adenoma, left     BPH (benign prostatic hyperplasia)     CAD (coronary artery disease)     CKD (chronic kidney disease) stage 5, GFR less than 15 ml/min     Depression     Diabetes mellitus     Hyperlipidemia     Hypertension     Retinitis     Stroke     Pt states residual sx is "I walk funny".    Vitiligo      Past Surgical History:   Procedure Laterality Date    FLUOROSCOPY Bilateral 7/20/2020    Procedure: Angiogram- Bilateral Adrenal;  Surgeon: Jose R Laws MD;  Location: [facility] CATH LAB;  Service: General;  Laterality: Bilateral;    ROBOT-ASSISTED ADRENALECTOMY Left     ROBOT-ASSISTED SURGICAL REMOVAL OF ADRENAL GLAND USING DA DEMETRI XI Left 10/20/2020    Procedure: XI ROBOTIC ADRENALECTOMY;  Surgeon: Livan Quiñones MD;  Location: [facility] OR;  Service: General;  Laterality: Left;         FAMILY HISTORY:     History reviewed. No pertinent family history.      SOCIAL HISTORY:     Social History     Socioeconomic History    Marital status: Single     Spouse name: Not on file    Number of children: Not on file    Years of education: Not on file    Highest education level: Not on file   Occupational History    Not on file   Social Needs    Financial resource strain: Not on file    Food insecurity     Worry: Not on file     Inability: Not on file    Transportation needs     Medical: Not on file     Non-medical: Not on file   Tobacco Use    Smoking status: Never Smoker    Smokeless tobacco: Never Used   Substance and Sexual Activity    Alcohol use: No    Drug use: No    Sexual activity: Not Currently   Lifestyle    Physical activity     Days per week: Not on file     Minutes per session: Not on file    Stress: Not on file   Relationships    Social connections     Talks on phone: Not on file     Gets together: Not on file     Attends Cheondoism service: Not on file     Active member of club or organization: Not on file     Attends meetings of clubs or organizations: " Not on file     Relationship status: Not on file   Other Topics Concern    Not on file   Social History Narrative    Not on file         ALLERGIES:       Review of patient's allergies indicates:   Allergen Reactions    Benadryl [diphenhydramine hcl] Other (See Comments)     Pt states benadryl makes him feel numb    Ace inhibitors      YIN           HOME MEDICATIONS:     Prior to Admission medications    Medication Sig Start Date End Date Taking? Authorizing Provider   atorvastatin (LIPITOR) 80 MG tablet Take 1 tablet (80 mg total) by mouth once daily. 6/5/20 11/7/20 Yes Regina Babin MD   hydrALAZINE (APRESOLINE) 25 MG tablet Take 3 tablets (75 mg total) by mouth every 8 (eight) hours. 10/28/20 10/28/21 Yes Livan Quiñones MD   insulin aspart U-100 (NOVOLOG) 100 unit/mL injection Inject into the skin 2 (two) times a day. 4 units q a.m, 7 units at dinner   Yes Historical Provider   insulin detemir U-100 (LEVEMIR) 100 unit/mL injection Inject 31 Units into the skin every evening.    Yes Historical Provider   metoprolol tartrate (LOPRESSOR) 25 MG tablet Take 0.5 tablets (12.5 mg total) by mouth 2 (two) times daily. 10/28/20 10/28/21 Yes Livan Quiñones MD   multivitamin capsule Take 1 capsule by mouth once daily.   Yes Historical Provider   NIFEdipine (PROCARDIA-XL) 60 MG (OSM) 24 hr tablet Take 1 tablet (60 mg total) by mouth once daily. 10/28/20 10/28/21 Yes Livan Quiñones MD   sertraline (ZOLOFT) 100 MG tablet Take 100 mg by mouth once daily.    Yes Historical Provider   sevelamer carbonate (RENVELA) 800 mg Tab Take 1 tablet (800 mg total) by mouth 3 (three) times daily with meals. 10/28/20 10/28/21 Yes Livan Quiñones MD   ARIPiprazole (ABILIFY) 30 MG Tab Take 30 mg by mouth every evening.     Historical Provider   aspirin (ASPIR-81 ORAL) Take 81 mg by mouth every evening.     Historical Provider   diphenoxylate-atropine 2.5-0.025 mg (LOMOTIL) 2.5-0.025 mg per tablet Take 1 tablet by mouth 4 (four) times daily as  needed for Diarrhea. 10/30/20   Nabil Modi MD   HYDROcodone-acetaminophen (NORCO) 5-325 mg per tablet Take 1 tablet by mouth every 8 (eight) hours as needed.  Patient not taking: Reported on 11/2/2020 10/28/20   Livan Quiñones MD   Lactobacillus acidmedardo-L.bulgar 1 million cell Chew Take 4 tablets by mouth 3 (three) times daily with meals. 10/30/20   Nabil Modi MD   tamsulosin (FLOMAX) 0.4 mg Cap Take 1 capsule (0.4 mg total) by mouth once daily. 10/28/20 10/28/21  Livan Quiñones MD          HOSPITAL MEDICATIONS:     Scheduled Meds:    sodium chloride 0.9%   Intravenous Once    ARIPiprazole  30 mg Oral QHS    aspirin  81 mg Oral QHS    atorvastatin  80 mg Oral Daily    epoetin rebecca-ebpx (RETACRIT) injection  10,000 Units Subcutaneous Once    furosemide (LASIX) IV  60 mg Intravenous Q12H    hydrALAZINE  75 mg Oral Q8H    insulin detemir U-100  15 Units Subcutaneous QHS    iron sucrose  200 mg Intravenous Once in dialysis    Lactobacillus margaux-L.cecigar  4 tablet Oral TID WM    metoprolol tartrate  12.5 mg Oral BID    mupirocin   Nasal BID    NIFEdipine  60 mg Oral Daily    sertraline  100 mg Oral Daily    sevelamer carbonate  800 mg Oral TID WM    tamsulosin  0.4 mg Oral Daily     Continuous Infusions:   PRN Meds: sodium chloride, sodium chloride 0.9%, acetaminophen, cloNIDine, dextrose 50%, dextrose 50%, glucose, glucose, insulin aspart U-100, ondansetron, sodium chloride 0.9%      PHYSICAL EXAM:     Wt Readings from Last 1 Encounters:   11/09/20 0400 89.9 kg (198 lb 3.1 oz)   11/08/20 0558 91.3 kg (201 lb 4.5 oz)   11/08/20 0235 93 kg (205 lb 0.4 oz)   11/07/20 2222 93.9 kg (207 lb)     Body mass index is 36.25 kg/m².    Vital Signs (Most Recent)  Temp: 98.8 °F (37.1 °C) (11/09/20 0722)  Pulse: 76 (11/09/20 0722)  Resp: 20 (11/09/20 0722)  BP: (!) 152/70 (11/09/20 0722)  SpO2: 95 % (11/09/20 0800)    Vital Signs Range (Last 24H):  Temp:  [97.2 °F (36.2 °C)-99.5 °F (37.5 °C)]   Pulse:  [55-76]    Resp:  [16-24]   BP: (129-173)/(49-91)   SpO2:  [95 %-100 %]      Physical Exam  Constitutional:       General: He is not in acute distress.     Appearance: Normal appearance.   HENT:      Head: Normocephalic and atraumatic.      Nose: Nose normal. No congestion or rhinorrhea.      Mouth/Throat:      Mouth: Mucous membranes are moist.      Pharynx: Oropharynx is clear.   Eyes:      Extraocular Movements: Extraocular movements intact.      Pupils: Pupils are equal, round, and reactive to light.   Neck:      Musculoskeletal: Normal range of motion.   Cardiovascular:      Rate and Rhythm: Normal rate.      Heart sounds: Normal heart sounds.   Pulmonary:      Effort: Pulmonary effort is normal.      Breath sounds: Normal breath sounds.   Abdominal:      General: Bowel sounds are normal.      Palpations: Abdomen is soft.   Musculoskeletal: Normal range of motion.      Right lower leg: Edema (2+ pitting) present.      Left lower leg: Edema (2+ pitting) present.   Skin:     General: Skin is warm and dry.      Capillary Refill: Capillary refill takes less than 2 seconds.      Comments: vitiligo   Neurological:      Mental Status: He is alert and oriented to person, place, and time.           LABORATORY STUDIES:     Laboratory:  CBC:   Recent Labs   Lab 11/09/20  0611   WBC 7.87   RBC 2.59*   HGB 7.8*   HCT 25.1*      MCV 97   MCH 30.1   MCHC 31.1*     CMP:   Recent Labs   Lab 11/07/20  2233  11/09/20  0611      < > 117*   CALCIUM 8.1*   < > 8.1*   ALBUMIN 2.4*   < > 2.1*   PROT 6.1  --   --       < > 142   K 4.7   < > 4.0   CO2 20*   < > 25   *   < > 108   BUN 83*   < > 66*   CREATININE 9.3*   < > 7.4*   ALKPHOS 44*  --   --    ALT 12  --   --    AST 19  --   --    BILITOT 0.3  --   --     < > = values in this interval not displayed.             MICROBIOLOGY DATA:     No results found for: LABGENI, LABREFE, LABUPPE, LABURIN, LABAFB    Microbiology x 7d:   Microbiology Results (last 7 days)     **  No results found for the last 168 hours. **            IMAGING:     Imaging Results          X-Ray Chest AP Portable (Final result)  Result time 11/07/20 22:46:04    Final result by Lul Watkins MD (11/07/20 22:46:04)                 Impression:      Probable pulmonary edema.      Electronically signed by: Lul Watkins  Date:    11/07/2020  Time:    22:46             Narrative:    EXAMINATION:  XR CHEST AP PORTABLE    CLINICAL HISTORY:  shortness of breath;    TECHNIQUE:  Single frontal view of the chest was performed.    COMPARISON:  11/02/2020.    FINDINGS:  Bilateral pleural fluid collections and increased interstitial attenuation lung bases concerning for early pulmonary edema.    The cardiac silhouette is borderline enlarged. The hilar and mediastinal contours are unremarkable.    Degenerative change of the shoulder.                                  CONSULTS:     IP CONSULT TO NEPHROLOGY  IP CONSULT TO VASCULAR SURGERY       ASSESSMENT & PLAN:     Primary Diagnosis:  Acute pulmonary edema    Active Hospital Problems    Diagnosis  POA    *Acute pulmonary edema and anasarca [J81.0]  Yes    Anemia due to chronic kidney disease [N18.9, D63.1]  Yes     Chronic    Disorder of electrolytes [E87.8]  Yes    YIN on CKD, stage V not on dialysis [N17.9]  Yes    Gross hematuria [R31.0]  Yes    Type 2 diabetes mellitus, with long-term current use of insulin [E11.9, Z79.4]  Not Applicable     Chronic    Essential hypertension [I10]  Yes     Chronic      Resolved Hospital Problems   No resolved problems to display.             VTE Risk Mitigation (From admission, onward)         Ordered     IP VTE HIGH RISK PATIENT  Once      11/08/20 0226     Place sequential compression device  Until discontinued      11/08/20 0226     Reason for No Pharmacological VTE Prophylaxis  Once     Question:  Reasons:  Answer:  Active Bleeding    11/08/20 0226              ** Acute pulmonary edema   - Likely d/t worsening CKD   - Na/  fluid restriction     - D/c IV Lasix, start PO Lasix 60 mg BID     ** Chronic kidney disease   - Pt received dialysis 11/8/20    - Follow up with nephrology outpatient     ** Anemia   - Transfused 1 unit pRBC    - Hgb now up to 7.8, cont to monitor with daily CBC    **Hypertension    - Cont home medications: nifedipine, hydralazine, metoprolol    ** Diabetes    - Cont home basal insulin at 15 U nightly     - Low dose SSI    - Diabetic diet    ===============================================================    Emily Romero, MS-3

## 2020-11-09 NOTE — SUBJECTIVE & OBJECTIVE
Interval History:     11/8/20: Patient is feeling fine, no complaints.     Review of patient's allergies indicates:   Allergen Reactions    Benadryl [diphenhydramine hcl] Other (See Comments)     Pt states benadryl makes him feel numb    Ace inhibitors      YIN     Current Facility-Administered Medications   Medication Frequency    0.9%  NaCl infusion (for blood administration) Q24H PRN    0.9%  NaCl infusion PRN    0.9%  NaCl infusion Once    acetaminophen tablet 650 mg Q6H PRN    ARIPiprazole tablet 30 mg QHS    aspirin EC tablet 81 mg QHS    atorvastatin tablet 80 mg Daily    cloNIDine tablet 0.2 mg Q6H PRN    dextrose 50% injection 12.5 g PRN    dextrose 50% injection 25 g PRN    epoetin rebecca-epbx injection 10,000 Units Once    furosemide tablet 60 mg BID    glucose chewable tablet 16 g PRN    glucose chewable tablet 24 g PRN    hydrALAZINE tablet 75 mg Q8H    insulin aspart U-100 pen 0-5 Units QID (AC + HS) PRN    insulin detemir U-100 pen 15 Units QHS    iron sucrose injection 200 mg Once in dialysis    Lactobacillus acidoph-L.bulgar 1 million cell tablet 4 tablet TID WM    metoprolol tartrate (LOPRESSOR) split tablet 12.5 mg BID    mupirocin 2 % ointment BID    NIFEdipine 24 hr tablet 60 mg Daily    ondansetron injection 4 mg Q8H PRN    sertraline tablet 100 mg Daily    sevelamer carbonate tablet 800 mg TID WM    sodium chloride 0.9% flush 10 mL PRN    tamsulosin 24 hr capsule 0.4 mg Daily     Facility-Administered Medications Ordered in Other Encounters   Medication Frequency    diphenhydrAMINE injection 25 mg Q6H PRN    metoclopramide HCl injection 10 mg Q10 Min PRN       Objective:     Vital Signs (Most Recent):  Temp: 98.1 °F (36.7 °C) (11/09/20 1123)  Pulse: 64 (11/09/20 1123)  Resp: 18 (11/09/20 1123)  BP: (!) 140/63 (11/09/20 1123)  SpO2: 99 % (11/09/20 1123)  O2 Device (Oxygen Therapy): nasal cannula (11/09/20 0830) Vital Signs (24h Range):  Temp:  [97.2 °F (36.2  °C)-99.5 °F (37.5 °C)] 98.1 °F (36.7 °C)  Pulse:  [55-76] 64  Resp:  [16-20] 18  SpO2:  [95 %-100 %] 99 %  BP: (132-173)/(49-91) 140/63     Weight: 89.9 kg (198 lb 3.1 oz) (11/09/20 0400)  Body mass index is 36.25 kg/m².  Body surface area is 1.98 meters squared.    I/O last 3 completed shifts:  In: 1276 [P.O.:476; Blood:300; Other:500]  Out: 4750 [Urine:3750; Other:1000]    Physical Exam  Constitutional:       Appearance: He is well-developed.   HENT:      Head: Normocephalic.      Nose: No rhinorrhea.      Mouth/Throat:      Pharynx: No oropharyngeal exudate.   Eyes:      Pupils: Pupils are equal, round, and reactive to light.   Neck:      Thyroid: No thyroid mass.   Cardiovascular:      Rate and Rhythm: Normal rate and regular rhythm.      Heart sounds: S1 normal and S2 normal.   Pulmonary:      Effort: Pulmonary effort is normal. No respiratory distress.      Breath sounds: No wheezing.   Abdominal:      General: Bowel sounds are normal. There is no distension.      Palpations: Abdomen is soft.      Tenderness: There is no abdominal tenderness.      Hernia: No hernia is present.   Musculoskeletal:      Right lower leg: Edema present.      Left lower leg: Edema present.   Lymphadenopathy:      Cervical: No cervical adenopathy.   Skin:     General: Skin is warm and dry.   Neurological:      Mental Status: He is alert.   Psychiatric:         Behavior: Behavior is cooperative.         Significant Labs:  Lab Results   Component Value Date    CREATININE 7.4 (H) 11/09/2020    BUN 66 (H) 11/09/2020     11/09/2020    K 4.0 11/09/2020     11/09/2020    CO2 25 11/09/2020     Lab Results   Component Value Date    .0 (H) 09/04/2020    CALCIUM 8.1 (L) 11/09/2020    PHOS 4.7 (H) 11/09/2020    PHOS 4.7 (H) 11/09/2020     Lab Results   Component Value Date    ALBUMIN 2.1 (L) 11/09/2020     Lab Results   Component Value Date    WBC 7.87 11/09/2020    HGB 7.8 (L) 11/09/2020    HCT 25.1 (L) 11/09/2020    MCV 97  11/09/2020     11/09/2020       Recent Labs   Lab 11/09/20  0611   MG 1.9         Significant Imaging:  Imaging Results          X-Ray Chest AP Portable (Final result)  Result time 11/07/20 22:46:04    Final result by Lul Watkins MD (11/07/20 22:46:04)                 Impression:      Probable pulmonary edema.      Electronically signed by: Lul Watkins  Date:    11/07/2020  Time:    22:46             Narrative:    EXAMINATION:  XR CHEST AP PORTABLE    CLINICAL HISTORY:  shortness of breath;    TECHNIQUE:  Single frontal view of the chest was performed.    COMPARISON:  11/02/2020.    FINDINGS:  Bilateral pleural fluid collections and increased interstitial attenuation lung bases concerning for early pulmonary edema.    The cardiac silhouette is borderline enlarged. The hilar and mediastinal contours are unremarkable.    Degenerative change of the shoulder.

## 2020-11-09 NOTE — ASSESSMENT & PLAN NOTE
Patient's renal function has not improved over past 2 weeks. Patient likely ESRD now.    First HD session was on 11/8/20. 2nd HD session today. Outpatient dialysis placement in progress.     Access: Right IJ was placed on 11/8/20 by Vascular surgery.

## 2020-11-09 NOTE — ASSESSMENT & PLAN NOTE
- Continue home basal insulin at decreased dose of 15 units nightly.  - Accu-Cheks with low-dose SSI.  - Diabetic diet.  - Recent HbA1c of 6.    11/9:  Controlled  Cont sliding scale

## 2020-11-09 NOTE — PLAN OF CARE
AOx4. POC reviewed; interventions implemented as appropriate.   Able to verbalize needs. Calm & cooperative at this time.  VSS. On 2L O2 via NC, tolerating well.  Double lumen vas-cath placed today; site clean & dry; free of complications to site.  First session of dialysis w/ no adverse effects noted.   IV diuresis continues.   Lap sites x6 to L-side abdomen. Denies pain.  POCT glucose ac/hs. No coverage required.  Progressive mobility level - standing w/ assist. Able to ambulate w/ walker for short distances.  Noe cath in place; patent w/ gross hematuria noted.  Able to reposition self in bed. Frequent position changes encouraged. Educated on s/sx of  DTI.  NADN. Resting quietly in bed.   Hourly rounding complete.   All safety measures remain in place. Bed alarm on & audible. Free of falls. SR up x2; bed low & locked. Call light w/in reach.   Will continue to monitor throughout shift.

## 2020-11-09 NOTE — SUBJECTIVE & OBJECTIVE
Interval History: Tolerated dialysis yesterday. Reports improvement in symptoms.     Review of Systems   Constitutional: Negative for fatigue and fever.   HENT: Negative for sinus pressure.    Eyes: Negative for visual disturbance.   Respiratory: Positive for shortness of breath (improved).    Cardiovascular: Negative for chest pain.   Gastrointestinal: Negative for nausea and vomiting.   Genitourinary: Negative for difficulty urinating.   Musculoskeletal: Negative for back pain.   Skin: Negative for rash.   Neurological: Negative for headaches.   Psychiatric/Behavioral: Negative for confusion.     Objective:     Vital Signs (Most Recent):  Temp: 98.1 °F (36.7 °C) (11/09/20 1123)  Pulse: 64 (11/09/20 1123)  Resp: 18 (11/09/20 1123)  BP: (!) 140/63 (11/09/20 1123)  SpO2: 99 % (11/09/20 1123) Vital Signs (24h Range):  Temp:  [97.2 °F (36.2 °C)-99.5 °F (37.5 °C)] 98.1 °F (36.7 °C)  Pulse:  [55-76] 64  Resp:  [16-20] 18  SpO2:  [95 %-100 %] 99 %  BP: (132-173)/(49-91) 140/63     Weight: 89.9 kg (198 lb 3.1 oz)  Body mass index is 36.25 kg/m².    Intake/Output Summary (Last 24 hours) at 11/9/2020 1235  Last data filed at 11/9/2020 0400  Gross per 24 hour   Intake 1276 ml   Output 3950 ml   Net -2674 ml      Physical Exam  Vitals signs and nursing note reviewed.   Constitutional:       General: He is awake. He is not in acute distress.     Appearance: Normal appearance. He is well-developed. He is not diaphoretic.   HENT:      Head: Normocephalic and atraumatic.   Eyes:      Conjunctiva/sclera: Conjunctivae normal.      Comments: PERRL; EOM intact.   Neck:      Musculoskeletal: Normal range of motion and neck supple.      Vascular: JVD present.   Cardiovascular:      Rate and Rhythm: Normal rate and regular rhythm.  No extrasystoles are present.     Heart sounds: S1 normal and S2 normal. No murmur.      Comments: Anasarca.  Pulmonary:      Effort: Pulmonary effort is normal. Tachypnea present. No accessory muscle usage or  respiratory distress.      Breath sounds: Normal breath sounds and air entry. No wheezing, rhonchi or rales.   Abdominal:      General: Bowel sounds are normal. There is no distension.      Palpations: Abdomen is soft.      Tenderness: There is no abdominal tenderness. There is no guarding or rebound.      Comments: Anasarca.   Musculoskeletal: Normal range of motion.         General: No tenderness or deformity.      Right lower leg: 3+ Pitting Edema present.      Left lower leg: 3+ Pitting Edema present.   Skin:     General: Skin is warm and dry.      Capillary Refill: Capillary refill takes less than 2 seconds.      Findings: No erythema or rash.   Neurological:      General: No focal deficit present.      Mental Status: He is alert and oriented to person, place, and time.   Psychiatric:         Mood and Affect: Mood and affect normal.         Behavior: Behavior normal. Behavior is cooperative.         Significant Labs:   Recent Lab Results       11/09/20  0611   11/09/20  0524   11/08/20  2058   11/08/20  1804        Albumin 2.1           Anion Gap 9           Baso # 0.04           Basophil % 0.5           BUN 66           Calcium 8.1           Chloride 108           CO2 25           Creatinine 7.4           Differential Method Automated           eGFR if  8           eGFR if non  7  Comment:  Calculation used to obtain the estimated glomerular filtration  rate (eGFR) is the CKD-EPI equation.              Eos # 0.2           Eosinophil % 1.9           Glucose 117           Gran # (ANC) 6.2           Gran % 79.0           Hematocrit 25.1           Hemoglobin 7.8           Immature Grans (Abs) 0.06  Comment:  Mild elevation in immature granulocytes is non specific and   can be seen in a variety of conditions including stress response,   acute inflammation, trauma and pregnancy. Correlation with other   laboratory and clinical findings is essential.             Immature Granulocytes  0.8           Lymph # 0.8           Lymph % 9.5           Magnesium 1.9           MCH 30.1           MCHC 31.1           MCV 97           Mono # 0.7           Mono % 8.3           MPV 10.8           nRBC 0           Phosphorus 4.7            4.7           Platelets 256           POCT Glucose   70 111 113     Potassium 4.0           RBC 2.59           RDW 13.3           Sodium 142           WBC 7.87               All pertinent labs within the past 24 hours have been reviewed.    Significant Imaging: I have reviewed all pertinent imaging results/findings within the past 24 hours.

## 2020-11-09 NOTE — PLAN OF CARE
Bed Mobility - mod A; sit to/from stand with RW min A; Amb 8ft in room with O2 donned, RW and min a; Rec HHPT and family spv/assist prn for safety withmobility and adl's; Rec BSC

## 2020-11-09 NOTE — PLAN OF CARE
Ongoing (interventions implemented as appropriate)  Pt AAO x4.  VSS  Pt able to make needs known.  Pt remained afebrile throughout this shift.   Pt sitting up in bed...gait unsteady   Pt remained free of falls this shift.   Pt denies pain this shift.  Plan of care reviewed. Patient verbalizes understanding.   Pt moving/turing independent. Frequent weight shifting encouraged.  Patient sinus rhythm on monitor.   Bed low, side rails up x 2, wheels locked, call light in reach.   Hourly rounding completed.   Will continue to monitor.

## 2020-11-09 NOTE — PT/OT/SLP EVAL
Physical Therapy Evaluation    Patient Name:  Colt Stacy Jr.   MRN:  [id number]    Recommendations:     Discharge Recommendations:  home health PT, home(family support) vs snf pending progress  Discharge Equipment Recommendations: bedside commode, shower chair   Barriers to discharge: None    Assessment:     Colt Stacy Jr. is a 75 y.o. male admitted with a medical diagnosis of Acute pulmonary edema.  He presents with the following impairments/functional limitations:  weakness, gait instability, decreased lower extremity function, impaired balance, impaired endurance, impaired self care skills, edema, impaired functional mobilty, decreased coordination, decreased safety awareness.    Rehab Prognosis: Good; patient would benefit from acute skilled PT services to address these deficits and reach maximum level of function.    Recent Surgery: Procedure(s):  Insertion, Vascular Access Catheter 1 Day Post-Op    Plan:     During this hospitalization, patient to be seen 5 x/week to address the identified rehab impairments via gait training, therapeutic activities, therapeutic exercises and progress toward the following goals:    · Plan of Care Expires:  11/16/20    Subjective     Chief Complaint: intermittent soreness; sob and le weakness  Patient/Family Comments/goals: to return to plof at home; get stronger  Pain/Comfort:  · Pain Rating 1: 0/10    Patients cultural, spiritual, Tenriism conflicts given the current situation:      Living Environment:  Lives with dtr; no steps to enter; never alone per patient  Prior to admission, patients level of function was mod indep with dme.  Equipment used at home: walker, rolling, oxygen.  DME owned (not currently used): none.  Upon discharge, patient will have assistance from dtr and hhpt.    Objective:     Communicated with RN prior to session.  Patient found HOB elevated with oxygen, greenfield catheter, telemetry, bed alarm  upon PT entry to room.    General Precautions:  "Standard, fall, respiratory   Orthopedic Precautions:N/A   Braces: N/A     Exams:  · B LE rom wfl, inc edema in arms and legs - more distally; LE strength grossly 4-/5 at least    Functional Mobility:  · Bed Mobility - supine to/from sit mod A and vc/tc's for log-rolling technique/handheld A for ue support  · Transfers - sit to/from stand with RW min A and vc's for safe hand placement  · Gt - Amb 8ft with RW in room on O2 and min A for balance/cues for safe RW use    Therapeutic Activities and Exercises:   PT educated patient on POC, B LE TE to prep mobility & for edema management/dec stiffness and safety/fall/O2 use precautions with mobility using RW    AM-PAC 6 CLICK MOBILITY  Total Score:14     Patient left HOB elevated/hands elevated on pillows; heels floated with pillow with all lines intact, call button in reach, bed alarm on and RN notified.    GOALS:   Multidisciplinary Problems     Physical Therapy Goals        Problem: Physical Therapy Goal    Goal Priority Disciplines Outcome Goal Variances Interventions   Physical Therapy Goal     PT, PT/OT      Description: 1. Patient will perform supine to/from sit cga  2. Patient will perform sit to/from stand with RW and sba  3. Patient will ambulate 100ft RW sba no gross LOB                   History:     Past Medical History:   Diagnosis Date    Adrenal adenoma, left     BPH (benign prostatic hyperplasia)     CAD (coronary artery disease)     CKD (chronic kidney disease) stage 5, GFR less than 15 ml/min     Depression     Diabetes mellitus     Hyperlipidemia     Hypertension     Retinitis     Stroke     Pt states residual sx is "I walk funny".    Vitiligo        Past Surgical History:   Procedure Laterality Date    FLUOROSCOPY Bilateral 7/20/2020    Procedure: Angiogram- Bilateral Adrenal;  Surgeon: Jose R Laws MD;  Location: [facility] CATH LAB;  Service: General;  Laterality: Bilateral;    ROBOT-ASSISTED ADRENALECTOMY Left     ROBOT-ASSISTED SURGICAL " REMOVAL OF ADRENAL GLAND USING DA DEMETRI XI Left 10/20/2020    Procedure: XI ROBOTIC ADRENALECTOMY;  Surgeon: Livan Quiñones MD;  Location: [facility];  Service: General;  Laterality: Left;       Time Tracking:     PT Received On: 11/09/20  PT Start Time: 1405     PT Stop Time: 1445  PT Total Time (min): 40 min     Billable Minutes: Evaluation 15, Therapeutic Activity 10 and Therapeutic Exercise 15      Davian Montgomery, PT  11/09/2020

## 2020-11-09 NOTE — PROGRESS NOTES
[facility] -   Nephrology  Progress Note    Patient Name: Colt Stacy Jr.  MRN: [id number]  Admission Date: 11/7/2020  Hospital Length of Stay: 0 days  Attending Provider: Dago Vásquez MD   Primary Care Physician: Primary Doctor No  Principal Problem:Acute pulmonary edema    Subjective:     HPI: 75 y.o. male patient with a PMHx of CKD stage V not on dialysis, CAD, HLD, HTN, DM II, h/o CVA, left adrenal adenoma s/p adrenalectomy on 10/20, BPH, chronic hematuria with chronic indwelling greenfield catheter, and vitiligo who presented to the Emergency Department with c/o SOB that has progressively worsened and LE edema.     Nephrology was consulted to help with patient's renal care [place] [facility] [place] I saw and examined patient in his hospital room. Patient confirms SOB and LE edema.     Chart review revealed that patient's renal function has leveled off with creatinine at 9-11 over the past 2 weeks. Patient with indwelling Greenfield for urinary obstruction.     Interval History:     11/8/20: Patient is feeling fine, no complaints.     Review of patient's allergies indicates:   Allergen Reactions    Benadryl [diphenhydramine hcl] Other (See Comments)     Pt states benadryl makes him feel numb    Ace inhibitors      YIN     Current Facility-Administered Medications   Medication Frequency    0.9%  NaCl infusion (for blood administration) Q24H PRN    0.9%  NaCl infusion PRN    0.9%  NaCl infusion Once    acetaminophen tablet 650 mg Q6H PRN    ARIPiprazole tablet 30 mg QHS    aspirin EC tablet 81 mg QHS    atorvastatin tablet 80 mg Daily    cloNIDine tablet 0.2 mg Q6H PRN    dextrose 50% injection 12.5 g PRN    dextrose 50% injection 25 g PRN    epoetin rebecca-epbx injection 10,000 Units Once    furosemide tablet 60 mg BID    glucose chewable tablet 16 g PRN    glucose chewable tablet 24 g PRN    hydrALAZINE tablet 75 mg Q8H    insulin aspart U-100 pen 0-5 Units QID (AC + HS) PRN    insulin detemir  U-100 pen 15 Units QHS    iron sucrose injection 200 mg Once in dialysis    Lactobacillus acidoph-L.bulgar 1 million cell tablet 4 tablet TID WM    metoprolol tartrate (LOPRESSOR) split tablet 12.5 mg BID    mupirocin 2 % ointment BID    NIFEdipine 24 hr tablet 60 mg Daily    ondansetron injection 4 mg Q8H PRN    sertraline tablet 100 mg Daily    sevelamer carbonate tablet 800 mg TID WM    sodium chloride 0.9% flush 10 mL PRN    tamsulosin 24 hr capsule 0.4 mg Daily     Facility-Administered Medications Ordered in Other Encounters   Medication Frequency    diphenhydrAMINE injection 25 mg Q6H PRN    metoclopramide HCl injection 10 mg Q10 Min PRN       Objective:     Vital Signs (Most Recent):  Temp: 98.1 °F (36.7 °C) (11/09/20 1123)  Pulse: 64 (11/09/20 1123)  Resp: 18 (11/09/20 1123)  BP: (!) 140/63 (11/09/20 1123)  SpO2: 99 % (11/09/20 1123)  O2 Device (Oxygen Therapy): nasal cannula (11/09/20 0830) Vital Signs (24h Range):  Temp:  [97.2 °F (36.2 °C)-99.5 °F (37.5 °C)] 98.1 °F (36.7 °C)  Pulse:  [55-76] 64  Resp:  [16-20] 18  SpO2:  [95 %-100 %] 99 %  BP: (132-173)/(49-91) 140/63     Weight: 89.9 kg (198 lb 3.1 oz) (11/09/20 0400)  Body mass index is 36.25 kg/m².  Body surface area is 1.98 meters squared.    I/O last 3 completed shifts:  In: 1276 [P.O.:476; Blood:300; Other:500]  Out: 4750 [Urine:3750; Other:1000]    Physical Exam  Constitutional:       Appearance: He is well-developed.   HENT:      Head: Normocephalic.      Nose: No rhinorrhea.      Mouth/Throat:      Pharynx: No oropharyngeal exudate.   Eyes:      Pupils: Pupils are equal, round, and reactive to light.   Neck:      Thyroid: No thyroid mass.   Cardiovascular:      Rate and Rhythm: Normal rate and regular rhythm.      Heart sounds: S1 normal and S2 normal.   Pulmonary:      Effort: Pulmonary effort is normal. No respiratory distress.      Breath sounds: No wheezing.   Abdominal:      General: Bowel sounds are normal. There is no  distension.      Palpations: Abdomen is soft.      Tenderness: There is no abdominal tenderness.      Hernia: No hernia is present.   Musculoskeletal:      Right lower leg: Edema present.      Left lower leg: Edema present.   Lymphadenopathy:      Cervical: No cervical adenopathy.   Skin:     General: Skin is warm and dry.   Neurological:      Mental Status: He is alert.   Psychiatric:         Behavior: Behavior is cooperative.         Significant Labs:  Lab Results   Component Value Date    CREATININE 7.4 (H) 11/09/2020    BUN 66 (H) 11/09/2020     11/09/2020    K 4.0 11/09/2020     11/09/2020    CO2 25 11/09/2020     Lab Results   Component Value Date    .0 (H) 09/04/2020    CALCIUM 8.1 (L) 11/09/2020    PHOS 4.7 (H) 11/09/2020    PHOS 4.7 (H) 11/09/2020     Lab Results   Component Value Date    ALBUMIN 2.1 (L) 11/09/2020     Lab Results   Component Value Date    WBC 7.87 11/09/2020    HGB 7.8 (L) 11/09/2020    HCT 25.1 (L) 11/09/2020    MCV 97 11/09/2020     11/09/2020       Recent Labs   Lab 11/09/20  0611   MG 1.9         Significant Imaging:  Imaging Results          X-Ray Chest AP Portable (Final result)  Result time 11/07/20 22:46:04    Final result by Lul Watkins MD (11/07/20 22:46:04)                 Impression:      Probable pulmonary edema.      Electronically signed by: Lul Watkins  Date:    11/07/2020  Time:    22:46             Narrative:    EXAMINATION:  XR CHEST AP PORTABLE    CLINICAL HISTORY:  shortness of breath;    TECHNIQUE:  Single frontal view of the chest was performed.    COMPARISON:  11/02/2020.    FINDINGS:  Bilateral pleural fluid collections and increased interstitial attenuation lung bases concerning for early pulmonary edema.    The cardiac silhouette is borderline enlarged. The hilar and mediastinal contours are unremarkable.    Degenerative change of the shoulder.                                  Assessment/Plan:     * Acute pulmonary edema and  anasarca  Will remove excess fluid during dialysis. OK to continue IV Lasix.     Disorder of electrolytes  Corrected calcium OK.    Mild hyperphosphatemia: continue sevelamer.     Anemia due to chronic kidney disease  Will give Epogen with HD. Fe saturation 10%. Will give IV iron with HD.     YIN on CKD, stage V not on dialysis  Patient's renal function has not improved over past 2 weeks. Patient likely ESRD now.    First HD session was on 11/8/20. 2nd HD session today. Outpatient dialysis placement in progress.     Access: Right IJ was placed on 11/8/20 by Vascular surgery.     Gross hematuria  Patient with chronic indwelling Noe. Urology follow-up as outpatient.         Thank you for your consult. I will follow-up with patient. Please contact us if you have any additional questions.    Soren Contreras MD  Nephrology  [facility]

## 2020-11-10 PROBLEM — N30.00 ACUTE CYSTITIS: Status: ACTIVE | Noted: 2020-11-10

## 2020-11-10 LAB
ALBUMIN SERPL BCP-MCNC: 2 G/DL (ref 3.5–5.2)
ANION GAP SERPL CALC-SCNC: 8 MMOL/L (ref 8–16)
BASOPHILS # BLD AUTO: 0.03 K/UL (ref 0–0.2)
BASOPHILS NFR BLD: 0.3 % (ref 0–1.9)
BUN SERPL-MCNC: 48 MG/DL (ref 8–23)
CALCIUM SERPL-MCNC: 8 MG/DL (ref 8.7–10.5)
CHLORIDE SERPL-SCNC: 107 MMOL/L (ref 95–110)
CO2 SERPL-SCNC: 24 MMOL/L (ref 23–29)
CREAT SERPL-MCNC: 5.6 MG/DL (ref 0.5–1.4)
DIFFERENTIAL METHOD: ABNORMAL
EOSINOPHIL # BLD AUTO: 0.1 K/UL (ref 0–0.5)
EOSINOPHIL NFR BLD: 1 % (ref 0–8)
ERYTHROCYTE [DISTWIDTH] IN BLOOD BY AUTOMATED COUNT: 13.2 % (ref 11.5–14.5)
EST. GFR  (AFRICAN AMERICAN): 11 ML/MIN/1.73 M^2
EST. GFR  (NON AFRICAN AMERICAN): 9 ML/MIN/1.73 M^2
GLUCOSE SERPL-MCNC: 85 MG/DL (ref 70–110)
HCT VFR BLD AUTO: 25.7 % (ref 40–54)
HGB BLD-MCNC: 8 G/DL (ref 14–18)
IMM GRANULOCYTES # BLD AUTO: 0.07 K/UL (ref 0–0.04)
IMM GRANULOCYTES NFR BLD AUTO: 0.7 % (ref 0–0.5)
LYMPHOCYTES # BLD AUTO: 0.7 K/UL (ref 1–4.8)
LYMPHOCYTES NFR BLD: 6.9 % (ref 18–48)
MCH RBC QN AUTO: 30.1 PG (ref 27–31)
MCHC RBC AUTO-ENTMCNC: 31.1 G/DL (ref 32–36)
MCV RBC AUTO: 97 FL (ref 82–98)
MONOCYTES # BLD AUTO: 0.9 K/UL (ref 0.3–1)
MONOCYTES NFR BLD: 9.3 % (ref 4–15)
NEUTROPHILS # BLD AUTO: 7.7 K/UL (ref 1.8–7.7)
NEUTROPHILS NFR BLD: 81.8 % (ref 38–73)
NRBC BLD-RTO: 0 /100 WBC
PHOSPHATE SERPL-MCNC: 3.9 MG/DL (ref 2.7–4.5)
PLATELET # BLD AUTO: 243 K/UL (ref 150–350)
PMV BLD AUTO: 11.1 FL (ref 9.2–12.9)
POCT GLUCOSE: 134 MG/DL (ref 70–110)
POCT GLUCOSE: 202 MG/DL (ref 70–110)
POCT GLUCOSE: 207 MG/DL (ref 70–110)
POCT GLUCOSE: 78 MG/DL (ref 70–110)
POTASSIUM SERPL-SCNC: 4 MMOL/L (ref 3.5–5.1)
RBC # BLD AUTO: 2.66 M/UL (ref 4.6–6.2)
SODIUM SERPL-SCNC: 139 MMOL/L (ref 136–145)
WBC # BLD AUTO: 9.38 K/UL (ref 3.9–12.7)

## 2020-11-10 PROCEDURE — 97116 GAIT TRAINING THERAPY: CPT | Mod: CQ

## 2020-11-10 PROCEDURE — 21400001 HC TELEMETRY ROOM

## 2020-11-10 PROCEDURE — 99233 PR SUBSEQUENT HOSPITAL CARE,LEVL III: ICD-10-PCS | Mod: ,,, | Performed by: INTERNAL MEDICINE

## 2020-11-10 PROCEDURE — 97110 THERAPEUTIC EXERCISES: CPT | Mod: CQ

## 2020-11-10 PROCEDURE — 27000221 HC OXYGEN, UP TO 24 HOURS

## 2020-11-10 PROCEDURE — 63600175 PHARM REV CODE 636 W HCPCS: Performed by: INTERNAL MEDICINE

## 2020-11-10 PROCEDURE — 99233 SBSQ HOSP IP/OBS HIGH 50: CPT | Mod: ,,, | Performed by: INTERNAL MEDICINE

## 2020-11-10 PROCEDURE — 94760 N-INVAS EAR/PLS OXIMETRY 1: CPT

## 2020-11-10 PROCEDURE — 63600175 PHARM REV CODE 636 W HCPCS: Performed by: FAMILY MEDICINE

## 2020-11-10 PROCEDURE — 85025 COMPLETE CBC W/AUTO DIFF WBC: CPT

## 2020-11-10 PROCEDURE — 36415 COLL VENOUS BLD VENIPUNCTURE: CPT

## 2020-11-10 PROCEDURE — 99900035 HC TECH TIME PER 15 MIN (STAT)

## 2020-11-10 PROCEDURE — 97530 THERAPEUTIC ACTIVITIES: CPT | Performed by: PHYSICAL THERAPIST

## 2020-11-10 PROCEDURE — 97165 OT EVAL LOW COMPLEX 30 MIN: CPT | Performed by: PHYSICAL THERAPIST

## 2020-11-10 PROCEDURE — 80069 RENAL FUNCTION PANEL: CPT

## 2020-11-10 PROCEDURE — 63600175 PHARM REV CODE 636 W HCPCS: Performed by: NURSE PRACTITIONER

## 2020-11-10 PROCEDURE — 96372 THER/PROPH/DIAG INJ SC/IM: CPT

## 2020-11-10 PROCEDURE — 25000003 PHARM REV CODE 250: Performed by: FAMILY MEDICINE

## 2020-11-10 PROCEDURE — 90935 HEMODIALYSIS ONE EVALUATION: CPT

## 2020-11-10 PROCEDURE — 25000003 PHARM REV CODE 250: Performed by: NURSE PRACTITIONER

## 2020-11-10 RX ORDER — SODIUM CHLORIDE 9 MG/ML
INJECTION, SOLUTION INTRAVENOUS
Status: DISCONTINUED | OUTPATIENT
Start: 2020-11-10 | End: 2020-11-13 | Stop reason: HOSPADM

## 2020-11-10 RX ORDER — SODIUM CHLORIDE 9 MG/ML
INJECTION, SOLUTION INTRAVENOUS ONCE
Status: DISCONTINUED | OUTPATIENT
Start: 2020-11-10 | End: 2020-11-13 | Stop reason: HOSPADM

## 2020-11-10 RX ADMIN — HYDRALAZINE HYDROCHLORIDE 75 MG: 25 TABLET, FILM COATED ORAL at 08:11

## 2020-11-10 RX ADMIN — FUROSEMIDE 60 MG: 40 TABLET ORAL at 07:11

## 2020-11-10 RX ADMIN — LACTOBACILLUS TAB 4 TABLET: TAB at 02:11

## 2020-11-10 RX ADMIN — FUROSEMIDE 60 MG: 40 TABLET ORAL at 09:11

## 2020-11-10 RX ADMIN — CEFTRIAXONE 1 G: 1 INJECTION, SOLUTION INTRAVENOUS at 12:11

## 2020-11-10 RX ADMIN — METOPROLOL TARTRATE 12.5 MG: 25 TABLET, FILM COATED ORAL at 09:11

## 2020-11-10 RX ADMIN — INSULIN ASPART 2 UNITS: 100 INJECTION, SOLUTION INTRAVENOUS; SUBCUTANEOUS at 12:11

## 2020-11-10 RX ADMIN — NIFEDIPINE 60 MG: 30 TABLET, FILM COATED, EXTENDED RELEASE ORAL at 09:11

## 2020-11-10 RX ADMIN — MUPIROCIN: 20 OINTMENT TOPICAL at 09:11

## 2020-11-10 RX ADMIN — ASPIRIN 81 MG: 81 TABLET, COATED ORAL at 08:11

## 2020-11-10 RX ADMIN — SEVELAMER CARBONATE 800 MG: 800 TABLET, FILM COATED ORAL at 07:11

## 2020-11-10 RX ADMIN — LACTOBACILLUS TAB 4 TABLET: TAB at 07:11

## 2020-11-10 RX ADMIN — LACTOBACILLUS TAB 4 TABLET: TAB at 09:11

## 2020-11-10 RX ADMIN — INSULIN ASPART 1 UNITS: 100 INJECTION, SOLUTION INTRAVENOUS; SUBCUTANEOUS at 09:11

## 2020-11-10 RX ADMIN — EPOETIN ALFA-EPBX 10000 UNITS: 10000 INJECTION, SOLUTION INTRAVENOUS; SUBCUTANEOUS at 04:11

## 2020-11-10 RX ADMIN — TAMSULOSIN HYDROCHLORIDE 0.4 MG: 0.4 CAPSULE ORAL at 09:11

## 2020-11-10 RX ADMIN — ARIPIPRAZOLE 30 MG: 5 TABLET ORAL at 09:11

## 2020-11-10 RX ADMIN — HYDRALAZINE HYDROCHLORIDE 75 MG: 25 TABLET, FILM COATED ORAL at 05:11

## 2020-11-10 RX ADMIN — ATORVASTATIN CALCIUM 80 MG: 40 TABLET, FILM COATED ORAL at 09:11

## 2020-11-10 RX ADMIN — MUPIROCIN: 20 OINTMENT TOPICAL at 08:11

## 2020-11-10 RX ADMIN — METOPROLOL TARTRATE 12.5 MG: 25 TABLET, FILM COATED ORAL at 08:11

## 2020-11-10 RX ADMIN — SEVELAMER CARBONATE 800 MG: 800 TABLET, FILM COATED ORAL at 09:11

## 2020-11-10 RX ADMIN — SEVELAMER CARBONATE 800 MG: 800 TABLET, FILM COATED ORAL at 02:11

## 2020-11-10 RX ADMIN — SERTRALINE HYDROCHLORIDE 100 MG: 50 TABLET ORAL at 09:11

## 2020-11-10 NOTE — ASSESSMENT & PLAN NOTE
Patient's renal function has not improved over past 2 weeks. Patient likely ESRD now.    First HD session was on 11/8/20. 3rd HD session today. Outpatient dialysis placement in progress.     Access: Right IJ was placed on 11/8/20 by Vascular surgery.

## 2020-11-10 NOTE — PLAN OF CARE
AOx4. POC reviewed; interventions implemented as appropriate.   Able to verbalize needs. Calm & cooperative at this time.  VSS. On 2L O2 via NC, tolerating well.  Double lumen vas-cath; site clean & dry; free of complications.  Dialyzed w/ no adverse effects noted; 500 mL removed.   IV diuresis continues.   Lap sites x6 to L-side abdomen. Denies pain.  POCT glucose ac/hs. No coverage required.  Progressive mobility level - standing w/ assist. Able to ambulate w/ walker for short distances.  Noe cath removed by previous shift; pt urinating w/out any issues.  Able to reposition self in bed. Frequent position changes encouraged. Educated on s/sx of  DTI.  NADN. Resting quietly in bed.   Hourly rounding complete.   All safety measures remain in place. Bed alarm on & audible. Free of falls. SR up x2; bed low & locked. Call light w/in reach.   Will continue to monitor throughout shift.

## 2020-11-10 NOTE — PT/OT/SLP PROGRESS
Physical Therapy Treatment    Patient Name:  Colt Stacy Jr.   MRN:  [id number]    Recommendations:     Discharge Recommendations:  home with home health, nursing facility, skilled   Discharge Equipment Recommendations: bedside commode, shower chair   Barriers to discharge:     Assessment:     Colt Stacy Jr. is a 75 y.o. male admitted with a medical diagnosis of Acute pulmonary edema.  He presents with the following impairments/functional limitations:  weakness, impaired endurance, impaired self care skills, impaired functional mobilty, gait instability, impaired balance, impaired cardiopulmonary response to activity. Patient would benefit from skilled PT to address functional limitations in order to return to PLOF/ decrease caregiver burden.     Rehab Prognosis: Fair; patient would benefit from acute skilled PT services to address these deficits and reach maximum level of function.    Recent Surgery: Procedure(s):  Insertion, Vascular Access Catheter 2 Days Post-Op    Plan:     During this hospitalization, patient to be seen 5 x/week to address the identified rehab impairments via gait training, therapeutic activities, therapeutic exercises and progress toward the following goals:    · Plan of Care Expires:  11/16/20    Subjective     Chief Complaint: patient without complaints at this time   Patient/Family Comments/goals: return to PLOF  Pain/Comfort:  · Pain Rating 1: 0/10      Objective:     Communicated with nurse Hines and Epic chart review completed prior to session.  Patient found supine with bed alarm, oxygen, peripheral IV, telemetry upon PT entry to room.     General Precautions: Standard, fall, respiratory   Orthopedic Precautions:N/A   Braces: N/A     Functional Mobility:  Usupported sitting EOB for LE therex: LAQ, Hip Flex, AP (3x10/LE)    STS from EOB to RW: Min A    Gait: 80ft with RW Min A    Stand pivot T/F to chair with RW: Min A    Educated patient on pursed lip breathing techniques and  emphasized necessity of use during activity    AM-PAC 6 CLICK MOBILITY  Turning over in bed (including adjusting bedclothes, sheets and blankets)?: 2  Sitting down on and standing up from a chair with arms (e.g., wheelchair, bedside commode, etc.): 3  Moving from lying on back to sitting on the side of the bed?: 2  Moving to and from a bed to a chair (including a wheelchair)?: 3  Need to walk in hospital room?: 3  Climbing 3-5 steps with a railing?: 1  Basic Mobility Total Score: 14       Patient left up in chair with call button in reach and chair alarm on..    GOALS:   Multidisciplinary Problems     Physical Therapy Goals        Problem: Physical Therapy Goal    Goal Priority Disciplines Outcome Goal Variances Interventions   Physical Therapy Goal     PT, PT/OT      Description: 1. Patient will perform supine to/from sit cga  2. Patient will perform sit to/from stand with RW and sba  3. Patient will ambulate 100ft RW sba no gross LOB                   Time Tracking:     PT Received On: 11/10/20  PT Start Time: 0820     PT Stop Time: 0845  PT Total Time (min): 25 min     Billable Minutes: Gait Training 10 and Therapeutic Exercise 15    Treatment Type: Treatment  PT/PTA: PTA     PTA Visit Number: 1     Leeanna Peña PTA  11/10/2020

## 2020-11-10 NOTE — PROGRESS NOTES
[facility] -   Nephrology  Progress Note    Patient Name: Colt Stacy Jr.  MRN: [id number]  Admission Date: 11/7/2020  Hospital Length of Stay: 1 days  Attending Provider: Dago Vásquez MD   Primary Care Physician: Primary Doctor No  Principal Problem:Acute pulmonary edema    Subjective:     HPI: 75 y.o. male patient with a PMHx of CKD stage V not on dialysis, CAD, HLD, HTN, DM II, h/o CVA, left adrenal adenoma s/p adrenalectomy on 10/20, BPH, chronic hematuria with chronic indwelling greenfield catheter, and vitiligo who presented to the Emergency Department with c/o SOB that has progressively worsened and LE edema.     Nephrology was consulted to help with patient's renal care while he is admitted at [facility] [place]. I saw and examined patient in his hospital room. Patient confirms SOB and LE edema.     Chart review revealed that patient's renal function has leveled off with creatinine at 9-11 over the past 2 weeks. Patient with indwelling Greenfield for urinary obstruction.     Interval History:     11/9/20: Patient is feeling fine, no complaints.     11/10/20: Patient resting comfortably, denies any complaints at present.         Review of patient's allergies indicates:   Allergen Reactions    Benadryl [diphenhydramine hcl] Other (See Comments)     Pt states benadryl makes him feel numb    Ace inhibitors      YIN     Current Facility-Administered Medications   Medication Frequency    0.9%  NaCl infusion (for blood administration) Q24H PRN    0.9%  NaCl infusion PRN    0.9%  NaCl infusion Once    0.9%  NaCl infusion PRN    0.9%  NaCl infusion Once    0.9%  NaCl infusion PRN    0.9%  NaCl infusion Once    acetaminophen tablet 650 mg Q6H PRN    ARIPiprazole tablet 30 mg QHS    aspirin EC tablet 81 mg QHS    atorvastatin tablet 80 mg Daily    cefTRIAXone (ROCEPHIN) 1 g/50 mL D5W IVPB Q24H    cloNIDine tablet 0.2 mg Q6H PRN    dextrose 50% injection 12.5 g PRN    dextrose 50% injection 25 g PRN    epoetin  rebecca-epbx injection 10,000 Units Once    furosemide tablet 60 mg BID    glucose chewable tablet 16 g PRN    glucose chewable tablet 24 g PRN    heparin (porcine) injection 3,200 Units PRN    hydrALAZINE tablet 75 mg Q8H    insulin aspart U-100 pen 0-5 Units QID (AC + HS) PRN    iron sucrose (VENOFER) 100 mg in sodium chloride 0.9% 100 mL IVPB PRN    Lactobacillus acidoph-L.bulgar 1 million cell tablet 4 tablet TID WM    metoprolol tartrate (LOPRESSOR) split tablet 12.5 mg BID    mupirocin 2 % ointment BID    NIFEdipine 24 hr tablet 60 mg Daily    ondansetron injection 4 mg Q8H PRN    sertraline tablet 100 mg Daily    sevelamer carbonate tablet 800 mg TID WM    sodium chloride 0.9% flush 10 mL PRN    tamsulosin 24 hr capsule 0.4 mg Daily     Facility-Administered Medications Ordered in Other Encounters   Medication Frequency    diphenhydrAMINE injection 25 mg Q6H PRN    metoclopramide HCl injection 10 mg Q10 Min PRN       Objective:     Vital Signs (Most Recent):  Temp: 99.5 °F (37.5 °C) (11/10/20 1118)  Pulse: 66 (11/10/20 1118)  Resp: 20 (11/10/20 1118)  BP: (!) 111/53 (11/10/20 1118)  SpO2: 97 % (11/10/20 1118)  O2 Device (Oxygen Therapy): nasal cannula (11/10/20 0817) Vital Signs (24h Range):  Temp:  [98.9 °F (37.2 °C)-101.7 °F (38.7 °C)] 99.5 °F (37.5 °C)  Pulse:  [62-90] 66  Resp:  [18-22] 20  SpO2:  [94 %-98 %] 97 %  BP: (111-188)/(43-74) 111/53     Weight: 87.7 kg (193 lb 5.5 oz) (11/10/20 0400)  Body mass index is 35.36 kg/m².  Body surface area is 1.96 meters squared.    I/O last 3 completed shifts:  In: 1458 [P.O.:958; Other:500]  Out: 2950 [Urine:2850; Other:100]    Physical Exam  Constitutional:       Appearance: He is well-developed.   HENT:      Head: Normocephalic.      Nose: No rhinorrhea.      Mouth/Throat:      Pharynx: No oropharyngeal exudate.   Eyes:      Pupils: Pupils are equal, round, and reactive to light.   Neck:      Thyroid: No thyroid mass.   Cardiovascular:      Rate  and Rhythm: Normal rate and regular rhythm.      Heart sounds: S1 normal and S2 normal.   Pulmonary:      Effort: Pulmonary effort is normal. No respiratory distress.      Breath sounds: No wheezing.   Abdominal:      General: Bowel sounds are normal. There is no distension.      Palpations: Abdomen is soft.      Tenderness: There is no abdominal tenderness.      Hernia: No hernia is present.   Musculoskeletal:      Right lower leg: Edema present.      Left lower leg: Edema present.   Lymphadenopathy:      Cervical: No cervical adenopathy.   Skin:     General: Skin is warm and dry.   Neurological:      Mental Status: He is alert.   Psychiatric:         Behavior: Behavior is cooperative.         Significant Labs:  Lab Results   Component Value Date    CREATININE 5.6 (H) 11/10/2020    BUN 48 (H) 11/10/2020     11/10/2020    K 4.0 11/10/2020     11/10/2020    CO2 24 11/10/2020     Lab Results   Component Value Date    .0 (H) 09/04/2020    CALCIUM 8.0 (L) 11/10/2020    PHOS 3.9 11/10/2020     Lab Results   Component Value Date    ALBUMIN 2.0 (L) 11/10/2020     Lab Results   Component Value Date    WBC 9.38 11/10/2020    HGB 8.0 (L) 11/10/2020    HCT 25.7 (L) 11/10/2020    MCV 97 11/10/2020     11/10/2020       Recent Labs   Lab 11/09/20  0611   MG 1.9         Significant Imaging:  Imaging Results          X-Ray Chest AP Portable (Final result)  Result time 11/07/20 22:46:04    Final result by Lul Watkins MD (11/07/20 22:46:04)                 Impression:      Probable pulmonary edema.      Electronically signed by: Lul Watkins  Date:    11/07/2020  Time:    22:46             Narrative:    EXAMINATION:  XR CHEST AP PORTABLE    CLINICAL HISTORY:  shortness of breath;    TECHNIQUE:  Single frontal view of the chest was performed.    COMPARISON:  11/02/2020.    FINDINGS:  Bilateral pleural fluid collections and increased interstitial attenuation lung bases concerning for early pulmonary  edema.    The cardiac silhouette is borderline enlarged. The hilar and mediastinal contours are unremarkable.    Degenerative change of the shoulder.                                  Assessment/Plan:     * Acute pulmonary edema and anasarca  Will remove excess fluid during dialysis. OK to continue IV Lasix.     Disorder of electrolytes  Corrected calcium OK.    Mild hyperphosphatemia: continue sevelamer.     Anemia due to chronic kidney disease  Will give Epogen with HD. Fe saturation 10%. Will give IV iron with HD.     YIN on CKD, stage V not on dialysis  Patient's renal function has not improved over past 2 weeks. Patient is ESRD now.    First HD session was on 11/8/20. 3rd HD session today. Outpatient dialysis placement in progress.     Access: Right IJ was placed on 11/8/20 by Vascular surgery.     Gross hematuria  Patient with chronic indwelling Noe. Urology follow-up as outpatient.         Thank you for your consult. I will follow-up with patient. Please contact us if you have any additional questions.    Soren Contreras MD  Nephrology  [facility]

## 2020-11-10 NOTE — PLAN OF CARE
Usupported sitting EOB for LE therex: LAQ, Hip Flex, AP (3x10/LE)  STS from EOB to RW: Min A  Gait: 80ft with RW Min A    Educated patient on pursed lip breathing techniques and emphasized necessity of use during activity

## 2020-11-10 NOTE — ASSESSMENT & PLAN NOTE
- Secondary to worsening CKD.  Patient progressing towards dialysis.  Nephrology consult.  - 100 mg IV Lasix given in the ED with 800 mL diuresed.  Continue diuresis with 60 mg IV Lasix Q 12.  - Blood pressure control.  - Strict I&Os, daily weights, Na/fluid restriction.  - Recent 2D echo report reviewed.    11/9:  Tolerated dialysis yesterday  Plan for dialysis again today per nephro  Case management on case  Will need outpatient dialysis established     11/10:   Stable  Will need outpatient dialysis established prior to dc

## 2020-11-10 NOTE — NURSING
Pt had a greenfield which was placed by surgery on 10/20 when he had adrenal gland removed.  Greenfield was removed by previous shift. Upon admit, pt had hematuria, which pt stated it was chronic issue. While rounding, the pt's room smelled as though he had a bm. While changed brief, noted pt had not had a bm. Urine w/ strong fowl smell & appears as though it was greenish-tan thick mucous. Pt's meatus has evidence of trauma & has a small wound. Secure chat sent to LOUIE Epstein requesting UA w/ C&S be collected via straight cath.

## 2020-11-10 NOTE — PROGRESS NOTES
[facility] Hospital Medicine  Progress Note    Patient Name: Colt Stacy Jr.  MRN: [id number]  Patient Class: IP- Inpatient   Admission Date: 11/7/2020  Length of Stay: 1 days  Attending Physician: Dago Vásquez MD  Primary Care Provider: Primary Doctor No        Subjective:     Principal Problem:Acute pulmonary edema        HPI:  Colt Stacy Jr. is a 75 y.o. male patient with a PMHx of CKD stage V not on dialysis, CAD, HLD, HTN, DM II, h/o CVA, left adrenal adenoma s/p adrenalectomy on 10/20, BPH, chronic hematuria with chronic indwelling greenfield catheter, and vitiligo who presented to the Emergency Department with c/o SOB that has progressively worsened over the past few hours.  No aggravating or alleviating factors.  Associated edema to bilateral lower extremities, bilateral upper extremities, and abdomen.  Denies any chest pain, palpitations, cough, wheezing, congestion, orthopnea, PND, weight gain, abdominal pain, nausea, vomit in, diarrhea, worsening hematuria, decreased UOP, back pain, lightheadedness or dizziness, syncope, weakness, fever or chills. Patient is on 2L O2 at home.  in the ED, patient's O2 sat remained stable on 2 L nasal cannula with some mild respiratory distress noted with RR 22-25.  Initial workup showed:  Normal WBC, H&H 7.3/23.4, creatinine 9.3, BUN 83, potassium 4.7, CO2 20, , troponin 0.092, COVID negative.  EKG unremarkable. CXR showed mild pulmonary edema.  100 mg IV Lasix given in the ED with 800 mL diuresed.  Case discussed with Nephrology who agrees with diuresis and will see patient in consult.  Hospital Medicine was contacted for admission and patient placed in observation for acute pulmonary edema and worsening CKD stage V.  Patient is a full code.  His daughter is SDM.      Overview/Hospital Course:  11/9: dialyzed yesterday by nephro. Will need outpatient dialysis established prior to dc. Iron deficiency noted. Venofer given.   11/10: greenfield removed  yesterday, concerned with UTI. Rocephin started. Establishing outpatient dialysis. Concerned with tightening of foreskin however patient able to urinate. Will need outpatient f/u with urology.     Interval History: No acute events overnight. Patient complains of dysuria but no difficulty with urination.     Review of Systems   Constitutional: Negative for fatigue and fever.   HENT: Negative for sinus pressure.    Eyes: Negative for visual disturbance.   Respiratory: Positive for shortness of breath (improved).    Cardiovascular: Negative for chest pain.   Gastrointestinal: Negative for nausea and vomiting.   Genitourinary: Positive for discharge, dysuria and penile swelling. Negative for difficulty urinating, flank pain, genital sores, penile pain and scrotal swelling.   Musculoskeletal: Negative for back pain.   Skin: Negative for rash.   Neurological: Negative for headaches.   Psychiatric/Behavioral: Negative for confusion.     Objective:     Vital Signs (Most Recent):  Temp: 99.5 °F (37.5 °C) (11/10/20 1118)  Pulse: 66 (11/10/20 1118)  Resp: 20 (11/10/20 1118)  BP: (!) 111/53 (11/10/20 1118)  SpO2: 97 % (11/10/20 1118) Vital Signs (24h Range):  Temp:  [98.9 °F (37.2 °C)-101.7 °F (38.7 °C)] 99.5 °F (37.5 °C)  Pulse:  [62-90] 66  Resp:  [18-22] 20  SpO2:  [94 %-98 %] 97 %  BP: (111-188)/(43-74) 111/53     Weight: 87.7 kg (193 lb 5.5 oz)  Body mass index is 35.36 kg/m².    Intake/Output Summary (Last 24 hours) at 11/10/2020 1207  Last data filed at 11/10/2020 0400  Gross per 24 hour   Intake 1340 ml   Output 1800 ml   Net -460 ml      Physical Exam  Vitals signs and nursing note reviewed.   Constitutional:       General: He is awake. He is not in acute distress.     Appearance: Normal appearance. He is well-developed. He is not diaphoretic.   HENT:      Head: Normocephalic and atraumatic.   Eyes:      Conjunctiva/sclera: Conjunctivae normal.      Comments: PERRL; EOM intact.   Neck:      Musculoskeletal: Normal  range of motion and neck supple.      Vascular: JVD present.   Cardiovascular:      Rate and Rhythm: Normal rate and regular rhythm.  No extrasystoles are present.     Heart sounds: S1 normal and S2 normal. No murmur.      Comments: Anasarca.  Pulmonary:      Effort: Pulmonary effort is normal. Tachypnea present. No accessory muscle usage or respiratory distress.      Breath sounds: Normal breath sounds and air entry. No wheezing, rhonchi or rales.   Abdominal:      General: Bowel sounds are normal. There is no distension.      Palpations: Abdomen is soft.      Tenderness: There is no abdominal tenderness. There is no guarding or rebound.      Comments: Anasarca.   Musculoskeletal: Normal range of motion.         General: No tenderness or deformity.      Right lower leg: 3+ Pitting Edema present.      Left lower leg: 3+ Pitting Edema present.   Skin:     General: Skin is warm and dry.      Capillary Refill: Capillary refill takes less than 2 seconds.      Findings: No erythema or rash.   Neurological:      General: No focal deficit present.      Mental Status: He is alert and oriented to person, place, and time.   Psychiatric:         Mood and Affect: Mood and affect normal.         Behavior: Behavior normal. Behavior is cooperative.         Significant Labs:   Recent Lab Results       11/10/20  1022   11/10/20  0619   11/10/20  0540   11/09/20  2142   11/09/20  1621        Albumin   2.0           Anion Gap   8           Baso # 0.03             Basophil % 0.3             BUN   48           Calcium   8.0           Chloride   107           CO2   24           Creatinine   5.6           Differential Method Automated             eGFR if    11           eGFR if non    9  Comment:  Calculation used to obtain the estimated glomerular filtration  rate (eGFR) is the CKD-EPI equation.              Eos # 0.1             Eosinophil % 1.0             Glucose   85           Gran # (ANC) 7.7              Gran % 81.8             Hematocrit 25.7             Hemoglobin 8.0             Immature Grans (Abs) 0.07  Comment:  Mild elevation in immature granulocytes is non specific and   can be seen in a variety of conditions including stress response,   acute inflammation, trauma and pregnancy. Correlation with other   laboratory and clinical findings is essential.               Immature Granulocytes 0.7             Lymph # 0.7             Lymph % 6.9             MCH 30.1             MCHC 31.1             MCV 97             Mono # 0.9             Mono % 9.3             MPV 11.1             nRBC 0             Phosphorus   3.9           Platelets 243             POCT Glucose     78 124 128     Potassium   4.0           RBC 2.66             RDW 13.2             Sodium   139           WBC 9.38                                All pertinent labs within the past 24 hours have been reviewed.    Significant Imaging: I have reviewed all pertinent imaging results/findings within the past 24 hours.      Assessment/Plan:      * Acute pulmonary edema and anasarca  - Secondary to worsening CKD.  Patient progressing towards dialysis.  Nephrology consult.  - 100 mg IV Lasix given in the ED with 800 mL diuresed.  Continue diuresis with 60 mg IV Lasix Q 12.  - Blood pressure control.  - Strict I&Os, daily weights, Na/fluid restriction.  - Recent 2D echo report reviewed.    11/9:  Tolerated dialysis yesterday  Plan for dialysis again today per nephro  Case management on case  Will need outpatient dialysis established     11/10:   Stable  Will need outpatient dialysis established prior to dc       Acute cystitis  11/10:  Started rocephin empirically  U/a and culture pending       Anemia due to chronic kidney disease  - H&H below baseline at 7.3/23.4.  Follow CBC and transfuse as needed.    YIN on CKD, stage V not on dialysis  - Initial creatinine of 9.3.  Patient will likely need RRT in near future.  - Nephrology consult.  - Avoid nephrotoxic  agents.  - Follow labs.    11/9:  Dialysis per nephro     Gross hematuria  - Patient with chronic hematuria and chronic indwelling Noe catheter.    - Avoid antiplatelets/anticoagulation.  - Follow-up with urology outpatient.    Type 2 diabetes mellitus, with long-term current use of insulin  - Continue home basal insulin at decreased dose of 15 units nightly.  - Accu-Cheks with low-dose SSI.  - Diabetic diet.  - Recent HbA1c of 6.    11/9:  Controlled  Cont sliding scale     11/10:  Glucose in the 70s this am  Will dc long acting insulin   Cont sliding scale     Essential hypertension  - Blood pressure above goal, but stable on admission.  - Continue home nifedipine, Lopressor, and hydralazine.  Follow BP trends and optimize as needed.  - Diuretics as above.  - Clonidine as needed.      VTE Risk Mitigation (From admission, onward)         Ordered     heparin (porcine) injection 3,200 Units  As needed (PRN)      11/09/20 1800     IP VTE HIGH RISK PATIENT  Once      11/08/20 0226     Place sequential compression device  Until discontinued      11/08/20 0226     Reason for No Pharmacological VTE Prophylaxis  Once     Question:  Reasons:  Answer:  Active Bleeding    11/08/20 0226                Discharge Planning   RICH:      Code Status: Full Code   Is the patient medically ready for discharge?:     Reason for patient still in hospital (select all that apply): Patient trending condition  Discharge Plan A: Skilled Nursing Facility                  Dago Vásquez MD  Department of Hospital Medicine   [facility]

## 2020-11-10 NOTE — PT/OT/SLP EVAL
Occupational Therapy   Evaluation    Name: Colt Stacy Jr.  MRN: [id number]  Admitting Diagnosis:  Acute pulmonary edema 2 Days Post-Op    Recommendations:     Discharge Recommendations: home with home health, nursing facility, skilled(depending on progress with POC)  Discharge Equipment Recommendations:  bedside commode, shower chair  Barriers to discharge:       Assessment:     Colt Stacy Jr. is a 75 y.o. male with a medical diagnosis of Acute pulmonary edema.  He presents with impaired functional mobility and ADLs. Performance deficits affecting function: weakness, impaired endurance, impaired self care skills, impaired functional mobilty, gait instability, impaired balance, decreased safety awareness, impaired cardiopulmonary response to activity.      Rehab Prognosis: Fair; patient would benefit from acute skilled OT services to address these deficits and reach maximum level of function.       Plan:     Patient to be seen 3 x/week to address the above listed problems via self-care/home management, therapeutic activities, therapeutic exercises  · Plan of Care Expires: 11/17/20  · Plan of Care Reviewed with: patient    Subjective     Chief Complaint: weakness  Patient/Family Comments/goals: increase strength    Occupational Profile:  Living Environment: lives with daughter in 1 story house with no steps, never alone  Previous level of function: Mod I with ADLs, Ambulates with RW  Roles and Routines: UNKNOWN  Equipment Used at Home:  walker, rolling, oxygen  Assistance upon Discharge: family?    Pain/Comfort:  · Pain Rating 1: 0/10    Patients cultural, spiritual, Congregational conflicts given the current situation:      Objective:     Communicated with: Nurse Hines and [facility] chart review prior to session.  Patient found supine with oxygen, telemetry, peripheral IV, bed alarm upon OT entry to room.    General Precautions: Standard, fall, respiratory   Orthopedic Precautions:N/A   Braces: N/A     Occupational  Performance:    Bed Mobility:    · Patient completed Rolling/Turning to Left with  moderate assistance  · Patient completed Rolling/Turning to Right with moderate assistance  · Patient completed Scooting/Bridging with minimum assistance  · Patient completed Supine to Sit with moderate assistance    Functional Mobility/Transfers:  · Patient completed Sit <> Stand Transfer with minimum assistance  with  rolling walker   · Patient completed Bed <> Chair Transfer using Step Transfer technique with minimum assistance with rolling walker  · Functional Mobility: ~80ft using RW with Min A    Activities of Daily Living:  · Upper Body Dressing: minimum assistance .  · Lower Body Dressing: maximal assistance .    Cognitive/Visual Perceptual:  Cognitive/Psychosocial Skills:     -       Oriented to: Person, Place, Time and Situation   -       Follows Commands/attention:Follows two-step commands  -       Communication: clear/fluent  -       Memory: No Deficits noted  -       Safety awareness/insight to disability: impaired   Visual/Perceptual:      -Intact .    Physical Exam:  Sensation:    -       Intact  Dominant hand:    -       right  Upper Extremity Range of Motion:     -       Right Upper Extremity: WFL  -       Left Upper Extremity: WFL  Upper Extremity Strength:    -       Right Upper Extremity: 4/5 grossly  -       Left Upper Extremity: 4/5 grossly   Strength:    -       Right Upper Extremity: WFL  -       Left Upper Extremity: WFL    AMPAC 6 Click ADL:  AMPAC Total Score: 17    Treatment & Education:  OT Eval completed as listed above. Pt educated in role of OT and POC.   Education:    Patient left up in chair with all lines intact, call button in reach, chair alarm on and Nurse Donny notified    GOALS:   Multidisciplinary Problems     Occupational Therapy Goals        Problem: Occupational Therapy Goal    Goal Priority Disciplines Outcome Interventions   Occupational Therapy Goal     OT, PT/OT     Description:  "LTGS to be met by 11/17/2020  1. Pt will perform LB dressing with Min A  2. Pt will perform UB dressing with SBA  3. Pt will perform toilet t/s with SBA  4. Pt will perform (B) UE therapeutic exercises 1 x 20 reps                   History:     Past Medical History:   Diagnosis Date    Adrenal adenoma, left     BPH (benign prostatic hyperplasia)     CAD (coronary artery disease)     CKD (chronic kidney disease) stage 5, GFR less than 15 ml/min     Depression     Diabetes mellitus     Hyperlipidemia     Hypertension     Retinitis     Stroke     Pt states residual sx is "I walk funny".    Vitiligo        Past Surgical History:   Procedure Laterality Date    FLUOROSCOPY Bilateral 7/20/2020    Procedure: Angiogram- Bilateral Adrenal;  Surgeon: Jose R Laws MD;  Location: [facility] CATH LAB;  Service: General;  Laterality: Bilateral;    ROBOT-ASSISTED ADRENALECTOMY Left     ROBOT-ASSISTED SURGICAL REMOVAL OF ADRENAL GLAND USING DA DEMETRI XI Left 10/20/2020    Procedure: XI ROBOTIC ADRENALECTOMY;  Surgeon: Livan Quiñones MD;  Location: [facility] OR;  Service: General;  Laterality: Left;       Time Tracking:     OT Date of Treatment: 11/10/20  OT Start Time: 0730  OT Stop Time: 0755  OT Total Time (min): 25 min    Billable Minutes:Evaluation 15 min  Therapeutic Activity 10 min    Kirstin Ewing, PT/OT  11/10/2020    "

## 2020-11-10 NOTE — PLAN OF CARE
[facility]RIAN [facility].  Ting requested the following:  Labs, date of first HD, nephrology note, order, and last progress note that indicates ESRD. BRIAN sent a secure chat to Dr Contreras regard need of note that shows ESRD.

## 2020-11-10 NOTE — SUBJECTIVE & OBJECTIVE
Interval History: No acute events overnight. Patient complains of dysuria but no difficulty with urination.     Review of Systems   Constitutional: Negative for fatigue and fever.   HENT: Negative for sinus pressure.    Eyes: Negative for visual disturbance.   Respiratory: Positive for shortness of breath (improved).    Cardiovascular: Negative for chest pain.   Gastrointestinal: Negative for nausea and vomiting.   Genitourinary: Positive for discharge, dysuria and penile swelling. Negative for difficulty urinating, flank pain, genital sores, penile pain and scrotal swelling.   Musculoskeletal: Negative for back pain.   Skin: Negative for rash.   Neurological: Negative for headaches.   Psychiatric/Behavioral: Negative for confusion.     Objective:     Vital Signs (Most Recent):  Temp: 99.5 °F (37.5 °C) (11/10/20 1118)  Pulse: 66 (11/10/20 1118)  Resp: 20 (11/10/20 1118)  BP: (!) 111/53 (11/10/20 1118)  SpO2: 97 % (11/10/20 1118) Vital Signs (24h Range):  Temp:  [98.9 °F (37.2 °C)-101.7 °F (38.7 °C)] 99.5 °F (37.5 °C)  Pulse:  [62-90] 66  Resp:  [18-22] 20  SpO2:  [94 %-98 %] 97 %  BP: (111-188)/(43-74) 111/53     Weight: 87.7 kg (193 lb 5.5 oz)  Body mass index is 35.36 kg/m².    Intake/Output Summary (Last 24 hours) at 11/10/2020 1207  Last data filed at 11/10/2020 0400  Gross per 24 hour   Intake 1340 ml   Output 1800 ml   Net -460 ml      Physical Exam  Vitals signs and nursing note reviewed.   Constitutional:       General: He is awake. He is not in acute distress.     Appearance: Normal appearance. He is well-developed. He is not diaphoretic.   HENT:      Head: Normocephalic and atraumatic.   Eyes:      Conjunctiva/sclera: Conjunctivae normal.      Comments: PERRL; EOM intact.   Neck:      Musculoskeletal: Normal range of motion and neck supple.      Vascular: JVD present.   Cardiovascular:      Rate and Rhythm: Normal rate and regular rhythm.  No extrasystoles are present.     Heart sounds: S1 normal and S2  normal. No murmur.      Comments: Anasarca.  Pulmonary:      Effort: Pulmonary effort is normal. Tachypnea present. No accessory muscle usage or respiratory distress.      Breath sounds: Normal breath sounds and air entry. No wheezing, rhonchi or rales.   Abdominal:      General: Bowel sounds are normal. There is no distension.      Palpations: Abdomen is soft.      Tenderness: There is no abdominal tenderness. There is no guarding or rebound.      Comments: Anasarca.   Musculoskeletal: Normal range of motion.         General: No tenderness or deformity.      Right lower leg: 3+ Pitting Edema present.      Left lower leg: 3+ Pitting Edema present.   Skin:     General: Skin is warm and dry.      Capillary Refill: Capillary refill takes less than 2 seconds.      Findings: No erythema or rash.   Neurological:      General: No focal deficit present.      Mental Status: He is alert and oriented to person, place, and time.   Psychiatric:         Mood and Affect: Mood and affect normal.         Behavior: Behavior normal. Behavior is cooperative.         Significant Labs:   Recent Lab Results       11/10/20  1022   11/10/20  0619   11/10/20  0540   11/09/20  2142   11/09/20  1621        Albumin   2.0           Anion Gap   8           Baso # 0.03             Basophil % 0.3             BUN   48           Calcium   8.0           Chloride   107           CO2   24           Creatinine   5.6           Differential Method Automated             eGFR if    11           eGFR if non    9  Comment:  Calculation used to obtain the estimated glomerular filtration  rate (eGFR) is the CKD-EPI equation.              Eos # 0.1             Eosinophil % 1.0             Glucose   85           Gran # (ANC) 7.7             Gran % 81.8             Hematocrit 25.7             Hemoglobin 8.0             Immature Grans (Abs) 0.07  Comment:  Mild elevation in immature granulocytes is non specific and   can be seen in a  variety of conditions including stress response,   acute inflammation, trauma and pregnancy. Correlation with other   laboratory and clinical findings is essential.               Immature Granulocytes 0.7             Lymph # 0.7             Lymph % 6.9             MCH 30.1             MCHC 31.1             MCV 97             Mono # 0.9             Mono % 9.3             MPV 11.1             nRBC 0             Phosphorus   3.9           Platelets 243             POCT Glucose     78 124 128     Potassium   4.0           RBC 2.66             RDW 13.2             Sodium   139           WBC 9.38                                All pertinent labs within the past 24 hours have been reviewed.    Significant Imaging: I have reviewed all pertinent imaging results/findings within the past 24 hours.

## 2020-11-10 NOTE — ASSESSMENT & PLAN NOTE
- Continue home basal insulin at decreased dose of 15 units nightly.  - Accu-Cheks with low-dose SSI.  - Diabetic diet.  - Recent HbA1c of 6.    11/9:  Controlled  Cont sliding scale     11/10:  Glucose in the 70s this am  Will dc long acting insulin   Cont sliding scale

## 2020-11-10 NOTE — PROGRESS NOTES
Patient received 3 hours of hemodialysis treatment. Epoetin 10,000 units was given during Dialysis treatment. Net  mls removed.No  issues access noted.

## 2020-11-10 NOTE — PLAN OF CARE
OT Eval completed. Recommend SNF vs Home with Home Health and 24hr Supervision, depending on progress with POC.

## 2020-11-10 NOTE — PLAN OF CARE
CM met with patient/daughter (Cris) at the bedside to discuss recommendations by PT.  Cris and the patient have agreed that he will go home with her.  Her address is [address] [place] [id number].  Cris stated that [facility] so all she has to do is call the company.  She does not need for  to set up home health since it was [facility] arranged by [facility].  CM discussed hemodialysis outpatient centers and provided a dialysis finder list.  Preference is for [facility] in [place].  Choice form completed and placed in the patient blue folder.   CM will make a referral to [facility] with [facility].  CM inquired about equipment needs and Cris stated that they have all the equipment needed.  No other discharge needs at this time.

## 2020-11-10 NOTE — SUBJECTIVE & OBJECTIVE
Interval History:     11/9/20: Patient is feeling fine, no complaints.     11/10/20: Patient resting comfortably, denies any complaints at present.         Review of patient's allergies indicates:   Allergen Reactions    Benadryl [diphenhydramine hcl] Other (See Comments)     Pt states benadryl makes him feel numb    Ace inhibitors      YIN     Current Facility-Administered Medications   Medication Frequency    0.9%  NaCl infusion (for blood administration) Q24H PRN    0.9%  NaCl infusion PRN    0.9%  NaCl infusion Once    0.9%  NaCl infusion PRN    0.9%  NaCl infusion Once    0.9%  NaCl infusion PRN    0.9%  NaCl infusion Once    acetaminophen tablet 650 mg Q6H PRN    ARIPiprazole tablet 30 mg QHS    aspirin EC tablet 81 mg QHS    atorvastatin tablet 80 mg Daily    cefTRIAXone (ROCEPHIN) 1 g/50 mL D5W IVPB Q24H    cloNIDine tablet 0.2 mg Q6H PRN    dextrose 50% injection 12.5 g PRN    dextrose 50% injection 25 g PRN    epoetin rebecca-epbx injection 10,000 Units Once    furosemide tablet 60 mg BID    glucose chewable tablet 16 g PRN    glucose chewable tablet 24 g PRN    heparin (porcine) injection 3,200 Units PRN    hydrALAZINE tablet 75 mg Q8H    insulin aspart U-100 pen 0-5 Units QID (AC + HS) PRN    iron sucrose (VENOFER) 100 mg in sodium chloride 0.9% 100 mL IVPB PRN    Lactobacillus acidoph-L.bulgar 1 million cell tablet 4 tablet TID WM    metoprolol tartrate (LOPRESSOR) split tablet 12.5 mg BID    mupirocin 2 % ointment BID    NIFEdipine 24 hr tablet 60 mg Daily    ondansetron injection 4 mg Q8H PRN    sertraline tablet 100 mg Daily    sevelamer carbonate tablet 800 mg TID WM    sodium chloride 0.9% flush 10 mL PRN    tamsulosin 24 hr capsule 0.4 mg Daily     Facility-Administered Medications Ordered in Other Encounters   Medication Frequency    diphenhydrAMINE injection 25 mg Q6H PRN    metoclopramide HCl injection 10 mg Q10 Min PRN       Objective:     Vital Signs (Most  Recent):  Temp: 99.5 °F (37.5 °C) (11/10/20 1118)  Pulse: 66 (11/10/20 1118)  Resp: 20 (11/10/20 1118)  BP: (!) 111/53 (11/10/20 1118)  SpO2: 97 % (11/10/20 1118)  O2 Device (Oxygen Therapy): nasal cannula (11/10/20 0817) Vital Signs (24h Range):  Temp:  [98.9 °F (37.2 °C)-101.7 °F (38.7 °C)] 99.5 °F (37.5 °C)  Pulse:  [62-90] 66  Resp:  [18-22] 20  SpO2:  [94 %-98 %] 97 %  BP: (111-188)/(43-74) 111/53     Weight: 87.7 kg (193 lb 5.5 oz) (11/10/20 0400)  Body mass index is 35.36 kg/m².  Body surface area is 1.96 meters squared.    I/O last 3 completed shifts:  In: 1458 [P.O.:958; Other:500]  Out: 2950 [Urine:2850; Other:100]    Physical Exam  Constitutional:       Appearance: He is well-developed.   HENT:      Head: Normocephalic.      Nose: No rhinorrhea.      Mouth/Throat:      Pharynx: No oropharyngeal exudate.   Eyes:      Pupils: Pupils are equal, round, and reactive to light.   Neck:      Thyroid: No thyroid mass.   Cardiovascular:      Rate and Rhythm: Normal rate and regular rhythm.      Heart sounds: S1 normal and S2 normal.   Pulmonary:      Effort: Pulmonary effort is normal. No respiratory distress.      Breath sounds: No wheezing.   Abdominal:      General: Bowel sounds are normal. There is no distension.      Palpations: Abdomen is soft.      Tenderness: There is no abdominal tenderness.      Hernia: No hernia is present.   Musculoskeletal:      Right lower leg: Edema present.      Left lower leg: Edema present.   Lymphadenopathy:      Cervical: No cervical adenopathy.   Skin:     General: Skin is warm and dry.   Neurological:      Mental Status: He is alert.   Psychiatric:         Behavior: Behavior is cooperative.         Significant Labs:  Lab Results   Component Value Date    CREATININE 5.6 (H) 11/10/2020    BUN 48 (H) 11/10/2020     11/10/2020    K 4.0 11/10/2020     11/10/2020    CO2 24 11/10/2020     Lab Results   Component Value Date    .0 (H) 09/04/2020    CALCIUM 8.0 (L)  11/10/2020    PHOS 3.9 11/10/2020     Lab Results   Component Value Date    ALBUMIN 2.0 (L) 11/10/2020     Lab Results   Component Value Date    WBC 9.38 11/10/2020    HGB 8.0 (L) 11/10/2020    HCT 25.7 (L) 11/10/2020    MCV 97 11/10/2020     11/10/2020       Recent Labs   Lab 11/09/20  0611   MG 1.9         Significant Imaging:  Imaging Results          X-Ray Chest AP Portable (Final result)  Result time 11/07/20 22:46:04    Final result by Lul Watkins MD (11/07/20 22:46:04)                 Impression:      Probable pulmonary edema.      Electronically signed by: Lul Watkins  Date:    11/07/2020  Time:    22:46             Narrative:    EXAMINATION:  XR CHEST AP PORTABLE    CLINICAL HISTORY:  shortness of breath;    TECHNIQUE:  Single frontal view of the chest was performed.    COMPARISON:  11/02/2020.    FINDINGS:  Bilateral pleural fluid collections and increased interstitial attenuation lung bases concerning for early pulmonary edema.    The cardiac silhouette is borderline enlarged. The hilar and mediastinal contours are unremarkable.    Degenerative change of the shoulder.

## 2020-11-11 LAB
ALBUMIN SERPL BCP-MCNC: 1.9 G/DL (ref 3.5–5.2)
ANION GAP SERPL CALC-SCNC: 6 MMOL/L (ref 8–16)
BASOPHILS # BLD AUTO: 0.05 K/UL (ref 0–0.2)
BASOPHILS NFR BLD: 0.6 % (ref 0–1.9)
BUN SERPL-MCNC: 33 MG/DL (ref 8–23)
CALCIUM SERPL-MCNC: 7.9 MG/DL (ref 8.7–10.5)
CHLORIDE SERPL-SCNC: 105 MMOL/L (ref 95–110)
CO2 SERPL-SCNC: 24 MMOL/L (ref 23–29)
CREAT SERPL-MCNC: 4.7 MG/DL (ref 0.5–1.4)
DIFFERENTIAL METHOD: ABNORMAL
EOSINOPHIL # BLD AUTO: 0.2 K/UL (ref 0–0.5)
EOSINOPHIL NFR BLD: 2.8 % (ref 0–8)
ERYTHROCYTE [DISTWIDTH] IN BLOOD BY AUTOMATED COUNT: 13.1 % (ref 11.5–14.5)
EST. GFR  (AFRICAN AMERICAN): 13 ML/MIN/1.73 M^2
EST. GFR  (NON AFRICAN AMERICAN): 11 ML/MIN/1.73 M^2
GLUCOSE SERPL-MCNC: 128 MG/DL (ref 70–110)
HCT VFR BLD AUTO: 24 % (ref 40–54)
HGB BLD-MCNC: 7.4 G/DL (ref 14–18)
IMM GRANULOCYTES # BLD AUTO: 0.07 K/UL (ref 0–0.04)
IMM GRANULOCYTES NFR BLD AUTO: 0.8 % (ref 0–0.5)
LYMPHOCYTES # BLD AUTO: 0.8 K/UL (ref 1–4.8)
LYMPHOCYTES NFR BLD: 9.3 % (ref 18–48)
MCH RBC QN AUTO: 30.2 PG (ref 27–31)
MCHC RBC AUTO-ENTMCNC: 30.8 G/DL (ref 32–36)
MCV RBC AUTO: 98 FL (ref 82–98)
MONOCYTES # BLD AUTO: 1 K/UL (ref 0.3–1)
MONOCYTES NFR BLD: 11.8 % (ref 4–15)
NEUTROPHILS # BLD AUTO: 6.2 K/UL (ref 1.8–7.7)
NEUTROPHILS NFR BLD: 74.7 % (ref 38–73)
NRBC BLD-RTO: 0 /100 WBC
PHOSPHATE SERPL-MCNC: 3.3 MG/DL (ref 2.7–4.5)
PLATELET # BLD AUTO: 224 K/UL (ref 150–350)
PMV BLD AUTO: 11.2 FL (ref 9.2–12.9)
POCT GLUCOSE: 135 MG/DL (ref 70–110)
POCT GLUCOSE: 140 MG/DL (ref 70–110)
POCT GLUCOSE: 164 MG/DL (ref 70–110)
POTASSIUM SERPL-SCNC: 4.1 MMOL/L (ref 3.5–5.1)
RBC # BLD AUTO: 2.45 M/UL (ref 4.6–6.2)
SODIUM SERPL-SCNC: 135 MMOL/L (ref 136–145)
WBC # BLD AUTO: 8.29 K/UL (ref 3.9–12.7)

## 2020-11-11 PROCEDURE — 90935 PR HEMODIALYSIS, ONE EVALUATION: ICD-10-PCS | Mod: ,,, | Performed by: INTERNAL MEDICINE

## 2020-11-11 PROCEDURE — 90935 HEMODIALYSIS ONE EVALUATION: CPT | Mod: ,,, | Performed by: INTERNAL MEDICINE

## 2020-11-11 PROCEDURE — 25000003 PHARM REV CODE 250: Performed by: NURSE PRACTITIONER

## 2020-11-11 PROCEDURE — 80100016 HC MAINTENANCE HEMODIALYSIS

## 2020-11-11 PROCEDURE — 25000003 PHARM REV CODE 250: Performed by: FAMILY MEDICINE

## 2020-11-11 PROCEDURE — 27000221 HC OXYGEN, UP TO 24 HOURS

## 2020-11-11 PROCEDURE — 63600175 PHARM REV CODE 636 W HCPCS: Performed by: FAMILY MEDICINE

## 2020-11-11 PROCEDURE — 36415 COLL VENOUS BLD VENIPUNCTURE: CPT

## 2020-11-11 PROCEDURE — 99900035 HC TECH TIME PER 15 MIN (STAT)

## 2020-11-11 PROCEDURE — 21400001 HC TELEMETRY ROOM

## 2020-11-11 PROCEDURE — 85025 COMPLETE CBC W/AUTO DIFF WBC: CPT

## 2020-11-11 PROCEDURE — 80069 RENAL FUNCTION PANEL: CPT

## 2020-11-11 PROCEDURE — 94761 N-INVAS EAR/PLS OXIMETRY MLT: CPT

## 2020-11-11 RX ORDER — SODIUM CHLORIDE 9 MG/ML
INJECTION, SOLUTION INTRAVENOUS
Status: DISCONTINUED | OUTPATIENT
Start: 2020-11-11 | End: 2020-11-13 | Stop reason: HOSPADM

## 2020-11-11 RX ORDER — SODIUM CHLORIDE 9 MG/ML
INJECTION, SOLUTION INTRAVENOUS ONCE
Status: DISCONTINUED | OUTPATIENT
Start: 2020-11-11 | End: 2020-11-13 | Stop reason: HOSPADM

## 2020-11-11 RX ADMIN — FUROSEMIDE 60 MG: 40 TABLET ORAL at 09:11

## 2020-11-11 RX ADMIN — FUROSEMIDE 60 MG: 40 TABLET ORAL at 05:11

## 2020-11-11 RX ADMIN — NIFEDIPINE 60 MG: 30 TABLET, FILM COATED, EXTENDED RELEASE ORAL at 09:11

## 2020-11-11 RX ADMIN — LACTOBACILLUS TAB 4 TABLET: TAB at 12:11

## 2020-11-11 RX ADMIN — ARIPIPRAZOLE 30 MG: 5 TABLET ORAL at 10:11

## 2020-11-11 RX ADMIN — SEVELAMER CARBONATE 800 MG: 800 TABLET, FILM COATED ORAL at 05:11

## 2020-11-11 RX ADMIN — SEVELAMER CARBONATE 800 MG: 800 TABLET, FILM COATED ORAL at 12:11

## 2020-11-11 RX ADMIN — MUPIROCIN: 20 OINTMENT TOPICAL at 12:11

## 2020-11-11 RX ADMIN — TAMSULOSIN HYDROCHLORIDE 0.4 MG: 0.4 CAPSULE ORAL at 09:11

## 2020-11-11 RX ADMIN — HYDRALAZINE HYDROCHLORIDE 75 MG: 25 TABLET, FILM COATED ORAL at 05:11

## 2020-11-11 RX ADMIN — SERTRALINE HYDROCHLORIDE 100 MG: 50 TABLET ORAL at 09:11

## 2020-11-11 RX ADMIN — ASPIRIN 81 MG: 81 TABLET, COATED ORAL at 10:11

## 2020-11-11 RX ADMIN — LACTOBACILLUS TAB 4 TABLET: TAB at 07:11

## 2020-11-11 RX ADMIN — ATORVASTATIN CALCIUM 80 MG: 40 TABLET, FILM COATED ORAL at 09:11

## 2020-11-11 RX ADMIN — SEVELAMER CARBONATE 800 MG: 800 TABLET, FILM COATED ORAL at 07:11

## 2020-11-11 RX ADMIN — HYDRALAZINE HYDROCHLORIDE 75 MG: 25 TABLET, FILM COATED ORAL at 10:11

## 2020-11-11 RX ADMIN — METOPROLOL TARTRATE 12.5 MG: 25 TABLET, FILM COATED ORAL at 10:11

## 2020-11-11 RX ADMIN — MUPIROCIN: 20 OINTMENT TOPICAL at 10:11

## 2020-11-11 RX ADMIN — METOPROLOL TARTRATE 12.5 MG: 25 TABLET, FILM COATED ORAL at 09:11

## 2020-11-11 RX ADMIN — CEFTRIAXONE 1 G: 1 INJECTION, SOLUTION INTRAVENOUS at 12:11

## 2020-11-11 RX ADMIN — LACTOBACILLUS TAB 4 TABLET: TAB at 05:11

## 2020-11-11 NOTE — ASSESSMENT & PLAN NOTE
Patient's renal function has not improved over past 2 weeks. Patient likely ESRD now.    First HD session was on 11/8/20. 4th HD session today. Outpatient dialysis placement in progress.     Access: Right IJ was placed on 11/8/20 by Vascular surgery.

## 2020-11-11 NOTE — PLAN OF CARE
Patient started on IV antibiotics for UTI. No other significant events during shift.       Problem: Fall Injury Risk  Goal: Absence of Fall and Fall-Related Injury  Outcome: Ongoing, Progressing     Problem: Adult Inpatient Plan of Care  Goal: Plan of Care Review  Outcome: Ongoing, Progressing     Problem: Diabetes Comorbidity  Goal: Blood Glucose Level Within Desired Range  Outcome: Ongoing, Progressing     Problem: Infection (Hemodialysis)  Goal: Absence of Infection Signs/Symptoms  Outcome: Ongoing, Progressing     Problem: Hemodynamic Instability (Hemodialysis)  Goal: Vital Signs Remain in Desired Range  Outcome: Ongoing, Progressing     Problem: Skin Injury Risk Increased  Goal: Skin Health and Integrity  Outcome: Ongoing, Progressing     Problem: Infection  Goal: Infection Symptom Resolution  Outcome: Ongoing, Progressing

## 2020-11-11 NOTE — SUBJECTIVE & OBJECTIVE
Interval History:  No acute events reported overnight.  Patient reports improvement in dysuria.  Noe placed today for urinary retention.     Review of Systems   Constitutional: Negative for fatigue and fever.   HENT: Negative for sinus pressure.    Eyes: Negative for visual disturbance.   Respiratory: Positive for shortness of breath (improved).    Cardiovascular: Negative for chest pain.   Gastrointestinal: Negative for nausea and vomiting.   Genitourinary: Positive for discharge, dysuria and penile swelling. Negative for difficulty urinating, flank pain, genital sores, penile pain and scrotal swelling.   Musculoskeletal: Negative for back pain.   Skin: Negative for rash.   Neurological: Negative for headaches.   Psychiatric/Behavioral: Negative for confusion.     Objective:     Vital Signs (Most Recent):  Temp: 99 °F (37.2 °C) (11/11/20 1300)  Pulse: 86 (11/11/20 1600)  Resp: 19 (11/11/20 1300)  BP: (!) 141/71 (11/11/20 1600)  SpO2: 99 % (11/11/20 1222) Vital Signs (24h Range):  Temp:  [99 °F (37.2 °C)-99.7 °F (37.6 °C)] 99 °F (37.2 °C)  Pulse:  [63-90] 86  Resp:  [16-20] 19  SpO2:  [96 %-99 %] 99 %  BP: (104-158)/(53-72) 141/71     Weight: 87.3 kg (192 lb 7.4 oz)  Body mass index is 35.2 kg/m².    Intake/Output Summary (Last 24 hours) at 11/11/2020 1646  Last data filed at 11/10/2020 1820  Gross per 24 hour   Intake 500 ml   Output 1000 ml   Net -500 ml      Physical Exam  Vitals signs and nursing note reviewed.   Constitutional:       General: He is awake. He is not in acute distress.     Appearance: Normal appearance. He is well-developed. He is not diaphoretic.   HENT:      Head: Normocephalic and atraumatic.   Eyes:      Conjunctiva/sclera: Conjunctivae normal.      Comments: PERRL; EOM intact.   Neck:      Musculoskeletal: Normal range of motion and neck supple.      Vascular: JVD present.   Cardiovascular:      Rate and Rhythm: Normal rate and regular rhythm.  No extrasystoles are present.     Heart  sounds: S1 normal and S2 normal. No murmur.      Comments: Anasarca.  Pulmonary:      Effort: Pulmonary effort is normal. Tachypnea present. No accessory muscle usage or respiratory distress.      Breath sounds: Normal breath sounds and air entry. No wheezing, rhonchi or rales.   Abdominal:      General: Bowel sounds are normal. There is no distension.      Palpations: Abdomen is soft.      Tenderness: There is no abdominal tenderness. There is no guarding or rebound.      Comments: Anasarca.   Musculoskeletal: Normal range of motion.         General: No tenderness or deformity.      Right lower leg: 3+ Pitting Edema present.      Left lower leg: 3+ Pitting Edema present.   Skin:     General: Skin is warm and dry.      Capillary Refill: Capillary refill takes less than 2 seconds.      Findings: No erythema or rash.   Neurological:      General: No focal deficit present.      Mental Status: He is alert and oriented to person, place, and time.   Psychiatric:         Mood and Affect: Mood and affect normal.         Behavior: Behavior normal. Behavior is cooperative.         Significant Labs:   Recent Lab Results       11/11/20  1259   11/11/20  0611   11/11/20  0524   11/10/20  2140   11/10/20  1852        Albumin   1.9           Anion Gap   6           Baso #   0.05           Basophil %   0.6           BUN   33           Calcium   7.9           Chloride   105           CO2   24           Creatinine   4.7           Differential Method   Automated           eGFR if    13           eGFR if non    11  Comment:  Calculation used to obtain the estimated glomerular filtration  rate (eGFR) is the CKD-EPI equation.              Eos #   0.2           Eosinophil %   2.8           Glucose   128           Gran # (ANC)   6.2           Gran %   74.7           Hematocrit   24.0           Hemoglobin   7.4           Immature Grans (Abs)   0.07  Comment:  Mild elevation in immature granulocytes is non  specific and   can be seen in a variety of conditions including stress response,   acute inflammation, trauma and pregnancy. Correlation with other   laboratory and clinical findings is essential.             Immature Granulocytes   0.8           Lymph #   0.8           Lymph %   9.3           MCH   30.2           MCHC   30.8           MCV   98           Mono #   1.0           Mono %   11.8           MPV   11.2           nRBC   0           Phosphorus   3.3           Platelets   224           POCT Glucose 164   135 202 134     Potassium   4.1           RBC   2.45           RDW   13.1           Sodium   135           WBC   8.29                              All pertinent labs within the past 24 hours have been reviewed.    Significant Imaging: I have reviewed all pertinent imaging results/findings within the past 24 hours.

## 2020-11-11 NOTE — PROGRESS NOTES
[facility] Hospital Medicine  Progress Note    Patient Name: Colt Stacy Jr.  MRN: [id number]  Patient Class: IP- Inpatient   Admission Date: 11/7/2020  Length of Stay: 2 days  Attending Physician: Dago Vásquez MD  Primary Care Provider: Primary Doctor No        Subjective:     Principal Problem:Acute pulmonary edema        HPI:  Colt Stacy Jr. is a 75 y.o. male patient with a PMHx of CKD stage V not on dialysis, CAD, HLD, HTN, DM II, h/o CVA, left adrenal adenoma s/p adrenalectomy on 10/20, BPH, chronic hematuria with chronic indwelling greenfield catheter, and vitiligo who presented to the Emergency Department with c/o SOB that has progressively worsened over the past few hours.  No aggravating or alleviating factors.  Associated edema to bilateral lower extremities, bilateral upper extremities, and abdomen.  Denies any chest pain, palpitations, cough, wheezing, congestion, orthopnea, PND, weight gain, abdominal pain, nausea, vomit in, diarrhea, worsening hematuria, decreased UOP, back pain, lightheadedness or dizziness, syncope, weakness, fever or chills. Patient is on 2L O2 at home.  in the ED, patient's O2 sat remained stable on 2 L nasal cannula with some mild respiratory distress noted with RR 22-25.  Initial workup showed:  Normal WBC, H&H 7.3/23.4, creatinine 9.3, BUN 83, potassium 4.7, CO2 20, , troponin 0.092, COVID negative.  EKG unremarkable. CXR showed mild pulmonary edema.  100 mg IV Lasix given in the ED with 800 mL diuresed.  Case discussed with Nephrology who agrees with diuresis and will see patient in consult.  Hospital Medicine was contacted for admission and patient placed in observation for acute pulmonary edema and worsening CKD stage V.  Patient is a full code.  His daughter is SDM.      Overview/Hospital Course:  11/9: dialyzed yesterday by nephro. Will need outpatient dialysis established prior to dc. Iron deficiency noted. Venofer given.   11/10: greenfield removed  yesterday, concerned with UTI. Rocephin started. Establishing outpatient dialysis. Concerned with tightening of foreskin however patient able to urinate. Will need outpatient f/u with urology.   11/11:  Noe placed for urinary retention. Will need outpatient dialysis established. F/u with urology outpatient.     Interval History:  No acute events reported overnight.  Patient reports improvement in dysuria.  Noe placed today for urinary retention.     Review of Systems   Constitutional: Negative for fatigue and fever.   HENT: Negative for sinus pressure.    Eyes: Negative for visual disturbance.   Respiratory: Positive for shortness of breath (improved).    Cardiovascular: Negative for chest pain.   Gastrointestinal: Negative for nausea and vomiting.   Genitourinary: Positive for discharge, dysuria and penile swelling. Negative for difficulty urinating, flank pain, genital sores, penile pain and scrotal swelling.   Musculoskeletal: Negative for back pain.   Skin: Negative for rash.   Neurological: Negative for headaches.   Psychiatric/Behavioral: Negative for confusion.     Objective:     Vital Signs (Most Recent):  Temp: 99 °F (37.2 °C) (11/11/20 1300)  Pulse: 86 (11/11/20 1600)  Resp: 19 (11/11/20 1300)  BP: (!) 141/71 (11/11/20 1600)  SpO2: 99 % (11/11/20 1222) Vital Signs (24h Range):  Temp:  [99 °F (37.2 °C)-99.7 °F (37.6 °C)] 99 °F (37.2 °C)  Pulse:  [63-90] 86  Resp:  [16-20] 19  SpO2:  [96 %-99 %] 99 %  BP: (104-158)/(53-72) 141/71     Weight: 87.3 kg (192 lb 7.4 oz)  Body mass index is 35.2 kg/m².    Intake/Output Summary (Last 24 hours) at 11/11/2020 1646  Last data filed at 11/10/2020 1820  Gross per 24 hour   Intake 500 ml   Output 1000 ml   Net -500 ml      Physical Exam  Vitals signs and nursing note reviewed.   Constitutional:       General: He is awake. He is not in acute distress.     Appearance: Normal appearance. He is well-developed. He is not diaphoretic.   HENT:      Head: Normocephalic and  atraumatic.   Eyes:      Conjunctiva/sclera: Conjunctivae normal.      Comments: PERRL; EOM intact.   Neck:      Musculoskeletal: Normal range of motion and neck supple.      Vascular: JVD present.   Cardiovascular:      Rate and Rhythm: Normal rate and regular rhythm.  No extrasystoles are present.     Heart sounds: S1 normal and S2 normal. No murmur.      Comments: Anasarca.  Pulmonary:      Effort: Pulmonary effort is normal. Tachypnea present. No accessory muscle usage or respiratory distress.      Breath sounds: Normal breath sounds and air entry. No wheezing, rhonchi or rales.   Abdominal:      General: Bowel sounds are normal. There is no distension.      Palpations: Abdomen is soft.      Tenderness: There is no abdominal tenderness. There is no guarding or rebound.      Comments: Anasarca.   Musculoskeletal: Normal range of motion.         General: No tenderness or deformity.      Right lower leg: 3+ Pitting Edema present.      Left lower leg: 3+ Pitting Edema present.   Skin:     General: Skin is warm and dry.      Capillary Refill: Capillary refill takes less than 2 seconds.      Findings: No erythema or rash.   Neurological:      General: No focal deficit present.      Mental Status: He is alert and oriented to person, place, and time.   Psychiatric:         Mood and Affect: Mood and affect normal.         Behavior: Behavior normal. Behavior is cooperative.         Significant Labs:   Recent Lab Results       11/11/20  1259   11/11/20  0611   11/11/20  0524   11/10/20  2140   11/10/20  1852        Albumin   1.9           Anion Gap   6           Baso #   0.05           Basophil %   0.6           BUN   33           Calcium   7.9           Chloride   105           CO2   24           Creatinine   4.7           Differential Method   Automated           eGFR if    13           eGFR if non    11  Comment:  Calculation used to obtain the estimated glomerular filtration  rate (eGFR)  is the CKD-EPI equation.              Eos #   0.2           Eosinophil %   2.8           Glucose   128           Gran # (ANC)   6.2           Gran %   74.7           Hematocrit   24.0           Hemoglobin   7.4           Immature Grans (Abs)   0.07  Comment:  Mild elevation in immature granulocytes is non specific and   can be seen in a variety of conditions including stress response,   acute inflammation, trauma and pregnancy. Correlation with other   laboratory and clinical findings is essential.             Immature Granulocytes   0.8           Lymph #   0.8           Lymph %   9.3           MCH   30.2           MCHC   30.8           MCV   98           Mono #   1.0           Mono %   11.8           MPV   11.2           nRBC   0           Phosphorus   3.3           Platelets   224           POCT Glucose 164   135 202 134     Potassium   4.1           RBC   2.45           RDW   13.1           Sodium   135           WBC   8.29                              All pertinent labs within the past 24 hours have been reviewed.    Significant Imaging: I have reviewed all pertinent imaging results/findings within the past 24 hours.      Assessment/Plan:      * Acute pulmonary edema and anasarca  - Secondary to worsening CKD.  Patient progressing towards dialysis.  Nephrology consult.  - 100 mg IV Lasix given in the ED with 800 mL diuresed.  Continue diuresis with 60 mg IV Lasix Q 12.  - Blood pressure control.  - Strict I&Os, daily weights, Na/fluid restriction.  - Recent 2D echo report reviewed.    11/9:  Tolerated dialysis yesterday  Plan for dialysis again today per nephro  Case management on case  Will need outpatient dialysis established     11/10:   Stable  Will need outpatient dialysis established prior to dc     11/11:  Improved  Will need outpatient dialysis established       Acute cystitis  11/10:  Started rocephin empirically  U/a and culture pending     11/11:  Cont rocephin         Anemia due to chronic kidney  disease  - H&H below baseline at 7.3/23.4.  Follow CBC and transfuse as needed.    ESRD (end stage renal disease)  - Initial creatinine of 9.3.  Patient will likely need RRT in near future.  - Nephrology consult.  - Avoid nephrotoxic agents.  - Follow labs.    11/9:  Dialysis per nephro     11/11:  Dialysis per nephro     Gross hematuria  - Patient with chronic hematuria and chronic indwelling Noe catheter.    - Avoid antiplatelets/anticoagulation.  - Follow-up with urology outpatient.    Type 2 diabetes mellitus, with long-term current use of insulin  - Continue home basal insulin at decreased dose of 15 units nightly.  - Accu-Cheks with low-dose SSI.  - Diabetic diet.  - Recent HbA1c of 6.    11/9:  Controlled  Cont sliding scale     11/10:  Glucose in the 70s this am  Will dc long acting insulin   Cont sliding scale     11/11:  Cont sliding scale     Essential hypertension  - Blood pressure above goal, but stable on admission.  - Continue home nifedipine, Lopressor, and hydralazine.  Follow BP trends and optimize as needed.  - Diuretics as above.  - Clonidine as needed.      VTE Risk Mitigation (From admission, onward)         Ordered     heparin (porcine) injection 3,200 Units  As needed (PRN)      11/09/20 1800     IP VTE HIGH RISK PATIENT  Once      11/08/20 0226     Place sequential compression device  Until discontinued      11/08/20 0226     Reason for No Pharmacological VTE Prophylaxis  Once     Question:  Reasons:  Answer:  Active Bleeding    11/08/20 0226                Discharge Planning   RICH:      Code Status: Full Code   Is the patient medically ready for discharge?:     Reason for patient still in hospital (select all that apply): Patient trending condition  Discharge Plan A: Skilled Nursing Facility                  Dago Vásquez MD  Department of Hospital Medicine   [facility]

## 2020-11-11 NOTE — CHAPLAIN
Initial visit with patient and family.  Provided support through listening and presence.  Provided pt with a small flag and thanked him for his service.  Pt and his family member currently had no other needs and I will follow up as needed.    Chaplain Filiberto Antonio M.Div., [facility]

## 2020-11-11 NOTE — PROGRESS NOTES
[facility] -   Nephrology  Progress Note    Patient Name: Colt Stacy Jr.  MRN: [id number]  Admission Date: 11/7/2020  Hospital Length of Stay: 2 days  Attending Provider: Daog Vásquez MD   Primary Care Physician: Primary Doctor No  Principal Problem:Acute pulmonary edema    Subjective:     HPI: 75 y.o. male patient with a PMHx of CKD stage V not on dialysis, CAD, HLD, HTN, DM II, h/o CVA, left adrenal adenoma s/p adrenalectomy on 10/20, BPH, chronic hematuria with chronic indwelling greenfield catheter, and vitiligo who presented to the Emergency Department with c/o SOB that has progressively worsened and LE edema.     Nephrology was consulted to help with patient's renal care while he [place] [facility] I saw and examined patient in his hospital room. Patient confirms SOB and LE edema.     Chart review revealed that patient's renal function has leveled off with creatinine at 9-11 over the past 2 weeks. Patient with indwelling Greenfield for urinary obstruction.     Interval History:     11/9/20: Patient is feeling fine, no complaints.     11/10/20: Patient resting comfortably, denies any complaints at present.     11/11/20: Patient is currently on hemodialysis. No complications. Vitals are stable. Patient has no complaints.         Review of patient's allergies indicates:   Allergen Reactions    Benadryl [diphenhydramine hcl] Other (See Comments)     Pt states benadryl makes him feel numb    Ace inhibitors      YIN     Current Facility-Administered Medications   Medication Frequency    0.9%  NaCl infusion (for blood administration) Q24H PRN    0.9%  NaCl infusion PRN    0.9%  NaCl infusion Once    0.9%  NaCl infusion PRN    0.9%  NaCl infusion Once    0.9%  NaCl infusion PRN    0.9%  NaCl infusion Once    0.9%  NaCl infusion PRN    0.9%  NaCl infusion Once    acetaminophen tablet 650 mg Q6H PRN    ARIPiprazole tablet 30 mg QHS    aspirin EC tablet 81 mg QHS    atorvastatin tablet 80 mg Daily     cefTRIAXone (ROCEPHIN) 1 g/50 mL D5W IVPB Q24H    cloNIDine tablet 0.2 mg Q6H PRN    dextrose 50% injection 12.5 g PRN    dextrose 50% injection 25 g PRN    furosemide tablet 60 mg BID    glucose chewable tablet 16 g PRN    glucose chewable tablet 24 g PRN    heparin (porcine) injection 3,200 Units PRN    hydrALAZINE tablet 75 mg Q8H    insulin aspart U-100 pen 0-5 Units QID (AC + HS) PRN    iron sucrose (VENOFER) 100 mg in sodium chloride 0.9% 100 mL IVPB PRN    Lactobacillus acidoph-L.bulgar 1 million cell tablet 4 tablet TID WM    metoprolol tartrate (LOPRESSOR) split tablet 12.5 mg BID    mupirocin 2 % ointment BID    NIFEdipine 24 hr tablet 60 mg Daily    ondansetron injection 4 mg Q8H PRN    sertraline tablet 100 mg Daily    sevelamer carbonate tablet 800 mg TID WM    sodium chloride 0.9% flush 10 mL PRN    tamsulosin 24 hr capsule 0.4 mg Daily     Facility-Administered Medications Ordered in Other Encounters   Medication Frequency    diphenhydrAMINE injection 25 mg Q6H PRN    metoclopramide HCl injection 10 mg Q10 Min PRN       Objective:     Vital Signs (Most Recent):  Temp: 99 °F (37.2 °C) (11/11/20 1300)  Pulse: 66 (11/11/20 1300)  Resp: 19 (11/11/20 1300)  BP: 122/61 (11/11/20 1300)  SpO2: 99 % (11/11/20 1222)  O2 Device (Oxygen Therapy): nasal cannula(wears at home) (11/11/20 0745) Vital Signs (24h Range):  Temp:  [99 °F (37.2 °C)-99.7 °F (37.6 °C)] 99 °F (37.2 °C)  Pulse:  [66-90] 66  Resp:  [16-20] 19  SpO2:  [96 %-99 %] 99 %  BP: (100-158)/(44-72) 122/61     Weight: 87.3 kg (192 lb 7.4 oz) (11/11/20 0306)  Body mass index is 35.2 kg/m².  Body surface area is 1.95 meters squared.    I/O last 3 completed shifts:  In: 1240 [P.O.:240; Other:1000]  Out: 1100 [Other:1100]    Physical Exam  Constitutional:       Appearance: He is well-developed.   HENT:      Head: Normocephalic.      Nose: No rhinorrhea.      Mouth/Throat:      Pharynx: No oropharyngeal exudate.   Eyes:      Pupils:  Pupils are equal, round, and reactive to light.   Neck:      Thyroid: No thyroid mass.   Cardiovascular:      Rate and Rhythm: Normal rate and regular rhythm.      Heart sounds: S1 normal and S2 normal.   Pulmonary:      Effort: Pulmonary effort is normal. No respiratory distress.      Breath sounds: No wheezing.   Abdominal:      General: Bowel sounds are normal. There is no distension.      Palpations: Abdomen is soft.      Tenderness: There is no abdominal tenderness.      Hernia: No hernia is present.   Musculoskeletal:      Right lower leg: Edema present.      Left lower leg: Edema present.   Lymphadenopathy:      Cervical: No cervical adenopathy.   Skin:     General: Skin is warm and dry.   Neurological:      Mental Status: He is alert.   Psychiatric:         Behavior: Behavior is cooperative.         Significant Labs:  Lab Results   Component Value Date    CREATININE 4.7 (H) 11/11/2020    BUN 33 (H) 11/11/2020     (L) 11/11/2020    K 4.1 11/11/2020     11/11/2020    CO2 24 11/11/2020     Lab Results   Component Value Date    .0 (H) 09/04/2020    CALCIUM 7.9 (L) 11/11/2020    PHOS 3.3 11/11/2020     Lab Results   Component Value Date    ALBUMIN 1.9 (L) 11/11/2020     Lab Results   Component Value Date    WBC 8.29 11/11/2020    HGB 7.4 (L) 11/11/2020    HCT 24.0 (L) 11/11/2020    MCV 98 11/11/2020     11/11/2020       Recent Labs   Lab 11/09/20  0611   MG 1.9         Significant Imaging:  Imaging Results          X-Ray Chest AP Portable (Final result)  Result time 11/07/20 22:46:04    Final result by Lul Watkins MD (11/07/20 22:46:04)                 Impression:      Probable pulmonary edema.      Electronically signed by: Lul Watkins  Date:    11/07/2020  Time:    22:46             Narrative:    EXAMINATION:  XR CHEST AP PORTABLE    CLINICAL HISTORY:  shortness of breath;    TECHNIQUE:  Single frontal view of the chest was  performed.    COMPARISON:  11/02/2020.    FINDINGS:  Bilateral pleural fluid collections and increased interstitial attenuation lung bases concerning for early pulmonary edema.    The cardiac silhouette is borderline enlarged. The hilar and mediastinal contours are unremarkable.    Degenerative change of the shoulder.                                  Assessment/Plan:     * Acute pulmonary edema and anasarca  Will remove excess fluid during dialysis. OK to continue IV Lasix.     Disorder of electrolytes  Corrected calcium OK.    Mild hyperphosphatemia: continue sevelamer.     Anemia due to chronic kidney disease  Will give Epogen with HD. Fe saturation 10%. Will give IV iron with HD.     YIN on CKD, stage V not on dialysis  Patient's renal function has not improved over past 2 weeks. Patient likely ESRD now.    First HD session was on 11/8/20. 4th HD session today. Outpatient dialysis placement in progress.     Access: Right IJ was placed on 11/8/20 by Vascular surgery.     Gross hematuria  Patient with chronic indwelling Noe. Urology follow-up as outpatient.         Thank you for your consult. I will follow-up with patient. Please contact us if you have any additional questions.    Soren Contreras MD  Nephrology  [facility]

## 2020-11-11 NOTE — PROGRESS NOTES
Patient completed 3.5 hours of dialysis with 1000 Uf off. No signs of pain or distress noted. Catheter locked with heparin and report given to primary nurse.

## 2020-11-11 NOTE — PLAN OF CARE
POC reviewed; interventions implemented as appropriate.   Tolerating abx w/out s/e noted.  NADN. Resting quietly in bed.   Hourly rounding complete.   All safety measures remain in place. Bed alarm on & audible. Free of falls. SR up x2; bed low & locked. Call light w/in reach.   Will continue to monitor throughout shift.

## 2020-11-11 NOTE — ASSESSMENT & PLAN NOTE
- Continue home basal insulin at decreased dose of 15 units nightly.  - Accu-Cheks with low-dose SSI.  - Diabetic diet.  - Recent HbA1c of 6.    11/9:  Controlled  Cont sliding scale     11/10:  Glucose in the 70s this am  Will dc long acting insulin   Cont sliding scale     11/11:  Cont sliding scale

## 2020-11-11 NOTE — NURSING
Pt tolerated treatment well. Pt ran for 3hrs.  Net removed 500mls. CVC works well. Gave [id number] Units of Epoetin during treatment.

## 2020-11-11 NOTE — ASSESSMENT & PLAN NOTE
- Secondary to worsening CKD.  Patient progressing towards dialysis.  Nephrology consult.  - 100 mg IV Lasix given in the ED with 800 mL diuresed.  Continue diuresis with 60 mg IV Lasix Q 12.  - Blood pressure control.  - Strict I&Os, daily weights, Na/fluid restriction.  - Recent 2D echo report reviewed.    11/9:  Tolerated dialysis yesterday  Plan for dialysis again today per nephro  Case management on case  Will need outpatient dialysis established     11/10:   Stable  Will need outpatient dialysis established prior to dc     11/11:  Improved  Will need outpatient dialysis established

## 2020-11-11 NOTE — ASSESSMENT & PLAN NOTE
- Initial creatinine of 9.3.  Patient will likely need RRT in near future.  - Nephrology consult.  - Avoid nephrotoxic agents.  - Follow labs.    11/9:  Dialysis per nephro     11/11:  Dialysis per nephro

## 2020-11-12 LAB
ALBUMIN SERPL BCP-MCNC: 1.9 G/DL (ref 3.5–5.2)
ANION GAP SERPL CALC-SCNC: 8 MMOL/L (ref 8–16)
BACTERIA #/AREA URNS HPF: ABNORMAL /HPF
BASOPHILS # BLD AUTO: 0.05 K/UL (ref 0–0.2)
BASOPHILS NFR BLD: 0.6 % (ref 0–1.9)
BILIRUB UR QL STRIP: NEGATIVE
BUN SERPL-MCNC: 25 MG/DL (ref 8–23)
CALCIUM SERPL-MCNC: 8 MG/DL (ref 8.7–10.5)
CHLORIDE SERPL-SCNC: 108 MMOL/L (ref 95–110)
CLARITY UR: ABNORMAL
CO2 SERPL-SCNC: 21 MMOL/L (ref 23–29)
COLOR UR: YELLOW
CREAT SERPL-MCNC: 4 MG/DL (ref 0.5–1.4)
DIFFERENTIAL METHOD: ABNORMAL
EOSINOPHIL # BLD AUTO: 0.4 K/UL (ref 0–0.5)
EOSINOPHIL NFR BLD: 5.3 % (ref 0–8)
ERYTHROCYTE [DISTWIDTH] IN BLOOD BY AUTOMATED COUNT: 13.1 % (ref 11.5–14.5)
EST. GFR  (AFRICAN AMERICAN): 16 ML/MIN/1.73 M^2
EST. GFR  (NON AFRICAN AMERICAN): 14 ML/MIN/1.73 M^2
GLUCOSE SERPL-MCNC: 119 MG/DL (ref 70–110)
GLUCOSE UR QL STRIP: NEGATIVE
HCT VFR BLD AUTO: 25.5 % (ref 40–54)
HGB BLD-MCNC: 7.9 G/DL (ref 14–18)
HGB UR QL STRIP: ABNORMAL
HYALINE CASTS #/AREA URNS LPF: 0 /LPF
IMM GRANULOCYTES # BLD AUTO: 0.05 K/UL (ref 0–0.04)
IMM GRANULOCYTES NFR BLD AUTO: 0.6 % (ref 0–0.5)
KETONES UR QL STRIP: NEGATIVE
LEUKOCYTE ESTERASE UR QL STRIP: ABNORMAL
LYMPHOCYTES # BLD AUTO: 1 K/UL (ref 1–4.8)
LYMPHOCYTES NFR BLD: 12.1 % (ref 18–48)
MCH RBC QN AUTO: 29.9 PG (ref 27–31)
MCHC RBC AUTO-ENTMCNC: 31 G/DL (ref 32–36)
MCV RBC AUTO: 97 FL (ref 82–98)
MICROSCOPIC COMMENT: ABNORMAL
MONOCYTES # BLD AUTO: 0.9 K/UL (ref 0.3–1)
MONOCYTES NFR BLD: 11.6 % (ref 4–15)
NEUTROPHILS # BLD AUTO: 5.5 K/UL (ref 1.8–7.7)
NEUTROPHILS NFR BLD: 69.8 % (ref 38–73)
NITRITE UR QL STRIP: NEGATIVE
NRBC BLD-RTO: 0 /100 WBC
PH UR STRIP: 6 [PH] (ref 5–8)
PHOSPHATE SERPL-MCNC: 3.3 MG/DL (ref 2.7–4.5)
PLATELET # BLD AUTO: 215 K/UL (ref 150–350)
PMV BLD AUTO: 10.9 FL (ref 9.2–12.9)
POCT GLUCOSE: 114 MG/DL (ref 70–110)
POCT GLUCOSE: 118 MG/DL (ref 70–110)
POCT GLUCOSE: 137 MG/DL (ref 70–110)
POCT GLUCOSE: 141 MG/DL (ref 70–110)
POCT GLUCOSE: 166 MG/DL (ref 70–110)
POTASSIUM SERPL-SCNC: 4.4 MMOL/L (ref 3.5–5.1)
PROT UR QL STRIP: ABNORMAL
RBC # BLD AUTO: 2.64 M/UL (ref 4.6–6.2)
RBC #/AREA URNS HPF: 1 /HPF (ref 0–4)
SODIUM SERPL-SCNC: 137 MMOL/L (ref 136–145)
SP GR UR STRIP: 1.02 (ref 1–1.03)
URN SPEC COLLECT METH UR: ABNORMAL
UROBILINOGEN UR STRIP-ACNC: NEGATIVE EU/DL
WBC # BLD AUTO: 7.91 K/UL (ref 3.9–12.7)
WBC #/AREA URNS HPF: >100 /HPF (ref 0–5)

## 2020-11-12 PROCEDURE — 85025 COMPLETE CBC W/AUTO DIFF WBC: CPT

## 2020-11-12 PROCEDURE — 25000003 PHARM REV CODE 250: Performed by: FAMILY MEDICINE

## 2020-11-12 PROCEDURE — 63600175 PHARM REV CODE 636 W HCPCS: Performed by: FAMILY MEDICINE

## 2020-11-12 PROCEDURE — 36415 COLL VENOUS BLD VENIPUNCTURE: CPT

## 2020-11-12 PROCEDURE — 97110 THERAPEUTIC EXERCISES: CPT

## 2020-11-12 PROCEDURE — 51798 US URINE CAPACITY MEASURE: CPT

## 2020-11-12 PROCEDURE — 81000 URINALYSIS NONAUTO W/SCOPE: CPT

## 2020-11-12 PROCEDURE — 25000003 PHARM REV CODE 250: Performed by: NURSE PRACTITIONER

## 2020-11-12 PROCEDURE — 80069 RENAL FUNCTION PANEL: CPT

## 2020-11-12 PROCEDURE — 94761 N-INVAS EAR/PLS OXIMETRY MLT: CPT

## 2020-11-12 PROCEDURE — 99233 SBSQ HOSP IP/OBS HIGH 50: CPT | Mod: ,,, | Performed by: INTERNAL MEDICINE

## 2020-11-12 PROCEDURE — 99233 PR SUBSEQUENT HOSPITAL CARE,LEVL III: ICD-10-PCS | Mod: ,,, | Performed by: INTERNAL MEDICINE

## 2020-11-12 PROCEDURE — 97116 GAIT TRAINING THERAPY: CPT

## 2020-11-12 PROCEDURE — 87186 SC STD MICRODIL/AGAR DIL: CPT

## 2020-11-12 PROCEDURE — 87077 CULTURE AEROBIC IDENTIFY: CPT

## 2020-11-12 PROCEDURE — 87086 URINE CULTURE/COLONY COUNT: CPT

## 2020-11-12 PROCEDURE — 87088 URINE BACTERIA CULTURE: CPT

## 2020-11-12 PROCEDURE — 21400001 HC TELEMETRY ROOM

## 2020-11-12 PROCEDURE — 27000221 HC OXYGEN, UP TO 24 HOURS

## 2020-11-12 RX ORDER — SODIUM CHLORIDE 9 MG/ML
INJECTION, SOLUTION INTRAVENOUS ONCE
Status: DISCONTINUED | OUTPATIENT
Start: 2020-11-12 | End: 2020-11-13 | Stop reason: HOSPADM

## 2020-11-12 RX ORDER — SODIUM CHLORIDE 9 MG/ML
INJECTION, SOLUTION INTRAVENOUS
Status: DISCONTINUED | OUTPATIENT
Start: 2020-11-12 | End: 2020-11-13 | Stop reason: HOSPADM

## 2020-11-12 RX ADMIN — HYDRALAZINE HYDROCHLORIDE 75 MG: 25 TABLET, FILM COATED ORAL at 03:11

## 2020-11-12 RX ADMIN — ARIPIPRAZOLE 30 MG: 5 TABLET ORAL at 08:11

## 2020-11-12 RX ADMIN — SEVELAMER CARBONATE 800 MG: 800 TABLET, FILM COATED ORAL at 05:11

## 2020-11-12 RX ADMIN — ASPIRIN 81 MG: 81 TABLET, COATED ORAL at 08:11

## 2020-11-12 RX ADMIN — SERTRALINE HYDROCHLORIDE 100 MG: 50 TABLET ORAL at 08:11

## 2020-11-12 RX ADMIN — FUROSEMIDE 60 MG: 40 TABLET ORAL at 05:11

## 2020-11-12 RX ADMIN — TAMSULOSIN HYDROCHLORIDE 0.4 MG: 0.4 CAPSULE ORAL at 08:11

## 2020-11-12 RX ADMIN — MUPIROCIN: 20 OINTMENT TOPICAL at 08:11

## 2020-11-12 RX ADMIN — HYDRALAZINE HYDROCHLORIDE 75 MG: 25 TABLET, FILM COATED ORAL at 06:11

## 2020-11-12 RX ADMIN — LACTOBACILLUS TAB 4 TABLET: TAB at 11:11

## 2020-11-12 RX ADMIN — CEFTRIAXONE 1 G: 1 INJECTION, SOLUTION INTRAVENOUS at 11:11

## 2020-11-12 RX ADMIN — NIFEDIPINE 60 MG: 30 TABLET, FILM COATED, EXTENDED RELEASE ORAL at 08:11

## 2020-11-12 RX ADMIN — SEVELAMER CARBONATE 800 MG: 800 TABLET, FILM COATED ORAL at 11:11

## 2020-11-12 RX ADMIN — FUROSEMIDE 60 MG: 40 TABLET ORAL at 08:11

## 2020-11-12 RX ADMIN — SEVELAMER CARBONATE 800 MG: 800 TABLET, FILM COATED ORAL at 08:11

## 2020-11-12 RX ADMIN — LACTOBACILLUS TAB 4 TABLET: TAB at 08:11

## 2020-11-12 RX ADMIN — HYDRALAZINE HYDROCHLORIDE 75 MG: 25 TABLET, FILM COATED ORAL at 09:11

## 2020-11-12 RX ADMIN — MUPIROCIN: 20 OINTMENT TOPICAL at 09:11

## 2020-11-12 RX ADMIN — ATORVASTATIN CALCIUM 80 MG: 40 TABLET, FILM COATED ORAL at 08:11

## 2020-11-12 RX ADMIN — METOPROLOL TARTRATE 12.5 MG: 25 TABLET, FILM COATED ORAL at 08:11

## 2020-11-12 RX ADMIN — LACTOBACILLUS TAB 4 TABLET: TAB at 05:11

## 2020-11-12 NOTE — ASSESSMENT & PLAN NOTE
11/10:  Started rocephin empirically  U/a and culture pending     11/11:  Cont rocephin     11/12:  Cont rocephin  Urine culture pending

## 2020-11-12 NOTE — ASSESSMENT & PLAN NOTE
Patient's renal function has not improved over past 2 weeks. Patient likely ESRD now.    First HD session was on 11/8/20.  Outpatient dialysis placement in progress.     Next HD tomorrow.     Access: Right IJ was placed on 11/8/20 by Vascular surgery.

## 2020-11-12 NOTE — PT/OT/SLP PROGRESS
Occupational Therapy   Treatment    Name: Colt Stacy Jr.  MRN: [id number]  Admitting Diagnosis:  Acute pulmonary edema  4 Days Post-Op    Recommendations:     Discharge Recommendations: nursing facility, skilled  Discharge Equipment Recommendations:  bedside commode, shower chair  Barriers to discharge:       Assessment:     Colt Stacy Jr. is a 75 y.o. male with a medical diagnosis of Acute pulmonary edema.  He presents with impaired self care and fx mobility. Performance deficits affecting function are weakness, impaired endurance, impaired self care skills, impaired functional mobilty, decreased coordination, decreased lower extremity function, decreased ROM, decreased upper extremity function, impaired balance.     Rehab Prognosis:  Fair; patient would benefit from acute skilled OT services to address these deficits and reach maximum level of function.       Plan:     Patient to be seen 3 x/week to address the above listed problems via self-care/home management, therapeutic activities, therapeutic exercises  · Plan of Care Expires: 11/17/20  · Plan of Care Reviewed with:      Subjective     Pain/Comfort:  · Pain Rating 1: 0/10    Objective:     Communicated with: pt and nurse prior to session.  Patient found supine with peripheral IV, oxygen, telemetry, central line upon OT entry to room.    General Precautions: Standard, fall   Orthopedic Precautions:N/A   Braces: N/A     Occupational Performance:     Bed Mobility:    ·      Functional Mobility/Transfers:  ·   · Functional Mobility:     Activities of Daily Living:  ·       [facility] 6 Click ADL: 16    Treatment & Education:  Pt seen at bedside, agreed to therapeutic ex to BUE's, exhibits +1 edema to RUE, elevated on pillow. Pt performed AROM ex to RUE to all available planes - limited by central line and iv at elbow. Pt performed red theraband ex to LUE 1 x 15 reps  X 4 planes to increase fx strength to impact self care performance. Pt raymond tx well.     Patient  left supine with all lines intact and call button in reachEducation:      GOALS:   Multidisciplinary Problems     Occupational Therapy Goals        Problem: Occupational Therapy Goal    Goal Priority Disciplines Outcome Interventions   Occupational Therapy Goal     OT, PT/OT     Description: LTGS to be met by 11/17/2020  1. Pt will perform LB dressing with Min A  2. Pt will perform UB dressing with SBA  3. Pt will perform toilet t/s with SBA  4. Pt will perform (B) UE therapeutic exercises 1 x 20 reps                   Time Tracking:     OT Date of Treatment: 11/12/20  OT Start Time: 1420  OT Stop Time: 1440  OT Total Time (min): 20 min    Billable Minutes:Therapeutic Exercise  x 20 min    Mena Mathew OT  11/12/2020

## 2020-11-12 NOTE — PT/OT/SLP PROGRESS
Physical Therapy      Patient Name:  Colt Stacy    MRN:  [id number]    Patient not seen today secondary to being in dialysis at 16:55  . Will follow-up tomorrow.    Shakila Ortiz, PTA

## 2020-11-12 NOTE — PT/OT/SLP PROGRESS
Physical Therapy  Treatment    Colt Stacy Jr.   MRN: [id number]   Admitting Diagnosis: Acute pulmonary edema    PT Received On: 11/12/20  PT Start Time: 0735     PT Stop Time: 0800    PT Total Time (min): 25 min       Billable Minutes:  Gait Training 15 and Therapeutic Exercise 10    Treatment Type: Treatment  PT/PTA: PT     PTA Visit Number: 0       General Precautions: Standard, fall  Orthopedic Precautions: N/A   Braces: N/A         Subjective:  Communicated with NURSE PATTI AND Epic CHART REVIEW prior to session.   PT AGREED TO TX     Pain/Comfort  Pain Rating 1: 0/10  Pain Rating Post-Intervention 1: 0/10    Objective:   Patient found with: telemetry, peripheral IV, oxygen, bed alarm    Functional Mobility:  PT MET IN RM LOG ROLLED TO LEFT AND SEATED EOB WITH CGA. PT STOOD WITH RW AND CGA FOR GT WITH SLOW STEP TO >SHUFFLING GT WITH INC FATIGUE X 100'. PT RETURNED TO RM T/F TO CHAIR WITH CGA. PT COMPLETED B LE TE X 15 REPS OF AP, TKE, AND MIP. PT LEFT SEATED IN CHAIR WITH ALL NEEDS MET     AM-PAC 6 CLICK MOBILITY  How much help from another person does this patient currently need?   1 = Unable, Total/Dependent Assistance  2 = A lot, Maximum/Moderate Assistance  3 = A little, Minimum/Contact Guard/Supervision  4 = None, Modified Will/Independent    Turning over in bed (including adjusting bedclothes, sheets and blankets)?: 3  Sitting down on and standing up from a chair with arms (e.g., wheelchair, bedside commode, etc.): 3  Moving from lying on back to sitting on the side of the bed?: 3  Moving to and from a bed to a chair (including a wheelchair)?: 3  Need to walk in hospital room?: 3  Climbing 3-5 steps with a railing?: 1  Basic Mobility Total Score: 16    AM-PAC Raw Score CMS G-Code Modifier Level of Impairment Assistance   6 % Total / Unable   7 - 9 CM 80 - 100% Maximal Assist   10 - 14 CL 60 - 80% Moderate Assist   15 - 19 CK 40 - 60% Moderate Assist   20 - 22 CJ 20 - 40% Minimal Assist    23 CI 1-20% SBA / CGA   24 CH 0% Independent/ Mod I     Patient left up in chair with call button in reach and chair alarm on.    Assessment:  PT PROGRESSING WITH GT TRAINING   Rehab identified problem list/impairments: Rehab identified problem list/impairments: weakness, impaired endurance, impaired self care skills, impaired functional mobilty, gait instability, decreased lower extremity function, decreased ROM, impaired balance    Rehab potential is good.    Activity tolerance: Fair    Discharge recommendations: Discharge Facility/Level of Care Needs: nursing facility, skilled     Barriers to discharge:      Equipment recommendations:       GOALS:   Multidisciplinary Problems     Physical Therapy Goals        Problem: Physical Therapy Goal    Goal Priority Disciplines Outcome Goal Variances Interventions   Physical Therapy Goal     PT, PT/OT Ongoing, Progressing     Description: 1. Patient will perform supine to/from sit cga  2. Patient will perform sit to/from stand with RW and sba  3. Patient will ambulate 100ft RW sba no gross LOB                   PLAN:    Patient to be seen 5 x/week  to address the above listed problems via gait training, therapeutic activities, therapeutic exercises  Plan of Care expires: 11/16/20  Plan of Care reviewed with: patient         Alta Lawson, PT  11/12/2020

## 2020-11-12 NOTE — ASSESSMENT & PLAN NOTE
- Continue home basal insulin at decreased dose of 15 units nightly.  - Accu-Cheks with low-dose SSI.  - Diabetic diet.  - Recent HbA1c of 6.    11/9:  Controlled  Cont sliding scale     11/10:  Glucose in the 70s this am  Will dc long acting insulin   Cont sliding scale     11/11-11/12:  Cont sliding scale

## 2020-11-12 NOTE — ASSESSMENT & PLAN NOTE
- Secondary to worsening CKD.  Patient progressing towards dialysis.  Nephrology consult.  - 100 mg IV Lasix given in the ED with 800 mL diuresed.  Continue diuresis with 60 mg IV Lasix Q 12.  - Blood pressure control.  - Strict I&Os, daily weights, Na/fluid restriction.  - Recent 2D echo report reviewed.    11/9:  Tolerated dialysis yesterday  Plan for dialysis again today per nephro  Case management on case  Will need outpatient dialysis established     11/10:   Stable  Will need outpatient dialysis established prior to dc     11/11-11/12:  Improved  Will need outpatient dialysis established

## 2020-11-12 NOTE — SUBJECTIVE & OBJECTIVE
Interval History:     11/9/20: Patient is feeling fine, no complaints.     11/10/20: Patient resting comfortably, denies any complaints at present.     11/11/20: Patient is currently on hemodialysis. No complications. Vitals are stable. Patient has no complaints.    11/12/20:  Patient is resting comfortably, no complaints.         Review of patient's allergies indicates:   Allergen Reactions    Benadryl [diphenhydramine hcl] Other (See Comments)     Pt states benadryl makes him feel numb    Ace inhibitors      YIN     Current Facility-Administered Medications   Medication Frequency    0.9%  NaCl infusion (for blood administration) Q24H PRN    0.9%  NaCl infusion PRN    0.9%  NaCl infusion Once    0.9%  NaCl infusion PRN    0.9%  NaCl infusion Once    0.9%  NaCl infusion PRN    0.9%  NaCl infusion Once    0.9%  NaCl infusion PRN    0.9%  NaCl infusion Once    acetaminophen tablet 650 mg Q6H PRN    ARIPiprazole tablet 30 mg QHS    aspirin EC tablet 81 mg QHS    atorvastatin tablet 80 mg Daily    cefTRIAXone (ROCEPHIN) 1 g/50 mL D5W IVPB Q24H    cloNIDine tablet 0.2 mg Q6H PRN    dextrose 50% injection 12.5 g PRN    dextrose 50% injection 25 g PRN    furosemide tablet 60 mg BID    glucose chewable tablet 16 g PRN    glucose chewable tablet 24 g PRN    heparin (porcine) injection 3,200 Units PRN    hydrALAZINE tablet 75 mg Q8H    insulin aspart U-100 pen 0-5 Units QID (AC + HS) PRN    iron sucrose (VENOFER) 100 mg in sodium chloride 0.9% 100 mL IVPB PRN    Lactobacillus acidoph-L.bulgar 1 million cell tablet 4 tablet TID WM    metoprolol tartrate (LOPRESSOR) split tablet 12.5 mg BID    mupirocin 2 % ointment BID    NIFEdipine 24 hr tablet 60 mg Daily    ondansetron injection 4 mg Q8H PRN    sertraline tablet 100 mg Daily    sevelamer carbonate tablet 800 mg TID WM    sodium chloride 0.9% flush 10 mL PRN    tamsulosin 24 hr capsule 0.4 mg Daily     Facility-Administered Medications  Ordered in Other Encounters   Medication Frequency    diphenhydrAMINE injection 25 mg Q6H PRN    metoclopramide HCl injection 10 mg Q10 Min PRN       Objective:     Vital Signs (Most Recent):  Temp: 98.1 °F (36.7 °C) (11/12/20 1519)  Pulse: 72 (11/12/20 1519)  Resp: 16 (11/12/20 1519)  BP: 137/61 (11/12/20 1519)  SpO2: 97 % (11/12/20 1519)  O2 Device (Oxygen Therapy): nasal cannula (11/12/20 0734) Vital Signs (24h Range):  Temp:  [98.1 °F (36.7 °C)-99.7 °F (37.6 °C)] 98.1 °F (36.7 °C)  Pulse:  [63-86] 72  Resp:  [16-20] 16  SpO2:  [94 %-98 %] 97 %  BP: (111-146)/(55-71) 137/61     Weight: 86 kg (189 lb 9.5 oz) (11/12/20 0330)  Body mass index is 34.68 kg/m².  Body surface area is 1.94 meters squared.    I/O last 3 completed shifts:  In: 980 [P.O.:480; Other:500]  Out: 2100 [Urine:600; Other:1500]    Physical Exam  Constitutional:       Appearance: He is well-developed.   HENT:      Head: Normocephalic.      Nose: No rhinorrhea.      Mouth/Throat:      Pharynx: No oropharyngeal exudate.   Eyes:      Pupils: Pupils are equal, round, and reactive to light.   Neck:      Thyroid: No thyroid mass.   Cardiovascular:      Rate and Rhythm: Normal rate and regular rhythm.      Heart sounds: S1 normal and S2 normal.   Pulmonary:      Effort: Pulmonary effort is normal. No respiratory distress.      Breath sounds: No wheezing.   Abdominal:      General: Bowel sounds are normal. There is no distension.      Palpations: Abdomen is soft.      Tenderness: There is no abdominal tenderness.      Hernia: No hernia is present.   Musculoskeletal:      Right lower leg: Edema present.      Left lower leg: Edema present.   Lymphadenopathy:      Cervical: No cervical adenopathy.   Skin:     General: Skin is warm and dry.   Neurological:      Mental Status: He is alert.   Psychiatric:         Behavior: Behavior is cooperative.         Significant Labs:  Lab Results   Component Value Date    CREATININE 4.0 (H) 11/12/2020    BUN 25 (H)  11/12/2020     11/12/2020    K 4.4 11/12/2020     11/12/2020    CO2 21 (L) 11/12/2020     Lab Results   Component Value Date    .0 (H) 09/04/2020    CALCIUM 8.0 (L) 11/12/2020    PHOS 3.3 11/12/2020     Lab Results   Component Value Date    ALBUMIN 1.9 (L) 11/12/2020     Lab Results   Component Value Date    WBC 7.91 11/12/2020    HGB 7.9 (L) 11/12/2020    HCT 25.5 (L) 11/12/2020    MCV 97 11/12/2020     11/12/2020       Recent Labs   Lab 11/09/20  0611   MG 1.9         Significant Imaging:  Imaging Results          X-Ray Chest AP Portable (Final result)  Result time 11/07/20 22:46:04    Final result by Lul Watkins MD (11/07/20 22:46:04)                 Impression:      Probable pulmonary edema.      Electronically signed by: Lul Watkins  Date:    11/07/2020  Time:    22:46             Narrative:    EXAMINATION:  XR CHEST AP PORTABLE    CLINICAL HISTORY:  shortness of breath;    TECHNIQUE:  Single frontal view of the chest was performed.    COMPARISON:  11/02/2020.    FINDINGS:  Bilateral pleural fluid collections and increased interstitial attenuation lung bases concerning for early pulmonary edema.    The cardiac silhouette is borderline enlarged. The hilar and mediastinal contours are unremarkable.    Degenerative change of the shoulder.

## 2020-11-12 NOTE — PLAN OF CARE
POC reviewed; interventions implemented as appropriate.   Bladder scan performed w/ 526cc resulted. Coude urinary catheter placed. Tolerated well.  Urine sample sent to lab for UA.  NADN. Resting quietly in bed.   Hourly rounding complete.   All safety measures remain in place. Bed alarm on & audible. Free of falls. SR up x2; bed low & locked. Call light w/in reach.   Will continue to monitor throughout shift.

## 2020-11-12 NOTE — PLAN OF CARE
POC reviewed with pt, verbalizes understanding, family updated on care plan, verbalizes understanding     Problem: Fall Injury Risk  Goal: Absence of Fall and Fall-Related Injury  Outcome: Ongoing, Progressing     Problem: Adult Inpatient Plan of Care  Goal: Plan of Care Review  Outcome: Ongoing, Progressing  Goal: Patient-Specific Goal (Individualization)  Outcome: Ongoing, Progressing  Goal: Absence of Hospital-Acquired Illness or Injury  Outcome: Ongoing, Progressing  Goal: Optimal Comfort and Wellbeing  Outcome: Ongoing, Progressing  Goal: Readiness for Transition of Care  Outcome: Ongoing, Progressing  Goal: Rounds/Family Conference  Outcome: Ongoing, Progressing     Problem: Diabetes Comorbidity  Goal: Blood Glucose Level Within Desired Range  Outcome: Ongoing, Progressing     Problem: Electrolyte Imbalance (Acute Kidney Injury/Impairment)  Goal: Serum Electrolyte Balance  Outcome: Ongoing, Progressing     Problem: Fluid Imbalance (Acute Kidney Injury/Impairment)  Goal: Optimal Fluid Balance  Outcome: Ongoing, Progressing     Problem: Hematologic Alteration (Acute Kidney Injury/Impairment)  Goal: Hemoglobin, Hematocrit and Platelets Within Normal Range  Outcome: Ongoing, Progressing     Problem: Oral Intake Inadequate (Acute Kidney Injury/Impairment)  Goal: Optimal Nutrition Intake  Outcome: Ongoing, Progressing     Problem: Renal Function Impairment (Acute Kidney Injury/Impairment)  Goal: Effective Renal Function  Outcome: Ongoing, Progressing     Problem: Infection  Goal: Infection Symptom Resolution  Outcome: Ongoing, Progressing     Problem: Skin Injury Risk Increased  Goal: Skin Health and Integrity  Outcome: Ongoing, Progressing     Problem: Device-Related Complication Risk (Hemodialysis)  Goal: Safe, Effective Therapy Delivery  Outcome: Ongoing, Progressing     Problem: Hemodynamic Instability (Hemodialysis)  Goal: Vital Signs Remain in Desired Range  Outcome: Ongoing, Progressing     Problem: Infection  (Hemodialysis)  Goal: Absence of Infection Signs/Symptoms  Outcome: Ongoing, Progressing

## 2020-11-12 NOTE — ASSESSMENT & PLAN NOTE
- Initial creatinine of 9.3.  Patient will likely need RRT in near future.  - Nephrology consult.  - Avoid nephrotoxic agents.  - Follow labs.    11/9:  Dialysis per nephro     11/11-11/12:  Dialysis per nephro

## 2020-11-12 NOTE — ASSESSMENT & PLAN NOTE
- Patient with chronic hematuria and chronic indwelling Noe catheter.    - Avoid antiplatelets/anticoagulation.  - Follow-up with urology outpatient.    11/12:  Bleeding noted around meatus  Along with discharge  Will reconsult urology for evaluation

## 2020-11-12 NOTE — PROGRESS NOTES
[facility] -   Nephrology  Progress Note    Patient Name: Colt Stacy Jr.  MRN: [id number]  Admission Date: 11/7/2020  Hospital Length of Stay: 3 days  Attending Provider: Dago Vásquez MD   Primary Care Physician: Primary Doctor No  Principal Problem:Acute pulmonary edema    Subjective:     HPI: 75 y.o. male patient with a PMHx of CKD stage V not on dialysis, CAD, HLD, HTN, DM II, h/o CVA, left adrenal adenoma s/p adrenalectomy on 10/20, BPH, chronic hematuria with chronic indwelling greenfield catheter, and vitiligo who presented to the Emergency Department with c/o SOB that has progressively worsened and LE edema.     Nephrology was consulted to help with patient's renal care while he is [place] [facility] I saw and examined patient in his hospital room. Patient confirms SOB and LE edema.     Chart review revealed that patient's renal function has leveled off with creatinine at 9-11 over the past 2 weeks. Patient with indwelling Greenfield for urinary obstruction.     Interval History:     11/9/20: Patient is feeling fine, no complaints.     11/10/20: Patient resting comfortably, denies any complaints at present.     11/11/20: Patient is currently on hemodialysis. No complications. Vitals are stable. Patient has no complaints.    11/12/20:  Patient is resting comfortably, no complaints.         Review of patient's allergies indicates:   Allergen Reactions    Benadryl [diphenhydramine hcl] Other (See Comments)     Pt states benadryl makes him feel numb    Ace inhibitors      YIN     Current Facility-Administered Medications   Medication Frequency    0.9%  NaCl infusion (for blood administration) Q24H PRN    0.9%  NaCl infusion PRN    0.9%  NaCl infusion Once    0.9%  NaCl infusion PRN    0.9%  NaCl infusion Once    0.9%  NaCl infusion PRN    0.9%  NaCl infusion Once    0.9%  NaCl infusion PRN    0.9%  NaCl infusion Once    acetaminophen tablet 650 mg Q6H PRN    ARIPiprazole tablet 30 mg QHS    aspirin EC  tablet 81 mg QHS    atorvastatin tablet 80 mg Daily    cefTRIAXone (ROCEPHIN) 1 g/50 mL D5W IVPB Q24H    cloNIDine tablet 0.2 mg Q6H PRN    dextrose 50% injection 12.5 g PRN    dextrose 50% injection 25 g PRN    furosemide tablet 60 mg BID    glucose chewable tablet 16 g PRN    glucose chewable tablet 24 g PRN    heparin (porcine) injection 3,200 Units PRN    hydrALAZINE tablet 75 mg Q8H    insulin aspart U-100 pen 0-5 Units QID (AC + HS) PRN    iron sucrose (VENOFER) 100 mg in sodium chloride 0.9% 100 mL IVPB PRN    Lactobacillus acidoph-L.bulgar 1 million cell tablet 4 tablet TID WM    metoprolol tartrate (LOPRESSOR) split tablet 12.5 mg BID    mupirocin 2 % ointment BID    NIFEdipine 24 hr tablet 60 mg Daily    ondansetron injection 4 mg Q8H PRN    sertraline tablet 100 mg Daily    sevelamer carbonate tablet 800 mg TID WM    sodium chloride 0.9% flush 10 mL PRN    tamsulosin 24 hr capsule 0.4 mg Daily     Facility-Administered Medications Ordered in Other Encounters   Medication Frequency    diphenhydrAMINE injection 25 mg Q6H PRN    metoclopramide HCl injection 10 mg Q10 Min PRN       Objective:     Vital Signs (Most Recent):  Temp: 98.1 °F (36.7 °C) (11/12/20 1519)  Pulse: 72 (11/12/20 1519)  Resp: 16 (11/12/20 1519)  BP: 137/61 (11/12/20 1519)  SpO2: 97 % (11/12/20 1519)  O2 Device (Oxygen Therapy): nasal cannula (11/12/20 6871) Vital Signs (24h Range):  Temp:  [98.1 °F (36.7 °C)-99.7 °F (37.6 °C)] 98.1 °F (36.7 °C)  Pulse:  [63-86] 72  Resp:  [16-20] 16  SpO2:  [94 %-98 %] 97 %  BP: (111-146)/(55-71) 137/61     Weight: 86 kg (189 lb 9.5 oz) (11/12/20 0330)  Body mass index is 34.68 kg/m².  Body surface area is 1.94 meters squared.    I/O last 3 completed shifts:  In: 980 [P.O.:480; Other:500]  Out: 2100 [Urine:600; Other:1500]    Physical Exam  Constitutional:       Appearance: He is well-developed.   HENT:      Head: Normocephalic.      Nose: No rhinorrhea.      Mouth/Throat:       Pharynx: No oropharyngeal exudate.   Eyes:      Pupils: Pupils are equal, round, and reactive to light.   Neck:      Thyroid: No thyroid mass.   Cardiovascular:      Rate and Rhythm: Normal rate and regular rhythm.      Heart sounds: S1 normal and S2 normal.   Pulmonary:      Effort: Pulmonary effort is normal. No respiratory distress.      Breath sounds: No wheezing.   Abdominal:      General: Bowel sounds are normal. There is no distension.      Palpations: Abdomen is soft.      Tenderness: There is no abdominal tenderness.      Hernia: No hernia is present.   Musculoskeletal:      Right lower leg: Edema present.      Left lower leg: Edema present.   Lymphadenopathy:      Cervical: No cervical adenopathy.   Skin:     General: Skin is warm and dry.   Neurological:      Mental Status: He is alert.   Psychiatric:         Behavior: Behavior is cooperative.         Significant Labs:  Lab Results   Component Value Date    CREATININE 4.0 (H) 11/12/2020    BUN 25 (H) 11/12/2020     11/12/2020    K 4.4 11/12/2020     11/12/2020    CO2 21 (L) 11/12/2020     Lab Results   Component Value Date    .0 (H) 09/04/2020    CALCIUM 8.0 (L) 11/12/2020    PHOS 3.3 11/12/2020     Lab Results   Component Value Date    ALBUMIN 1.9 (L) 11/12/2020     Lab Results   Component Value Date    WBC 7.91 11/12/2020    HGB 7.9 (L) 11/12/2020    HCT 25.5 (L) 11/12/2020    MCV 97 11/12/2020     11/12/2020       Recent Labs   Lab 11/09/20  0611   MG 1.9         Significant Imaging:  Imaging Results          X-Ray Chest AP Portable (Final result)  Result time 11/07/20 22:46:04    Final result by Lul Watkins MD (11/07/20 22:46:04)                 Impression:      Probable pulmonary edema.      Electronically signed by: Lul Watkins  Date:    11/07/2020  Time:    22:46             Narrative:    EXAMINATION:  XR CHEST AP PORTABLE    CLINICAL HISTORY:  shortness of breath;    TECHNIQUE:  Single frontal view of the chest  was performed.    COMPARISON:  11/02/2020.    FINDINGS:  Bilateral pleural fluid collections and increased interstitial attenuation lung bases concerning for early pulmonary edema.    The cardiac silhouette is borderline enlarged. The hilar and mediastinal contours are unremarkable.    Degenerative change of the shoulder.                                  Assessment/Plan:     * Acute pulmonary edema and anasarca  Will remove excess fluid during dialysis. OK to continue IV Lasix.     Disorder of electrolytes  Corrected calcium OK.    Mild hyperphosphatemia: continue sevelamer.     Anemia due to chronic kidney disease  Will give Epogen with HD. Fe saturation 10%. Will give IV iron with HD.     ESRD (end stage renal disease)  Patient's renal function has not improved over past 2 weeks. Patient likely ESRD now.    First HD session was on 11/8/20.  Outpatient dialysis placement in progress.     Next HD tomorrow.     Access: Right IJ was placed on 11/8/20 by Vascular surgery.         Thank you for your consult. I will follow-up with patient. Please contact us if you have any additional questions.    Soren Contreras MD  Nephrology  [facility]

## 2020-11-12 NOTE — SUBJECTIVE & OBJECTIVE
Interval History: No acute events overnight.     Review of Systems   Constitutional: Negative for fatigue and fever.   HENT: Negative for sinus pressure.    Eyes: Negative for visual disturbance.   Respiratory: Negative for shortness of breath.    Cardiovascular: Negative for chest pain.   Gastrointestinal: Negative for nausea and vomiting.   Genitourinary: Positive for discharge and penile swelling. Negative for difficulty urinating, dysuria, flank pain, genital sores, penile pain and scrotal swelling.   Musculoskeletal: Negative for back pain.   Skin: Negative for rash.   Neurological: Negative for headaches.   Psychiatric/Behavioral: Negative for confusion.     Objective:     Vital Signs (Most Recent):  Temp: 98.3 °F (36.8 °C) (11/12/20 1149)  Pulse: 63 (11/12/20 1300)  Resp: 18 (11/12/20 1149)  BP: 132/63 (11/12/20 1149)  SpO2: (!) 94 % (11/12/20 1149) Vital Signs (24h Range):  Temp:  [98.3 °F (36.8 °C)-99.7 °F (37.6 °C)] 98.3 °F (36.8 °C)  Pulse:  [63-86] 63  Resp:  [18-20] 18  SpO2:  [94 %-98 %] 94 %  BP: (111-146)/(55-71) 132/63     Weight: 86 kg (189 lb 9.5 oz)  Body mass index is 34.68 kg/m².    Intake/Output Summary (Last 24 hours) at 11/12/2020 1505  Last data filed at 11/12/2020 1419  Gross per 24 hour   Intake 1034.52 ml   Output 2100 ml   Net -1065.48 ml      Physical Exam  Vitals signs and nursing note reviewed.   Constitutional:       General: He is awake. He is not in acute distress.     Appearance: Normal appearance. He is well-developed. He is not diaphoretic.   HENT:      Head: Normocephalic and atraumatic.   Eyes:      Conjunctiva/sclera: Conjunctivae normal.      Comments: PERRL; EOM intact.   Neck:      Musculoskeletal: Normal range of motion and neck supple.      Vascular: JVD present.   Cardiovascular:      Rate and Rhythm: Normal rate and regular rhythm.  No extrasystoles are present.     Heart sounds: S1 normal and S2 normal. No murmur.      Comments: Anasarca.  Pulmonary:      Effort:  Pulmonary effort is normal. Tachypnea present. No accessory muscle usage or respiratory distress.      Breath sounds: Normal breath sounds and air entry. No wheezing, rhonchi or rales.   Abdominal:      General: Bowel sounds are normal. There is no distension.      Palpations: Abdomen is soft.      Tenderness: There is no abdominal tenderness. There is no guarding or rebound.      Comments: Anasarca.   Musculoskeletal: Normal range of motion.         General: No tenderness or deformity.      Right lower leg: 3+ Pitting Edema present.      Left lower leg: 3+ Pitting Edema present.   Skin:     General: Skin is warm and dry.      Capillary Refill: Capillary refill takes less than 2 seconds.      Findings: No erythema or rash.   Neurological:      General: No focal deficit present.      Mental Status: He is alert and oriented to person, place, and time.   Psychiatric:         Mood and Affect: Mood and affect normal.         Behavior: Behavior normal. Behavior is cooperative.         Significant Labs:   Recent Lab Results       11/12/20  1139   11/12/20  0641   11/12/20  0604   11/12/20  0417   11/11/20  2218        Albumin     1.9         Anion Gap     8         Appearance, UA       Hazy       Bacteria, UA       Occasional       Baso #     0.05         Basophil %     0.6         Bilirubin (UA)       Negative       BUN     25         Calcium     8.0         Chloride     108         CO2     21         Color, UA       Yellow       Creatinine     4.0         Differential Method     Automated         eGFR if      16         eGFR if non      14  Comment:  Calculation used to obtain the estimated glomerular filtration  rate (eGFR) is the CKD-EPI equation.            Eos #     0.4         Eosinophil %     5.3         Glucose     119         Glucose, UA       Negative       Gran # (ANC)     5.5         Gran %     69.8         Hematocrit     25.5         Hemoglobin     7.9         Hyaline Casts, UA        0       Immature Grans (Abs)     0.05  Comment:  Mild elevation in immature granulocytes is non specific and   can be seen in a variety of conditions including stress response,   acute inflammation, trauma and pregnancy. Correlation with other   laboratory and clinical findings is essential.           Immature Granulocytes     0.6         Ketones, UA       Negative       Leukocytes, UA       2+       Lymph #     1.0         Lymph %     12.1         MCH     29.9         MCHC     31.0         MCV     97         Microscopic Comment       SEE COMMENT  Comment:  Other formed elements not mentioned in the report are not   present in the microscopic examination.          Mono #     0.9         Mono %     11.6         MPV     10.9         NITRITE UA       Negative       nRBC     0         Occult Blood UA       Trace       pH, UA       6.0       Phosphorus     3.3         Platelets     215         POCT Glucose 137 114     141     Potassium     4.4         Protein, UA       2+  Comment:  Recommend a 24 hour urine protein or a urine   protein/creatinine ratio if globulin induced proteinuria is  clinically suspected.         RBC     2.64         RBC, UA       1       RDW     13.1         Sodium     137         Specific [place], UA       1.025       Specimen UA       Urine, Catheterized       UROBILINOGEN UA       Negative       WBC, UA       >100       WBC     7.91                          11/11/20  1742        Albumin       Anion Gap       Appearance, UA       Bacteria, UA       Baso #       Basophil %       Bilirubin (UA)       BUN       Calcium       Chloride       CO2       Color, UA       Creatinine       Differential Method       eGFR if        eGFR if non        Eos #       Eosinophil %       Glucose       Glucose, UA       Gran # (ANC)       Gran %       Hematocrit       Hemoglobin       Hyaline Casts, UA       Immature Grans (Abs)       Immature Granulocytes       Ketones, UA        Leukocytes, UA       Lymph #       Lymph %       MCH       MCHC       MCV       Microscopic Comment       Mono #       Mono %       MPV       NITRITE UA       nRBC       Occult Blood UA       pH, UA       Phosphorus       Platelets       POCT Glucose 140     Potassium       Protein, UA       RBC       RBC, UA       RDW       Sodium       Specific Gravity, UA       Specimen UA       UROBILINOGEN UA       WBC, UA       WBC           All pertinent labs within the past 24 hours have been reviewed.    Significant Imaging: I have reviewed all pertinent imaging results/findings within the past 24 hours.

## 2020-11-13 VITALS
DIASTOLIC BLOOD PRESSURE: 69 MMHG | RESPIRATION RATE: 17 BRPM | OXYGEN SATURATION: 95 % | SYSTOLIC BLOOD PRESSURE: 154 MMHG | HEART RATE: 69 BPM | TEMPERATURE: 99 F | BODY MASS INDEX: 37.64 KG/M2 | HEIGHT: 62 IN | WEIGHT: 204.56 LBS

## 2020-11-13 LAB
ALBUMIN SERPL BCP-MCNC: 2 G/DL (ref 3.5–5.2)
ANION GAP SERPL CALC-SCNC: 12 MMOL/L (ref 8–16)
BASOPHILS # BLD AUTO: 0.04 K/UL (ref 0–0.2)
BASOPHILS NFR BLD: 0.6 % (ref 0–1.9)
BUN SERPL-MCNC: 45 MG/DL (ref 8–23)
CALCIUM SERPL-MCNC: 8.1 MG/DL (ref 8.7–10.5)
CHLORIDE SERPL-SCNC: 107 MMOL/L (ref 95–110)
CO2 SERPL-SCNC: 20 MMOL/L (ref 23–29)
CREAT SERPL-MCNC: 6 MG/DL (ref 0.5–1.4)
DIFFERENTIAL METHOD: ABNORMAL
EOSINOPHIL # BLD AUTO: 0.3 K/UL (ref 0–0.5)
EOSINOPHIL NFR BLD: 4.6 % (ref 0–8)
ERYTHROCYTE [DISTWIDTH] IN BLOOD BY AUTOMATED COUNT: 13 % (ref 11.5–14.5)
EST. GFR  (AFRICAN AMERICAN): 10 ML/MIN/1.73 M^2
EST. GFR  (NON AFRICAN AMERICAN): 9 ML/MIN/1.73 M^2
GLUCOSE SERPL-MCNC: 110 MG/DL (ref 70–110)
HCT VFR BLD AUTO: 25.4 % (ref 40–54)
HGB BLD-MCNC: 8.1 G/DL (ref 14–18)
IMM GRANULOCYTES # BLD AUTO: 0.04 K/UL (ref 0–0.04)
IMM GRANULOCYTES NFR BLD AUTO: 0.6 % (ref 0–0.5)
LYMPHOCYTES # BLD AUTO: 1.1 K/UL (ref 1–4.8)
LYMPHOCYTES NFR BLD: 16 % (ref 18–48)
MCH RBC QN AUTO: 30.6 PG (ref 27–31)
MCHC RBC AUTO-ENTMCNC: 31.9 G/DL (ref 32–36)
MCV RBC AUTO: 96 FL (ref 82–98)
MONOCYTES # BLD AUTO: 0.6 K/UL (ref 0.3–1)
MONOCYTES NFR BLD: 9.1 % (ref 4–15)
NEUTROPHILS # BLD AUTO: 4.7 K/UL (ref 1.8–7.7)
NEUTROPHILS NFR BLD: 69.1 % (ref 38–73)
NRBC BLD-RTO: 0 /100 WBC
PHOSPHATE SERPL-MCNC: 3.8 MG/DL (ref 2.7–4.5)
PLATELET # BLD AUTO: 227 K/UL (ref 150–350)
PMV BLD AUTO: 10.9 FL (ref 9.2–12.9)
POCT GLUCOSE: 117 MG/DL (ref 70–110)
POCT GLUCOSE: 136 MG/DL (ref 70–110)
POCT GLUCOSE: 169 MG/DL (ref 70–110)
POTASSIUM SERPL-SCNC: 4.4 MMOL/L (ref 3.5–5.1)
RBC # BLD AUTO: 2.65 M/UL (ref 4.6–6.2)
SODIUM SERPL-SCNC: 139 MMOL/L (ref 136–145)
WBC # BLD AUTO: 6.73 K/UL (ref 3.9–12.7)

## 2020-11-13 PROCEDURE — 25000003 PHARM REV CODE 250: Performed by: NURSE PRACTITIONER

## 2020-11-13 PROCEDURE — 99231 PR SUBSEQUENT HOSPITAL CARE,LEVL I: ICD-10-PCS | Mod: ,,, | Performed by: UROLOGY

## 2020-11-13 PROCEDURE — 97110 THERAPEUTIC EXERCISES: CPT

## 2020-11-13 PROCEDURE — 36415 COLL VENOUS BLD VENIPUNCTURE: CPT

## 2020-11-13 PROCEDURE — 63600175 PHARM REV CODE 636 W HCPCS: Performed by: INTERNAL MEDICINE

## 2020-11-13 PROCEDURE — 25000003 PHARM REV CODE 250: Performed by: FAMILY MEDICINE

## 2020-11-13 PROCEDURE — 63600175 PHARM REV CODE 636 W HCPCS: Performed by: FAMILY MEDICINE

## 2020-11-13 PROCEDURE — 80069 RENAL FUNCTION PANEL: CPT

## 2020-11-13 PROCEDURE — 99231 SBSQ HOSP IP/OBS SF/LOW 25: CPT | Mod: ,,, | Performed by: UROLOGY

## 2020-11-13 PROCEDURE — 99233 PR SUBSEQUENT HOSPITAL CARE,LEVL III: ICD-10-PCS | Mod: ,,, | Performed by: INTERNAL MEDICINE

## 2020-11-13 PROCEDURE — 85025 COMPLETE CBC W/AUTO DIFF WBC: CPT

## 2020-11-13 PROCEDURE — 80100016 HC MAINTENANCE HEMODIALYSIS

## 2020-11-13 PROCEDURE — 97116 GAIT TRAINING THERAPY: CPT

## 2020-11-13 PROCEDURE — 99233 SBSQ HOSP IP/OBS HIGH 50: CPT | Mod: ,,, | Performed by: INTERNAL MEDICINE

## 2020-11-13 RX ORDER — METOPROLOL TARTRATE 25 MG/1
12.5 TABLET, FILM COATED ORAL 2 TIMES DAILY
Qty: 30 TABLET | Refills: 11 | Status: SHIPPED | OUTPATIENT
Start: 2020-11-13 | End: 2021-11-13

## 2020-11-13 RX ORDER — HYDRALAZINE HYDROCHLORIDE 25 MG/1
75 TABLET, FILM COATED ORAL EVERY 8 HOURS
Qty: 270 TABLET | Refills: 11 | Status: SHIPPED | OUTPATIENT
Start: 2020-11-13 | End: 2021-11-13

## 2020-11-13 RX ORDER — HYDRALAZINE HYDROCHLORIDE 25 MG/1
75 TABLET, FILM COATED ORAL EVERY 8 HOURS
Qty: 270 TABLET | Refills: 11 | Status: SHIPPED | OUTPATIENT
Start: 2020-11-13 | End: 2020-11-13

## 2020-11-13 RX ORDER — INSULIN ASPART 100 [IU]/ML
2 INJECTION, SOLUTION INTRAVENOUS; SUBCUTANEOUS 2 TIMES DAILY
Qty: 10 ML | Refills: 1 | Status: SHIPPED | OUTPATIENT
Start: 2020-11-13 | End: 2020-11-13 | Stop reason: SDUPTHER

## 2020-11-13 RX ORDER — CIPROFLOXACIN 500 MG/1
500 TABLET ORAL DAILY
Qty: 5 TABLET | Refills: 0 | Status: SHIPPED | OUTPATIENT
Start: 2020-11-13 | End: 2020-11-13 | Stop reason: SDUPTHER

## 2020-11-13 RX ORDER — METOPROLOL TARTRATE 25 MG/1
12.5 TABLET, FILM COATED ORAL 2 TIMES DAILY
Qty: 30 TABLET | Refills: 11 | Status: SHIPPED | OUTPATIENT
Start: 2020-11-13 | End: 2020-11-13

## 2020-11-13 RX ORDER — INSULIN ASPART 100 [IU]/ML
2 INJECTION, SOLUTION INTRAVENOUS; SUBCUTANEOUS 2 TIMES DAILY
Qty: 10 ML | Refills: 1 | Status: SHIPPED | OUTPATIENT
Start: 2020-11-13

## 2020-11-13 RX ORDER — CIPROFLOXACIN 500 MG/1
500 TABLET ORAL DAILY
Qty: 5 TABLET | Refills: 0 | Status: SHIPPED | OUTPATIENT
Start: 2020-11-13 | End: 2020-11-18

## 2020-11-13 RX ADMIN — HYDRALAZINE HYDROCHLORIDE 75 MG: 25 TABLET, FILM COATED ORAL at 02:11

## 2020-11-13 RX ADMIN — HYDRALAZINE HYDROCHLORIDE 75 MG: 25 TABLET, FILM COATED ORAL at 05:11

## 2020-11-13 RX ADMIN — SEVELAMER CARBONATE 800 MG: 800 TABLET, FILM COATED ORAL at 12:11

## 2020-11-13 RX ADMIN — EPOETIN ALFA-EPBX 10000 UNITS: 10000 INJECTION, SOLUTION INTRAVENOUS; SUBCUTANEOUS at 09:11

## 2020-11-13 RX ADMIN — SERTRALINE HYDROCHLORIDE 100 MG: 50 TABLET ORAL at 12:11

## 2020-11-13 RX ADMIN — CEFTRIAXONE 1 G: 1 INJECTION, SOLUTION INTRAVENOUS at 12:11

## 2020-11-13 RX ADMIN — HEPARIN SODIUM 3200 UNITS: 1000 INJECTION, SOLUTION INTRAVENOUS; SUBCUTANEOUS at 09:11

## 2020-11-13 RX ADMIN — SEVELAMER CARBONATE 800 MG: 800 TABLET, FILM COATED ORAL at 05:11

## 2020-11-13 RX ADMIN — LACTOBACILLUS TAB 4 TABLET: TAB at 12:11

## 2020-11-13 RX ADMIN — LACTOBACILLUS TAB 4 TABLET: TAB at 05:11

## 2020-11-13 RX ADMIN — TAMSULOSIN HYDROCHLORIDE 0.4 MG: 0.4 CAPSULE ORAL at 12:11

## 2020-11-13 RX ADMIN — FUROSEMIDE 60 MG: 40 TABLET ORAL at 05:11

## 2020-11-13 RX ADMIN — ATORVASTATIN CALCIUM 80 MG: 40 TABLET, FILM COATED ORAL at 12:11

## 2020-11-13 RX ADMIN — FUROSEMIDE 60 MG: 40 TABLET ORAL at 12:11

## 2020-11-13 RX ADMIN — METOPROLOL TARTRATE 12.5 MG: 25 TABLET, FILM COATED ORAL at 12:11

## 2020-11-13 RX ADMIN — NIFEDIPINE 60 MG: 30 TABLET, FILM COATED, EXTENDED RELEASE ORAL at 12:11

## 2020-11-13 NOTE — SUBJECTIVE & OBJECTIVE
Interval History: Pt was seen and examined. Chart reviewed. 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 Pt was seen this am on HD treatment, tolerating HD well.    Review of patient's allergies indicates:   Allergen Reactions    Benadryl [diphenhydramine hcl] Other (See Comments)     Pt states benadryl makes him feel numb    Ace inhibitors      YIN     Current Facility-Administered Medications   Medication Frequency    0.9%  NaCl infusion (for blood administration) Q24H PRN    0.9%  NaCl infusion PRN    0.9%  NaCl infusion Once    0.9%  NaCl infusion PRN    0.9%  NaCl infusion Once    0.9%  NaCl infusion PRN    0.9%  NaCl infusion Once    0.9%  NaCl infusion PRN    0.9%  NaCl infusion Once    0.9%  NaCl infusion PRN    0.9%  NaCl infusion Once    acetaminophen tablet 650 mg Q6H PRN    ARIPiprazole tablet 30 mg QHS    aspirin EC tablet 81 mg QHS    atorvastatin tablet 80 mg Daily    cefTRIAXone (ROCEPHIN) 1 g/50 mL D5W IVPB Q24H    cloNIDine tablet 0.2 mg Q6H PRN    dextrose 50% injection 12.5 g PRN    dextrose 50% injection 25 g PRN    furosemide tablet 60 mg BID    glucose chewable tablet 16 g PRN    glucose chewable tablet 24 g PRN    heparin (porcine) injection 3,200 Units PRN    hydrALAZINE tablet 75 mg Q8H    insulin aspart U-100 pen 0-5 Units QID (AC + HS) PRN    iron sucrose (VENOFER) 100 mg in sodium chloride 0.9% 100 mL IVPB PRN    Lactobacillus acidoph-L.bulgar 1 million cell tablet 4 tablet TID WM    metoprolol tartrate (LOPRESSOR) split tablet 12.5 mg BID    NIFEdipine 24 hr tablet 60 mg Daily    ondansetron injection 4 mg Q8H PRN    sertraline tablet 100 mg Daily    sevelamer carbonate tablet 800 mg TID WM    sodium chloride 0.9% flush 10 mL PRN    tamsulosin 24 hr capsule 0.4 mg Daily     Facility-Administered Medications Ordered in Other  Encounters   Medication Frequency    diphenhydrAMINE injection 25 mg Q6H PRN    metoclopramide HCl injection 10 mg Q10 Min PRN       Objective:     Vital Signs (Most Recent):  Temp: 98.8 °F (37.1 °C) (11/13/20 0835)  Pulse: 65 (11/13/20 1020)  Resp: 18 (11/13/20 1020)  BP: (!) 187/66 (11/13/20 1020)  SpO2: (!) 93 % (11/13/20 0827)  O2 Device (Oxygen Therapy): room air (11/13/20 0840) Vital Signs (24h Range):  Temp:  [98.1 °F (36.7 °C)-99.6 °F (37.6 °C)] 98.8 °F (37.1 °C)  Pulse:  [63-80] 65  Resp:  [16-20] 18  SpO2:  [93 %-97 %] 93 %  BP: (124-187)/(50-80) 187/66     Weight: 92.8 kg (204 lb 9.4 oz) (11/13/20 0400)  Body mass index is 37.42 kg/m².  Body surface area is 2.01 meters squared.    I/O last 3 completed shifts:  In: 1562.5 [P.O.:1508; I.V.:54.5]  Out: 1900 [Urine:1900]    Physical Exam  Vitals signs and nursing note reviewed.   Constitutional:       Appearance: Normal appearance.   HENT:      Head: Normocephalic and atraumatic.   Cardiovascular:      Rate and Rhythm: Normal rate and regular rhythm.   Pulmonary:      Effort: Pulmonary effort is normal.      Breath sounds: Normal breath sounds.   Abdominal:      General: Abdomen is flat.      Palpations: Abdomen is soft.   Musculoskeletal:      Right lower leg: No edema.      Left lower leg: No edema.   Skin:     General: Skin is warm and dry.   Neurological:      General: No focal deficit present.      Mental Status: He is alert and oriented to person, place, and time.   Psychiatric:         Mood and Affect: Mood normal.         Behavior: Behavior normal.         Significant Labs: reviewed  BMP  Lab Results   Component Value Date     11/13/2020    K 4.4 11/13/2020     11/13/2020    CO2 20 (L) 11/13/2020    BUN 45 (H) 11/13/2020    CREATININE 6.0 (H) 11/13/2020    CALCIUM 8.1 (L) 11/13/2020    ANIONGAP 12 11/13/2020    ESTGFRAFRICA 10 (A) 11/13/2020    EGFRNONAA 9 (A) 11/13/2020     Lab Results   Component Value Date    WBC 6.73 11/13/2020     HGB 8.1 (L) 11/13/2020    HCT 25.4 (L) 11/13/2020    MCV 96 11/13/2020     11/13/2020         Significant Imaging: reviewed

## 2020-11-13 NOTE — PLAN OF CARE
[facility]  [facility], however [facility] has to process before schedule will be finalized.  Tentative schedule will be [facility] [place] [facility] at 9:00 am

## 2020-11-13 NOTE — PLAN OF CARE
Aox4. POC reviewed; interventions implemented as appropriate.   Tolerating tx.  WCN treating wound on penis.   Noe catheter in place & patent w/ cloudy urine. All infection prevention measures in place. No s/s of infection noted.  NADN. Resting quietly in bed.   Hourly rounding complete.   All safety measures remain in place. Bed alarm on & audible. Free of falls. SR up x2; bed low & locked. Call light w/in reach.   Will continue to monitor throughout shift.

## 2020-11-13 NOTE — PLAN OF CARE
Patient received hd today. Net removal 2 liters. No access issues. Tolerated. Dr. Babin visited during hd.

## 2020-11-13 NOTE — PROGRESS NOTES
[facility] -   Nephrology  Progress Note    Patient Name: Colt Stacy Jr.  MRN: [id number]  Admission Date: 11/7/2020  Hospital Length of Stay: 4 days  Attending Provider: Farhad Valderrama, *   Primary Care Physician: Primary Doctor No  Principal Problem:Acute pulmonary edema    Subjective:     HPI: 75 y.o. male patient with a PMHx of CKD stage V not on dialysis, CAD, HLD, HTN, DM II, h/o CVA, left adrenal adenoma s/p adrenalectomy on 10/20, BPH, chronic hematuria with chronic indwelling greenfield catheter, and vitiligo who presented to the Emergency Department with c/o SOB that has progressively worsened and LE edema.     Nephrology was consulted to help with patient's renal care while he is admitted at [facility]. I saw and examined patient in his hospital room. Patient confirms SOB and LE edema.     Chart review revealed that patient's renal function has leveled off with creatinine at 9-11 over the past 2 weeks. Patient with indwelling Greenfield for urinary obstruction.     Interval History: Pt was seen and examined. Chart reviewed. 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 Pt was seen this am on HD treatment, tolerating HD well.    Review of patient's allergies indicates:   Allergen Reactions    Benadryl [diphenhydramine hcl] Other (See Comments)     Pt states benadryl makes him feel numb    Ace inhibitors      YIN     Current Facility-Administered Medications   Medication Frequency    0.9%  NaCl infusion (for blood administration) Q24H PRN    0.9%  NaCl infusion PRN    0.9%  NaCl infusion Once    0.9%  NaCl infusion PRN    0.9%  NaCl infusion Once    0.9%  NaCl infusion PRN    0.9%  NaCl infusion Once    0.9%  NaCl infusion PRN    0.9%  NaCl infusion Once    0.9%  NaCl infusion PRN    0.9%  NaCl infusion Once    acetaminophen tablet 650 mg Q6H PRN     ARIPiprazole tablet 30 mg QHS    aspirin EC tablet 81 mg QHS    atorvastatin tablet 80 mg Daily    cefTRIAXone (ROCEPHIN) 1 g/50 mL D5W IVPB Q24H    cloNIDine tablet 0.2 mg Q6H PRN    dextrose 50% injection 12.5 g PRN    dextrose 50% injection 25 g PRN    furosemide tablet 60 mg BID    glucose chewable tablet 16 g PRN    glucose chewable tablet 24 g PRN    heparin (porcine) injection 3,200 Units PRN    hydrALAZINE tablet 75 mg Q8H    insulin aspart U-100 pen 0-5 Units QID (AC + HS) PRN    iron sucrose (VENOFER) 100 mg in sodium chloride 0.9% 100 mL IVPB PRN    Lactobacillus acidoph-L.bulgar 1 million cell tablet 4 tablet TID WM    metoprolol tartrate (LOPRESSOR) split tablet 12.5 mg BID    NIFEdipine 24 hr tablet 60 mg Daily    ondansetron injection 4 mg Q8H PRN    sertraline tablet 100 mg Daily    sevelamer carbonate tablet 800 mg TID WM    sodium chloride 0.9% flush 10 mL PRN    tamsulosin 24 hr capsule 0.4 mg Daily     Facility-Administered Medications Ordered in Other Encounters   Medication Frequency    diphenhydrAMINE injection 25 mg Q6H PRN    metoclopramide HCl injection 10 mg Q10 Min PRN       Objective:     Vital Signs (Most Recent):  Temp: 98.8 °F (37.1 °C) (11/13/20 0835)  Pulse: 65 (11/13/20 1020)  Resp: 18 (11/13/20 1020)  BP: (!) 187/66 (11/13/20 1020)  SpO2: (!) 93 % (11/13/20 0827)  O2 Device (Oxygen Therapy): room air (11/13/20 0840) Vital Signs (24h Range):  Temp:  [98.1 °F (36.7 °C)-99.6 °F (37.6 °C)] 98.8 °F (37.1 °C)  Pulse:  [63-80] 65  Resp:  [16-20] 18  SpO2:  [93 %-97 %] 93 %  BP: (124-187)/(50-80) 187/66     Weight: 92.8 kg (204 lb 9.4 oz) (11/13/20 0400)  Body mass index is 37.42 kg/m².  Body surface area is 2.01 meters squared.    I/O last 3 completed shifts:  In: 1562.5 [P.O.:1508; I.V.:54.5]  Out: 1900 [Urine:1900]    Physical Exam  Vitals signs and nursing note reviewed.   Constitutional:       Appearance: Normal appearance.   HENT:      Head: Normocephalic and  atraumatic.   Cardiovascular:      Rate and Rhythm: Normal rate and regular rhythm.   Pulmonary:      Effort: Pulmonary effort is normal.      Breath sounds: Normal breath sounds.   Abdominal:      General: Abdomen is flat.      Palpations: Abdomen is soft.   Musculoskeletal:      Right lower leg: No edema.      Left lower leg: No edema.   Skin:     General: Skin is warm and dry.   Neurological:      General: No focal deficit present.      Mental Status: He is alert and oriented to person, place, and time.   Psychiatric:         Mood and Affect: Mood normal.         Behavior: Behavior normal.         Significant Labs: reviewed  BMP  Lab Results   Component Value Date     11/13/2020    K 4.4 11/13/2020     11/13/2020    CO2 20 (L) 11/13/2020    BUN 45 (H) 11/13/2020    CREATININE 6.0 (H) 11/13/2020    CALCIUM 8.1 (L) 11/13/2020    ANIONGAP 12 11/13/2020    ESTGFRAFRICA 10 (A) 11/13/2020    EGFRNONAA 9 (A) 11/13/2020     Lab Results   Component Value Date    WBC 6.73 11/13/2020    HGB 8.1 (L) 11/13/2020    HCT 25.4 (L) 11/13/2020    MCV 96 11/13/2020     11/13/2020         Significant Imaging: reviewed    Assessment/Plan:     74 y/o male with advanced CKD leading to ESRD has started chronic HD:     ESRD  Advanced CKD due to uncontrolled BP/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  Recommend consult urology           Plans and recommendations:  As discussed above  Opportunity for  questions provided, discussed with PCP        Regina Babin MD  Nephrology  [facility]

## 2020-11-13 NOTE — PT/OT/SLP PROGRESS
Physical Therapy  Treatment    Colt Stacy Jr.   MRN: [id number]   Admitting Diagnosis: Acute pulmonary edema    PT Received On: 11/13/20  PT Start Time: 0720     PT Stop Time: 0745    PT Total Time (min): 25 min       Billable Minutes:  Gait Training 15 and Therapeutic Exercise 10    Treatment Type: Treatment  PT/PTA: PT     PTA Visit Number: 0       General Precautions: Standard, fall  Orthopedic Precautions: N/A   Braces: N/A         Subjective:  Communicated with NURSE MORGAN AND Epic CHART REVIEW  prior to session.  PT AGREED TO TX     Pain/Comfort  Pain Rating 1: 0/10  Pain Rating Post-Intervention 1: 0/10    Objective:   Patient found with: peripheral IV, telemetry    Functional Mobility:  PT MET IN RM LOG ROLLED TO LEFT SEATED EOB WITH SBA. PT SCOOTED TO EOB AND STOOD WITH RW CGA FOR GT WITH SLOW STEP TO GT PACE. PT GT TRAINED 1X20' WITH RW AND RETURNED SEATED EOB FOR REST AND TO ALLOW MD TO COMPLETE ASSESSMENT. PT STOOD X 2 FOR GT X 60' WITH RW AND SHUFFLING GT. PT RETURNED TO RM T/F TO CHAIR WITH RW AND CGA. PT COMPLETED B LE TE X 15 REPS OF AP, TKE, AND MIP. PT LEFT SEATED IN CHAIR WITH BREAKFAST TRAY SET UP AND ALL NEEDS MET.     AM-PAC 6 CLICK MOBILITY  How much help from another person does this patient currently need?   1 = Unable, Total/Dependent Assistance  2 = A lot, Maximum/Moderate Assistance  3 = A little, Minimum/Contact Guard/Supervision  4 = None, Modified [place]/Independent    Turning over in bed (including adjusting bedclothes, sheets and blankets)?: 4  Sitting down on and standing up from a chair with arms (e.g., wheelchair, bedside commode, etc.): 3  Moving from lying on back to sitting on the side of the bed?: 4  Moving to and from a bed to a chair (including a wheelchair)?: 3  Need to walk in hospital room?: 3  Climbing 3-5 steps with a railing?: 1  Basic Mobility Total Score: 18    AM-PAC Raw Score CMS G-Code Modifier Level of Impairment Assistance   6 % Total / Unable    7 - 9 CM 80 - 100% Maximal Assist   10 - 14 CL 60 - 80% Moderate Assist   15 - 19 CK 40 - 60% Moderate Assist   20 - 22 CJ 20 - 40% Minimal Assist   23 CI 1-20% SBA / CGA   24 CH 0% Independent/ Mod I     Patient left up in chair with call button in reach and chair alarm on.    Assessment:  PT MARGY TX WITH INC FATIGUE     Rehab identified problem list/impairments: Rehab identified problem list/impairments: weakness, impaired endurance, impaired functional mobilty, gait instability, impaired balance, pain    Rehab potential is good.    Activity tolerance: Fair    Discharge recommendations: Discharge Facility/Level of Care Needs: nursing facility, skilled     Barriers to discharge:      Equipment recommendations:       GOALS:   Multidisciplinary Problems     Physical Therapy Goals        Problem: Physical Therapy Goal    Goal Priority Disciplines Outcome Goal Variances Interventions   Physical Therapy Goal     PT, PT/OT Ongoing, Progressing     Description: 1. Patient will perform supine to/from sit cga  2. Patient will perform sit to/from stand with RW and sba  3. Patient will ambulate 100ft RW sba no gross LOB                   PLAN:    Patient to be seen 5 x/week  to address the above listed problems via gait training, therapeutic activities, therapeutic exercises  Plan of Care expires: 11/16/20  Plan of Care reviewed with: patient         Alta Lawson, PT  11/13/2020

## 2020-11-13 NOTE — CONSULTS
Consulted to this 75 year old male patient for possible skin breakdown to his meatus. PMHx of CKD stage V not on dialysis, CAD, HLD, HTN, DM II, h/o CVA, left adrenal adenoma s/p adrenalectomy on 10/20, BPH, chronic hematuria with chronic indwelling greenfield catheter, and vitiligo. He is awake and alert, assessment performed. penis is edematous with Greenfield catheter in place. There is some dried drainage, no evidence of current pressure injury. Bloody and creamy drainage noted from penis. sacrum, coccyx, bilateral buttock and heels with no redness or breakdown. Recommend elevate scrotum/penis with towel roll. Encourage patient to reposition frequently. Will follow prn. Dr. Vásquez notified of possible need for urology consult

## 2020-11-13 NOTE — PLAN OF CARE
BRIAN communicated with Lin at [facility].  Lin is awaiting a call back from her billing department with an update [facility].  Lin will call BRIAN with update when she communicates with her billing department.

## 2020-11-13 NOTE — ASSESSMENT & PLAN NOTE
74 y/o male with YIN on CKD stage 4 has persistent gross hematuria:     Acute renal failure on CKD stage 4  s Cr stable, slowly improving  The possibly causes of YIN on CKD further considered  YIN occured immediately post surgery post L adrenelectomy on 10/20/20  YIN not related to hyperaldosteronism or the adrenelectomy, unless removing the left adrenal imposed hypoxia on the left kidney (share the same vascular drainage), and the contralateral kidney is the non-dominant kidney  Post obstructive nephropathy from retention usually improves robustly after resolution of the urinary retention, pt has well functioning greenfield catheter now   DDX diarrhea: had large diarrhea x 4 days immediately post admit     Has responded to IVF's, with daily improvement in s Cr     Pt has no acute indications for HD  Will need outpt f/u  OK to d/c home if pt can maintain PO intake and keep the foely catheter is  Will need urology f/u, arreola been arranged  Will need renal clinic f/u in late Nov or Dec, will arrange     HTN (hypertension)  Secondary HTN/resistent HTN  Prior w/u showed left adrenal gland adenoma  S/p left adrenelectomy  BP has improved     BP better controlled   Meds reviewed  Increased hydralazine from 75 to 100 mg po tid yesterday        Stage 4 chronic kidney disease  Stage 4 CKD at baseline.  Due to DM, HTN, unopposed hyperaldosteronism         Plans and recommendations:  As discussed above  Opportunity for questions provided, discussed with PCP

## 2020-11-13 NOTE — PLAN OF CARE
Patient approved at [facility] Mike Gardner per Lin at [facility].  CM updated Dr Morales via secure chat.

## 2020-11-13 NOTE — PT/OT/SLP PROGRESS
Occupational Therapy      Patient Name:  Colt Stacy    MRN:  [id number]    Patient not seen today secondary due TO DIALYSIS. Will follow-up.    Mariam Poe OT  11/13/2020

## 2020-11-13 NOTE — CONSULTS
"Chief Complaint   Patient presents with    Shortness of Breath     89 RA per [facility]       Physician requesting consult: Dago Vásquez MD    History of Present Illness:   Colt Stacy Jr. is a 75 y.o. male currently admitted with CKD stage V and acute pulmonary edema. He has been seen in the urology service in the past for gross hematuria, BPH and urinary retention. He has an indwelling greenfield at this point and has had penile edema. He has been noted to have bleeding and discharge around the meatus. He is on rocephin for UTI. He denies any pain from his greenfield.       Review of Systems   Constitutional: Negative for activity change, chills and fever.   HENT: Negative for drooling, facial swelling and nosebleeds.    Eyes: Negative for photophobia, pain and visual disturbance.   Respiratory: Negative for choking and chest tightness.    Cardiovascular: Negative for chest pain, palpitations and leg swelling.   Gastrointestinal: Negative for abdominal pain, blood in stool, diarrhea, nausea and vomiting.   Genitourinary: Positive for discharge, genital sores and penile swelling. Negative for penile pain.   Musculoskeletal: Negative for back pain, gait problem and joint swelling.   Skin: Negative for color change, rash and wound.   Neurological: Negative for dizziness, tremors, seizures and light-headedness.   Psychiatric/Behavioral: Negative for behavioral problems, confusion, hallucinations and self-injury.       Past Medical History:   Diagnosis Date    Adrenal adenoma, left     BPH (benign prostatic hyperplasia)     CAD (coronary artery disease)     CKD (chronic kidney disease) stage 5, GFR less than 15 ml/min     Depression     Diabetes mellitus     Hyperlipidemia     Hypertension     Retinitis     Stroke     Pt states residual sx is "I walk funny".    Vitiligo        Past Surgical History:   Procedure Laterality Date    FLUOROSCOPY Bilateral 7/20/2020    Procedure: Angiogram- Bilateral Adrenal;  Surgeon: Jose R MOORE " MD Eusebia;  Location: [facility] CATH LAB;  Service: General;  Laterality: Bilateral;    ROBOT-ASSISTED ADRENALECTOMY Left     ROBOT-ASSISTED SURGICAL REMOVAL OF ADRENAL GLAND USING DA DEMETRI XI Left 10/20/2020    Procedure: XI ROBOTIC ADRENALECTOMY;  Surgeon: Livan Quiñones MD;  Location: [facility] OR;  Service: General;  Laterality: Left;       History reviewed. No pertinent family history.    Social History     Tobacco Use    Smoking status: Never Smoker    Smokeless tobacco: Never Used   Substance Use Topics    Alcohol use: No    Drug use: No       Current Facility-Administered Medications   Medication Dose Route Frequency Provider Last Rate Last Dose    0.9%  NaCl infusion (for blood administration)   Intravenous Q24H PRN Dago Vásquez MD        0.9%  NaCl infusion   Intravenous PRN Soren Contreras MD        0.9%  NaCl infusion   Intravenous Once Soren Contreras MD        0.9%  NaCl infusion   Intravenous PRN Soren Contreras MD        0.9%  NaCl infusion   Intravenous Once Soren Contreras MD        0.9%  NaCl infusion   Intravenous PRN Soren Contreras MD        0.9%  NaCl infusion   Intravenous Once Soren Contreras MD        0.9%  NaCl infusion   Intravenous PRN Soren Contreras MD        0.9%  NaCl infusion   Intravenous Once Soren Contreras MD   Stopped at 11/11/20 1315    0.9%  NaCl infusion   Intravenous PRN Soren Contreras MD        0.9%  NaCl infusion   Intravenous Once Soren Contreras MD        acetaminophen tablet 650 mg  650 mg Oral Q6H PRN Meli Simpson, NP   650 mg at 11/09/20 1609    ARIPiprazole tablet 30 mg  30 mg Oral QHS Meli Simpson, NP   30 mg at 11/12/20 2059    aspirin EC tablet 81 mg  81 mg Oral QHS Melimaine Simpson, NP   81 mg at 11/12/20 2059    atorvastatin tablet 80 mg  80 mg Oral Daily Meli Simpson, NP   80 mg at 11/12/20 0816    cefTRIAXone (ROCEPHIN) 1 g/50 mL D5W IVPB  1 g Intravenous Q24H Dago Vásquez MD   1 g at 11/12/20 1144    cloNIDine tablet 0.2 mg  0.2 mg Oral Q6H  PRN Meli Simpson, NP        dextrose 50% injection 12.5 g  12.5 g Intravenous PRN Meli Simpson, NP        dextrose 50% injection 25 g  25 g Intravenous PRN Meli Simpson NP        epoetin rebecca-epbx injection 10,000 Units  10,000 Units Subcutaneous Once Soren Contreras MD        furosemide tablet 60 mg  60 mg Oral BID Dago Vásquez MD   60 mg at 11/12/20 1741    glucose chewable tablet 16 g  16 g Oral PRN Meli Simpson, NP        glucose chewable tablet 24 g  24 g Oral PRN Meli Simpson, NP        heparin (porcine) injection 3,200 Units  3,200 Units Intravenous PRN Soren Contreras MD   3,200 Units at 11/09/20 1844    hydrALAZINE tablet 75 mg  75 mg Oral Q8H Meli Simpson, NP   75 mg at 11/13/20 0522    insulin aspart U-100 pen 0-5 Units  0-5 Units Subcutaneous QID (AC + HS) PRN Meli Simpson NP   1 Units at 11/10/20 2142    iron sucrose (VENOFER) 100 mg in sodium chloride 0.9% 100 mL IVPB  100 mg Intravenous PRN Soren Contreras MD        Lactobacillus acidoph-L.bulgar 1 million cell tablet 4 tablet  4 tablet Oral TID  Meli Simpson NP   4 tablet at 11/12/20 1741    metoprolol tartrate (LOPRESSOR) split tablet 12.5 mg  12.5 mg Oral BID Meli Simpson, NP   12.5 mg at 11/12/20 2059    mupirocin 2 % ointment   Nasal BID Soren Contreras MD        NIFEdipine 24 hr tablet 60 mg  60 mg Oral Daily Meil Simpson, NP   60 mg at 11/12/20 0816    ondansetron injection 4 mg  4 mg Intravenous Q8H PRN Meli Simpson NP        sertraline tablet 100 mg  100 mg Oral Daily Meli Simpson, NP   100 mg at 11/12/20 0816    sevelamer carbonate tablet 800 mg  800 mg Oral TID  Meli Simpson NP   800 mg at 11/12/20 1741    sodium chloride 0.9% flush 10 mL  10 mL Intravenous PRN Meli Simpson NP        tamsulosin 24 hr capsule 0.4 mg  0.4 mg Oral Daily Meil Simpson NP   0.4 mg at 11/12/20 0816     Facility-Administered Medications Ordered in Other Encounters   Medication Dose  Route Frequency Provider Last Rate Last Dose    diphenhydrAMINE injection 25 mg  25 mg Intravenous Q6H PRN Ana Jose MD        metoclopramide HCl injection 10 mg  10 mg Intravenous Q10 Min PRN Ana Jose MD           Review of patient's allergies indicates:   Allergen Reactions    Benadryl [diphenhydramine hcl] Other (See Comments)     Pt states benadryl makes him feel numb    Ace inhibitors      YIN       Physical Exam  Vitals:    11/13/20 0827   BP: (!) 147/67   Pulse: 77   Resp: 18   Temp: 99.6 °F (37.6 °C)       General: Well-developed, well-nourished in no acute distress  HEENT: Normocephalic, atraumatic, Extraocular movements intact  Respirations: even and unlabored  Back: midline spine, no CVA tenderness  Abdomen: soft, Non-tender, non-distended, no organomegaly or palpable masses, no rebound or guarding  : uncircumcised phallus with greenfield in place draining clear yellow urine. Foreskin edematous with old dried blood and dried yellow discharge, consistent with indwelling catheter. No sign of active infection. Unable to retract foreskin due to edema. Meatus not visible.  Extremities: atraumatic, moves all equally, no clubbing, cyanosis, 2+ edema  Psych: normal affect  Skin: warm and dry, no lesions  Neuro: Alert and oriented, Cranial nerves II-XII grossly intact    Urinalysis  pH, UA   Date Value Ref Range Status   11/12/2020 6.0 5.0 - 8.0 Final     Glucose, UA   Date Value Ref Range Status   11/12/2020 Negative Negative Final     Occult Blood UA   Date Value Ref Range Status   11/12/2020 Trace (A) Negative Final     Nitrite, UA   Date Value Ref Range Status   11/12/2020 Negative Negative Final     Leukocytes, UA   Date Value Ref Range Status   11/12/2020 2+ (A) Negative Final         Assessment:   Urinary retention  Anasarca causing penile edema  BPH with urinary retention    Plan:    Would elevate penis and scrotum while in bed. No sign of active infection of the foreskin. Would  position greenfield so that there is no tension on the catheter to prevent any foreskin breakdown. Given difficulty of greenfield placement, will not remove until voiding trial in the clinic. Continue empiric rocephin and f/u urine culture. Please call with any questions. Pt already Has follow up visit scheduled next week with Dr. Lala for voiding trial, 11/19/20.

## 2020-11-14 LAB
BACTERIA UR CULT: ABNORMAL
BACTERIA UR CULT: ABNORMAL

## 2020-11-15 NOTE — PHYSICIAN QUERY
PT Name: Colt Stacy Jr.  MR #: [id number]     Diagnosis Clarification      CDS/: Kathrine Allen     RN CCDS          Contact information:[email or web address]    This form is a permanent document in the medical record.     Query Date: November 15, 2020    Dear Provider,  By submitting this query, we are merely seeking further clarification of documentation.  Please utilize your independent clinical judgment when addressing the question(s) below.     The medical record contains the following:    Supporting Clinical Information Location in Medical Record   Gross hematuria   - Patient with chronic hematuria and chronic indwelling Greenfield catheter.   - Avoid antiplatelets/anticoagulation.   - Follow-up with urology outpatient.    11/10: greenfield removed yesterday, concerned with UTI.  Patient started on IV antibiotics for UTI.    Greenfield present prior to admission and removed on the 9th and replaced for discharge on the 12th       Specimen UA Urine, Catheterized   Color, UA Yellow   Appearance, UA Hazy (A)   Specific Gravity, UA 1.025   pH, UA 6.0   Protein, UA 2+ (A)   Glucose, UA Negative   Ketones, UA Negative   Occult Blood UA Trace (A)   NITRITE UA Negative   UROBILINOGEN UA Negative   Bilirubin (UA) Negative   Leukocytes, UA 2+ (A)   RBC, UA 1   WBC, UA >100 (H)   Bacteria, UA Occasional     CITROBACTER BRAAKI   50,000 - 99,999 cfu/ml     ENTEROCOCCUS FAECALIS   50,000 - 99,999 cfu/ml     Rocephin IV 11/10-11/13  Ciprofloxin PO x 5 days (RX given) H&P            Hospital medicine PN 11/10  Nursing care plan 11/10    Nursing flow sheets        Urinalysis 11/12                                                Urine cultures 11/12            MAR     Please clarify if the ______UTI______ diagnosis has been:    [x  ] UTI related to indwelling greenfield ruled in   [  ] UTI not related to indwelling greenfield ruled in    [  ] UTI ruled out    [  ] Other/Clarification of findings (please specify)_______________    [   ]  Clinically undetermined     IF RULED IN, please specify the POA status:    Present on admission (POA) status:   [   ] Yes (Y)                          [  ] Clinically Undetermined (W)  [   ] No (N)                            [   ] Documentation insufficient to determine if condition is POA (U)       Please document in your progress notes daily for the duration of treatment, until resolved, and include in your discharge summary.

## 2020-11-15 NOTE — DISCHARGE SUMMARY
[facility] Hospital Medicine  Discharge Summary      Patient Name: Colt Stacy Jr.  MRN: [id number]  Admission Date: 11/7/2020  Hospital Length of Stay: 4 days  Discharge Date and Time: 11/13/2020  5:56 PM  Attending Physician: No att. providers found   Discharging Provider: Farhad Valderrama MD  Primary Care Provider: Primary Doctor Sheri      HPI:   Colt Stacy Jr. is a 75 y.o. male patient with a PMHx of CKD stage V not on dialysis, CAD, HLD, HTN, DM II, h/o CVA, left adrenal adenoma s/p adrenalectomy on 10/20, BPH, chronic hematuria with chronic indwelling greenfield catheter, and vitiligo who presented to the Emergency Department with c/o SOB that has progressively worsened over the past few hours.  No aggravating or alleviating factors.  Associated edema to bilateral lower extremities, bilateral upper extremities, and abdomen.  Denies any chest pain, palpitations, cough, wheezing, congestion, orthopnea, PND, weight gain, abdominal pain, nausea, vomit in, diarrhea, worsening hematuria, decreased UOP, back pain, lightheadedness or dizziness, syncope, weakness, fever or chills. Patient is on 2L O2 at home.  in the ED, patient's O2 sat remained stable on 2 L nasal cannula with some mild respiratory distress noted with RR 22-25.  Initial workup showed:  Normal WBC, H&H 7.3/23.4, creatinine 9.3, BUN 83, potassium 4.7, CO2 20, , troponin 0.092, COVID negative.  EKG unremarkable. CXR showed mild pulmonary edema.  100 mg IV Lasix given in the ED with 800 mL diuresed.  Case discussed with Nephrology who agrees with diuresis and will see patient in consult.  Hospital Medicine was contacted for admission and patient placed in observation for acute pulmonary edema and worsening CKD stage V.  Patient is a full code.  His daughter is SDM.      Procedure(s):  Insertion, Vascular Access Catheter      Hospital Course:   11/9: dialyzed yesterday by nephro. Will need outpatient dialysis established  prior to dc. Iron deficiency noted. Venofer given.   11/10: greenfield removed yesterday, concerned with UTI. Rocephin started. Establishing outpatient dialysis. Concerned with tightening of foreskin however patient able to urinate. Will need outpatient f/u with urology.   11/11:  Greenfield placed for urinary retention. Will need outpatient dialysis established. F/u with urology outpatient.   11/12: awaiting outpatient dialysis to be established. Continue rocephin for uti. Urine culture will need to be added on to u/a. Urine culture ordered. Bleeding and discharge still noted around meatus. Will consult urology.   11/13 Pt was seen and examined at bedside . He was determined to be suitable for d/c   He is schedule for outpt HD  He will be d/c with the greenfield per urology recs . He will be d/c on po cipro for UTI .  The Lantuss and short acting insulin  units were decreased accordantly      Consults:   Consults (From admission, onward)        Status Ordering Provider     Inpatient consult to Urology  Once     Provider:  Mickey Major IV, MD    Completed KAYE MONTALVO          No new Assessment & Plan notes have been filed under this hospital service since the last note was generated.  Service: Hospital Medicine    Final Active Diagnoses:    Diagnosis Date Noted POA    PRINCIPAL PROBLEM:  Acute pulmonary edema and anasarca [J81.0] 11/08/2020 Yes    Resistant hypertension [I10]  Yes    Urinary retention [R33.9]  Yes    Acute cystitis [N30.00] 11/10/2020 Yes    Anemia due to chronic kidney disease [N18.9, D63.1] 11/08/2020 Yes     Chronic    Disorder of electrolytes [E87.8] 11/08/2020 Yes    ESRD (end stage renal disease) [N18.6]  Yes    Gross hematuria [R31.0] 11/02/2020 Yes    Type 2 diabetes mellitus, with long-term current use of insulin [E11.9, Z79.4] 12/11/2019 Not Applicable     Chronic    Essential hypertension [I10] 12/11/2019 Yes     Chronic      Problems Resolved During this Admission:       Discharged  Condition: stable    Disposition: Home or Self Care    Follow Up:  Follow-up Information     [facility]. Go on 11/14/2020.    Why: Every Tuesday, Thursday, and Saturday at 9:00 am.  Be there for 8:30 am for first treatment   Contact information:  [address] [facility] [address]  [phone]           Juni Lala MD In 1 week.    Specialty: Urology  Contact information:  [id number] [place] [address] [phone]                 Patient Instructions:      Diet renal     Activity as tolerated       Significant Diagnostic Studies: Labs: BMP: No results for input(s): GLU, NA, K, CL, CO2, BUN, CREATININE, CALCIUM, MG in the last 48 hours., CMP No results for input(s): NA, K, CL, CO2, GLU, BUN, CREATININE, CALCIUM, PROT, ALBUMIN, BILITOT, ALKPHOS, AST, ALT, ANIONGAP, ESTGFRAFRICA, EGFRNONAA in the last 48 hours. and CBC No results for input(s): WBC, HGB, HCT, PLT in the last 48 hours.  Microbiology: Blood Culture No results found for: LABBLOO    Pending Diagnostic Studies:     None         Medications:  Reconciled Home Medications:      Medication List      START taking these medications    ciprofloxacin HCl 500 MG tablet  Commonly known as: CIPRO  Take 1 tablet (500 mg total) by mouth once daily. for 5 days     insulin aspart U-100 100 unit/mL injection  Commonly known as: NOVOLOG  Inject 2 Units into the skin 2 (two) times a day. 4 units q a.m, 7 units at dinner     insulin detemir U-100 100 unit/mL injection  Commonly known as: LEVEMIR  Inject 7 Units into the skin every evening.        CONTINUE taking these medications    ARIPiprazole 30 MG Tab  Commonly known as: ABILIFY  Take 30 mg by mouth every evening.     ASPIR-81 ORAL  Take 81 mg by mouth every evening.     atorvastatin 80 MG tablet  Commonly known as: LIPITOR  Take 1 tablet (80 mg total) by mouth once daily.     diphenoxylate-atropine 2.5-0.025 mg 2.5-0.025 mg per tablet  Commonly known as: LOMOTIL  Take 1 tablet by  mouth 4 (four) times daily as needed for Diarrhea.     hydrALAZINE 25 MG tablet  Commonly known as: APRESOLINE  Take 3 tablets (75 mg total) by mouth every 8 (eight) hours.     HYDROcodone-acetaminophen 5-325 mg per tablet  Commonly known as: NORCO  Take 1 tablet by mouth every 8 (eight) hours as needed.     Lactobacillus acidoph-L.bulgar 1 million cell Chew  Take 4 tablets by mouth 3 (three) times daily with meals.     metoprolol tartrate 25 MG tablet  Commonly known as: LOPRESSOR  Take 0.5 tablets (12.5 mg total) by mouth 2 (two) times daily.     multivitamin capsule  Take 1 capsule by mouth once daily.     NIFEdipine 60 MG (OSM) 24 hr tablet  Commonly known as: PROCARDIA-XL  Take 1 tablet (60 mg total) by mouth once daily.     sertraline 100 MG tablet  Commonly known as: ZOLOFT  Take 100 mg by mouth once daily.     sevelamer carbonate 800 mg Tab  Commonly known as: RENVELA  Take 1 tablet (800 mg total) by mouth 3 (three) times daily with meals.     tamsulosin 0.4 mg Cap  Commonly known as: FLOMAX  Take 1 capsule (0.4 mg total) by mouth once daily.            Indwelling Lines/Drains at time of discharge:   Lines/Drains/Airways     Central Venous Catheter Line                 Hemodialysis Catheter 11/08/20 1320 right internal jugular 7 days          Drain                 Urethral Catheter 11/12/20 0415 Coude 16 Fr. 3 days                Time spent on the discharge of patient: 35 minutes  Patient was seen and examined on the date of discharge and determined to be suitable for discharge.         Farhad Valderrama MD  Department of Hospital Medicine  [facility]

## 2020-11-16 ENCOUNTER — PATIENT OUTREACH (OUTPATIENT)
Dept: ADMINISTRATIVE | Facility: CLINIC | Age: 75
End: 2020-11-16

## 2020-11-16 DIAGNOSIS — J81.0 ACUTE PULMONARY EDEMA: Primary | ICD-10-CM

## 2020-11-16 NOTE — PATIENT INSTRUCTIONS
Pulmonary Edema  Your healthcare provider has told you that you have pulmonary edema. Read on to learn more about pulmonary edema and how it can be treated.  What is pulmonary edema?     Pulmonary edema is fluid in the air sacs (alveoli) in the lungs.   Pulmonary edema occurs when the air sacs (alveoli) in your lungs fill with fluid. The fluid buildup makes it hard for the lungs to do their job, including getting oxygen from the air you breathe. This can make it hard to breathe. The most common cause of pulmonary edema is heart failure. When the heart doesnt work properly, it can cause pressure to rise in the veins (blood vessels) of the lungs. As pressure builds, fluid leaks out of the congested veins. It fills the alveoli. The extra fluid prevents oxygen from moving through the lungs as it should. But heart failure isnt the only cause of pulmonary edema. Damage to the lungs or kidney failure can also cause fluid to fill the lungs. And in some cases, living or exercising at high altitudes can lead to fluid buildup in the lungs.  How is pulmonary edema diagnosed?  Your healthcare provider does an exam and asks about your health history. You may also have one or more of the following:  · Blood tests to take samples of blood.  · Imaging tests to take detailed pictures of inside the body. These may include a chest X-ray and ultrasound.  · Electrocardiography (ECG)  and echocardiogram to test how well the heart is functioning.  How is pulmonary edema treated?  Treatment usually depends on whats causing the edema. For instance, if its because of heart failure, treating the heart condition will treat the edema. Treatment can also ease symptoms. Therapy often includes the following:  · Oxygen. This may be given through a mask that goes over the nose. It may be given through a small tube that sits under the nose. Sometimes, pressurized air will be needed through a tight fitting mask, using a machine called CPAP or  BiPAP. Or it may be given through a tube placed into the windpipe (trachea). If a tube is required, a ventilator, often called a breathing machine or respirator will be used.  · Medicines. These may include water pills (diuretics) to help your body get rid of extra fluid. The fluid passes out of your body as urine. You may also need medicines to treat the heart. These can help your heart work better. This helps reduce fluid buildup in the lungs.  What are the long-term concerns?  If treated right away, pulmonary edema can be improved. It may even be cured. But in some cases, ongoing treatment is needed to help control the problem. This may require having procedures or taking medicines for months or years. In some cases, you may need to use oxygen or breathing equipment for a long time. This can lead to complications such as damage to lung tissue. Your healthcare provider can tell you more if needed.  Call 911  Call 911 if any of these occur:  · Chest pain  · Severe trouble breathing  · Coughing up blood  · Skin turns blue      When to call the healthcare provider  Call your the healthcare provider right away if you have any of the following:  · Unusual or irregular heartbeat  · Unable to speak full sentences before running out of breath  · Sweating more than usual   Date Last Reviewed: 2/1/2017  © 5399-2198 [employer]. [address] [place] [address], [place] [address]. All rights reserved. This information is not intended as a substitute for professional medical care. Always follow your healthcare professional's instructions.

## 2020-11-16 NOTE — PLAN OF CARE
[facility].  Urology follow up scheduled. Patient discharged to [facility] for recovery following this hospital stay.     11/16/20 0720   Final Note   Assessment Type Final Discharge Note   Anticipated Discharge Disposition Home   Hospital Follow Up  Appt(s) scheduled? Yes   Right Care Referral Info   Post Acute Recommendation No Care

## 2020-11-16 NOTE — PROGRESS NOTES
"Daughter states that after previous discharge that he was to have home health, but it was "put on hold" because of readmission. She states that [facility] this [facility] and they are awaiting on notes to get home health out. Instructed her that the pt was sent home without home health this time. [facility] to get [facility] from [facility] to get home health started.     "

## 2020-11-17 NOTE — PHYSICIAN QUERY
PT Name: Colt Stacy Jr.  MR #: [id number]     Diagnosis Clarification      CDS/: Kathrine Allen     RN CCDS          Contact information:[email or web address]    This form is a permanent document in the medical record.     Query Date: November 17, 2020    Dear Provider,  By submitting this query, we are merely seeking further clarification of documentation.  Please utilize your independent clinical judgment when addressing the question(s) below.     The medical record contains the following:    Supporting Clinical Information Location in Medical Record   Gross hematuria   - Patient with chronic hematuria and chronic indwelling Greenfield catheter.   - Avoid antiplatelets/anticoagulation.   - Follow-up with urology outpatient.    11/10: greenfield removed yesterday, concerned with UTI.  Patient started on IV antibiotics for UTI.    Greenfield present prior to admission and removed on the 9th and replaced for discharge on the 12th       Specimen UA Urine, Catheterized   Color, UA Yellow   Appearance, UA Hazy (A)   Specific Gravity, UA 1.025   pH, UA 6.0   Protein, UA 2+ (A)   Glucose, UA Negative   Ketones, UA Negative   Occult Blood UA Trace (A)   NITRITE UA Negative   UROBILINOGEN UA Negative   Bilirubin (UA) Negative   Leukocytes, UA 2+ (A)   RBC, UA 1   WBC, UA >100 (H)   Bacteria, UA Occasional     CITROBACTER BRAAKI   50,000 - 99,999 cfu/ml     ENTEROCOCCUS FAECALIS   50,000 - 99,999 cfu/ml     Rocephin IV 11/10-11/13  Ciprofloxin PO x 5 days (RX given) H&P            Hospital medicine PN 11/10  Nursing care plan 11/10    Nursing flow sheets        Urinalysis 11/12                                                Urine cultures 11/12            MAR     Please clarify if the ______UTI______ diagnosis has been:    [x  ] UTI related to indwelling greenfield ruled in   [  ] UTI not related to indwelling greenfield ruled in    [  ] UTI ruled out    [  ] Other/Clarification of findings (please specify)_______________    [   ]  Clinically undetermined     IF RULED IN, please specify the POA status:    Present on admission (POA) status:   [   x] Yes (Y)                          [  ] Clinically Undetermined (W)  [   ] No (N)                            [   ] Documentation insufficient to determine if condition is POA (U)       Please document in your progress notes daily for the duration of treatment, until resolved, and include in your discharge summary.

## 2020-11-19 ENCOUNTER — OFFICE VISIT (OUTPATIENT)
Dept: UROLOGY | Facility: CLINIC | Age: 75
End: 2020-11-19
Payer: MEDICARE

## 2020-11-19 VITALS
WEIGHT: 177.94 LBS | DIASTOLIC BLOOD PRESSURE: 60 MMHG | SYSTOLIC BLOOD PRESSURE: 130 MMHG | BODY MASS INDEX: 32.54 KG/M2 | TEMPERATURE: 98 F

## 2020-11-19 DIAGNOSIS — N13.8 BENIGN PROSTATIC HYPERPLASIA WITH URINARY OBSTRUCTION: ICD-10-CM

## 2020-11-19 DIAGNOSIS — N40.1 BENIGN PROSTATIC HYPERPLASIA WITH URINARY OBSTRUCTION: ICD-10-CM

## 2020-11-19 DIAGNOSIS — R31.0 GROSS HEMATURIA: Primary | ICD-10-CM

## 2020-11-19 PROCEDURE — 99213 PR OFFICE/OUTPT VISIT, EST, LEVL III, 20-29 MIN: ICD-10-PCS | Mod: S$PBB,,, | Performed by: UROLOGY

## 2020-11-19 PROCEDURE — 99999 PR PBB SHADOW E&M-EST. PATIENT-LVL III: ICD-10-PCS | Mod: PBBFAC,,, | Performed by: UROLOGY

## 2020-11-19 PROCEDURE — 99213 OFFICE O/P EST LOW 20 MIN: CPT | Mod: S$PBB,,, | Performed by: UROLOGY

## 2020-11-19 PROCEDURE — 99999 PR PBB SHADOW E&M-EST. PATIENT-LVL III: CPT | Mod: PBBFAC,,, | Performed by: UROLOGY

## 2020-11-19 PROCEDURE — 99213 OFFICE O/P EST LOW 20 MIN: CPT | Mod: PBBFAC | Performed by: UROLOGY

## 2020-11-19 NOTE — PROGRESS NOTES
Chief Complaint:   Encounter Diagnoses   Name Primary?    Gross hematuria Yes    Benign prostatic hyperplasia with urinary obstruction      HPI:   11/19/20- patient is here today for a voiding trial, no hematuria.  He is now on dialysis.  11/2/20- patient is here for voiding trial, unfortunately he has a significant amount of blood in the greenfield and coming from around the catheter with minimal amounts of urine in the bag.  Will remove his greenfield and replace.  10/23/20- 75-year-old gentleman status post robotically assisted laparoscopic adrenalectomy, postoperative day 1.  Unfortunately over the evening, the patient was not voiding a Greenfield catheter was placed.  All the patient did have significant output of almost 900 mL, he also had significant gross hematuria.  A [address] Greenfield catheter was attempted to be placed by the nursing staff, and then by myself with no passage beyond the prosthetic urethra.  We also attempted coude catheters, could not get beyond and into the bladder neck.  Patient states that he has never had gross hematuria before, he has never been a smoker.  States that he does have significant BPH, but on treated.  Patient states that he gets up about 8-10 times during the evening, with increased frequency and urgency during the daytime.  Patient has chronic renal insufficiency, with acute renal failure today.  No other urological history, no family history of urological cancers or stones.    Allergies:  Benadryl [diphenhydramine hcl] and Ace inhibitors    Medications:  has a current medication list which includes the following prescription(s): aripiprazole, aspirin, atorvastatin, diphenoxylate-atropine 2.5-0.025 mg, hydralazine, hydrocodone-acetaminophen, insulin aspart u-100, insulin detemir u-100, lactobacillus acidoph-l.bulgar, metoprolol tartrate, multivitamin, nifedipine, sertraline, sevelamer carbonate, and tamsulosin, and the following Facility-Administered Medications: diphenhydramine  and metoclopramide hcl.    Review of Systems:  General: No fever, chills, fatigability, or weight loss.  Skin: No rashes, itching, or changes in color or texture of skin.  Chest: Denies CABRERA, cyanosis, wheezing, cough, and sputum production.  Abdomen: Appetite fine. No weight loss. Denies diarrhea, abdominal pain, hematemesis, or blood in stool.  Musculoskeletal: No joint stiffness or swelling. Denies back pain.  : As above.  All other review of systems negative.    PMH:   has a past medical history of Adrenal adenoma, left, BPH (benign prostatic hyperplasia), CAD (coronary artery disease), CKD (chronic kidney disease) stage 5, GFR less than 15 ml/min, Depression, Diabetes mellitus, Hyperlipidemia, Hypertension, Retinitis, Stroke, and Vitiligo.    PSH:   has a past surgical history that includes Fluoroscopy (Bilateral, 7/20/2020); Robot-assisted surgical removal of adrenal gland using da Min Xi (Left, 10/20/2020); and Robot-assisted adrenalectomy (Left).    FamHx: family history is not on file.    SocHx:  reports that he has never smoked. He has never used smokeless tobacco. He reports that he does not drink alcohol or use drugs.      Physical Exam:  There were no vitals filed for this visit.  General: A&Ox3, no apparent distress, no deformities  Neck: No masses, normal ROM  Lungs: normal inspiration, no use of accessory muscles  Heart: normal pulse, no arrhythmias  Abdomen: Soft, NT, ND, no masses, no hernias, no hepatosplenomegaly  Skin: The skin is warm and dry. No jaundice.  Ext: No c/c/e.  : 11/20- significant hematuria around greenfield, with severe edema of the penis causing phimosis.  Blood at the meatus, greenfield unable to be passed.    Labs/Studies:   Voiding trial instilled 100 ml, no desire to void 11/20  Cysto catheter placement due to false passage 10/23/20    Impression/Plan:     1. Gross hematuria- resolved and occurred initially after greenfield attempt in the hospital, call with a recurrence.  Work up in  the future if recurrent.    2.  BPH- tamsulosin and will continue for now.  Patient with no desire to void, but not producing as much urine now per the family.  Would like to attempt to void on his own, but understands that if he cannot then a greenfield may need to be placed in the ER.  Patient and daughter understand and will contact us with any issues.  Will have him return in 3 weeks for a uroflow/pvr, unless he needs to see us sooner.

## 2020-11-27 ENCOUNTER — TELEPHONE (OUTPATIENT)
Dept: SURGERY | Facility: CLINIC | Age: 75
End: 2020-11-27

## 2020-11-27 NOTE — TELEPHONE ENCOUNTER
Called and spoke with patients daughter who stated patient needed medication refills. [facility] Melgoza. Advised patient PCP would have to refill Zoloft 100mg as well as Lopressor and Flomax 0.4mg.. Patient voiced understanding and stated she will call [facility] and [facility] with Dr Melgoza.

## 2020-12-02 ENCOUNTER — LAB VISIT (OUTPATIENT)
Dept: LAB | Facility: HOSPITAL | Age: 75
End: 2020-12-02
Attending: INTERNAL MEDICINE
Payer: MEDICARE

## 2020-12-02 DIAGNOSIS — N17.9 AKI (ACUTE KIDNEY INJURY): ICD-10-CM

## 2020-12-02 LAB
ALBUMIN SERPL BCP-MCNC: 2.8 G/DL (ref 3.5–5.2)
ANION GAP SERPL CALC-SCNC: 10 MMOL/L (ref 8–16)
BUN SERPL-MCNC: 22 MG/DL (ref 8–23)
CALCIUM SERPL-MCNC: 8.4 MG/DL (ref 8.7–10.5)
CHLORIDE SERPL-SCNC: 107 MMOL/L (ref 95–110)
CO2 SERPL-SCNC: 23 MMOL/L (ref 23–29)
CREAT SERPL-MCNC: 5.3 MG/DL (ref 0.5–1.4)
EST. GFR  (AFRICAN AMERICAN): 11 ML/MIN/1.73 M^2
EST. GFR  (NON AFRICAN AMERICAN): 10 ML/MIN/1.73 M^2
GLUCOSE SERPL-MCNC: 115 MG/DL (ref 70–110)
PHOSPHATE SERPL-MCNC: 2.2 MG/DL (ref 2.7–4.5)
POTASSIUM SERPL-SCNC: 4.1 MMOL/L (ref 3.5–5.1)
SODIUM SERPL-SCNC: 140 MMOL/L (ref 136–145)

## 2020-12-02 PROCEDURE — 36415 COLL VENOUS BLD VENIPUNCTURE: CPT

## 2020-12-02 PROCEDURE — 83970 ASSAY OF PARATHORMONE: CPT

## 2020-12-02 PROCEDURE — 80069 RENAL FUNCTION PANEL: CPT

## 2020-12-03 LAB — PTH-INTACT SERPL-MCNC: 250 PG/ML (ref 9–77)

## 2021-01-07 ENCOUNTER — OUTPATIENT CASE MANAGEMENT (OUTPATIENT)
Dept: ADMINISTRATIVE | Facility: OTHER | Age: 76
End: 2021-01-07

## 2021-02-08 PROBLEM — J81.0 ACUTE PULMONARY EDEMA: Status: RESOLVED | Noted: 2020-11-08 | Resolved: 2021-02-08

## 2021-11-12 ENCOUNTER — TELEPHONE (OUTPATIENT)
Dept: SURGERY | Facility: CLINIC | Age: 76
End: 2021-11-12
Payer: MEDICARE

## 2023-05-30 NOTE — SUBJECTIVE & OBJECTIVE
Interval History:     11/9/20: Patient is feeling fine, no complaints.     11/10/20: Patient resting comfortably, denies any complaints at present.     11/11/20: Patient is currently on hemodialysis. No complications. Vitals are stable. Patient has no complaints.         Review of patient's allergies indicates:   Allergen Reactions    Benadryl [diphenhydramine hcl] Other (See Comments)     Pt states benadryl makes him feel numb    Ace inhibitors      YIN     Current Facility-Administered Medications   Medication Frequency    0.9%  NaCl infusion (for blood administration) Q24H PRN    0.9%  NaCl infusion PRN    0.9%  NaCl infusion Once    0.9%  NaCl infusion PRN    0.9%  NaCl infusion Once    0.9%  NaCl infusion PRN    0.9%  NaCl infusion Once    0.9%  NaCl infusion PRN    0.9%  NaCl infusion Once    acetaminophen tablet 650 mg Q6H PRN    ARIPiprazole tablet 30 mg QHS    aspirin EC tablet 81 mg QHS    atorvastatin tablet 80 mg Daily    cefTRIAXone (ROCEPHIN) 1 g/50 mL D5W IVPB Q24H    cloNIDine tablet 0.2 mg Q6H PRN    dextrose 50% injection 12.5 g PRN    dextrose 50% injection 25 g PRN    furosemide tablet 60 mg BID    glucose chewable tablet 16 g PRN    glucose chewable tablet 24 g PRN    heparin (porcine) injection 3,200 Units PRN    hydrALAZINE tablet 75 mg Q8H    insulin aspart U-100 pen 0-5 Units QID (AC + HS) PRN    iron sucrose (VENOFER) 100 mg in sodium chloride 0.9% 100 mL IVPB PRN    Lactobacillus acidoph-L.bulgar 1 million cell tablet 4 tablet TID WM    metoprolol tartrate (LOPRESSOR) split tablet 12.5 mg BID    mupirocin 2 % ointment BID    NIFEdipine 24 hr tablet 60 mg Daily    ondansetron injection 4 mg Q8H PRN    sertraline tablet 100 mg Daily    sevelamer carbonate tablet 800 mg TID WM    sodium chloride 0.9% flush 10 mL PRN    tamsulosin 24 hr capsule 0.4 mg Daily     Facility-Administered Medications Ordered in Other Encounters   Medication Frequency     Order placed please follow up to let pt know usual process   diphenhydrAMINE injection 25 mg Q6H PRN    metoclopramide HCl injection 10 mg Q10 Min PRN       Objective:     Vital Signs (Most Recent):  Temp: 99 °F (37.2 °C) (11/11/20 1300)  Pulse: 66 (11/11/20 1300)  Resp: 19 (11/11/20 1300)  BP: 122/61 (11/11/20 1300)  SpO2: 99 % (11/11/20 1222)  O2 Device (Oxygen Therapy): nasal cannula(wears at home) (11/11/20 0745) Vital Signs (24h Range):  Temp:  [99 °F (37.2 °C)-99.7 °F (37.6 °C)] 99 °F (37.2 °C)  Pulse:  [66-90] 66  Resp:  [16-20] 19  SpO2:  [96 %-99 %] 99 %  BP: (100-158)/(44-72) 122/61     Weight: 87.3 kg (192 lb 7.4 oz) (11/11/20 0306)  Body mass index is 35.2 kg/m².  Body surface area is 1.95 meters squared.    I/O last 3 completed shifts:  In: 1240 [P.O.:240; Other:1000]  Out: 1100 [Other:1100]    Physical Exam  Constitutional:       Appearance: He is well-developed.   HENT:      Head: Normocephalic.      Nose: No rhinorrhea.      Mouth/Throat:      Pharynx: No oropharyngeal exudate.   Eyes:      Pupils: Pupils are equal, round, and reactive to light.   Neck:      Thyroid: No thyroid mass.   Cardiovascular:      Rate and Rhythm: Normal rate and regular rhythm.      Heart sounds: S1 normal and S2 normal.   Pulmonary:      Effort: Pulmonary effort is normal. No respiratory distress.      Breath sounds: No wheezing.   Abdominal:      General: Bowel sounds are normal. There is no distension.      Palpations: Abdomen is soft.      Tenderness: There is no abdominal tenderness.      Hernia: No hernia is present.   Musculoskeletal:      Right lower leg: Edema present.      Left lower leg: Edema present.   Lymphadenopathy:      Cervical: No cervical adenopathy.   Skin:     General: Skin is warm and dry.   Neurological:      Mental Status: He is alert.   Psychiatric:         Behavior: Behavior is cooperative.         Significant Labs:  Lab Results   Component Value Date    CREATININE 4.7 (H) 11/11/2020    BUN 33 (H) 11/11/2020     (L) 11/11/2020    K 4.1 11/11/2020      11/11/2020    CO2 24 11/11/2020     Lab Results   Component Value Date    .0 (H) 09/04/2020    CALCIUM 7.9 (L) 11/11/2020    PHOS 3.3 11/11/2020     Lab Results   Component Value Date    ALBUMIN 1.9 (L) 11/11/2020     Lab Results   Component Value Date    WBC 8.29 11/11/2020    HGB 7.4 (L) 11/11/2020    HCT 24.0 (L) 11/11/2020    MCV 98 11/11/2020     11/11/2020       Recent Labs   Lab 11/09/20  0611   MG 1.9         Significant Imaging:  Imaging Results          X-Ray Chest AP Portable (Final result)  Result time 11/07/20 22:46:04    Final result by Lul Watkins MD (11/07/20 22:46:04)                 Impression:      Probable pulmonary edema.      Electronically signed by: Lul Watkins  Date:    11/07/2020  Time:    22:46             Narrative:    EXAMINATION:  XR CHEST AP PORTABLE    CLINICAL HISTORY:  shortness of breath;    TECHNIQUE:  Single frontal view of the chest was performed.    COMPARISON:  11/02/2020.    FINDINGS:  Bilateral pleural fluid collections and increased interstitial attenuation lung bases concerning for early pulmonary edema.    The cardiac silhouette is borderline enlarged. The hilar and mediastinal contours are unremarkable.    Degenerative change of the shoulder.

## 2023-10-25 NOTE — PROGRESS NOTES
Detail Level: [facility] Hospital Medicine  Progress Note    Patient Name: Colt Stacy Jr.  MRN: [id number]  Patient Class: IP- Inpatient   Admission Date: 11/7/2020  Length of Stay: 3 days  Attending Physician: Dago Vásquez MD  Primary Care Provider: Primary Doctor No        Subjective:     Principal Problem:Acute pulmonary edema        HPI:  Colt Stacy Jr. is a 75 y.o. male patient with a PMHx of CKD stage V not on dialysis, CAD, HLD, HTN, DM II, h/o CVA, left adrenal adenoma s/p adrenalectomy on 10/20, BPH, chronic hematuria with chronic indwelling greenfield catheter, and vitiligo who presented to the Emergency Department with c/o SOB that has progressively worsened over the past few hours.  No aggravating or alleviating factors.  Associated edema to bilateral lower extremities, bilateral upper extremities, and abdomen.  Denies any chest pain, palpitations, cough, wheezing, congestion, orthopnea, PND, weight gain, abdominal pain, nausea, vomit in, diarrhea, worsening hematuria, decreased UOP, back pain, lightheadedness or dizziness, syncope, weakness, fever or chills. Patient is on 2L O2 at home.  in the ED, patient's O2 sat remained stable on 2 L nasal cannula with some mild respiratory distress noted with RR 22-25.  Initial workup showed:  Normal WBC, H&H 7.3/23.4, creatinine 9.3, BUN 83, potassium 4.7, CO2 20, , troponin 0.092, COVID negative.  EKG unremarkable. CXR showed mild pulmonary edema.  100 mg IV Lasix given in the ED with 800 mL diuresed.  Case discussed with Nephrology who agrees with diuresis and will see patient in consult.  Hospital Medicine was contacted for admission and patient placed in observation for acute pulmonary edema and worsening CKD stage V.  Patient is a full code.  His daughter is SDM.      Overview/Hospital Course:  11/9: dialyzed yesterday by nephro. Will need outpatient dialysis established prior to dc. Iron deficiency noted. Venofer given.   11/10: greenfield removed  yesterday, concerned with UTI. Rocephin started. Establishing outpatient dialysis. Concerned with tightening of foreskin however patient able to urinate. Will need outpatient f/u with urology.   11/11:  Noe placed for urinary retention. Will need outpatient dialysis established. F/u with urology outpatient.   11/12: awaiting outpatient dialysis to be established. Continue rocephin for uti. Urine culture will need to be added on to u/a. Urine culture ordered. Bleeding and discharge still noted around meatus. Will consult urology.     Interval History: No acute events overnight.     Review of Systems   Constitutional: Negative for fatigue and fever.   HENT: Negative for sinus pressure.    Eyes: Negative for visual disturbance.   Respiratory: Negative for shortness of breath.    Cardiovascular: Negative for chest pain.   Gastrointestinal: Negative for nausea and vomiting.   Genitourinary: Positive for discharge and penile swelling. Negative for difficulty urinating, dysuria, flank pain, genital sores, penile pain and scrotal swelling.   Musculoskeletal: Negative for back pain.   Skin: Negative for rash.   Neurological: Negative for headaches.   Psychiatric/Behavioral: Negative for confusion.     Objective:     Vital Signs (Most Recent):  Temp: 98.3 °F (36.8 °C) (11/12/20 1149)  Pulse: 63 (11/12/20 1300)  Resp: 18 (11/12/20 1149)  BP: 132/63 (11/12/20 1149)  SpO2: (!) 94 % (11/12/20 1149) Vital Signs (24h Range):  Temp:  [98.3 °F (36.8 °C)-99.7 °F (37.6 °C)] 98.3 °F (36.8 °C)  Pulse:  [63-86] 63  Resp:  [18-20] 18  SpO2:  [94 %-98 %] 94 %  BP: (111-146)/(55-71) 132/63     Weight: 86 kg (189 lb 9.5 oz)  Body mass index is 34.68 kg/m².    Intake/Output Summary (Last 24 hours) at 11/12/2020 1505  Last data filed at 11/12/2020 1419  Gross per 24 hour   Intake 1034.52 ml   Output 2100 ml   Net -1065.48 ml      Physical Exam  Vitals signs and nursing note reviewed.   Constitutional:       General: He is awake. He is not in  acute distress.     Appearance: Normal appearance. He is well-developed. He is not diaphoretic.   HENT:      Head: Normocephalic and atraumatic.   Eyes:      Conjunctiva/sclera: Conjunctivae normal.      Comments: PERRL; EOM intact.   Neck:      Musculoskeletal: Normal range of motion and neck supple.      Vascular: JVD present.   Cardiovascular:      Rate and Rhythm: Normal rate and regular rhythm.  No extrasystoles are present.     Heart sounds: S1 normal and S2 normal. No murmur.      Comments: Anasarca.  Pulmonary:      Effort: Pulmonary effort is normal. Tachypnea present. No accessory muscle usage or respiratory distress.      Breath sounds: Normal breath sounds and air entry. No wheezing, rhonchi or rales.   Abdominal:      General: Bowel sounds are normal. There is no distension.      Palpations: Abdomen is soft.      Tenderness: There is no abdominal tenderness. There is no guarding or rebound.      Comments: Anasarca.   Musculoskeletal: Normal range of motion.         General: No tenderness or deformity.      Right lower leg: 3+ Pitting Edema present.      Left lower leg: 3+ Pitting Edema present.   Skin:     General: Skin is warm and dry.      Capillary Refill: Capillary refill takes less than 2 seconds.      Findings: No erythema or rash.   Neurological:      General: No focal deficit present.      Mental Status: He is alert and oriented to person, place, and time.   Psychiatric:         Mood and Affect: Mood and affect normal.         Behavior: Behavior normal. Behavior is cooperative.         Significant Labs:   Recent Lab Results       11/12/20  1139   11/12/20  0641   11/12/20  0604   11/12/20  0417   11/11/20  2218        Albumin     1.9         Anion Gap     8         Appearance, UA       Hazy       Bacteria, UA       Occasional       Baso #     0.05         Basophil %     0.6         Bilirubin (UA)       Negative       BUN     25         Calcium     8.0         Chloride     108         CO2     21          Color, UA       Yellow       Creatinine     4.0         Differential Method     Automated         eGFR if      16         eGFR if non      14  Comment:  Calculation used to obtain the estimated glomerular filtration  rate (eGFR) is the CKD-EPI equation.            Eos #     0.4         Eosinophil %     5.3         Glucose     119         Glucose, UA       Negative       Gran # (ANC)     5.5         Gran %     69.8         Hematocrit     25.5         Hemoglobin     7.9         Hyaline Casts, UA       0       Immature Grans (Abs)     0.05  Comment:  Mild elevation in immature granulocytes is non specific and   can be seen in a variety of conditions including stress response,   acute inflammation, trauma and pregnancy. Correlation with other   laboratory and clinical findings is essential.           Immature Granulocytes     0.6         Ketones, UA       Negative       Leukocytes, UA       2+       Lymph #     1.0         Lymph %     12.1         MCH     29.9         MCHC     31.0         MCV     97         Microscopic Comment       SEE COMMENT  Comment:  Other formed elements not mentioned in the report are not   present in the microscopic examination.          Mono #     0.9         Mono %     11.6         MPV     10.9         NITRITE UA       Negative       nRBC     0         Occult Blood UA       Trace       pH, UA       6.0       Phosphorus     3.3         Platelets     215         POCT Glucose 137 114     141     Potassium     4.4         Protein, UA       2+  Comment:  Recommend a 24 hour urine protein or a urine   protein/creatinine ratio if globulin induced proteinuria is  clinically suspected.         RBC     2.64         RBC, UA       1       RDW     13.1         Sodium     137         Specific [place], UA       1.025       Specimen UA       Urine, Catheterized       UROBILINOGEN UA       Negative       WBC, UA       >100       WBC     7.91                           11/11/20  1742        Albumin       Anion Gap       Appearance, UA       Bacteria, UA       Baso #       Basophil %       Bilirubin (UA)       BUN       Calcium       Chloride       CO2       Color, UA       Creatinine       Differential Method       eGFR if        eGFR if non        Eos #       Eosinophil %       Glucose       Glucose, UA       Gran # (ANC)       Gran %       Hematocrit       Hemoglobin       Hyaline Casts, UA       Immature Grans (Abs)       Immature Granulocytes       Ketones, UA       Leukocytes, UA       Lymph #       Lymph %       MCH       MCHC       MCV       Microscopic Comment       Mono #       Mono %       MPV       NITRITE UA       nRBC       Occult Blood UA       pH, UA       Phosphorus       Platelets       POCT Glucose 140     Potassium       Protein, UA       RBC       RBC, UA       RDW       Sodium       Specific Gravity, UA       Specimen UA       UROBILINOGEN UA       WBC, UA       WBC           All pertinent labs within the past 24 hours have been reviewed.    Significant Imaging: I have reviewed all pertinent imaging results/findings within the past 24 hours.      Assessment/Plan:      * Acute pulmonary edema and anasarca  - Secondary to worsening CKD.  Patient progressing towards dialysis.  Nephrology consult.  - 100 mg IV Lasix given in the ED with 800 mL diuresed.  Continue diuresis with 60 mg IV Lasix Q 12.  - Blood pressure control.  - Strict I&Os, daily weights, Na/fluid restriction.  - Recent 2D echo report reviewed.    11/9:  Tolerated dialysis yesterday  Plan for dialysis again today per nephro  Case management on case  Will need outpatient dialysis established     11/10:   Stable  Will need outpatient dialysis established prior to dc     11/11-11/12:  Improved  Will need outpatient dialysis established       Acute cystitis  11/10:  Started rocephin empirically  U/a and culture pending     11/11:  Cont rocephin     11/12:  Cont  Detail Level: Simple rocephin  Urine culture pending         Anemia due to chronic kidney disease  - H&H below baseline at 7.3/23.4.  Follow CBC and transfuse as needed.    ESRD (end stage renal disease)  - Initial creatinine of 9.3.  Patient will likely need RRT in near future.  - Nephrology consult.  - Avoid nephrotoxic agents.  - Follow labs.    11/9:  Dialysis per nephro     11/11-11/12:  Dialysis per nephro     Gross hematuria  - Patient with chronic hematuria and chronic indwelling Noe catheter.    - Avoid antiplatelets/anticoagulation.  - Follow-up with urology outpatient.    11/12:  Bleeding noted around meatus  Along with discharge  Will reconsult urology for evaluation     Type 2 diabetes mellitus, with long-term current use of insulin  - Continue home basal insulin at decreased dose of 15 units nightly.  - Accu-Cheks with low-dose SSI.  - Diabetic diet.  - Recent HbA1c of 6.    11/9:  Controlled  Cont sliding scale     11/10:  Glucose in the 70s this am  Will dc long acting insulin   Cont sliding scale     11/11-11/12:  Cont sliding scale     Essential hypertension  - Blood pressure above goal, but stable on admission.  - Continue home nifedipine, Lopressor, and hydralazine.  Follow BP trends and optimize as needed.  - Diuretics as above.  - Clonidine as needed.      VTE Risk Mitigation (From admission, onward)         Ordered     heparin (porcine) injection 3,200 Units  As needed (PRN)      11/09/20 1800     IP VTE HIGH RISK PATIENT  Once      11/08/20 0226     Place sequential compression device  Until discontinued      11/08/20 0226     Reason for No Pharmacological VTE Prophylaxis  Once     Question:  Reasons:  Answer:  Active Bleeding    11/08/20 0226                Discharge Planning   RICH:      Code Status: Full Code   Is the patient medically ready for discharge?: Yes    Reason for patient still in hospital (select all that apply): Patient trending condition and Pending disposition  Discharge Plan A: Skilled Nursing  Facility                  Dago Vásquez MD  Department of Hospital Medicine   [facility]

## 2024-08-29 NOTE — NURSING
0000- Irrigated bladder with 50mL normal saline, pulled back 50mL of dark pink urine with some blood clots, pt tolerated well, greenfield patent and draining, will continue to monitor    0345- Irrigated bladder with 50mL normal saline, pulled back 50mL of dark pink urine with some blood clots, pt tolerated well, greenfield patent and draining, will continue to monitor   DC info reviewed with patient, all questions answered. Patient well-appearing at time of discharge and vital signs stable. Ambulatory out of ED at this time.

## (undated) DEVICE — SEE MEDLINE ITEM 146420

## (undated) DEVICE — Device

## (undated) DEVICE — CATH TORCON NB C2 5F 65CM

## (undated) DEVICE — SEE MEDLINE ITEM 157187

## (undated) DEVICE — SHEET THYROID W/ISO-BAC

## (undated) DEVICE — SUT SILK 0 SH 30IN BLK BR

## (undated) DEVICE — MANIFOLD 4 PORT

## (undated) DEVICE — TROCAR ENDOPATH XCEL 12X100MM

## (undated) DEVICE — SEALER VESSEL EXTEND

## (undated) DEVICE — OBTURATOR BLADELESS 8MM XI CLR

## (undated) DEVICE — NDL PNEUMO INSUFFLATI 120MM

## (undated) DEVICE — COVER TIP CURVED SCISSORS XI

## (undated) DEVICE — TUBING HEATED INSUFFLATOR

## (undated) DEVICE — DEVICE CLOSURE DISP 14G

## (undated) DEVICE — CLIP HEMO-LOK MLX LARGE LF

## (undated) DEVICE — APPLICATOR CHLORAPREP ORN 26ML

## (undated) DEVICE — SEAL UNIVERSAL 5MM-8MM XI

## (undated) DEVICE — HEMOSTAT SURGICEL 4X8IN

## (undated) DEVICE — ADHESIVE DERMABOND ADVANCED

## (undated) DEVICE — PACK CATH LAB CUSTOM BR

## (undated) DEVICE — CATH 4FR SIM 1

## (undated) DEVICE — SUT CTD VICRYL 0 UND BR SUT

## (undated) DEVICE — DRAPE COLUMN DAVINCI XI

## (undated) DEVICE — KIT PRECISION ACCESS 5FR ECHO

## (undated) DEVICE — GUIDEWIRE ANGLED .035 150CM

## (undated) DEVICE — BAG TISSUE RETRIEVAL 5MM

## (undated) DEVICE — IRRIGATOR ENDOWRIST XI SUCTION

## (undated) DEVICE — SEE MEDLINE ITEM 157027

## (undated) DEVICE — GRASPER TIP RENEW LAP FENEST

## (undated) DEVICE — DRAPE ABDOMINAL TIBURON 14X11

## (undated) DEVICE — ELECTRODE REM PLYHSV RETURN 9

## (undated) DEVICE — CONTAINER SPECIMEN STRL 4OZ

## (undated) DEVICE — TROCAR ENDOPATH XCEL 5X100MM

## (undated) DEVICE — SUT MONOCRYL 4.0 PS2 CP496G

## (undated) DEVICE — KIT ANTIFOG

## (undated) DEVICE — DRAPE ARM DAVINCI XI

## (undated) DEVICE — SEE MEDLINE ITEM 152622

## (undated) DEVICE — DRAPE STERI LONG

## (undated) DEVICE — KIT NEEDLE GUIDE 18G